# Patient Record
Sex: MALE | Race: WHITE | NOT HISPANIC OR LATINO | Employment: FULL TIME | URBAN - METROPOLITAN AREA
[De-identification: names, ages, dates, MRNs, and addresses within clinical notes are randomized per-mention and may not be internally consistent; named-entity substitution may affect disease eponyms.]

---

## 2017-02-18 ENCOUNTER — LAB CONVERSION - ENCOUNTER (OUTPATIENT)
Dept: OTHER | Facility: OTHER | Age: 62
End: 2017-02-18

## 2017-02-18 LAB — PROSTATE SPECIFIC ANTIGEN TOTAL (HISTORICAL): <0.1 NG/ML

## 2017-03-23 ENCOUNTER — ANESTHESIA EVENT (OUTPATIENT)
Dept: GASTROENTEROLOGY | Facility: HOSPITAL | Age: 62
End: 2017-03-23

## 2017-03-23 ENCOUNTER — ANESTHESIA (OUTPATIENT)
Dept: GASTROENTEROLOGY | Facility: HOSPITAL | Age: 62
End: 2017-03-23

## 2017-04-04 ENCOUNTER — ALLSCRIPTS OFFICE VISIT (OUTPATIENT)
Dept: OTHER | Facility: OTHER | Age: 62
End: 2017-04-04

## 2017-05-08 ENCOUNTER — ANESTHESIA EVENT (OUTPATIENT)
Dept: GASTROENTEROLOGY | Facility: HOSPITAL | Age: 62
End: 2017-05-08
Payer: COMMERCIAL

## 2017-05-09 ENCOUNTER — ANESTHESIA (OUTPATIENT)
Dept: GASTROENTEROLOGY | Facility: HOSPITAL | Age: 62
End: 2017-05-09
Payer: COMMERCIAL

## 2017-05-09 ENCOUNTER — HOSPITAL ENCOUNTER (OUTPATIENT)
Facility: HOSPITAL | Age: 62
Setting detail: OUTPATIENT SURGERY
Discharge: HOME | End: 2017-05-09
Attending: INTERNAL MEDICINE | Admitting: INTERNAL MEDICINE
Payer: COMMERCIAL

## 2017-05-09 ENCOUNTER — GENERIC CONVERSION - ENCOUNTER (OUTPATIENT)
Dept: OTHER | Facility: OTHER | Age: 62
End: 2017-05-09

## 2017-05-09 VITALS
HEART RATE: 49 BPM | DIASTOLIC BLOOD PRESSURE: 82 MMHG | BODY MASS INDEX: 42.66 KG/M2 | WEIGHT: 315 LBS | SYSTOLIC BLOOD PRESSURE: 150 MMHG | RESPIRATION RATE: 18 BRPM | TEMPERATURE: 97.5 F | OXYGEN SATURATION: 100 % | HEIGHT: 72 IN

## 2017-05-09 DIAGNOSIS — K63.5 POLYP OF COLON: ICD-10-CM

## 2017-05-09 PROCEDURE — 88305 TISSUE EXAM BY PATHOLOGIST: CPT | Performed by: INTERNAL MEDICINE

## 2017-05-09 RX ORDER — OMEPRAZOLE 20 MG/1
20 CAPSULE, DELAYED RELEASE ORAL AS NEEDED
COMMUNITY

## 2017-05-09 RX ORDER — SODIUM CHLORIDE, SODIUM LACTATE, POTASSIUM CHLORIDE, CALCIUM CHLORIDE 600; 310; 30; 20 MG/100ML; MG/100ML; MG/100ML; MG/100ML
100 INJECTION, SOLUTION INTRAVENOUS CONTINUOUS
Status: DISCONTINUED | OUTPATIENT
Start: 2017-05-09 | End: 2017-05-09 | Stop reason: HOSPADM

## 2017-05-09 RX ORDER — LIDOCAINE HYDROCHLORIDE 10 MG/ML
INJECTION, SOLUTION INFILTRATION; PERINEURAL AS NEEDED
Status: DISCONTINUED | OUTPATIENT
Start: 2017-05-09 | End: 2017-05-09 | Stop reason: SURG

## 2017-05-09 RX ORDER — TRAMADOL HYDROCHLORIDE 50 MG/1
50 TABLET ORAL EVERY 6 HOURS PRN
Status: ON HOLD | COMMUNITY
End: 2017-05-09 | Stop reason: ALTCHOICE

## 2017-05-09 RX ORDER — PROPOFOL 10 MG/ML
INJECTION, EMULSION INTRAVENOUS AS NEEDED
Status: DISCONTINUED | OUTPATIENT
Start: 2017-05-09 | End: 2017-05-09 | Stop reason: SURG

## 2017-05-09 RX ORDER — LISINOPRIL 40 MG/1
20 TABLET ORAL DAILY
COMMUNITY
End: 2021-04-23 | Stop reason: CLARIF

## 2017-05-09 RX ORDER — ALLOPURINOL 100 MG/1
100 TABLET ORAL DAILY
COMMUNITY
End: 2021-06-28 | Stop reason: ALTCHOICE

## 2017-05-09 RX ORDER — TADALAFIL 5 MG/1
5 TABLET ORAL DAILY PRN
COMMUNITY
End: 2021-06-28 | Stop reason: ALTCHOICE

## 2017-05-09 RX ORDER — ASPIRIN 81 MG/1
81 TABLET, CHEWABLE ORAL DAILY
COMMUNITY
End: 2021-06-28 | Stop reason: ALTCHOICE

## 2017-05-09 RX ORDER — SIMVASTATIN 80 MG
40 TABLET ORAL
COMMUNITY
End: 2021-04-23 | Stop reason: SDUPTHER

## 2017-05-09 RX ORDER — OMEGA-3 FATTY ACIDS CAP DELAYED RELEASE 1000 MG 1000 MG
1000 CAPSULE DELAYED RELEASE ORAL DAILY
COMMUNITY

## 2017-05-09 RX ORDER — FEXOFENADINE HCL 180 MG/1
180 TABLET ORAL DAILY PRN
COMMUNITY

## 2017-05-09 RX ADMIN — PROPOFOL 50 MG: 10 INJECTION, EMULSION INTRAVENOUS at 14:01

## 2017-05-09 RX ADMIN — PROPOFOL 50 MG: 10 INJECTION, EMULSION INTRAVENOUS at 13:59

## 2017-05-09 RX ADMIN — PROPOFOL 20 MG: 10 INJECTION, EMULSION INTRAVENOUS at 13:55

## 2017-05-09 RX ADMIN — SODIUM CHLORIDE, SODIUM LACTATE, POTASSIUM CHLORIDE, AND CALCIUM CHLORIDE 100 ML/HR: .6; .31; .03; .02 INJECTION, SOLUTION INTRAVENOUS at 13:20

## 2017-05-09 RX ADMIN — PROPOFOL 100 MG: 10 INJECTION, EMULSION INTRAVENOUS at 13:53

## 2017-05-09 RX ADMIN — PROPOFOL 30 MG: 10 INJECTION, EMULSION INTRAVENOUS at 13:57

## 2017-05-09 RX ADMIN — PROPOFOL 20 MG: 10 INJECTION, EMULSION INTRAVENOUS at 13:54

## 2017-05-09 RX ADMIN — LIDOCAINE HYDROCHLORIDE 50 MG: 10 INJECTION, SOLUTION INFILTRATION; PERINEURAL at 13:53

## 2017-05-15 ENCOUNTER — GENERIC CONVERSION - ENCOUNTER (OUTPATIENT)
Dept: OTHER | Facility: OTHER | Age: 62
End: 2017-05-15

## 2017-08-23 ENCOUNTER — LAB CONVERSION - ENCOUNTER (OUTPATIENT)
Dept: OTHER | Facility: OTHER | Age: 62
End: 2017-08-23

## 2017-08-23 LAB — PROSTATE SPECIFIC ANTIGEN TOTAL (HISTORICAL): <0.1 NG/ML

## 2017-10-01 DIAGNOSIS — C61 MALIGNANT NEOPLASM OF PROSTATE (HCC): ICD-10-CM

## 2017-10-10 ENCOUNTER — ALLSCRIPTS OFFICE VISIT (OUTPATIENT)
Dept: OTHER | Facility: OTHER | Age: 62
End: 2017-10-10

## 2018-01-09 NOTE — RESULT NOTES
Discussion/Summary    Colon polyp removed came back as benign hyperplastic  Repeat colonoscopy in 5 years    DISCUSSED WITH PATIENT, REMINDER SET  CS         Verified Results  (1) TISSUE EXAM [id number] 02:00PM Dana Jimenez     Test Name Result Flag Reference   LAB AP CASE REPORT (Report)     Surgical Pathology Report             Case: [id number]                   Authorizing Provider: Merna John MD     Collected:      05/09/2017 1400        Ordering Location:   [facility]    Received:      05/09/2017 [facility]                               Pathologist:      Eliza Ybarra DO                               Specimen:  Large Intestine, Left/Descending Colon, Cold Bx Polypectomy Descending colon   LAB AP FINAL DIAGNOSIS (Report)     A  Colon, descending polyp, polypectomy:  - Hyperplastic polyp   - Benign polypoid fragment of colonic mucosa with submucosal lymphoid   aggregate  Interpretation performed at [facility], [place] [address]  Electronically signed by Eliza Ybarra DO on 5/10/2017 at 12:29 PM   LAB AP SURGICAL ADDITIONAL INFORMATION (Report)     These tests were developed and their performance characteristics   determined by [facility] or [email or web address]  They may not be cleared or approved by the [place]  Food and   Drug Administration  The FDA has determined that such clearance or   approval is not necessary  These tests are used for clinical purposes  They should not be regarded as investigational or for research  This   laboratory has been approved by CLIA 88, designated as a high-complexity   laboratory and is qualified to perform these tests  LAB AP GROSS DESCRIPTION (Report)     A  The specimen is received in formalin, labeled with the patient's name   and hospital number, and is designated polypectomy descending colon     The specimen consists of 2 tan soft tissue fragments measuring 0 2 and 0 3   cm  Entirely submitted  One cassette  Note: The estimated total formalin fixation time based upon information   provided by the submitting clinician and the standard processing schedule   is 14 5 hours      MAC

## 2018-01-13 VITALS
SYSTOLIC BLOOD PRESSURE: 128 MMHG | DIASTOLIC BLOOD PRESSURE: 80 MMHG | BODY MASS INDEX: 42.66 KG/M2 | HEIGHT: 72 IN | WEIGHT: 315 LBS | HEART RATE: 74 BPM

## 2018-01-14 VITALS
SYSTOLIC BLOOD PRESSURE: 130 MMHG | HEART RATE: 50 BPM | DIASTOLIC BLOOD PRESSURE: 68 MMHG | BODY MASS INDEX: 39.14 KG/M2 | HEIGHT: 72 IN | WEIGHT: 289 LBS

## 2018-01-16 NOTE — MISCELLANEOUS
Message  GI Reminder Recall Trent Coyle:   Date: 12/19/2016   Dear Mose Duverney BARROWS:     Review of our records shows you are due for the following: Colonoscopy  Please call the following office to schedule your appointment:   [address] [facility], [address], [address] [place] [address]  [phone]  We look forward to hearing from you!      Sincerely,         [facility]  Dr Erna Beckett    Electronically signed by : Wendi Castrejon, ; Dec 19 2016  9:29AM EST                       (Author)

## 2018-01-16 NOTE — PROGRESS NOTES
Assessment    1  Prostate cancer (185) (C61)    Plan  Prostate cancer    · (1) PSA, DIAGNOSTIC (FOLLOW-UP); Status:Active; Requested for:[id number];    Perform:[facility]; EDUARD:[id number]; Ordered; For:Prostate cancer; Ordered By:Edouard Blunt;   · Urine Dip Non-Automated- POC; Status:Complete - Retrospective By Protocol  Authorization;   Done: [id number] 08:44AM   Performed: In Office; Due:56Pyc8549; Last Updated By:Silke Flor; 8/26/2016 8:43:30 AM;Ordered; For:Prostate cancer; Ordered By:Edouard Blunt;   · Follow-up visit in 6 months Evaluation and Treatment  Follow-up  Status: Hold For -  Scheduling  Requested for: 04Wib4694   Ordered; For: Prostate cancer; Ordered By: Fatemeh Herndon Performed:  Due: 53Oxb9447    Discussion/Summary  Discussion Summary:   Radha Garcia is a 64year old gentleman here for follow up s/p DVP 11/2012 for his [place] 6 T2C prostate cancer managed by Dr Jen Lyle  PSA remains undetectable  Reviewed Kegel exercises for his leakage  Discussed dietary and behavioral modifications for his nocturia  Viagra samples given  Follow up in 6 months with a PSA prior to the visit  Medication SE Review and Pt Understands Tx: Possible side effects of new medications were reviewed with the patient/guardian today  Understands and agrees with treatment plan: The treatment plan was reviewed with the patient/guardian  The patient/guardian understands and agrees with the treatment plan      Chief Complaint  Chief Complaint Free Text Note Form: CAP S/P DVP (11/2012); ED  PSA= ,0 1 (8/24/16)      History of Present Illness  HPI: Radha Garcia is a 64year old gentleman here for follow up s/p DVP 11/2012 for his [place] 6 T2C prostate cancer  PSAs have been undetectable  Denies any hematuria or dysuria  Complains of nocturia 1-3 but does drink iced tea at dinner  Feels empty when voids and stream is good  Has some rare stress incontinence but does not wear pads  Occasionally has some post void dribbling   No interval health changes  Review of Systems  Complete-Male Urology:   Constitutional: No fever or chills, feels well, no tiredness, no recent weight gain or weight loss  Respiratory: No complaints of shortness of breath, no wheezing, no cough, no SOB on exertion, no orthopnea or PND  Cardiovascular: No complaints of slow heart rate, no fast heart rate, no chest pain, no palpitations, no leg claudication, no lower extremity  Gastrointestinal: No complaints of abdominal pain, no constipation, no nausea or vomiting, no diarrhea or bloody stools  Genitourinary: Empty sensation, feelings of urinary urgency and stream quality good, but no dysuria, no urinary hesitancy, no hematuria and no incontinence    The patient presents with complaints of nocturia (1-3 TIMES A NIGHT )  Musculoskeletal: No complaints of arthralgia, no myalgias, no joint swelling or stiffness, no limb pain or swelling  Integumentary: No complaints of skin rash or skin lesions, no itching, no skin wound, no dry skin  Hematologic/Lymphatic: No complaints of swollen glands, no swollen glands in the neck, does not bleed easily, no easy bruising  Neurological: No compliants of headache, no confusion, no convulsions, no numbness or tingling, no dizziness or fainting, no limb weakness, no difficulty walking  ROS Reviewed:   ROS reviewed  Active Problems    1  Adenomatous colon polyp (211 3) (D12 6)   2  Arthritis (716 90) (M19 90)   3  Organic impotence (607 84) (N52 9)   4  Prostate cancer (185) (C61)    Past Medical History    1  History of Reported Prostate Antigen Blood Test  Active Problems And Past Medical History Reviewed: The active problems and past medical history were reviewed and updated today  Surgical History    1  History of Needle Biopsy Of Prostate   2  History of Prostatectomy Robotic-Assisted  Surgical History Reviewed: The surgical history was reviewed and updated today  Family History  Mother    1  Family history of Brain Cancer (V16 8)  Father    2  Family history of Heart Disease (V17 49)  Family History Reviewed: The family history was reviewed and updated today  Social History    · Former smoker (F28 97) (I82 385)  Social History Reviewed: The social history was reviewed and updated today  The social history was reviewed and is unchanged  Current Meds   1  Adult Aspirin EC Low Strength 81 MG Oral Tablet Delayed Release Recorded   2  [employer] Vitamin B-12 TABS Recorded   3  [employer] Vitamin C 500 MG TABS Recorded   4  Allopurinol 100 MG Oral Tablet Recorded   5  Cialis 20 MG Oral Tablet; Take as directed; Therapy: 15JWH3400 to (Evaluate:20Jan2017); Last Rx:26Jan2016 Ordered   6  Daily Multiple Vitamins TABS Recorded   7  Fexofenadine HCl - 180 MG Oral Tablet Recorded   8  Fish Oil CAPS Recorded   9  Lisinopril 40 MG Oral Tablet; Therapy: 39QVM5443 to (Evaluate:96Wbw6683) Recorded   10  Omeprazole 20 MG Oral Capsule Delayed Release; Therapy: 38VOT8175 to (Evaluate:62Bbm0780) Recorded   11  Simvastatin 80 MG Oral Tablet Recorded  Medication List Reviewed: The medication list was reviewed and updated today  Allergies    1  Penicillins    Vitals  Vital Signs    Recorded: 10SLS5935 09:77HR   Systolic 960   Diastolic 86   Heart Rate 64   Height 6 ft    Weight 345 lb    BMI Calculated 46 79   BSA Calculated 2 69     Physical Exam    Constitutional   General appearance: Abnormal   obese  Head and Face   Head and face: Normal     Eyes   Conjunctiva and lids: No erythema, swelling or discharge  Ears, Nose, Mouth, and Throat   Hearing: Normal     Pulmonary   Respiratory effort: No increased work of breathing or signs of respiratory distress  Cardiovascular   Examination of extremities for edema and/or varicosities: Normal     Abdomen   Abdomen: Abnormal   obese     Musculoskeletal   Gait and station: Normal     Skin   Skin and subcutaneous tissue: Normal without rashes or lesions  Neurologic   Cranial nerves: Cranial nerves 2-12 intact  Psychiatric   Judgment and insight: Normal     Orientation to person, place and time: Normal     Recent and remote memory: Intact  Mood and affect: Normal        Results/Data  Urine Dip Non-Automated- POC 75Gib9606 08:44AM Yonatan Mauritian     Test Name Result Flag Reference   Color Yellow     Clarity Hazy     Leukocytes neg     Nitrite neg     Blood neg     Protein 30     Ph 6 0     Specific Gravity 1 025     Ketone neg     Glucose neg       (1) PSA, DIAGNOSTIC (FOLLOW-UP) 91Vdr7568 10:19AM Julia Dudley Order Number: [id number]     Test Name Result Flag Reference   PSA <0 1 ng/mL  0 0-4 0     Future Appointments    Date/Time Provider Specialty Site   02/27/2017 08:45 AM [facility] [address]     Signatures   Electronically signed by : Isaiah Dominguez;  Aug 26 2016  9:09AM EST                       (Author)    Electronically signed by : DEREK Brown ; Aug 26 2016  4:05PM EST

## 2018-10-10 DIAGNOSIS — C61 MALIGNANT NEOPLASM OF PROSTATE (HCC): ICD-10-CM

## 2018-11-01 ENCOUNTER — TRANSCRIBE ORDERS (OUTPATIENT)
Dept: LAB | Facility: CLINIC | Age: 63
End: 2018-11-01

## 2018-11-01 ENCOUNTER — APPOINTMENT (OUTPATIENT)
Dept: LAB | Facility: CLINIC | Age: 63
End: 2018-11-01
Payer: COMMERCIAL

## 2018-11-01 DIAGNOSIS — C61 PROSTATE CANCER (HCC): ICD-10-CM

## 2018-11-01 DIAGNOSIS — C61 PROSTATE CANCER (HCC): Primary | ICD-10-CM

## 2018-11-01 LAB — PSA SERPL-MCNC: <0.1 NG/ML (ref 0–4)

## 2018-11-01 PROCEDURE — 84153 ASSAY OF PSA TOTAL: CPT

## 2018-11-05 ENCOUNTER — OFFICE VISIT (OUTPATIENT)
Dept: UROLOGY | Facility: CLINIC | Age: 63
End: 2018-11-05
Payer: COMMERCIAL

## 2018-11-05 VITALS
DIASTOLIC BLOOD PRESSURE: 78 MMHG | BODY MASS INDEX: 39.28 KG/M2 | HEART RATE: 75 BPM | WEIGHT: 290 LBS | HEIGHT: 72 IN | SYSTOLIC BLOOD PRESSURE: 122 MMHG

## 2018-11-05 DIAGNOSIS — C61 PROSTATE CANCER (HCC): Primary | ICD-10-CM

## 2018-11-05 DIAGNOSIS — N52.31 ERECTILE DYSFUNCTION AFTER RADICAL PROSTATECTOMY: ICD-10-CM

## 2018-11-05 PROCEDURE — 99213 OFFICE O/P EST LOW 20 MIN: CPT | Performed by: PHYSICIAN ASSISTANT

## 2018-11-05 RX ORDER — SILDENAFIL CITRATE 20 MG/1
TABLET ORAL
Qty: 30 TABLET | Refills: 6 | Status: SHIPPED | OUTPATIENT
Start: 2018-11-05 | End: 2021-06-28 | Stop reason: ALTCHOICE

## 2018-11-05 NOTE — PROGRESS NOTES
1  Prostate cancer ([facility] [address] )  PSA Total, Diagnostic   2  Erectile dysfunction after radical prostatectomy  Impotence Aid Device (ERECAID CLASSIC) KIT    sildenafil (REVATIO) 20 mg tablet     Assessment and plan:       1  Gl 6 T2C prostate cancer s/p DVP 11/2012 managed by Dr Janeen Montoya  - PSA undetectable  - f/u 1 year PSA prior to visit    2  Erectile dysfunction   - discussed continuing Viagra 100mg, adding Vacuum assisted device, versus intracavernosal injections  Patient wishes to try a vacuum  RX provided and refills of Sildenafil sent to pharmacy  Fantasma Ellis PA-C      Chief Complaint     Chief Complaint   Patient presents with    Prostate Cancer     S/P DVP 2012    Erectile Dysfunction       History of Present Illness     Leona Marcos is a 63 y o  male patient of Dr Janeen Montoya with a history of [place] 6 T2C prostate cancer s/p DVP 11/2012 presenting for follow up  PSA remains undetactable at <0 1  Patient utilizes Viagra 100mg PRN erectile dysfunction  Is worried about trimix injections as he has a buried penis and does not have good visualization for injections  Patient overall comfortable with urination  Has rare stress incontinence  Denies dysuria, gross hematuria, suprapubic pressure, bone pain, or weight loss  Laboratory     Lab Results   Component Value Date    PSA <0 1 11/01/2018    PSA <0 1 08/24/2016    PSA <0 1 01/25/2016       Review of Systems     Review of Systems   Constitutional: Negative for activity change, appetite change, chills, diaphoresis, fatigue, fever and unexpected weight change  Respiratory: Negative for chest tightness and shortness of breath  Cardiovascular: Negative for chest pain, palpitations and leg swelling  Gastrointestinal: Negative for abdominal distention, abdominal pain, constipation, diarrhea, nausea and vomiting     Genitourinary: Negative for decreased urine volume, difficulty urinating, dysuria, enuresis, flank pain, frequency, genital sores, hematuria and urgency  Musculoskeletal: Negative for back pain, gait problem and myalgias  Skin: Negative for color change, pallor, rash and wound  Psychiatric/Behavioral: Negative for behavioral problems  The patient is not nervous/anxious  AUA SYMPTOM SCORE      Most Recent Value   AUA SYMPTOM SCORE   How often have you had a sensation of not emptying your bladder completely after you finished urinating? 0   How often have you had to urinate again less than two hours after you finished urinating? 4   How often have you found you stopped and started again several times when you urinate?  0   How often have you found it difficult to postpone urination? 2   How often have you had a weak urinary stream?  0   How often have you had to push or strain to begin urination? 0   How many times did you most typically get up to urinate from the time you went to bed at night until the time you got up in the morning? 2   Quality of Life: If you were to spend the rest of your life with your urinary condition just the way it is now, how would you feel about that?  1   AUA SYMPTOM SCORE  8          Allergies     Allergies   Allergen Reactions    Penicillins Rash     sensitivity       Physical Exam     Physical Exam   Constitutional: He is oriented to person, place, and time  He appears well-developed and well-nourished  No distress  HENT:   Head: Normocephalic and atraumatic  Eyes: Conjunctivae are normal    Neck: Normal range of motion  No tracheal deviation present  Pulmonary/Chest: Effort normal    Abdominal:   obese   Musculoskeletal: Normal range of motion  He exhibits no edema or deformity  Neurological: He is alert and oriented to person, place, and time  Skin: Skin is warm and dry  No rash noted  He is not diaphoretic  No erythema  Psychiatric: He has a normal mood and affect   His behavior is normal          Vital Signs     Vitals:    11/05/18 0908   BP: 122/78   BP Location: Left arm   Patient Position: Sitting   Cuff Size: Standard   Pulse: 75   Weight: 132 kg (290 lb)   Height: 6' (1 829 m)         Current Medications       Current Outpatient Prescriptions:     allopurinol (ZYLOPRIM) 100 mg tablet, Take 100 mg by mouth daily, Disp: , Rfl:     Ascorbic Acid (VITAMIN C PO), Take 1,000 mg by mouth daily, Disp: , Rfl:     Cyanocobalamin (VITAMIN B-12 PO), Take 1,000 mg by mouth daily, Disp: , Rfl:     fexofenadine (ALLEGRA) 180 MG tablet, Take 180 mg by mouth daily as needed, Disp: , Rfl:     lisinopril (ZESTRIL) 40 mg tablet, Take 20 mg by mouth daily  , Disp: , Rfl:     Multiple Vitamins-Minerals (MULTIVITAL PO), Take 1 tablet by mouth, Disp: , Rfl:     Omega-3 Fatty Acids (FISH OIL) 1000 MG CPDR, Take 1,000 mg by mouth daily, Disp: , Rfl:     omeprazole (PriLOSEC) 20 mg delayed release capsule, Take 20 mg by mouth as needed, Disp: , Rfl:     simvastatin (ZOCOR) 80 mg tablet, Take 40 mg by mouth daily at bedtime  , Disp: , Rfl:     tadalafil (CIALIS) 5 MG tablet, Take 5 mg by mouth daily as needed for erectile dysfunction, Disp: , Rfl:     aspirin 81 mg chewable tablet, Chew 81 mg daily, Disp: , Rfl:     Impotence Aid Device (ERECAID CLASSIC) KIT, by Does not apply route as needed (erectile dysfunction), Disp: 1 each, Rfl: 1    sildenafil (REVATIO) 20 mg tablet, Take 3-5 tablets PO PRN erectile dysfunction, Disp: 30 tablet, Rfl: 6      Active Problems     Patient Active Problem List   Diagnosis    Prostate cancer ([facility] [address] )    Erectile dysfunction after radical prostatectomy         Past Medical History     Past Medical History:   Diagnosis Date    GERD (gastroesophageal reflux disease)     Hyperlipidemia     Hypertension          Surgical History     Past Surgical History:   Procedure Laterality Date    HIP SURGERY Bilateral     CA COLONOSCOPY FLX DX W/COLLJ SPEC WHEN PFRMD N/A 5/9/2017    Procedure: COLONOSCOPY;  Surgeon: Radha Juarez MD;  [facility];   Service: Gastroenterology    PROSTATECTOMY           Family History     History reviewed  No pertinent family history        Social History     Social History       Radiology

## 2019-02-24 ENCOUNTER — HOSPITAL ENCOUNTER (EMERGENCY)
Facility: HOSPITAL | Age: 64
Discharge: HOME | End: 2019-02-24
Attending: EMERGENCY MEDICINE
Payer: COMMERCIAL

## 2019-02-24 ENCOUNTER — APPOINTMENT (EMERGENCY)
Dept: RADIOLOGY | Facility: HOSPITAL | Age: 64
End: 2019-02-24
Payer: COMMERCIAL

## 2019-02-24 VITALS
HEART RATE: 62 BPM | SYSTOLIC BLOOD PRESSURE: 188 MMHG | TEMPERATURE: 98.8 F | RESPIRATION RATE: 18 BRPM | DIASTOLIC BLOOD PRESSURE: 86 MMHG | OXYGEN SATURATION: 98 %

## 2019-02-24 DIAGNOSIS — S62.609A FINGER FRACTURE: Primary | ICD-10-CM

## 2019-02-24 DIAGNOSIS — S61.214A LACERATION OF RIGHT RING FINGER WITHOUT FOREIGN BODY WITHOUT DAMAGE TO NAIL, INITIAL ENCOUNTER: ICD-10-CM

## 2019-02-24 PROCEDURE — 90471 IMMUNIZATION ADMIN: CPT

## 2019-02-24 PROCEDURE — 73130 X-RAY EXAM OF HAND: CPT

## 2019-02-24 PROCEDURE — 90715 TDAP VACCINE 7 YRS/> IM: CPT | Performed by: PHYSICIAN ASSISTANT

## 2019-02-24 PROCEDURE — 99283 EMERGENCY DEPT VISIT LOW MDM: CPT

## 2019-02-24 RX ORDER — GINSENG 100 MG
1 CAPSULE ORAL ONCE
Status: COMPLETED | OUTPATIENT
Start: 2019-02-24 | End: 2019-02-24

## 2019-02-24 RX ORDER — LIDOCAINE HYDROCHLORIDE 10 MG/ML
5 INJECTION, SOLUTION EPIDURAL; INFILTRATION; INTRACAUDAL; PERINEURAL ONCE
Status: COMPLETED | OUTPATIENT
Start: 2019-02-24 | End: 2019-02-24

## 2019-02-24 RX ORDER — ACETAMINOPHEN 325 MG/1
650 TABLET ORAL ONCE
Status: COMPLETED | OUTPATIENT
Start: 2019-02-24 | End: 2019-02-24

## 2019-02-24 RX ADMIN — BACITRACIN ZINC 1 SMALL APPLICATION: 500 OINTMENT TOPICAL at 20:06

## 2019-02-24 RX ADMIN — LIDOCAINE HYDROCHLORIDE 5 ML: 10 INJECTION, SOLUTION EPIDURAL; INFILTRATION; INTRACAUDAL; PERINEURAL at 19:00

## 2019-02-24 RX ADMIN — ACETAMINOPHEN 650 MG: 325 TABLET, FILM COATED ORAL at 20:05

## 2019-02-24 RX ADMIN — TETANUS TOXOID, REDUCED DIPHTHERIA TOXOID AND ACELLULAR PERTUSSIS VACCINE, ADSORBED 0.5 ML: 5; 2.5; 8; 8; 2.5 SUSPENSION INTRAMUSCULAR at 19:01

## 2019-02-25 NOTE — ED PROVIDER NOTES
History  Chief Complaint   Patient presents with    Finger Injury     Patient fell "while showing off" and injured right ring finger  "I think the nail bed might be off  I fell by a cart, and pinched my finger when I got up "      28-year-old male presenting for laceration of the finger  Patient reports he was playing on the back of a shopping cart when the cart fell over crushing his right ring finger  Patient is left hand dominant  He states that he wrapped the area immediately with a napkin is unsure what the laceration looks like at this time  He denies any numbness tingling or weakness  He states he is unsure of tetanus status  Prior to Admission Medications   Prescriptions Last Dose Informant Patient Reported? Taking?    Ascorbic Acid (VITAMIN C PO)  Self Yes No   Sig: Take 1,000 mg by mouth daily   Cyanocobalamin (VITAMIN B-12 PO)  Self Yes No   Sig: Take 1,000 mg by mouth daily   Impotence Aid Device (ERECAID CLASSIC) KIT   No No   Sig: by Does not apply route as needed (erectile dysfunction)   Multiple Vitamins-Minerals (MULTIVITAL PO)  Self Yes No   Sig: Take 1 tablet by mouth   Omega-3 Fatty Acids (FISH OIL) 1000 MG CPDR  Self Yes No   Sig: Take 1,000 mg by mouth daily   allopurinol (ZYLOPRIM) 100 mg tablet  Self Yes No   Sig: Take 100 mg by mouth daily   aspirin 81 mg chewable tablet  Self Yes No   Sig: Chew 81 mg daily   fexofenadine (ALLEGRA) 180 MG tablet  Self Yes No   Sig: Take 180 mg by mouth daily as needed   lisinopril (ZESTRIL) 40 mg tablet  Self Yes No   Sig: Take 20 mg by mouth daily     omeprazole (PriLOSEC) 20 mg delayed release capsule  Self Yes No   Sig: Take 20 mg by mouth as needed   sildenafil (REVATIO) 20 mg tablet   No No   Sig: Take 3-5 tablets PO PRN erectile dysfunction   simvastatin (ZOCOR) 80 mg tablet  Self Yes No   Sig: Take 40 mg by mouth daily at bedtime     tadalafil (CIALIS) 5 MG tablet  Self Yes No   Sig: Take 5 mg by mouth daily as needed for erectile dysfunction      Facility-Administered Medications: None       Past Medical History:   Diagnosis Date    GERD (gastroesophageal reflux disease)     Hyperlipidemia     Hypertension        Past Surgical History:   Procedure Laterality Date    HIP SURGERY Bilateral     MS COLONOSCOPY FLX DX W/COLLJ SPEC WHEN PFRMD N/A 2017    Procedure: COLONOSCOPY;  Surgeon: Esther Bazan MD;  [facility]; Service: Gastroenterology    PROSTATECTOMY         History reviewed  No pertinent family history  I have reviewed and agree with the history as documented  Social History     Tobacco Use    Smoking status: Former Smoker     Last attempt to quit: 1992     Years since quittin 8    Smokeless tobacco: Never Used   Substance Use Topics    Alcohol use: Yes     Comment: rarely    Drug use: No        Review of Systems   All other systems reviewed and are negative  Physical Exam  Physical Exam   Constitutional: He is oriented to person, place, and time  He appears well-developed and well-nourished  No distress  HENT:   Head: Normocephalic and atraumatic  Eyes: Conjunctivae are normal    EOM grossly intact   Neck: Normal range of motion  Neck supple  No JVD present  Cardiovascular: Normal rate  Pulmonary/Chest: Effort normal    Abdominal: Soft  Musculoskeletal:        Hands:  FROM right upper extremity, steady gait, cap refill brisk, strength and sensation intact throughout, radial and ulnar pulses intact right upper extremity   Neurological: He is alert and oriented to person, place, and time  Skin: Skin is warm and dry  Capillary refill takes less than 2 seconds  Psychiatric: He has a normal mood and affect  His behavior is normal    Nursing note and vitals reviewed        Vital Signs  ED Triage Vitals   Temperature Pulse Respirations Blood Pressure SpO2   19 1809 19 1808 19 1808 19 1808 19 180   98 8 °F (37 1 °C) 62 18 (!) 188/86 98 %      Temp Source Heart Rate Source Patient Position - Orthostatic VS BP Location FiO2 (%)   02/24/19 1809 02/24/19 1808 02/24/19 1808 02/24/19 1808 --   Oral Monitor Sitting Left arm       Pain Score       02/24/19 1808       6           Vitals:    02/24/19 1808   BP: (!) 188/86   Pulse: 62   Patient Position - Orthostatic VS: Sitting       Visual Acuity      ED Medications  Medications   tetanus-diphtheria-acellular pertussis (BOOSTRIX) IM injection 0 5 mL (0 5 mL Intramuscular Given 2/24/19 1901)   lidocaine (PF) (XYLOCAINE-MPF) 1 % injection 5 mL (5 mL Infiltration Given by Other 2/24/19 1900)   bacitracin topical ointment 1 small application (1 small application Topical Given 2/24/19 2006)   acetaminophen (TYLENOL) tablet 650 mg (650 mg Oral Given 2/24/19 2005)       Diagnostic Studies  Results Reviewed     None                 XR hand 3+ views RIGHT    (Results Pending)              Procedures  Lac Repair  Date/Time: 2/24/2019 10:13 PM  Performed by: Mandi Lopez PA-C  Authorized by: Mandi Lopez PA-C   Consent: Verbal consent obtained  Risks and benefits: risks, benefits and alternatives were discussed  Consent given by: patient  Required items: required blood products, implants, devices, and special equipment available  Patient identity confirmed: verbally with patient  Time out: Immediately prior to procedure a "time out" was called to verify the correct patient, procedure, equipment, support staff and site/side marked as required    Body area: upper extremity  Location details: right ring finger  Laceration length: 2 cm  Foreign bodies: no foreign bodies  Tendon involvement: none  Nerve involvement: none  Anesthesia: local infiltration and digital block    Anesthesia:  Local Anesthetic: lidocaine 1% without epinephrine  Anesthetic total: 4 mL    Sedation:  Patient sedated: no      Wound Dehiscence:  Superficial Wound Dehiscence: simple closure      Procedure Details:  Irrigation solution: saline  Amount of cleaning: standard  Debridement: none  Degree of undermining: none  Skin closure: 5-0 nylon  Number of sutures: 4  Technique: simple  Approximation: close  Approximation difficulty: simple  Dressing: 4x4 sterile gauze and splint  Patient tolerance: Patient tolerated the procedure well with no immediate complications  Comments: Pt nail partially avulsed, placed back under cuticle, nail bed uninterrupted             Phone Contacts  ED Phone Contact    ED Course                               MDM  Number of Diagnoses or Management Options  Finger fracture:   Laceration of right ring finger without foreign body without damage to nail, initial encounter:   Diagnosis management comments: 72-year-old male presenting for evaluation laceration, x-ray did show a lucency over the distal aspect of the right ring finger likely fracture, patient's laceration was repaired with 4 5-0 simple interrupted sutures, patient was advised to return in 7-10 days for suture removal, tetanus was updated at this time, he is distally neurovascularly intact, follow up with PCP for any further complaints    All imaging discussed with patient, strict return to ED precautions discussed  Pt verbalizes understanding and agrees with plan  Pt is stable for discharge    Portions of the record may have been created with voice recognition software  Occasional wrong word or "sound a like" substitutions may have occurred due to the inherent limitations of voice recognition software  Read the chart carefully and recognize, using context, where substitutions have occurred          Disposition  Final diagnoses:   Finger fracture   Laceration of right ring finger without foreign body without damage to nail, initial encounter     Time reflects when diagnosis was documented in both MDM as applicable and the Disposition within this note     Time User Action Codes Description Comment    2/24/2019  7:56 PM Griffin Madrigal Add [[id number]] Finger fracture 2/24/2019  7:56 PM Rada Libman Add [[id number]] Laceration of right ring finger without foreign body without damage to nail, initial encounter       ED Disposition     ED Disposition Condition Date/Time Comment    Discharge Stable Sun Feb 24, 2019  7:56 PM [address] discharge to home/self care              Follow-up Information     Follow up With Specialties Details Why Contact Info Additional Information    Carmen Muñoz MD Internal Medicine Schedule an appointment as soon as possible for a visit  As needed [address]  Emergency Department Emergency Medicine In 7 days For suture removal [address]  [phone] [facility], [address] [facility], [facility]    Antonette Russell MD Orthopedic Surgery Schedule an appointment as soon as possible for a visit  As needed [address]  [phone]             Discharge Medication List as of 2/24/2019  7:57 PM      CONTINUE these medications which have NOT CHANGED    Details   allopurinol (ZYLOPRIM) 100 mg tablet Take 100 mg by mouth daily, Historical Med      Ascorbic Acid (VITAMIN C PO) Take 1,000 mg by mouth daily, Historical Med      aspirin 81 mg chewable tablet Chew 81 mg daily, Historical Med      Cyanocobalamin (VITAMIN B-12 PO) Take 1,000 mg by mouth daily, Historical Med      fexofenadine (ALLEGRA) 180 MG tablet Take 180 mg by mouth daily as needed, Historical Med      Impotence Aid Device (ERECAID CLASSIC) KIT by Does not apply route as needed (erectile dysfunction), Starting Mon 11/5/2018, Print      lisinopril (ZESTRIL) 40 mg tablet Take 20 mg by mouth daily  , Historical Med      Multiple Vitamins-Minerals (MULTIVITAL PO) Take 1 tablet by mouth, Historical Med      Omega-3 Fatty Acids (FISH OIL) 1000 MG CPDR Take 1,000 mg by mouth daily, Historical Med      omeprazole (PriLOSEC) 20 mg delayed release capsule Take 20 mg by mouth as needed, Historical Med      sildenafil (REVATIO) 20 mg tablet Take 3-5 tablets PO PRN erectile dysfunction, Normal      simvastatin (ZOCOR) 80 mg tablet Take 40 mg by mouth daily at bedtime  , Historical Med      tadalafil (CIALIS) 5 MG tablet Take 5 mg by mouth daily as needed for erectile dysfunction, Historical Med           No discharge procedures on file      ED Provider  Electronically Signed by           Wilfred Lopez PA-C  02/24/19 3025

## 2019-03-01 ENCOUNTER — HOSPITAL ENCOUNTER (EMERGENCY)
Facility: HOSPITAL | Age: 64
Discharge: HOME | End: 2019-03-01
Payer: COMMERCIAL

## 2019-03-01 VITALS
HEART RATE: 70 BPM | TEMPERATURE: 98.1 F | OXYGEN SATURATION: 96 % | SYSTOLIC BLOOD PRESSURE: 150 MMHG | RESPIRATION RATE: 18 BRPM | DIASTOLIC BLOOD PRESSURE: 81 MMHG

## 2019-03-01 DIAGNOSIS — S61.214A LACERATION WITHOUT FOREIGN BODY OF RIGHT RING FINGER WITHOUT DAMAGE TO NAIL, INITIAL ENCOUNTER: Primary | ICD-10-CM

## 2019-03-01 PROCEDURE — 99283 EMERGENCY DEPT VISIT LOW MDM: CPT

## 2019-03-01 NOTE — CONSULTS
Consultation - Plastic Surgery   Ariella John Scherer 66 y o  male MRN: [id number]  Unit/Bed#: ED 09 Encounter: [id number]      Assessment/Plan      Assessment:  Cellulitis right ring finger  Plan:  Po augmentin and wound care    History of Present Illness   Physician Requesting Consult: No att  providers found  Reason for Consult / Principal Problem: swelling and pain right ring finger  Hx and PE limited by:   HPI: Liseth Clay is a 66y o  year old male who presents with swelling and pain right ring finger the past 2 days; patient had a laceration to right ring finger with avulsion of nail 5 days ago and there was a suspicion of distal phalangeal fracture  Wound was sutured in the ER that patient developed swelling and pain subsequently    Consults    Review of Systems    Historical Information   Past Medical History:   Diagnosis Date    GERD (gastroesophageal reflux disease)     Hyperlipidemia     Hypertension      Past Surgical History:   Procedure Laterality Date    HIP SURGERY Bilateral     FL COLONOSCOPY FLX DX W/COLLJ SPEC WHEN PFRMD N/A 2017    Procedure: COLONOSCOPY;  Surgeon: Duane Ely MD;  [facility];   Service: Gastroenterology    PROSTATECTOMY       Social History   Social History     Substance and Sexual Activity   Alcohol Use Yes    Comment: rarely     Social History     Substance and Sexual Activity   Drug Use No     Social History     Tobacco Use   Smoking Status Former Smoker    Last attempt to quit: 1992    Years since quittin 8   Smokeless Tobacco Never Used     Family History: non-contributory    Meds/Allergies   all current active meds have been reviewed    Allergies   Allergen Reactions    Penicillins Rash     sensitivity       Objective   No intake or output data in the 24 hours ending 19 1651    Invasive Devices          None          Physical Exam examination of the right ring finger shows redness and swelling and pain; there is purulent drainage from the incision upon pressure  The nail is mobile and partially out of the socket/cuticle; x-ray was reviewed and there is no fracture    Lab Results:   No results found for: WBC, HGB, HCT, MCV, PLT   No results found for: TISSUECULT, WOUNDCULT  Imaging Studies:  X-ray from few days ago shows no fracture  EKG, Pathology, and Other Studies:   Lab Results   Component Value Date/Time    FINALDX  05/09/2017 02:00 PM     A  Colon, descending polyp, polypectomy:  - Hyperplastic polyp   - Benign polypoid fragment of colonic mucosa with submucosal lymphoid aggregate  Interpretation performed at [facility], [address]       VTE Prophylaxis: Reason for no pharmacologic prophylaxis Patient is outpatient    Code Status: No Order  Advance Directive and Living Will:      Power of :    POLST:      Counseling / Coordination of Care  Total floor / unit time spent today 30 minutes  Greater than 50% of total time was spent with the patient and / or family counseling and / or coordination of care   A description of the counseling / coordination of care:

## 2019-11-04 DIAGNOSIS — C61 PROSTATE CANCER (HCC): Primary | ICD-10-CM

## 2020-11-27 ENCOUNTER — APPOINTMENT (EMERGENCY)
Dept: RADIOLOGY | Facility: HOSPITAL | Age: 65
End: 2020-11-27
Payer: COMMERCIAL

## 2020-11-27 ENCOUNTER — HOSPITAL ENCOUNTER (EMERGENCY)
Facility: HOSPITAL | Age: 65
Discharge: HOME | End: 2020-11-27
Attending: EMERGENCY MEDICINE | Admitting: EMERGENCY MEDICINE
Payer: COMMERCIAL

## 2020-11-27 VITALS
HEART RATE: 74 BPM | RESPIRATION RATE: 18 BRPM | OXYGEN SATURATION: 98 % | SYSTOLIC BLOOD PRESSURE: 156 MMHG | BODY MASS INDEX: 42.66 KG/M2 | DIASTOLIC BLOOD PRESSURE: 82 MMHG | HEIGHT: 72 IN | TEMPERATURE: 98.4 F | WEIGHT: 315 LBS

## 2020-11-27 DIAGNOSIS — U07.1 COVID-19: ICD-10-CM

## 2020-11-27 DIAGNOSIS — J06.9 UPPER RESPIRATORY INFECTION: Primary | ICD-10-CM

## 2020-11-27 LAB
ALBUMIN SERPL BCP-MCNC: 3.3 G/DL (ref 3.5–5)
ALP SERPL-CCNC: 83 U/L (ref 46–116)
ALT SERPL W P-5'-P-CCNC: 41 U/L (ref 12–78)
ANION GAP SERPL CALCULATED.3IONS-SCNC: 7 MMOL/L (ref 4–13)
AST SERPL W P-5'-P-CCNC: 34 U/L (ref 5–45)
ATRIAL RATE: 69 BPM
BASOPHILS # BLD AUTO: 0.02 THOUSANDS/ΜL (ref 0–0.1)
BASOPHILS NFR BLD AUTO: 0 % (ref 0–1)
BILIRUB SERPL-MCNC: 0.25 MG/DL (ref 0.2–1)
BUN SERPL-MCNC: 13 MG/DL (ref 5–25)
CALCIUM ALBUM COR SERPL-MCNC: 9.1 MG/DL (ref 8.3–10.1)
CALCIUM SERPL-MCNC: 8.5 MG/DL (ref 8.3–10.1)
CHLORIDE SERPL-SCNC: 100 MMOL/L (ref 100–108)
CO2 SERPL-SCNC: 31 MMOL/L (ref 21–32)
CREAT SERPL-MCNC: 1.11 MG/DL (ref 0.6–1.3)
EOSINOPHIL # BLD AUTO: 0.06 THOUSAND/ΜL (ref 0–0.61)
EOSINOPHIL NFR BLD AUTO: 1 % (ref 0–6)
ERYTHROCYTE [DISTWIDTH] IN BLOOD BY AUTOMATED COUNT: 13.2 % (ref 11.6–15.1)
FLUAV RNA RESP QL NAA+PROBE: NEGATIVE
FLUBV RNA RESP QL NAA+PROBE: NEGATIVE
GFR SERPL CREATININE-BSD FRML MDRD: 70 ML/MIN/1.73SQ M
GLUCOSE SERPL-MCNC: 104 MG/DL (ref 65–140)
HCT VFR BLD AUTO: 43.4 % (ref 36.5–49.3)
HGB BLD-MCNC: 13.9 G/DL (ref 12–17)
IMM GRANULOCYTES # BLD AUTO: 0.03 THOUSAND/UL (ref 0–0.2)
IMM GRANULOCYTES NFR BLD AUTO: 1 % (ref 0–2)
LYMPHOCYTES # BLD AUTO: 1.19 THOUSANDS/ΜL (ref 0.6–4.47)
LYMPHOCYTES NFR BLD AUTO: 23 % (ref 14–44)
MCH RBC QN AUTO: 29.3 PG (ref 26.8–34.3)
MCHC RBC AUTO-ENTMCNC: 32 G/DL (ref 31.4–37.4)
MCV RBC AUTO: 91 FL (ref 82–98)
MONOCYTES # BLD AUTO: 0.42 THOUSAND/ΜL (ref 0.17–1.22)
MONOCYTES NFR BLD AUTO: 8 % (ref 4–12)
NEUTROPHILS # BLD AUTO: 3.52 THOUSANDS/ΜL (ref 1.85–7.62)
NEUTS SEG NFR BLD AUTO: 67 % (ref 43–75)
NRBC BLD AUTO-RTO: 0 /100 WBCS
P AXIS: 71 DEGREES
PLATELET # BLD AUTO: 219 THOUSANDS/UL (ref 149–390)
PMV BLD AUTO: 9.8 FL (ref 8.9–12.7)
POTASSIUM SERPL-SCNC: 4.1 MMOL/L (ref 3.5–5.3)
PR INTERVAL: 146 MS
PROT SERPL-MCNC: 7.6 G/DL (ref 6.4–8.2)
QRS AXIS: -14 DEGREES
QRSD INTERVAL: 78 MS
QT INTERVAL: 378 MS
QTC INTERVAL: 405 MS
RBC # BLD AUTO: 4.75 MILLION/UL (ref 3.88–5.62)
RSV RNA RESP QL NAA+PROBE: NEGATIVE
SARS-COV-2 RNA RESP QL NAA+PROBE: POSITIVE
SODIUM SERPL-SCNC: 138 MMOL/L (ref 136–145)
T WAVE AXIS: 117 DEGREES
TROPONIN I SERPL-MCNC: <0.02 NG/ML
VENTRICULAR RATE: 69 BPM
WBC # BLD AUTO: 5.24 THOUSAND/UL (ref 4.31–10.16)

## 2020-11-27 PROCEDURE — 0241U HB NFCT DS VIR RESP RNA 4 TRGT: CPT | Performed by: EMERGENCY MEDICINE

## 2020-11-27 PROCEDURE — 71045 X-RAY EXAM CHEST 1 VIEW: CPT

## 2020-11-27 PROCEDURE — 36415 COLL VENOUS BLD VENIPUNCTURE: CPT | Performed by: EMERGENCY MEDICINE

## 2020-11-27 PROCEDURE — 85025 COMPLETE CBC W/AUTO DIFF WBC: CPT | Performed by: EMERGENCY MEDICINE

## 2020-11-27 PROCEDURE — 99285 EMERGENCY DEPT VISIT HI MDM: CPT

## 2020-11-27 PROCEDURE — 84484 ASSAY OF TROPONIN QUANT: CPT | Performed by: EMERGENCY MEDICINE

## 2020-11-27 PROCEDURE — 93010 ELECTROCARDIOGRAM REPORT: CPT | Performed by: INTERNAL MEDICINE

## 2020-11-27 PROCEDURE — 80053 COMPREHEN METABOLIC PANEL: CPT | Performed by: EMERGENCY MEDICINE

## 2020-11-27 PROCEDURE — 99285 EMERGENCY DEPT VISIT HI MDM: CPT | Performed by: EMERGENCY MEDICINE

## 2020-11-27 PROCEDURE — 93005 ELECTROCARDIOGRAM TRACING: CPT

## 2020-11-27 RX ORDER — ACETAMINOPHEN 325 MG/1
975 TABLET ORAL ONCE
Status: COMPLETED | OUTPATIENT
Start: 2020-11-27 | End: 2020-11-27

## 2020-11-27 RX ADMIN — ACETAMINOPHEN 975 MG: 325 TABLET, FILM COATED ORAL at 19:46

## 2021-01-04 LAB — HBA1C MFR BLD HPLC: 6.5 %

## 2021-01-06 ENCOUNTER — TELEPHONE (OUTPATIENT)
Dept: INTERNAL MEDICINE CLINIC | Facility: CLINIC | Age: 66
End: 2021-01-06

## 2021-01-06 NOTE — TELEPHONE ENCOUNTER
Discussed with the patient blood test report  His potassium is 5 4  No new medications  Possible hemolysis  Discussed with personal potassium diet  His FBS is 106 and hemoglobin A1c is 6 5 , partial early onset of diabetes mellitus  Discussed with the patient low sugar diet and lose weight and exercise  His elevated triglyceride  He stop taking the fish oil  Cholesterol is much better  Advise to restart fish oil 1000 mg twice a day    Address for low-cholesterol low saturated fat diet

## 2021-03-03 RX ORDER — CEPHALEXIN 500 MG/1
CAPSULE ORAL
Qty: 30 CAPSULE | OUTPATIENT
Start: 2021-03-03

## 2021-03-29 DIAGNOSIS — I10 ESSENTIAL HYPERTENSION: Primary | ICD-10-CM

## 2021-03-29 RX ORDER — LISINOPRIL 10 MG/1
TABLET ORAL
Qty: 90 TABLET | Refills: 0 | Status: SHIPPED | OUTPATIENT
Start: 2021-03-29 | End: 2021-04-23 | Stop reason: SDUPTHER

## 2021-04-06 DIAGNOSIS — Z23 ENCOUNTER FOR IMMUNIZATION: ICD-10-CM

## 2021-04-14 ENCOUNTER — IMMUNIZATIONS (OUTPATIENT)
Dept: FAMILY MEDICINE CLINIC | Facility: HOSPITAL | Age: 66
End: 2021-04-14

## 2021-04-14 DIAGNOSIS — Z23 ENCOUNTER FOR IMMUNIZATION: Primary | ICD-10-CM

## 2021-04-14 PROCEDURE — 91300 SARS-COV-2 / COVID-19 MRNA VACCINE (PFIZER-BIONTECH) 30 MCG: CPT

## 2021-04-14 PROCEDURE — 0001A SARS-COV-2 / COVID-19 MRNA VACCINE (PFIZER-BIONTECH) 30 MCG: CPT

## 2021-04-23 ENCOUNTER — TELEPHONE (OUTPATIENT)
Dept: INTERNAL MEDICINE CLINIC | Facility: CLINIC | Age: 66
End: 2021-04-23

## 2021-04-23 DIAGNOSIS — E78.2 MIXED HYPERLIPIDEMIA: Primary | ICD-10-CM

## 2021-04-23 DIAGNOSIS — I10 ESSENTIAL HYPERTENSION: ICD-10-CM

## 2021-04-23 DIAGNOSIS — R21 SKIN RASH: ICD-10-CM

## 2021-04-23 RX ORDER — SIMVASTATIN 40 MG
40 TABLET ORAL
Qty: 90 TABLET | Refills: 0 | Status: SHIPPED | OUTPATIENT
Start: 2021-04-23 | End: 2021-07-14 | Stop reason: SDUPTHER

## 2021-04-23 RX ORDER — LISINOPRIL 10 MG/1
10 TABLET ORAL DAILY
Qty: 90 TABLET | Refills: 0 | Status: SHIPPED | OUTPATIENT
Start: 2021-04-23 | End: 2021-07-14

## 2021-04-23 NOTE — TELEPHONE ENCOUNTER
Simvistatin 40 mg QD  #90  Lisinopril 10 mg QD #90    Also would like a refill the cream you gave him for his eczema - he didn't remember the name and I do not see any creams on his med list     Send to [facility] pharm

## 2021-05-05 ENCOUNTER — IMMUNIZATIONS (OUTPATIENT)
Dept: FAMILY MEDICINE CLINIC | Facility: HOSPITAL | Age: 66
End: 2021-05-05

## 2021-05-05 DIAGNOSIS — Z23 ENCOUNTER FOR IMMUNIZATION: Primary | ICD-10-CM

## 2021-05-05 PROCEDURE — 0002A SARS-COV-2 / COVID-19 MRNA VACCINE (PFIZER-BIONTECH) 30 MCG: CPT

## 2021-05-05 PROCEDURE — 91300 SARS-COV-2 / COVID-19 MRNA VACCINE (PFIZER-BIONTECH) 30 MCG: CPT

## 2021-06-23 ENCOUNTER — TELEPHONE (OUTPATIENT)
Dept: INTERNAL MEDICINE CLINIC | Facility: CLINIC | Age: 66
End: 2021-06-23

## 2021-06-23 DIAGNOSIS — R73.9 HYPERGLYCEMIA: Primary | ICD-10-CM

## 2021-06-23 DIAGNOSIS — E78.2 MIXED HYPERLIPIDEMIA: ICD-10-CM

## 2021-06-23 DIAGNOSIS — I10 ESSENTIAL HYPERTENSION: ICD-10-CM

## 2021-06-23 NOTE — TELEPHONE ENCOUNTER
Arnoldo Lynn please call him that he needs blood test before his appointment  I ordered blood tests and urine test   It is in computer  If he goes to any [facility] he will not need prescription

## 2021-06-23 NOTE — TELEPHONE ENCOUNTER
Called patient but was unable to leave a VM due to voice mailbox full  LM on home phone re blood test and urine test ordered before appt on 6/28  Also LM on wife's cell phone re bloodwork order

## 2021-06-25 ENCOUNTER — APPOINTMENT (OUTPATIENT)
Dept: LAB | Facility: CLINIC | Age: 66
End: 2021-06-25
Payer: COMMERCIAL

## 2021-06-25 DIAGNOSIS — R73.9 HYPERGLYCEMIA: ICD-10-CM

## 2021-06-25 DIAGNOSIS — I10 ESSENTIAL HYPERTENSION: ICD-10-CM

## 2021-06-25 DIAGNOSIS — E78.2 MIXED HYPERLIPIDEMIA: ICD-10-CM

## 2021-06-25 LAB
ALBUMIN SERPL BCP-MCNC: 3.4 G/DL (ref 3.5–5)
ALP SERPL-CCNC: 58 U/L (ref 46–116)
ALT SERPL W P-5'-P-CCNC: 28 U/L (ref 12–78)
ANION GAP SERPL CALCULATED.3IONS-SCNC: 8 MMOL/L (ref 4–13)
AST SERPL W P-5'-P-CCNC: 18 U/L (ref 5–45)
BACTERIA UR QL AUTO: ABNORMAL /HPF
BILIRUB SERPL-MCNC: 0.37 MG/DL (ref 0.2–1)
BILIRUB UR QL STRIP: NEGATIVE
BUN SERPL-MCNC: 21 MG/DL (ref 5–25)
CALCIUM ALBUM COR SERPL-MCNC: 9.2 MG/DL (ref 8.3–10.1)
CALCIUM SERPL-MCNC: 8.7 MG/DL (ref 8.3–10.1)
CHLORIDE SERPL-SCNC: 103 MMOL/L (ref 100–108)
CHOLEST SERPL-MCNC: 156 MG/DL (ref 50–200)
CLARITY UR: CLEAR
CO2 SERPL-SCNC: 28 MMOL/L (ref 21–32)
COLOR UR: YELLOW
CREAT SERPL-MCNC: 1.17 MG/DL (ref 0.6–1.3)
EST. AVERAGE GLUCOSE BLD GHB EST-MCNC: 148 MG/DL
GFR SERPL CREATININE-BSD FRML MDRD: 66 ML/MIN/1.73SQ M
GLUCOSE P FAST SERPL-MCNC: 125 MG/DL (ref 65–99)
GLUCOSE UR STRIP-MCNC: NEGATIVE MG/DL
HBA1C MFR BLD: 6.8 %
HDLC SERPL-MCNC: 42 MG/DL
HGB UR QL STRIP.AUTO: NEGATIVE
KETONES UR STRIP-MCNC: NEGATIVE MG/DL
LDLC SERPL CALC-MCNC: 72 MG/DL (ref 0–100)
LEUKOCYTE ESTERASE UR QL STRIP: NEGATIVE
MUCOUS THREADS UR QL AUTO: ABNORMAL
NITRITE UR QL STRIP: NEGATIVE
NON-SQ EPI CELLS URNS QL MICRO: ABNORMAL /HPF
NONHDLC SERPL-MCNC: 114 MG/DL
PH UR STRIP.AUTO: 6 [PH]
POTASSIUM SERPL-SCNC: 4.7 MMOL/L (ref 3.5–5.3)
PROT SERPL-MCNC: 7.3 G/DL (ref 6.4–8.2)
PROT UR STRIP-MCNC: ABNORMAL MG/DL
RBC #/AREA URNS AUTO: ABNORMAL /HPF
SODIUM SERPL-SCNC: 139 MMOL/L (ref 136–145)
SP GR UR STRIP.AUTO: 1.02 (ref 1–1.03)
TRIGL SERPL-MCNC: 208 MG/DL
UROBILINOGEN UR QL STRIP.AUTO: 0.2 E.U./DL
WBC #/AREA URNS AUTO: ABNORMAL /HPF

## 2021-06-25 PROCEDURE — 3044F HG A1C LEVEL LT 7.0%: CPT | Performed by: INTERNAL MEDICINE

## 2021-06-25 PROCEDURE — 36415 COLL VENOUS BLD VENIPUNCTURE: CPT

## 2021-06-25 PROCEDURE — 81001 URINALYSIS AUTO W/SCOPE: CPT | Performed by: INTERNAL MEDICINE

## 2021-06-25 PROCEDURE — 83036 HEMOGLOBIN GLYCOSYLATED A1C: CPT

## 2021-06-25 PROCEDURE — 80061 LIPID PANEL: CPT

## 2021-06-25 PROCEDURE — 80053 COMPREHEN METABOLIC PANEL: CPT

## 2021-06-26 PROBLEM — E78.2 MIXED HYPERLIPIDEMIA: Status: ACTIVE | Noted: 2021-06-26

## 2021-06-26 PROBLEM — E11.9 TYPE 2 DIABETES MELLITUS WITHOUT COMPLICATION, WITHOUT LONG-TERM CURRENT USE OF INSULIN (HCC): Status: ACTIVE | Noted: 2021-06-26

## 2021-06-26 PROBLEM — R21 SKIN RASH: Status: ACTIVE | Noted: 2021-06-26

## 2021-06-26 PROBLEM — I10 ESSENTIAL HYPERTENSION: Status: ACTIVE | Noted: 2021-06-26

## 2021-06-26 NOTE — ASSESSMENT & PLAN NOTE
Lab Results   Component Value Date    HGBA1C 6 8 (H) 06/25/2021   Fasting blood sugar 125 and hemoglobin A1c 6 8  Patient was strongly advised to follow 1800 ADA diet  Presently controlled on diet

## 2021-06-28 ENCOUNTER — OFFICE VISIT (OUTPATIENT)
Dept: INTERNAL MEDICINE CLINIC | Facility: CLINIC | Age: 66
End: 2021-06-28
Payer: COMMERCIAL

## 2021-06-28 VITALS
HEART RATE: 68 BPM | BODY MASS INDEX: 44.1 KG/M2 | WEIGHT: 315 LBS | DIASTOLIC BLOOD PRESSURE: 82 MMHG | SYSTOLIC BLOOD PRESSURE: 140 MMHG | HEIGHT: 71 IN | TEMPERATURE: 98.7 F | OXYGEN SATURATION: 98 %

## 2021-06-28 DIAGNOSIS — R21 SKIN RASH: ICD-10-CM

## 2021-06-28 DIAGNOSIS — E78.2 MIXED HYPERLIPIDEMIA: ICD-10-CM

## 2021-06-28 DIAGNOSIS — Z00.00 ANNUAL PHYSICAL EXAM: Primary | ICD-10-CM

## 2021-06-28 DIAGNOSIS — E11.9 TYPE 2 DIABETES MELLITUS WITHOUT COMPLICATION, WITHOUT LONG-TERM CURRENT USE OF INSULIN (HCC): ICD-10-CM

## 2021-06-28 DIAGNOSIS — D12.6 TUBULAR ADENOMA OF COLON: ICD-10-CM

## 2021-06-28 DIAGNOSIS — C61 PROSTATE CANCER (HCC): ICD-10-CM

## 2021-06-28 DIAGNOSIS — I10 ESSENTIAL HYPERTENSION: ICD-10-CM

## 2021-06-28 DIAGNOSIS — N52.31 ERECTILE DYSFUNCTION AFTER RADICAL PROSTATECTOMY: ICD-10-CM

## 2021-06-28 PROCEDURE — 1036F TOBACCO NON-USER: CPT | Performed by: INTERNAL MEDICINE

## 2021-06-28 PROCEDURE — 3725F SCREEN DEPRESSION PERFORMED: CPT | Performed by: INTERNAL MEDICINE

## 2021-06-28 PROCEDURE — 99213 OFFICE O/P EST LOW 20 MIN: CPT | Performed by: INTERNAL MEDICINE

## 2021-06-28 PROCEDURE — 3008F BODY MASS INDEX DOCD: CPT | Performed by: INTERNAL MEDICINE

## 2021-06-28 PROCEDURE — 99396 PREV VISIT EST AGE 40-64: CPT | Performed by: INTERNAL MEDICINE

## 2021-06-28 RX ORDER — SILDENAFIL 50 MG/1
50 TABLET, FILM COATED ORAL DAILY PRN
Qty: 8 TABLET | Refills: 1 | Status: SHIPPED | OUTPATIENT
Start: 2021-06-28 | End: 2021-09-28

## 2021-06-28 NOTE — ASSESSMENT & PLAN NOTE
Blood pressure slightly elevated  Discussed with the patient will increase lisinopril 10-20 mg daily as recently had eye exam,has  slight hemorrhage in eyes although does not require any treatment will have a follow-up appointment in 3-4 months with ophthalmology

## 2021-06-28 NOTE — ASSESSMENT & PLAN NOTE
Has a right forearm rash has been applying triamcinolone ointment is not effective possible eczema and/ or psoriasis    Will refer to dermatologist

## 2021-06-28 NOTE — ASSESSMENT & PLAN NOTE
Cholesterol 156, triglyceride 208, HDL 42, LDL 72  Advised to continue present medication    Advised to watch diet for cholesterol and saturated fat  still triglyceride elevated advised to decrease carbs sugar intake

## 2021-06-28 NOTE — PATIENT INSTRUCTIONS

## 2021-06-28 NOTE — ASSESSMENT & PLAN NOTE
Last colonoscopy was done November 2013 by Dr Cece Villalta  Has tubular adenoma    Needs follow-up colonoscopy

## 2021-06-28 NOTE — ASSESSMENT & PLAN NOTE
Had tried sildenafil 20 mg which was not effective  Will prescribe 50 mg sildenafil to take 2 hours prior to sexual activity

## 2021-06-28 NOTE — PROGRESS NOTES
ADULT ANNUAL [address] INTERNAL MEDICINE    NAME: Monique Kang  AGE: 63 y o  SEX: male  : 1955     DATE: 2021     Assessment and Plan:     Problem List Items Addressed This Visit     None      Visit Diagnoses     Annual physical exam    -  Primary          Immunizations and preventive care screenings were discussed with patient today  Appropriate education was printed on patient's after visit summary  Counseling:  · Alcohol/drug use: discussed moderation in alcohol intake, the recommendations for healthy alcohol use, and avoidance of illicit drug use  No follow-ups on file  Chief Complaint:     Chief Complaint   Patient presents with    Annual Exam      History of Present Illness:     Adult Annual Physical   Patient here for a comprehensive physical exam  The patient reports no problems  Diet and Physical Activity  · Diet/Nutrition: well balanced diet  · Exercise: no formal exercise  Depression Screening  PHQ-9 Depression Screening    PHQ-9:   Frequency of the following problems over the past two weeks:      Little interest or pleasure in doing things: 0 - not at all  Feeling down, depressed, or hopeless: 0 - not at all  PHQ-2 Score: 0       General Health  · Sleep: sleeps well  · Hearing: normal - bilateral   · Vision: wears glasses and wears contacts  · Dental: regular dental visits   Health  · Symptoms include: erectile dysfunction     Review of Systems:     Review of Systems   Constitutional: Negative for chills and fever  HENT: Negative for congestion, ear pain, hearing loss, nosebleeds, sinus pain, sore throat and trouble swallowing  Eyes: Negative for discharge, redness and visual disturbance  Respiratory: Negative for cough, chest tightness and shortness of breath  Cardiovascular: Negative for chest pain and palpitations     Gastrointestinal: Negative for abdominal pain, blood in stool, constipation, diarrhea, nausea and vomiting  Genitourinary: Negative for dysuria, flank pain, frequency and hematuria  Musculoskeletal: Negative for arthralgias, myalgias and neck pain  Skin: Negative for color change and rash  Neurological: Negative for dizziness, speech difficulty, weakness and headaches  Hematological: Does not bruise/bleed easily  Psychiatric/Behavioral: Negative for agitation and behavioral problems  Past Medical History:     Past Medical History:   Diagnosis Date    Bronchospasm     Essential hypertension 2021    GERD (gastroesophageal reflux disease)     Hyperlipidemia     Hypertension     Insomnia     Leg cramps     Mixed hyperlipidemia 2021    Nocturia     Numbness of fingers of both hands     Skin lesion     Skin rash 2021    Snoring     Type 2 diabetes mellitus without complication, without long-term current use of insulin ([address] ) 2021    Wears glasses     Weight gain       Past Surgical History:     Past Surgical History:   Procedure Laterality Date    COLONOSCOPY  2017    HIP SURGERY Bilateral     Hip replacement    ME COLONOSCOPY FLX DX W/COLLJ SPEC WHEN PFRMD N/A 2017    Procedure: COLONOSCOPY;  Surgeon: Chantel Barboza MD;  [facility];   Service: Gastroenterology    PROSTATECTOMY      REPLACEMENT TOTAL KNEE Bilateral     Inactive      Family History:     Family History   Problem Relation Age of Onset    Brain cancer Mother     Depression Father     Heart disease Father       Social History:     Social History     Socioeconomic History    Marital status: /Civil Union     Spouse name: None    Number of children: None    Years of education: None    Highest education level: None   Occupational History    Occupation: Presently not working   Tobacco Use    Smoking status: Former Smoker     Quit date: 1992     Years since quittin 1    Smokeless tobacco: Never Used   Substance and Sexual Activity    Alcohol use: Yes     Comment: rarely    Drug use: No    Sexual activity: None   Other Topics Concern    None   Social History Narrative    Single-family home    Current work/study status: Full-time    Caffeine use: Coffee, 3 servings/day    Lives with spouse    Former smoker - Inactive 6/22/2018    Annual eye exam: Sees 1hr; wears glasses    Annual dental checkup: Sees q6months    PSA: Used to follow Urology    - As per [employer]     Social Determinants of Health     Financial Resource Strain:     Difficulty of Paying Living Expenses:    Food Insecurity:     Worried About Running Out of Food in the Last Year:     [address] in the Last Year:    Transportation Needs:     Lack of Transportation (Medical):      Lack of Transportation (Non-Medical):    Physical Activity:     Days of Exercise per Week:     Minutes of Exercise per Session:    Stress:     Feeling of Stress :    Social Connections:     Frequency of Communication with Friends and Family:     Frequency of Social Gatherings with Friends and Family:     Attends Baptism Services:     Active Member of Clubs or Organizations:     Attends Club or Organization Meetings:     Marital Status:    Intimate Partner Violence:     Fear of Current or Ex-Partner:     Emotionally Abused:     Physically Abused:     Sexually Abused:       Current Medications:     Current Outpatient Medications   Medication Sig Dispense Refill    Ascorbic Acid (VITAMIN C PO) Take 1,000 mg by mouth daily      Cyanocobalamin (VITAMIN B-12 PO) Take 1,000 mg by mouth daily      fexofenadine (ALLEGRA) 180 MG tablet Take 180 mg by mouth daily as needed      lisinopril (ZESTRIL) 10 mg tablet Take 1 tablet (10 mg total) by mouth daily 90 tablet 0    Multiple Vitamins-Minerals (MULTIVITAL PO) Take 1 tablet by mouth      Omega-3 Fatty Acids (FISH OIL) 1000 MG CPDR Take 1,000 mg by mouth daily      omeprazole (PriLOSEC) 20 mg delayed release capsule Take 20 mg by mouth as needed      simvastatin (ZOCOR) 40 mg tablet Take 1 tablet (40 mg total) by mouth daily at bedtime 90 tablet 0    triamcinolone (KENALOG) 0 1 % ointment Apply topically 2 (two) times a day 80 g 0     No current facility-administered medications for this visit  Allergies:     No Known Allergies   Physical Exam:     /82 (BP Location: Right arm, Patient Position: Sitting, Cuff Size: Adult)   Pulse 68   Temp 98 7 °F (37 1 °C) (Tympanic)   Ht 5' 11" (1 803 m)   Wt (!) 156 kg (344 lb)   SpO2 98%   BMI 47 98 kg/m²     Physical Exam  Vitals and nursing note reviewed  Constitutional:       Appearance: Normal appearance  He is well-developed  He is obese  HENT:      Head: Normocephalic and atraumatic  Right Ear: Tympanic membrane, ear canal and external ear normal       Left Ear: Tympanic membrane, ear canal and external ear normal       Mouth/Throat:      Comments: Pt  Has face mask on  Eyes:      General: No scleral icterus  Right eye: No discharge  Left eye: No discharge  Conjunctiva/sclera: Conjunctivae normal    Cardiovascular:      Rate and Rhythm: Normal rate and regular rhythm  Pulses: Normal pulses  Heart sounds: No murmur heard  Pulmonary:      Effort: Pulmonary effort is normal  No respiratory distress  Breath sounds: Normal breath sounds  Abdominal:      Palpations: Abdomen is soft  Tenderness: There is no abdominal tenderness  Musculoskeletal:         General: Normal range of motion  Cervical back: Normal range of motion and neck supple  Right lower leg: No edema  Left lower leg: No edema  Skin:     General: Skin is warm and dry  Findings: No rash (has rash right forearm ,papular,raised,slight erythematous, dry,below elbow area  )  Neurological:      General: No focal deficit present  Mental Status: He is alert and oriented to person, place, and time     Psychiatric:         Mood and Affect: Mood normal          Behavior: Behavior normal             Ce Aguilar MD  [address] [facility]

## 2021-06-28 NOTE — PROGRESS NOTES
Assessment/Plan:    1  Annual physical exam    2  Essential hypertension  Assessment & Plan:  Blood pressure slightly elevated  Discussed with the patient will increase lisinopril 10-20 mg daily as recently had eye exam,has  slight hemorrhage in eyes although does not require any treatment will have a follow-up appointment in 3-4 months with ophthalmology  3  Type 2 diabetes mellitus without complication, without long-term current use of insulin ([facility])  Assessment & Plan:    Lab Results   Component Value Date    HGBA1C 6 8 (H) 06/25/2021   Fasting blood sugar 125 and hemoglobin A1c 6 8  Patient was strongly advised to follow 1800 ADA diet  Presently controlled on diet  4  Skin rash  Assessment & Plan:  Has a right forearm rash has been applying triamcinolone ointment is not effective possible eczema and/ or psoriasis  Will refer to dermatologist     Orders:  -     Ambulatory referral to Dermatology; Future    5  Prostate cancer [facility])  Assessment & Plan:  Last PSA less than 0 01  Advised to follow-up with urologist       6  Mixed hyperlipidemia  Assessment & Plan:  Cholesterol 156, triglyceride 208, HDL 42, LDL 72  Advised to continue present medication  Advised to watch diet for cholesterol and saturated fat  still triglyceride elevated advised to decrease carbs sugar intake        7  Erectile dysfunction after radical prostatectomy  Assessment & Plan:  Had tried sildenafil 20 mg which was not effective  Will prescribe 50 mg sildenafil to take 2 hours prior to sexual activity  Orders:  -     sildenafil (VIAGRA) 50 MG tablet; Take 1 tablet (50 mg total) by mouth daily as needed for erectile dysfunction    8  Tubular adenoma of colon  Assessment & Plan:  Last colonoscopy was done November 2013 by Dr Alee Ariza  Has tubular adenoma  Needs follow-up colonoscopy    Orders:  -     Ambulatory referral to Gastroenterology; Future    9  BMI 45 0-49 9, adult ([facility])    BMI Counseling:  Body mass index is 47 98 kg/m²  The BMI is above normal  Nutrition recommendations include decreasing portion sizes, decreasing fast food intake, consuming healthier snacks, limiting drinks that contain sugar, moderation in carbohydrate intake, reducing intake of saturated and trans fat and reducing intake of cholesterol  Exercise recommendations include exercising 3-5 times per week and strength training exercises  No pharmacotherapy was ordered  Subjective:  Patient presents for annual wellness exam as well as follow-up of his medical problems  Patient ID: Monique Kang is a 63 y o  male  HPI   31-year-old white male patient presents for annual wellness exam as well as follow-up of his medical problems  He denies any chest pain, shortness of breath, pain abdomen  He denies any cough, fever, chills  Denies any nausea vomiting, diarrhea he got his COVID-19 vaccination his skin is site forearm not improving despite applying triamcinolone ointment  The following portions of the patient's history were reviewed and updated as appropriate:     Past Medical History:  He has a past medical history of BMI 45 0-49 9, adult ([facility] [address] ) (6/28/2021), Bronchospasm, Essential hypertension (6/26/2021), GERD (gastroesophageal reflux disease), Hyperlipidemia, Hypertension, Insomnia, Leg cramps, Mixed hyperlipidemia (6/26/2021), Nocturia, Numbness of fingers of both hands, Skin lesion, Skin rash (6/26/2021), Snoring, Tubular adenoma of colon (6/28/2021), Type 2 diabetes mellitus without complication, without long-term current use of insulin ([facility] [address] ) (6/26/2021), Wears glasses, and Weight gain  ,  _______________________________________________________________________  Past Surgical History:   has a past surgical history that includes Hip surgery (Bilateral); Prostatectomy; pr colonoscopy flx dx w/collj spec when pfrmd (N/A, 5/9/2017);  Replacement total knee (Bilateral); and Colonoscopy (05/09/2017)  ,  _______________________________________________________________________  Family History:  family history includes Brain cancer in his mother; Depression in his father; Heart disease in his father ,  _______________________________________________________________________  Social History:   reports that he quit smoking about 29 years ago  He has never used smokeless tobacco  He reports current alcohol use  He reports that he does not use drugs  ,  _______________________________________________________________________  Allergies:  has No Known Allergies     _______________________________________________________________________  Current Outpatient Medications   Medication Sig Dispense Refill    Ascorbic Acid (VITAMIN C PO) Take 1,000 mg by mouth daily      Cyanocobalamin (VITAMIN B-12 PO) Take 1,000 mg by mouth daily      fexofenadine (ALLEGRA) 180 MG tablet Take 180 mg by mouth daily as needed      lisinopril (ZESTRIL) 10 mg tablet Take 1 tablet (10 mg total) by mouth daily 90 tablet 0    Multiple Vitamins-Minerals (MULTIVITAL PO) Take 1 tablet by mouth      Omega-3 Fatty Acids (FISH OIL) 1000 MG CPDR Take 1,000 mg by mouth daily      omeprazole (PriLOSEC) 20 mg delayed release capsule Take 20 mg by mouth as needed      simvastatin (ZOCOR) 40 mg tablet Take 1 tablet (40 mg total) by mouth daily at bedtime 90 tablet 0    triamcinolone (KENALOG) 0 1 % ointment Apply topically 2 (two) times a day 80 g 0    sildenafil (VIAGRA) 50 MG tablet Take 1 tablet (50 mg total) by mouth daily as needed for erectile dysfunction 8 tablet 1     No current facility-administered medications for this visit      _______________________________________________________________________  Review of Systems   Constitutional: Negative for chills, fatigue and fever  HENT: Negative for congestion, ear pain, hearing loss, nosebleeds, sinus pain, sore throat and trouble swallowing      Eyes: Negative for discharge, redness and visual disturbance  Respiratory: Negative for cough, chest tightness and shortness of breath  Cardiovascular: Negative for chest pain and palpitations  Gastrointestinal: Negative for abdominal pain, blood in stool, constipation, diarrhea, nausea and vomiting  Genitourinary: Negative for dysuria, flank pain, frequency and hematuria  Musculoskeletal: Negative for arthralgias, myalgias and neck pain  Skin: Positive for rash (Right forearm)  Negative for color change  Neurological: Negative for dizziness, speech difficulty, weakness and headaches  Hematological: Does not bruise/bleed easily  Psychiatric/Behavioral: Negative for agitation and behavioral problems  Objective:  Vitals:    06/28/21 0910   BP: 140/82   BP Location: Right arm   Patient Position: Sitting   Cuff Size: Adult   Pulse: 68   Temp: 98 7 °F (37 1 °C)   TempSrc: Tympanic   SpO2: 98%   Weight: (!) 156 kg (344 lb)   Height: 5' 11" (1 803 m)     Body mass index is 47 98 kg/m²  Physical Exam  Vitals and nursing note reviewed  Constitutional:       General: He is not in acute distress  Appearance: Normal appearance  He is obese  HENT:      Head: Normocephalic and atraumatic  Right Ear: Ear canal and external ear normal       Left Ear: Ear canal and external ear normal       Mouth/Throat:      Comments: Patient has a face mask on  Eyes:      General: No scleral icterus  Right eye: No discharge  Left eye: No discharge  Extraocular Movements: Extraocular movements intact  Conjunctiva/sclera: Conjunctivae normal    Cardiovascular:      Rate and Rhythm: Normal rate and regular rhythm  Pulses: Normal pulses  Heart sounds: No murmur heard  Pulmonary:      Effort: Pulmonary effort is normal  No respiratory distress  Breath sounds: Normal breath sounds  Abdominal:      General: Bowel sounds are normal       Palpations: Abdomen is soft  There is no mass        Tenderness: There is no abdominal tenderness  Musculoskeletal:         General: Normal range of motion  Cervical back: Normal range of motion and neck supple  No muscular tenderness  Right lower leg: No edema  Left lower leg: No edema  Skin:     General: Skin is warm  Findings: No rash  Neurological:      General: No focal deficit present  Mental Status: He is alert and oriented to person, place, and time     Psychiatric:         Mood and Affect: Mood normal          Behavior: Behavior normal

## 2021-07-14 DIAGNOSIS — E78.2 MIXED HYPERLIPIDEMIA: ICD-10-CM

## 2021-07-14 DIAGNOSIS — I10 ESSENTIAL HYPERTENSION: Primary | ICD-10-CM

## 2021-07-14 PROCEDURE — 4010F ACE/ARB THERAPY RXD/TAKEN: CPT | Performed by: INTERNAL MEDICINE

## 2021-07-14 RX ORDER — LISINOPRIL 20 MG/1
20 TABLET ORAL DAILY
Qty: 90 TABLET | Refills: 1 | Status: SHIPPED | OUTPATIENT
Start: 2021-07-14 | End: 2021-10-12 | Stop reason: SDUPTHER

## 2021-07-14 RX ORDER — SIMVASTATIN 40 MG
40 TABLET ORAL
Qty: 90 TABLET | Refills: 0 | Status: SHIPPED | OUTPATIENT
Start: 2021-07-14 | End: 2021-10-12

## 2021-08-23 ENCOUNTER — CONSULT (OUTPATIENT)
Dept: DERMATOLOGY | Facility: CLINIC | Age: 66
End: 2021-08-23
Payer: COMMERCIAL

## 2021-08-23 VITALS — WEIGHT: 315 LBS | HEIGHT: 72 IN | TEMPERATURE: 97 F | BODY MASS INDEX: 42.66 KG/M2

## 2021-08-23 DIAGNOSIS — R21 SKIN RASH: ICD-10-CM

## 2021-08-23 DIAGNOSIS — L20.9 ATOPIC DERMATITIS, UNSPECIFIED TYPE: ICD-10-CM

## 2021-08-23 DIAGNOSIS — L28.0 LICHEN SIMPLEX CHRONICUS: ICD-10-CM

## 2021-08-23 DIAGNOSIS — L82.1 SEBORRHEIC KERATOSIS: ICD-10-CM

## 2021-08-23 PROCEDURE — 3008F BODY MASS INDEX DOCD: CPT | Performed by: DERMATOLOGY

## 2021-08-23 PROCEDURE — 1036F TOBACCO NON-USER: CPT | Performed by: DERMATOLOGY

## 2021-08-23 PROCEDURE — 99204 OFFICE O/P NEW MOD 45 MIN: CPT | Performed by: DERMATOLOGY

## 2021-08-23 RX ORDER — BETAMETHASONE VALERATE 0.1 %
LOTION (ML) TOPICAL
Qty: 60 ML | Refills: 0 | Status: SHIPPED | OUTPATIENT
Start: 2021-08-23 | End: 2022-03-03 | Stop reason: SDUPTHER

## 2021-08-23 RX ORDER — BETAMETHASONE DIPROPIONATE 0.05 %
OINTMENT (GRAM) TOPICAL
Qty: 45 G | Refills: 0 | Status: SHIPPED | OUTPATIENT
Start: 2021-08-23

## 2021-08-23 RX ORDER — BETAMETHASONE DIPROPIONATE 0.05 %
OINTMENT (GRAM) TOPICAL DAILY
Status: CANCELLED | OUTPATIENT
Start: 2021-08-23 | End: 2021-09-06

## 2021-08-23 NOTE — PATIENT INSTRUCTIONS
SEBORRHEIC KERATOSIS; NON-INFLAMED    Assessment and Plan:  Based on a thorough discussion of this condition and the management approach to it (including a comprehensive discussion of the known risks, side effects and potential benefits of treatment), the patient (family) agrees to implement the following specific plan:   Reassured benign    Seborrheic Keratosis  A seborrheic keratosis is a harmless warty spot that appears during adult life as a common sign of skin aging  Seborrheic keratoses can arise on any area of skin, covered or uncovered, with the usual exception of the palms and soles  They do not arise from mucous membranes  Seborrheic keratoses can have highly variable appearance  Seborrheic keratoses are extremely common  It has been estimated that over 90% of adults over the age of 61 years have one or more of them  They occur in males and females of all races, typically beginning to erupt in the 35s or 45s  They are uncommon under the age of 21 years  The precise cause of seborrhoeic keratoses is not known  Seborrhoeic keratoses are considered degenerative in nature  As time goes by, seborrheic keratoses tend to become more numerous  Some people inherit a tendency to develop a very large number of them; some people may have hundreds of them  The name "seborrheic keratosis" is misleading, because these lesions are not limited to a seborrhoeic distribution (scalp, mid-face, chest, upper back), nor are they formed from sebaceous glands, nor are they associated with sebum -- which is greasy    Seborrheic keratosis may also be called "SK," "Seb K," "basal cell papilloma," "senile wart," or "barnacle "      Researchers have noted:   Eruptive seborrhoeic keratoses can follow sunburn or dermatitis   Skin friction may be the reason they appear in body folds   Viral cause (e g , human papillomavirus) seems unlikely   Stable and clonal mutations or activation of FRFR3, PIK3CA, CHERYL, AKT1 and EGFR genes are found in seborrhoeic keratoses   Seborrhoeic keratosis can arise from solar lentigo   FRFR3 mutations also arise in solar lentigines  These mutations are associated with increased age and location on the head and neck, suggesting a role of ultraviolet radiation in these lesions   Seborrheic keratoses do not harbour tumour suppressor gene mutations   Epidermal growth factor receptor inhibitors, which are used to treat some cancers, often result in an increase in verrucal (warty) keratoses  There is no easy way to remove multiple lesions on a single occasion  Unless a specific lesion is "inflamed" and is causing pain or stinging/burning or is bleeding, most insurance companies do not authorize treatment  ATOPIC DERMATITIS     Assessment and Plan:  Based on a thorough discussion of this condition and the management approach to it (including a comprehensive discussion of the known risks, side effects and potential benefits of treatment), the patient (family) agrees to implement the following specific plan:   Apply prescribed Betamethasone valerate lotion once to twice daily as needed to scalp     Assessment and Plan:   Atopic Dermatitis is a chronic, itchy skin condition that is very common in children but may occur at any age  It is also known as eczema or atopic eczema   It is the most common form of dermatitis  Atopic dermatitis usually occurs in people who have an atopic tendency    This means they may develop any or all of these closely linked conditions: Atopic dermatitis, asthma, hay fever (allergic rhinitis), eosinophilic esophagitis, and gastroenteritis  Often these conditions run within families with a parent, child or sibling also affected  A family history of asthma, eczema or hay fever is particularly useful in diagnosing atopic dermatitis in infants  Atopic dermatitis arises because of a complex interaction of genetic and environmental factors   These include defects in skin barrier function making the skin more susceptible to irritation by soap and other contact irritants, the weather, temperature and non-specific triggers  There is also an element of immune system dysregulation that is often present  By definition, it is chronic and has a "waxing-waning" nature; flares should be expected but with good education and treatment strategies can be minimized  Your treatment plan   Using only mild cleansers (hypoallergenic and without fragrances) and fragrance free detergent (not unscented products which contain a masking agent)   Apply moisturizer to entire body at least 2 times a day   Use the above listed medication to affected areas twice a day for a full 2 days after the rash has cleared   Take on over the counter antihistamine like diphenhydramine before bed if the itching is keeping you awake      ATOPIC DERMATITIS FAQS    WHAT IS ATOPIC DERMATITIS? Atopic dermatitis (also called eczema) results from a weak skin barrier and an over active immune system  The weak skin barrier allows things to get into the skin and then the immune system has an intense reaction to this substances  The result is itchy red patches anywhere on the body  WHO GETS ATOPIC DERMATITIS? There is no single answer because many factors are involved  It is likely a combination of genetic makeup and environmental triggers and/or exposures  People with atopic dermatitis are prone to other types of "atopy"  They are at increased risk for food allergies, Asthma and hay fever and there is often a family history of these problems as well  WHAT ABOUT FOOD ALLERGIES? This is a very controversial topic, as many believe that food allergies are responsible for skin flares  In some cases, specific foods may cause worsening of atopic dermatitis; however this occurs in a minority of cases and usually happens within a few hours of ingestion   While food allergy is more common in children with eczema, foods are specific triggers for flares in only a small percentage of children  If you notice that the skin flares after certain foods you can see if eliminating one food at time makes a difference, as long as your child can still enjoy a well-balanced diet  There are blood (RAST) and skin (PRICK) tests that can check for allergies, but they are often positive in children who are not truly allergic  Therefore it is important that you work with your allergist and dermatologist to determine which foods are relevant and causing true symptoms  Extreme food elimination diets without the guidance of your doctor, which have become more popular in recent years, may even result in worsening of the skin rash due to malnutrition and avoidance of essential nutrients  WHY SO MUCH MOISTURIZER? This is the 1st step and most important part of treatment  This helps maintaint the skin's barrier function and prevent flares  Creams and ointments are preferable over lotions  Aveeno eczema relief and Eucerin eczema relief  and cerave are all good ones to start with  It is best to put  moisturizer on directly after bathing, while the skin is damp, it is twice as effective then  You may bathe daily  Use warm - not hot - water, for short periods of time (5-10 minutes at a time) Dry off by Lightly patting the skin with a towel and not rubbing  While the skin is still damp, (within 3 minutes) apply any medications to the rashy areas 1st and then moisturize the entire body  It's best to apply the medication 1st but if the medications sting, moisturizer can be applied 1st     WHY USE THE MEDICATION FOR AN EXTRA 2 DAYS AFTER THE RASH IS GONE? Sometimes the rash may appear to be gone, but there are still microscopic changes in the skin  Using the medication and extra 2 days will ensure the inflammation has resolve and make the rash less likely to return quickly          WHAT ARE TRIGGERS?   Occasionally there are specific things that trigger itching and rashes, but in many cases may none can be identified  The most common triggers are:   Heat and sweat for some individuals, cold weather for others   House dust mites, pet fur   Wool; synthetic fabrics like nylon; dyed fabrics   Tobacco smoke    Fragrances in: shampoos, soaps, lotions, laundry detergents, fabric softeners   Saliva or prolonged exposure to water  start with these  Avoid the use of fabric softeners in the washing machine or dryer sheets (unless they are fragrance-free)  Try to use laundry detergents, soaps and shampoos that are fragrance-free  You may find it helpful to double-rinse your clothes  Some children are sensitive to house dust mites and they may benefit from a plastic mattress wrap  While food allergy is more common in children with eczema, foods are specific triggers for flares in only a small percentage of children  If you notice that the skin flares after certain foods you can see if eliminating one food at time makes a difference, as long as your child can still enjoy a well-balanced diet  4) WHAT DOES THE ANTIHISTAMINE DO? Antihistamines help you not to scratch  Scratching only makes the skin more reactive and the barrier function even more disrupted  It can cause both children and their parents to lose sleep! There are different types of anti-itch medications  Some cause more drowsiness than others  Both types are acceptable depending on your child and your preference  Start with Benadryl and if that does not work, ask for a prescription antihistamine      5) ARE THERE ANY SIDE EFFECTS TO WORRY ABOUT? Corticosteroid creams and ointments (generally things with "-one" or "hattie" on the end of their names): The strength of the cream or ointment depends on the name of the active ingredient  The numbers at the end do not indicate the relative strength    Thus triamcinolone 0 1% ointment, considered a mid-strength corticosteroid, is much stronger than hydrocortisone 1% even though the number following the name is much lower  Topical corticosteroids are very effective in treating atopic dermatitis  When used in the manner prescribed (to rashy areas of skin and for no more than a few weeks at a time to any one area) they are very safe  These are corticosteroids and are anti-inflammatory, not the anabolic steroids like those used illegally by some athletes  It is important that you do not overuse steroid creams, and if you notice a thin, shiny appearance to the skin or broken blood vessels, you should stop using the cream and consult your health care provider regarding possible overuse/overthinning of the skin  The face, armpits and groin have particularly thin and sensitive skin and are therefore most at risk for bad results if steroids are over-used in these sites  Topical non-steroid creams and ointments (immunomodulators): These creams and ointments are also called topical calcineurin inhibitors (TCIs)  These include Protopic ointment and Elidel cream  Crisaborole 2% Stef Chavez) is a prescription ointment that targets an enzyme called PDE4 (phosphodiesterase 4)  It is used on the skin topically to treat mild-to-moderate eczema in adults and children 3years of age and older  In total, these nonsteroidal prescriptions are used to help decrease itching and redness in the skin  They are not as strong as most steroid creams; however, it is believed that they do not thin the skin when overused  They are generally used as second-line medications, though they may be used alone or in conjunction with topical steroids  In sensitive areas such as the face, underarms or groin, they are often recommended  They can sting inflamed skin, but are generally well tolerated once the skin is healing  The FDA placed a black-box warning on both Elidel and Protopic in 2006 based on animal studies using the medications    Some animals developed skin cancer and lymphoma  Subsequently, the FDA released a statement that there is no causal relationship between the two medications and cancer  Because of this concern, there are ongoing studies to evaluate this relationship in humans  So far, there are studies that support the safety of these medications  One showed that the rates of cancer in patient using these medications topically were less than the rates of the general population and another showed that in patient's using the medication over a large area of the body, the levels of the medication in the blood was undetectable  As for Eucrisa, this product is only approved for the topical treatment of mild-to-moderate eczema in patients 3years of age and older; use of the medication in kids younger than 2 is considered off label and has not been formally studied  Burning and stinging are the most commonly reported side effects of this medication  Rarely, this product has been known to cause hives and hypersensitivity reactions; discontinue its use if you develop severe itching, swelling, or redness in the area of application  ACROCHORDON ("SKIN TAG")    Assessment and Plan:  Based on a thorough discussion of this condition and the management approach to it (including a comprehensive discussion of the known risks, side effects and potential benefits of treatment), the patient (family) agrees to implement the following specific plan:   Consider removal; not considered cosmetic; $150- for removal up to 10    Skin tags are common, soft, harmless skin lesions that are also called, in the appropriate settings, papillomas, fibroepithelial polyps, and soft fibromas  They are made up of loosely arranged collagen fibers and blood vessels surrounded by a thickened or thinned-out epidermis  Skin tags tend to develop in both men and women as we grow older  They are usually found on the skin folds (neck, armpits, groin)    It is not known what specifically causes skin tags  Certain factors, though, do appear to play a role:   Chaffing and irritation from skin rubbing together   High levels of growth factors (as seen, for example, in pregnancy or in acromegaly/gigantism)   Insulin resistance   Human papillomavirus (wart virus)    We discussed that most skin tags do not need to be treated unless they are specifically causing the patient physical distress or limitation or pose a risk for a larger problem such as an infection that forms secondary to excoriation or chronic irritation  We had a thorough discussion of treatment options and specific risks (including that any procedural treatment may not be covered by insurance and would then be the patient's responsibility) and benefits/alternatives including but not limited to the following:   Cryotherapy (freezing)   Shave removal   Surgical excision (snip excision with scissors)   Electrosurgery   Ligation (we do not do this procedure and counseled against it due to risk of tissue necrosis and infection)    LICHEN SIMPLEX CHRONICUS    Assessment and Plan:  Based on a thorough discussion of this condition and the management approach to it (including a comprehensive discussion of the known risks, side effects and potential benefits of treatment), the patient (family) agrees to implement the following specific plan:   Consider skin biopsy in future if does not go away   Betamethasone diproprionate 0 05% ointment apply small amount to affected area once to twice daily for up to 2 weeks    Lichen simplex chronicus is a localized area of thickened skin from repeated rubbing, itching, and scratching of the skin  There may be a single or multiple well-circumscribed plaques on the skin  It is also called "neurodermatitis "    What causes lichen simplex chronicus?   Although the mechanism is not understood, lichen simplex chronicus follows repetitive scratching and rubbing, which arises because of chronic localized itch  Lichen simplex occurs in adult males and females  It is unusual in children and is more common in people with anxiety and/or obsessive compulsive disorder  The primary itch can be due to:   Atopic eczema   Contact eczema   Venous eczema   Psoriasis   Lichen planus   Fungal infection   Insect bite   Neuropathy (radiculopathy) eg, brachioradial pruritus  The itching may involve alterations in the way the nervous system perceives and processes itchy sensations  Skin that tends toward eczematous conditions (eg, atopic dermatitis) is more prone to lichenification  What are the clinical features of lichen simplex? A solitary plaque of lichen simplex is circumscribed, somewhat linear or oval in shape, and markedly thickened  It is intensely itchy  Other features may include:   Exaggerated skin markings   Dry or scaly surface   Leathery induration   Broken-off hairs   Pigmentation   Scratch marks    Lichen simplex is often solitary and unilateral, usually affecting the patient's dominant side  Multiple areas of skin can also be involved with symmetrical or asymmetrical distribution  It mainly involves easily reached sites, most commonly the legs, arms, neck, upper trunk, and anal region  How do we diagnose lichen simplex chronicus? Diagnosis is usually done by history and skin examination  At times, it may be helpful to do some investigations  These would include:   Skin scrapings for possible tinea (fungal) infection   Skin biopsy  In the absence of known underlying skin problem or infection, you doctor may look for clues that it is due to nerve damage  In severe cases, the following tests may be performed (although they are often unhelpful):  Spinal imaging: X-rays, CT scans, MRI scans   Electrophysiological nerve conduction studies  It is important to understand that the itchy patch of skin is, at least in part, due to scratching and rubbing   Treatment addresses the symptoms and any underlying cause  Treatment of the lichen simplex may include:   Potent topical steroids until the plaque is resolved (4-6 weeks) -- covering the affected area a few hours after application may enhance efficacy   Reduce potency or frequency of topical steroids once lichenification has resolved   Steroid injections every 4-6 weeks   Coal tar preparations   Moisturizers to relieve dryness and reduce desire to scratch   Cooling creams containing menthol   Antihistamine or tricyclic antidepressant medications (eg, amitriptyline or nortriptyline), to help sleep      The primary condition needs treating; for example:   Antifungal agents for dermatophyte infection   Phototherapy and immunomodulatory medications for inflammatory skin conditions (oral corticosteroids, methotrexate, azathioprine or ciclosporin)   Tricyclic antidepressants (amitriptyline, nortriptyline, doxepin), other antidepressants (eg, duloxetine) or antiepileptic medications (valproate, lamotrigine, gabapentin) for nerve causes

## 2021-08-23 NOTE — PROGRESS NOTES
[facility] Note     Patient Name: Brenda Mendoza  Encounter Date: 08/23/21     Have you been cared for by a [facility] Dermatologist in the last 3 years and, if so, which one? No    · Have you traveled outside of the [address] [place] [address] in the past 3 months or outside of the [facility] area in the last 2 weeks? No     May we call your Preferred Phone number to discuss your specific medical information? Yes     May we leave a detailed message that includes your specific medical information? Yes      Today's Chief Concerns:   Concern #1:  Rash on arm and head   Concern #2:  Skin tags    Past Medical History:  Have you personally ever had or currently have any of the following? · Skin cancer (such as Melanoma, Basal Cell Carcinoma, Squamous Cell Carcinoma? (If Yes, please provide more detail)- No  · Eczema: no  · Psoriasis: No  · HIV/AIDS: No  · Hepatitis B or C: No  · Tuberculosis: No  · Systemic Immunosuppression such as Diabetes, Biologic or Immunotherapy, Chemotherapy, Organ Transplantation, Bone Marrow Transplantation (If YES, please provide more detail): No  · Radiation Treatment (If YES, please provide more detail): No  · Any other major medical conditions/concerns? 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 Working around house    Family History:  Have any of your "first degree relatives" (parent, brother, sister, or child) had any of the following       · Skin cancer such as Melanoma or Merkel Cell Carcinoma or Pancreatic Cancer? No  · Eczema, Asthma, Hay Fever or Seasonal Allergies: YES, mother had hx of eczema  · Psoriasis or Psoriatic Arthritis: YES, mother had hx of psorasis  · Do any other medical conditions seem to run in your family? If Yes, what condition and which relatives?   YES, mother had hx of brain cancer, father had hx of diabetes    Current Medications:         Current Outpatient Medications:     Ascorbic Acid (VITAMIN C PO), Take 1,000 mg by mouth daily, Disp: , Rfl:     Cyanocobalamin (VITAMIN B-12 PO), Take 1,000 mg by mouth daily, Disp: , Rfl:     fexofenadine (ALLEGRA) 180 MG tablet, Take 180 mg by mouth daily as needed, Disp: , Rfl:     lisinopril (ZESTRIL) 20 mg tablet, Take 1 tablet (20 mg total) by mouth daily, Disp: 90 tablet, Rfl: 1    Multiple Vitamins-Minerals (MULTIVITAL PO), Take 1 tablet by mouth, Disp: , Rfl:     Omega-3 Fatty Acids (FISH OIL) 1000 MG CPDR, Take 1,000 mg by mouth daily, Disp: , Rfl:     omeprazole (PriLOSEC) 20 mg delayed release capsule, Take 20 mg by mouth as needed, Disp: , Rfl:     sildenafil (VIAGRA) 50 MG tablet, Take 1 tablet (50 mg total) by mouth daily as needed for erectile dysfunction, Disp: 8 tablet, Rfl: 1    triamcinolone (KENALOG) 0 1 % ointment, Apply topically 2 (two) times a day, Disp: 80 g, Rfl: 0    simvastatin (ZOCOR) 40 mg tablet, Take 1 tablet (40 mg total) by mouth daily at bedtime (Patient not taking: Reported on 8/23/2021), Disp: 90 tablet, Rfl: 0      Review of Systems:  Have you recently had or currently have any of the following? If YES, what are you doing for the problem? · Fever, chills or unintended weight loss: No  · Sudden loss or change in your vision: No  · Nausea, vomiting or blood in your stool: No  · Painful or swollen joints: yes  · Wheezing or cough: No  · Changing mole or non-healing wound: No  · Nosebleeds: No  · Excessive sweating: No  · Easy or prolonged bleeding? No  · Over the last 2 weeks, how often have you been bothered by the following problems? · Taking little interest or pleasure in doing things: 1 - Not at All  · Feeling down, depressed, or hopeless: 1 - Not at All  · Rapid heartbeat with epinephrine:  No    · FEMALES ONLY:    · Are you pregnant or planning to become pregnant?  N/A  · Are you currently or planning to be nursing or breast feeding? N/A    · Any known allergies? · No Known Allergies      Physical Exam:     Was a chaperone (Derm Clinical Assistant) present throughout the entire Physical Exam? Yes     Did the Dermatology Team specifically  the patient on the importance of a Full Skin Exam to be sure that nothing is missed clinically? Yes}  o Did the patient ultimately request or accept a Full Skin Exam?  NO  o Did the patient specifically refuse to have the areas "under-the-bra" examined by the Dermatologist? No  o Did the patient specifically refuse to have the areas "under-the-underwear" examined by the Dermatologist? No    CONSTITUTIONAL:   Vitals:    08/23/21 1047   Temp: (!) 97 °F (36 1 °C)   TempSrc: Tympanic   Weight: (!) 155 kg (341 lb)   Height: 6' (1 829 m)       PSYCH: Normal mood and affect  EYES: Normal conjunctiva  ENT: Normal lips and oral mucosa  CARDIOVASCULAR: No edema  RESPIRATORY: Normal respirations  HEME/LYMPH/IMMUNO:  No regional lymphadenopathy except as noted below in "ASSESSMENT AND PLAN BY DIAGNOSIS"    SKIN:  FOCUSED ORGAN SYSTEM EXAM  Hair, Scalp, Ears, Face Normal except as noted below in Assessment   Neck, Cervical Chain Nodes Normal except as noted below in Assessment   Right Arm Normal except as noted below in Assessment   Left Arm Normal except as noted below in Assessment        Assessment and Plan by Diagnosis:    History of Present Condition:     Duration:  How long has this been an issue for you?    o Right forearm -had about 9 months  o Scalp-had about a couple of years   Location Affected:  Where on the body is this affecting you?    o Right forearm and scalp   Quality:  Is there any bleeding, pain, itch, burning/irritation, or redness associated with the skin lesion? o  itches   Severity:  Describe any bleeding, pain, itch, burning/irritation, or redness on a scale of 1 to 10 (with 10 being the worst)    o  3   Timing:  Does this condition seem to be there pretty constantly or do you notice it more at specific times throughout the day? o  constant   Context:  Have you ever noticed that this condition seems to be associated with specific activities you do?    o  denies   Modifying Factors:    o Anything that seems to make the condition worse?    -  denies  o What have you tried to do to make the condition better?    -  triamcinolone ointment   Associated Signs and Symptoms:  Does this skin lesion seem to be associated with any of the following:  o  SL AMB DERM SIGNS AND SYMPTOMS: Itching and Scratching       SEBORRHEIC KERATOSIS; NON-INFLAMED    Physical Exam:   Anatomic Location Affected:  multiple lesions on extensive forearms   Morphological Description:  Flat and raised, waxy, smooth to warty textured, yellow to brownish-grey to dark brown to blackish, discrete, "stuck-on" appearing papules   Pertinent Positives:   Pertinent Negatives: Additional History of Present Condition:  Patient reports new bumps on the skin  Denies itch, burn, pain, bleeding or ulceration  Present constantly; nothing seems to make it worse or better  No prior treatment  Assessment and Plan:  Based on a thorough discussion of this condition and the management approach to it (including a comprehensive discussion of the known risks, side effects and potential benefits of treatment), the patient (family) agrees to implement the following specific plan:   Reassured benign    Seborrheic Keratosis  A seborrheic keratosis is a harmless warty spot that appears during adult life as a common sign of skin aging  Seborrheic keratoses can arise on any area of skin, covered or uncovered, with the usual exception of the palms and soles  They do not arise from mucous membranes  Seborrheic keratoses can have highly variable appearance  Seborrheic keratoses are extremely common  It has been estimated that over 90% of adults over the age of 61 years have one or more of them   They occur in males and females of all races, typically beginning to erupt in the 35s or 45s  They are uncommon under the age of 21 years  The precise cause of seborrhoeic keratoses is not known  Seborrhoeic keratoses are considered degenerative in nature  As time goes by, seborrheic keratoses tend to become more numerous  Some people inherit a tendency to develop a very large number of them; some people may have hundreds of them  The name "seborrheic keratosis" is misleading, because these lesions are not limited to a seborrhoeic distribution (scalp, mid-face, chest, upper back), nor are they formed from sebaceous glands, nor are they associated with sebum -- which is greasy  Seborrheic keratosis may also be called "SK," "Seb K," "basal cell papilloma," "senile wart," or "barnacle "      Researchers have noted:   Eruptive seborrhoeic keratoses can follow sunburn or dermatitis   Skin friction may be the reason they appear in body folds   Viral cause (e g , human papillomavirus) seems unlikely   Stable and clonal mutations or activation of FRFR3, PIK3CA, CHERYL, AKT1 and EGFR genes are found in seborrhoeic keratoses   Seborrhoeic keratosis can arise from solar lentigo   FRFR3 mutations also arise in solar lentigines  These mutations are associated with increased age and location on the head and neck, suggesting a role of ultraviolet radiation in these lesions   Seborrheic keratoses do not harbour tumour suppressor gene mutations   Epidermal growth factor receptor inhibitors, which are used to treat some cancers, often result in an increase in verrucal (warty) keratoses  There is no easy way to remove multiple lesions on a single occasion  Unless a specific lesion is "inflamed" and is causing pain or stinging/burning or is bleeding, most insurance companies do not authorize treatment      ATOPIC DERMATITIS     Physical Exam   Anatomic Location Affected:  Right parietal scalp   Morphological Description:  Slightly scaly patches   Severity: mild   Pertinent Positives:   Pertinent Negatives: Additional History of Present Condition:  Patient has had for a couple of years now; gets itchy    Assessment and Plan:  Based on a thorough discussion of this condition and the management approach to it (including a comprehensive discussion of the known risks, side effects and potential benefits of treatment), the patient (family) agrees to implement the following specific plan:   Apply prescribed Betamethasone valerate lotion once to twice daily as needed to scalp     Assessment and Plan:   Atopic Dermatitis is a chronic, itchy skin condition that is very common in children but may occur at any age  It is also known as eczema or atopic eczema   It is the most common form of dermatitis  Atopic dermatitis usually occurs in people who have an atopic tendency    This means they may develop any or all of these closely linked conditions: Atopic dermatitis, asthma, hay fever (allergic rhinitis), eosinophilic esophagitis, and gastroenteritis  Often these conditions run within families with a parent, child or sibling also affected  A family history of asthma, eczema or hay fever is particularly useful in diagnosing atopic dermatitis in infants  Atopic dermatitis arises because of a complex interaction of genetic and environmental factors  These include defects in skin barrier function making the skin more susceptible to irritation by soap and other contact irritants, the weather, temperature and non-specific triggers  There is also an element of immune system dysregulation that is often present  By definition, it is chronic and has a "waxing-waning" nature; flares should be expected but with good education and treatment strategies can be minimized      Your treatment plan   Using only mild cleansers (hypoallergenic and without fragrances) and fragrance free detergent (not unscented products which contain a masking agent)   Apply moisturizer to entire body at least 2 times a day   Use the above listed medication to affected areas twice a day for a full 2 days after the rash has cleared   Take on over the counter antihistamine like diphenhydramine before bed if the itching is keeping you awake              ATOPIC DERMATITIS FAQS    WHAT IS ATOPIC DERMATITIS? Atopic dermatitis (also called eczema) results from a weak skin barrier and an over active immune system  The weak skin barrier allows things to get into the skin and then the immune system has an intense reaction to this substances  The result is itchy red patches anywhere on the body  WHO GETS ATOPIC DERMATITIS? There is no single answer because many factors are involved  It is likely a combination of genetic makeup and environmental triggers and/or exposures  People with atopic dermatitis are prone to other types of "atopy"  They are at increased risk for food allergies, Asthma and hay fever and there is often a family history of these problems as well  WHAT ABOUT FOOD ALLERGIES? This is a very controversial topic, as many believe that food allergies are responsible for skin flares  In some cases, specific foods may cause worsening of atopic dermatitis; however this occurs in a minority of cases and usually happens within a few hours of ingestion  While food allergy is more common in children with eczema, foods are specific triggers for flares in only a small percentage of children  If you notice that the skin flares after certain foods you can see if eliminating one food at time makes a difference, as long as your child can still enjoy a well-balanced diet  There are blood (RAST) and skin (PRICK) tests that can check for allergies, but they are often positive in children who are not truly allergic  Therefore it is important that you work with your allergist and dermatologist to determine which foods are relevant and causing true symptoms  Extreme food elimination diets without the guidance of your doctor, which have become more popular in recent years, may even result in worsening of the skin rash due to malnutrition and avoidance of essential nutrients  WHY SO MUCH MOISTURIZER? This is the 1st step and most important part of treatment  This helps maintaint the skin's barrier function and prevent flares  Creams and ointments are preferable over lotions  Aveeno eczema relief and Eucerin eczema relief  and cerave are all good ones to start with  It is best to put  moisturizer on directly after bathing, while the skin is damp, it is twice as effective then  You may bathe daily  Use warm - not hot - water, for short periods of time (5-10 minutes at a time) Dry off by Lightly patting the skin with a towel and not rubbing  While the skin is still damp, (within 3 minutes) apply any medications to the rashy areas 1st and then moisturize the entire body  It's best to apply the medication 1st but if the medications sting, moisturizer can be applied 1st     WHY USE THE MEDICATION FOR AN EXTRA 2 DAYS AFTER THE RASH IS GONE? Sometimes the rash may appear to be gone, but there are still microscopic changes in the skin  Using the medication and extra 2 days will ensure the inflammation has resolve and make the rash less likely to return quickly  WHAT ARE TRIGGERS?   Occasionally there are specific things that trigger itching and rashes, but in many cases may none can be identified  The most common triggers are:   Heat and sweat for some individuals, cold weather for others   House dust mites, pet fur   Wool; synthetic fabrics like nylon; dyed fabrics   Tobacco smoke    Fragrances in: shampoos, soaps, lotions, laundry detergents, fabric softeners   Saliva or prolonged exposure to water  start with these  Avoid the use of fabric softeners in the washing machine or dryer sheets (unless they are fragrance-free)    Try to use laundry detergents, soaps and shampoos that are fragrance-free  You may find it helpful to double-rinse your clothes  Some children are sensitive to house dust mites and they may benefit from a plastic mattress wrap  While food allergy is more common in children with eczema, foods are specific triggers for flares in only a small percentage of children  If you notice that the skin flares after certain foods you can see if eliminating one food at time makes a difference, as long as your child can still enjoy a well-balanced diet  4) WHAT DOES THE ANTIHISTAMINE DO? Antihistamines help you not to scratch  Scratching only makes the skin more reactive and the barrier function even more disrupted  It can cause both children and their parents to lose sleep! There are different types of anti-itch medications  Some cause more drowsiness than others  Both types are acceptable depending on your child and your preference  Start with Benadryl and if that does not work, ask for a prescription antihistamine      5) ARE THERE ANY SIDE EFFECTS TO WORRY ABOUT? Corticosteroid creams and ointments (generally things with "-one" or "hattie" on the end of their names): The strength of the cream or ointment depends on the name of the active ingredient  The numbers at the end do not indicate the relative strength  Thus triamcinolone 0 1% ointment, considered a mid-strength corticosteroid, is much stronger than hydrocortisone 1% even though the number following the name is much lower  Topical corticosteroids are very effective in treating atopic dermatitis  When used in the manner prescribed (to rashy areas of skin and for no more than a few weeks at a time to any one area) they are very safe  These are corticosteroids and are anti-inflammatory, not the anabolic steroids like those used illegally by some athletes   It is important that you do not overuse steroid creams, and if you notice a thin, shiny appearance to the skin or broken blood vessels, you should stop using the cream and consult your health care provider regarding possible overuse/overthinning of the skin  The face, armpits and groin have particularly thin and sensitive skin and are therefore most at risk for bad results if steroids are over-used in these sites  Topical non-steroid creams and ointments (immunomodulators): These creams and ointments are also called topical calcineurin inhibitors (TCIs)  These include Protopic ointment and Elidel cream  Crisaborole 2% Joseluis Spaulding) is a prescription ointment that targets an enzyme called PDE4 (phosphodiesterase 4)  It is used on the skin topically to treat mild-to-moderate eczema in adults and children 3years of age and older  In total, these nonsteroidal prescriptions are used to help decrease itching and redness in the skin  They are not as strong as most steroid creams; however, it is believed that they do not thin the skin when overused  They are generally used as second-line medications, though they may be used alone or in conjunction with topical steroids  In sensitive areas such as the face, underarms or groin, they are often recommended  They can sting inflamed skin, but are generally well tolerated once the skin is healing  The FDA placed a black-box warning on both Elidel and Protopic in 2006 based on animal studies using the medications  Some animals developed skin cancer and lymphoma  Subsequently, the FDA released a statement that there is no causal relationship between the two medications and cancer  Because of this concern, there are ongoing studies to evaluate this relationship in humans  So far, there are studies that support the safety of these medications    One showed that the rates of cancer in patient using these medications topically were less than the rates of the general population and another showed that in patient's using the medication over a large area of the body, the levels of the medication in the blood was undetectable  As for Eucrisa, this product is only approved for the topical treatment of mild-to-moderate eczema in patients 3years of age and older; use of the medication in kids younger than 2 is considered off label and has not been formally studied  Burning and stinging are the most commonly reported side effects of this medication  Rarely, this product has been known to cause hives and hypersensitivity reactions; discontinue its use if you develop severe itching, swelling, or redness in the area of application  ACROCHORDON ("SKIN TAG")    Physical Exam:   Anatomic Location Affected:  neck   Morphological Description:  Skin colored tags   Pertinent Positives:   Pertinent Negatives: Additional History of Present Condition:  Patient has had for many years; noticed getting more    Assessment and Plan:  Based on a thorough discussion of this condition and the management approach to it (including a comprehensive discussion of the known risks, side effects and potential benefits of treatment), the patient (family) agrees to implement the following specific plan:   Consider removal; not considered cosmetic; $150- for removal up to 10    Skin tags are common, soft, harmless skin lesions that are also called, in the appropriate settings, papillomas, fibroepithelial polyps, and soft fibromas  They are made up of loosely arranged collagen fibers and blood vessels surrounded by a thickened or thinned-out epidermis  Skin tags tend to develop in both men and women as we grow older  They are usually found on the skin folds (neck, armpits, groin)  It is not known what specifically causes skin tags    Certain factors, though, do appear to play a role:   Chaffing and irritation from skin rubbing together   High levels of growth factors (as seen, for example, in pregnancy or in acromegaly/gigantism)   Insulin resistance   Human papillomavirus (wart virus)    We discussed that most skin tags do not need to be treated unless they are specifically causing the patient physical distress or limitation or pose a risk for a larger problem such as an infection that forms secondary to excoriation or chronic irritation  We had a thorough discussion of treatment options and specific risks (including that any procedural treatment may not be covered by insurance and would then be the patient's responsibility) and benefits/alternatives including but not limited to the following:   Cryotherapy (freezing)   Shave removal   Surgical excision (snip excision with scissors)   Electrosurgery   Ligation (we do not do this procedure and counseled against it due to risk of tissue necrosis and infection)    LICHEN SIMPLEX CHRONICUS    Physical Exam:   Anatomic Location Affected:  Right forearm   Morphological Description:  Lichen papules confluent into black 10 x 4 cm    Pertinent Positives:   Pertinent Negatives: Additional History of Present Condition:  Patient has been itching for about 9  months; was apply the triamcinolone ointment to forearm      Assessment and Plan:  Based on a thorough discussion of this condition and the management approach to it (including a comprehensive discussion of the known risks, side effects and potential benefits of treatment), the patient (family) agrees to implement the following specific plan:   Consider skin biopsy in future if does not go away   Betamethasone diproprionate 0 05% ointment apply small amount to affected area once to twice daily for up to 2 weeks    Lichen simplex chronicus is a localized area of thickened skin from repeated rubbing, itching, and scratching of the skin  There may be a single or multiple well-circumscribed plaques on the skin  It is also called "neurodermatitis "    What causes lichen simplex chronicus?   Although the mechanism is not understood, lichen simplex chronicus follows repetitive scratching and rubbing, which arises because of chronic localized itch  Lichen simplex occurs in adult males and females  It is unusual in children and is more common in people with anxiety and/or obsessive compulsive disorder  The primary itch can be due to:   Atopic eczema   Contact eczema   Venous eczema   Psoriasis   Lichen planus   Fungal infection   Insect bite   Neuropathy (radiculopathy) eg, brachioradial pruritus  The itching may involve alterations in the way the nervous system perceives and processes itchy sensations  Skin that tends toward eczematous conditions (eg, atopic dermatitis) is more prone to lichenification  What are the clinical features of lichen simplex? A solitary plaque of lichen simplex is circumscribed, somewhat linear or oval in shape, and markedly thickened  It is intensely itchy  Other features may include:   Exaggerated skin markings   Dry or scaly surface   Leathery induration   Broken-off hairs   Pigmentation   Scratch marks    Lichen simplex is often solitary and unilateral, usually affecting the patient's dominant side  Multiple areas of skin can also be involved with symmetrical or asymmetrical distribution  It mainly involves easily reached sites, most commonly the legs, arms, neck, upper trunk, and anal region  How do we diagnose lichen simplex chronicus? Diagnosis is usually done by history and skin examination  At times, it may be helpful to do some investigations  These would include:   Skin scrapings for possible tinea (fungal) infection   Skin biopsy  In the absence of known underlying skin problem or infection, you doctor may look for clues that it is due to nerve damage  In severe cases, the following tests may be performed (although they are often unhelpful):  Spinal imaging: X-rays, CT scans, MRI scans   Electrophysiological nerve conduction studies  It is important to understand that the itchy patch of skin is, at least in part, due to scratching and rubbing  Treatment addresses the symptoms and any underlying cause  Treatment of the lichen simplex may include:   Potent topical steroids until the plaque is resolved (4-6 weeks) -- covering the affected area a few hours after application may enhance efficacy   Reduce potency or frequency of topical steroids once lichenification has resolved   Steroid injections every 4-6 weeks   Coal tar preparations   Moisturizers to relieve dryness and reduce desire to scratch   Cooling creams containing menthol   Antihistamine or tricyclic antidepressant medications (eg, amitriptyline or nortriptyline), to help sleep      The primary condition needs treating; for example:   Antifungal agents for dermatophyte infection   Phototherapy and immunomodulatory medications for inflammatory skin conditions (oral corticosteroids, methotrexate, azathioprine or ciclosporin)   Tricyclic antidepressants (amitriptyline, nortriptyline, doxepin), other antidepressants (eg, duloxetine) or antiepileptic medications (valproate, lamotrigine, gabapentin) for nerve causes    Scribe Attestation    I,:  Opal Fall am acting as a scribe while in the presence of the attending physician :       I,:  Garrett Friedman MD personally performed the services described in this documentation    as scribed in my presence :

## 2021-09-15 ENCOUNTER — CONSULT (OUTPATIENT)
Dept: GASTROENTEROLOGY | Facility: AMBULARY SURGERY CENTER | Age: 66
End: 2021-09-15
Payer: COMMERCIAL

## 2021-09-15 VITALS
BODY MASS INDEX: 42.66 KG/M2 | HEIGHT: 72 IN | DIASTOLIC BLOOD PRESSURE: 86 MMHG | SYSTOLIC BLOOD PRESSURE: 144 MMHG | WEIGHT: 315 LBS

## 2021-09-15 DIAGNOSIS — Z86.010 HISTORY OF COLON POLYPS: ICD-10-CM

## 2021-09-15 DIAGNOSIS — K21.9 GASTROESOPHAGEAL REFLUX DISEASE WITHOUT ESOPHAGITIS: ICD-10-CM

## 2021-09-15 DIAGNOSIS — D12.6 TUBULAR ADENOMA OF COLON: ICD-10-CM

## 2021-09-15 DIAGNOSIS — Z86.010 HISTORY OF COLON POLYPS: Primary | ICD-10-CM

## 2021-09-15 PROBLEM — Z86.0100 HISTORY OF COLON POLYPS: Status: ACTIVE | Noted: 2021-09-15

## 2021-09-15 PROCEDURE — 3079F DIAST BP 80-89 MM HG: CPT | Performed by: INTERNAL MEDICINE

## 2021-09-15 PROCEDURE — 99204 OFFICE O/P NEW MOD 45 MIN: CPT | Performed by: INTERNAL MEDICINE

## 2021-09-15 PROCEDURE — 3077F SYST BP >= 140 MM HG: CPT | Performed by: INTERNAL MEDICINE

## 2021-09-15 PROCEDURE — 3008F BODY MASS INDEX DOCD: CPT | Performed by: INTERNAL MEDICINE

## 2021-09-15 RX ORDER — SODIUM PICOSULFATE, MAGNESIUM OXIDE, AND ANHYDROUS CITRIC ACID 10; 3.5; 12 MG/160ML; G/160ML; G/160ML
1 LIQUID ORAL SEE ADMIN INSTRUCTIONS
Qty: 160 ML | Refills: 0 | Status: SHIPPED | OUTPATIENT
Start: 2021-09-15 | End: 2021-09-16

## 2021-09-15 RX ORDER — FAMOTIDINE 20 MG/1
20 TABLET, FILM COATED ORAL 2 TIMES DAILY
Qty: 60 TABLET | Refills: 11 | Status: SHIPPED | OUTPATIENT
Start: 2021-09-15 | End: 2022-01-11 | Stop reason: SDUPTHER

## 2021-09-15 NOTE — PROGRESS NOTES
Consultation - [facility]) [facility]  Evelin Elias [facility] 1955 male         Chief Complaint:  GERD, history of colon polyps    HPI:  61-year-old obese male with history of GERD, hypertension, hyperlipidemia was referred for colonoscopy  Patient has history of colon polyps on the last colonoscopy was in 2017  He has history of GERD for which he takes omeprazole almost every day  Denies any difficulty swallowing  Good appetite, no recent weight loss  Regular bowel movements and denies any blood or mucus in the stool  No abdominal pain, nausea or vomiting  REVIEW OF SYSTEMS: Review of Systems   Constitutional: Negative for activity change, appetite change, chills, diaphoresis, fatigue, fever and unexpected weight change  HENT: Negative for ear discharge, ear pain, facial swelling, hearing loss, nosebleeds, sore throat, tinnitus and voice change  Eyes: Negative for pain, discharge, redness, itching and visual disturbance  Respiratory: Negative for apnea, cough, chest tightness, shortness of breath and wheezing  Cardiovascular: Negative for chest pain and palpitations  Gastrointestinal:        As noted in HPI   Endocrine: Negative for cold intolerance, heat intolerance and polyuria  Genitourinary: Negative for difficulty urinating, dysuria, flank pain, hematuria and urgency  Musculoskeletal: Negative for arthralgias, back pain, gait problem, joint swelling and myalgias  Skin: Negative for rash and wound  Neurological: Negative for dizziness, tremors, seizures, speech difficulty, light-headedness, numbness and headaches  Hematological: Negative for adenopathy  Does not bruise/bleed easily  Psychiatric/Behavioral: Negative for agitation, behavioral problems and confusion  The patient is not nervous/anxious           Past Medical History:   Diagnosis Date    Asthma     BMI 45 0-49 9, adult ([facility] [address] ) 6/28/2021    Bronchospasm     Cancer (HCC)     Prostate     Colon polyp     Essential hypertension 2021    GERD (gastroesophageal reflux disease)     Hyperlipidemia     Hypertension     Insomnia     Leg cramps     Mixed hyperlipidemia 2021    Nocturia     Numbness of fingers of both hands     Skin lesion     Skin rash 2021    Snoring     Tubular adenoma of colon 2021    Type 2 diabetes mellitus without complication, without long-term current use of insulin ([facility] [address] ) 2021    Wears glasses     Weight gain       Past Surgical History:   Procedure Laterality Date    COLONOSCOPY  2017    COLONOSCOPY      HIP SURGERY Bilateral     Hip replacement    SD COLONOSCOPY FLX DX W/COLLJ SPEC WHEN PFRMD N/A 2017    Procedure: COLONOSCOPY;  Surgeon: Gretchen Brown MD;  [facility];   Service: Gastroenterology    PROSTATECTOMY      REPLACEMENT TOTAL KNEE Bilateral     Inactive    UPPER GASTROINTESTINAL ENDOSCOPY       Social History     Socioeconomic History    Marital status: /Civil Union     Spouse name: Not on file    Number of children: Not on file    Years of education: Not on file    Highest education level: Not on file   Occupational History    Occupation: Presently not working   Tobacco Use    Smoking status: Former Smoker     Quit date: 1992     Years since quittin 3    Smokeless tobacco: Never Used   Vaping Use    Vaping Use: Every day   Substance and Sexual Activity    Alcohol use: Yes     Comment: rarely    Drug use: No    Sexual activity: Not on file   Other Topics Concern    Not on file   Social History Narrative    Single-family home    Current work/study status: Full-time    Caffeine use: Coffee, 3 servings/day    Lives with spouse    Former smoker - Inactive 2018    Annual eye exam: Sees 1hr; wears glasses    Annual dental checkup: Sees q6months    PSA: Used to follow Urology    - As per [employer]     Social Determinants of Health     Financial Resource Strain:     Difficulty of Paying Living Expenses:    Food Insecurity:     Worried About Running Out of Food in the Last Year:     [address] in the Last Year:    Transportation Needs:     Lack of Transportation (Medical):  Lack of Transportation (Non-Medical):    Physical Activity:     Days of Exercise per Week:     Minutes of Exercise per Session:    Stress:     Feeling of Stress :    Social Connections:     Frequency of Communication with Friends and Family:     Frequency of Social Gatherings with Friends and Family:     Attends Mosque Services:     Active Member of Clubs or Organizations:     Attends Club or Organization Meetings:     Marital Status:    Intimate Partner Violence:     Fear of Current or Ex-Partner:     Emotionally Abused:     Physically Abused:     Sexually Abused:      Family History   Problem Relation Age of Onset    Brain cancer Mother     Psoriasis Mother     Eczema Mother     Depression Father     Heart disease Father     Diabetes Father     Stroke Paternal Grandfather      Penicillins  Current Outpatient Medications   Medication Sig Dispense Refill    Ascorbic Acid (VITAMIN C PO) Take 1,000 mg by mouth daily      betamethasone dipropionate (DIPROSONE) 0 05 % ointment Apply sparingly  1-2 times/day for up to 2 wks to R forearm rash   45 g 0    betamethasone valerate (VALISONE) 0 1 % lotion Apply once to twice daily as needed to scalp 60 mL 0    Cyanocobalamin (VITAMIN B-12 PO) Take 1,000 mg by mouth daily      fexofenadine (ALLEGRA) 180 MG tablet Take 180 mg by mouth daily as needed      lisinopril (ZESTRIL) 20 mg tablet Take 1 tablet (20 mg total) by mouth daily 90 tablet 1    Multiple Vitamins-Minerals (MULTIVITAL PO) Take 1 tablet by mouth      Omega-3 Fatty Acids (FISH OIL) 1000 MG CPDR Take 1,000 mg by mouth daily      omeprazole (PriLOSEC) 20 mg delayed release capsule Take 20 mg by mouth as needed      sildenafil (VIAGRA) 50 MG tablet Take 1 tablet (50 mg total) by mouth daily as needed for erectile dysfunction 8 tablet 1    simvastatin (ZOCOR) 40 mg tablet Take 1 tablet (40 mg total) by mouth daily at bedtime 90 tablet 0    famotidine (PEPCID) 20 mg tablet Take 1 tablet (20 mg total) by mouth 2 (two) times a day 60 tablet 11    Sod Picosulfate-Mag Ox-Cit Acd (Clenpiq) 10-3 5-12 MG-GM -GM/160ML SOLN Take 1 Package by mouth see administration instructions 160 mL 0    triamcinolone (KENALOG) 0 1 % ointment Apply topically 2 (two) times a day (Patient not taking: Reported on 9/15/2021) 80 g 0     No current facility-administered medications for this visit  Blood pressure 144/86, height 6' (1 829 m), weight (!) 156 kg (343 lb 12 8 oz)  PHYSICAL EXAM: Physical Exam  Constitutional:       Appearance: He is well-developed  HENT:      Head: Normocephalic and atraumatic  Eyes:      General: No scleral icterus  Right eye: No discharge  Left eye: No discharge  Conjunctiva/sclera: Conjunctivae normal       Pupils: Pupils are equal, round, and reactive to light  Neck:      Thyroid: No thyromegaly  Vascular: No JVD  Trachea: No tracheal deviation  Cardiovascular:      Rate and Rhythm: Normal rate and regular rhythm  Heart sounds: Normal heart sounds  No murmur heard  No friction rub  No gallop  Pulmonary:      Effort: Pulmonary effort is normal  No respiratory distress  Breath sounds: Normal breath sounds  No wheezing or rales  Chest:      Chest wall: No tenderness  Abdominal:      General: Bowel sounds are normal  There is no distension  Palpations: Abdomen is soft  There is no mass  Tenderness: There is no abdominal tenderness  There is no guarding or rebound  Hernia: No hernia is present  Musculoskeletal:      Cervical back: Neck supple  Lymphadenopathy:      Cervical: No cervical adenopathy  Skin:     General: Skin is warm and dry  Findings: No erythema or rash     Neurological:      Mental Status: He is alert and oriented to person, place, and time  Psychiatric:         Behavior: Behavior normal          Thought Content: Thought content normal           Lab Results   Component Value Date    WBC 5 24 11/27/2020    HGB 13 9 11/27/2020    HCT 43 4 11/27/2020    MCV 91 11/27/2020     11/27/2020     Lab Results   Component Value Date    CALCIUM 8 7 06/25/2021    K 4 7 06/25/2021    CO2 28 06/25/2021     06/25/2021    BUN 21 06/25/2021    CREATININE 1 17 06/25/2021     Lab Results   Component Value Date    ALT 28 06/25/2021    AST 18 06/25/2021    ALKPHOS 58 06/25/2021     No results found for: INR, PROTIME    XR chest 1 view portable    Result Date: 11/27/2020  Impression: Limited assessment by technique  Suggested right lung base airspace infiltrate  The study was marked in [facility] for immediate notification  Workstation performed: [id number]       ASSESSMENT & PLAN:    Gastroesophageal reflux disease without esophagitis  Gastroesophageal reflux disease - Patient has the symptoms of chronic acid reflux for a long time  Possible hiatal hernia or LES weakness  Should rule out Lewis's esophagus because of chronic symptoms         -Patient was explained about the lifestyle and dietary modifications  Advised to avoid fatty foods, chocolates, caffeine, alcohol and any other triggering foods  Avoid eating for at least 3 hours before going to bed         -discussed about long-term side effects from omeprazole and advised him to change to Pepcid instead    -schedule for EGD    History of colon polyps  Personal history of colon polyps- patient is at increased risk for colon cancer screening  Rule out colorectal lesions including polyps or malignancy     -Schedule for colonoscopy  -High-fiber diet     -Patient was given instructions about the colonoscopy prep     -Patient was explained about  the risks and benefits of the procedure   Risks including but not limited to bleeding, infection, perforation were explained in detail  Also explained about less than 100% sensitivity with the exam and other alternatives

## 2021-09-15 NOTE — ASSESSMENT & PLAN NOTE
Gastroesophageal reflux disease - Patient has the symptoms of chronic acid reflux for a long time  Possible hiatal hernia or LES weakness  Should rule out Lewis's esophagus because of chronic symptoms         -Patient was explained about the lifestyle and dietary modifications  Advised to avoid fatty foods, chocolates, caffeine, alcohol and any other triggering foods    Avoid eating for at least 3 hours before going to bed         -discussed about long-term side effects from omeprazole and advised him to change to Pepcid instead    -schedule for EGD

## 2021-09-16 RX ORDER — SODIUM PICOSULFATE, MAGNESIUM OXIDE, AND ANHYDROUS CITRIC ACID 10; 3.5; 12 MG/160ML; G/160ML; G/160ML
1 LIQUID ORAL SEE ADMIN INSTRUCTIONS
Qty: 320 ML | Refills: 0 | Status: SHIPPED | OUTPATIENT
Start: 2021-09-16 | End: 2021-09-23

## 2021-09-17 ENCOUNTER — RA CDI HCC (OUTPATIENT)
Dept: OTHER | Facility: HOSPITAL | Age: 66
End: 2021-09-17

## 2021-09-17 NOTE — PROGRESS NOTES
[facility]  coding opportunities       Chart reviewed, no opportunity found: CHART REVIEWED, NO OPPORTUNITY FOUND                        Patients insurance company:  [employer] [facility] (Medicare Advantage and Commercial)

## 2021-09-22 DIAGNOSIS — Z86.010 HISTORY OF COLON POLYPS: ICD-10-CM

## 2021-09-23 DIAGNOSIS — E11.9 TYPE 2 DIABETES MELLITUS WITHOUT COMPLICATION, WITHOUT LONG-TERM CURRENT USE OF INSULIN (HCC): Primary | ICD-10-CM

## 2021-09-23 DIAGNOSIS — I10 ESSENTIAL HYPERTENSION: ICD-10-CM

## 2021-09-23 RX ORDER — SODIUM PICOSULFATE, MAGNESIUM OXIDE, AND ANHYDROUS CITRIC ACID 10; 3.5; 12 MG/160ML; G/160ML; G/160ML
1 LIQUID ORAL SEE ADMIN INSTRUCTIONS
Qty: 320 ML | Refills: 0 | Status: SHIPPED | OUTPATIENT
Start: 2021-09-23

## 2021-09-25 ENCOUNTER — APPOINTMENT (OUTPATIENT)
Dept: LAB | Facility: CLINIC | Age: 66
End: 2021-09-25
Payer: COMMERCIAL

## 2021-09-25 DIAGNOSIS — E11.9 TYPE 2 DIABETES MELLITUS WITHOUT COMPLICATION, WITHOUT LONG-TERM CURRENT USE OF INSULIN (HCC): ICD-10-CM

## 2021-09-25 DIAGNOSIS — I10 ESSENTIAL HYPERTENSION: ICD-10-CM

## 2021-09-25 LAB
ANION GAP SERPL CALCULATED.3IONS-SCNC: 7 MMOL/L (ref 4–13)
BUN SERPL-MCNC: 21 MG/DL (ref 5–25)
CALCIUM SERPL-MCNC: 8.4 MG/DL (ref 8.3–10.1)
CHLORIDE SERPL-SCNC: 104 MMOL/L (ref 100–108)
CO2 SERPL-SCNC: 29 MMOL/L (ref 21–32)
CREAT SERPL-MCNC: 1.21 MG/DL (ref 0.6–1.3)
CREAT UR-MCNC: 80.3 MG/DL
EST. AVERAGE GLUCOSE BLD GHB EST-MCNC: 146 MG/DL
GFR SERPL CREATININE-BSD FRML MDRD: 63 ML/MIN/1.73SQ M
GLUCOSE P FAST SERPL-MCNC: 119 MG/DL (ref 65–99)
HBA1C MFR BLD: 6.7 %
MICROALBUMIN UR-MCNC: 444 MG/L (ref 0–20)
MICROALBUMIN/CREAT 24H UR: 553 MG/G CREATININE (ref 0–30)
POTASSIUM SERPL-SCNC: 5.1 MMOL/L (ref 3.5–5.3)
SODIUM SERPL-SCNC: 140 MMOL/L (ref 136–145)

## 2021-09-25 PROCEDURE — 82570 ASSAY OF URINE CREATININE: CPT | Performed by: INTERNAL MEDICINE

## 2021-09-25 PROCEDURE — 83036 HEMOGLOBIN GLYCOSYLATED A1C: CPT

## 2021-09-25 PROCEDURE — 80048 BASIC METABOLIC PNL TOTAL CA: CPT

## 2021-09-25 PROCEDURE — 3044F HG A1C LEVEL LT 7.0%: CPT | Performed by: INTERNAL MEDICINE

## 2021-09-25 PROCEDURE — 3062F POS MACROALBUMINURIA REV: CPT | Performed by: INTERNAL MEDICINE

## 2021-09-25 PROCEDURE — 36415 COLL VENOUS BLD VENIPUNCTURE: CPT

## 2021-09-25 PROCEDURE — 82043 UR ALBUMIN QUANTITATIVE: CPT | Performed by: INTERNAL MEDICINE

## 2021-09-27 ENCOUNTER — OFFICE VISIT (OUTPATIENT)
Dept: INTERNAL MEDICINE CLINIC | Facility: CLINIC | Age: 66
End: 2021-09-27
Payer: COMMERCIAL

## 2021-09-27 VITALS
TEMPERATURE: 98.2 F | BODY MASS INDEX: 46.79 KG/M2 | SYSTOLIC BLOOD PRESSURE: 128 MMHG | WEIGHT: 315 LBS | HEART RATE: 98 BPM | OXYGEN SATURATION: 96 % | DIASTOLIC BLOOD PRESSURE: 82 MMHG

## 2021-09-27 DIAGNOSIS — C61 PROSTATE CANCER (HCC): ICD-10-CM

## 2021-09-27 DIAGNOSIS — L03.012 PARONYCHIA OF LEFT MIDDLE FINGER: ICD-10-CM

## 2021-09-27 DIAGNOSIS — Z23 NEED FOR PROPHYLACTIC VACCINATION AND INOCULATION AGAINST INFLUENZA: ICD-10-CM

## 2021-09-27 DIAGNOSIS — E11.9 TYPE 2 DIABETES MELLITUS WITHOUT COMPLICATION, WITHOUT LONG-TERM CURRENT USE OF INSULIN (HCC): ICD-10-CM

## 2021-09-27 DIAGNOSIS — K21.9 GASTROESOPHAGEAL REFLUX DISEASE WITHOUT ESOPHAGITIS: ICD-10-CM

## 2021-09-27 DIAGNOSIS — D12.6 TUBULAR ADENOMA OF COLON: ICD-10-CM

## 2021-09-27 DIAGNOSIS — E78.2 MIXED HYPERLIPIDEMIA: ICD-10-CM

## 2021-09-27 DIAGNOSIS — R80.9 MICROALBUMINURIA: ICD-10-CM

## 2021-09-27 DIAGNOSIS — I10 ESSENTIAL HYPERTENSION: Primary | ICD-10-CM

## 2021-09-27 PROCEDURE — 90682 RIV4 VACC RECOMBINANT DNA IM: CPT | Performed by: INTERNAL MEDICINE

## 2021-09-27 PROCEDURE — 90471 IMMUNIZATION ADMIN: CPT | Performed by: INTERNAL MEDICINE

## 2021-09-27 PROCEDURE — 99214 OFFICE O/P EST MOD 30 MIN: CPT | Performed by: INTERNAL MEDICINE

## 2021-09-27 PROCEDURE — 3066F NEPHROPATHY DOC TX: CPT | Performed by: INTERNAL MEDICINE

## 2021-09-27 PROCEDURE — 1036F TOBACCO NON-USER: CPT | Performed by: INTERNAL MEDICINE

## 2021-09-27 RX ORDER — DOXYCYCLINE HYCLATE 100 MG/1
100 CAPSULE ORAL EVERY 12 HOURS SCHEDULED
Qty: 20 CAPSULE | Refills: 0 | Status: SHIPPED | OUTPATIENT
Start: 2021-09-27 | End: 2021-10-07

## 2021-09-27 NOTE — ASSESSMENT & PLAN NOTE
He was advised by GI to change omeprazole to famotidine  So advised to take famotidine 20 mg p o  B i d   Watch diet

## 2021-09-27 NOTE — ASSESSMENT & PLAN NOTE
Advised to decrease portion size  Advised to decrease sugar, carbs, cholesterol intake  Advised to exercise 3-5 times per week to lose weight

## 2021-09-27 NOTE — PATIENT INSTRUCTIONS
Patient advised to continue present medications discussed with the patient medications and laboratory data in detail  Follow-up with me in 3 months    If any blood test was ordered please do 1 week prior to next appointment  If you have any questions please call the office [phone]

## 2021-09-27 NOTE — ASSESSMENT & PLAN NOTE
Diet controlled  Lab Results   Component Value Date    HGBA1C 6 7 (H) 09/25/2021    hemoglobin A1c 6 4 diet controlled   and hemoglobin A1c decreased from 6 7 to 6 4  Patient has been watching diet  Very closely

## 2021-09-27 NOTE — ASSESSMENT & PLAN NOTE
His last cholesterol 156, triglyceride 208, HDL 42, LDL 72  Patient advised to exercise lose weight and low-cholesterol low saturated fat diet  Continue present medication  Will follow lipid panel

## 2021-09-27 NOTE — PROGRESS NOTES
Assessment/Plan:    1  Essential hypertension  Assessment & Plan:  Blood pressure well controlled  Advised to continue present medication  Advised for low-salt diet    Orders:  -     CBC and differential; Future  -     Comprehensive metabolic panel; Future    2  Type 2 diabetes mellitus without complication, without long-term current use of insulin ([facility] [address] )  Assessment & Plan:  Diet controlled  Lab Results   Component Value Date    HGBA1C 6 7 (H) 09/25/2021    hemoglobin A1c 6 4 diet controlled   and hemoglobin A1c decreased from 6 7 to 6 4  Patient has been watching diet  Very closely  Orders:  -     CBC and differential; Future  -     Comprehensive metabolic panel; Future    3  Mixed hyperlipidemia  Assessment & Plan:  His last cholesterol 156, triglyceride 208, HDL 42, LDL 72  Patient advised to exercise lose weight and low-cholesterol low saturated fat diet  Continue present medication  Will follow lipid panel  Orders:  -     Lipid panel; Future    4  BMI 45 0-49 9, adult [facility])  Assessment & Plan:  Advised to decrease portion size  Advised to decrease sugar, carbs, cholesterol intake  Advised to exercise 3-5 times per week to lose weight  5  Tubular adenoma of colon  Assessment & Plan:  Patient was seen by GI doctor Cydney Goldberg, going for colonoscopy this week      6  Gastroesophageal reflux disease without esophagitis  Assessment & Plan:  He was advised by GI to change omeprazole to famotidine  So advised to take famotidine 20 mg p o  B i d   Watch diet  7  Prostate cancer [facility])  Assessment & Plan:  Last PSA January 2021 <0 01 follow yearly PSA    Orders:  -     PSA, Total Screen; Future    8  Paronychia of left middle finger  Assessment & Plan: Will prescribe doxycycline and warm compresses  If not better will refer to surgery  Orders:  -     doxycycline hyclate (VIBRAMYCIN) 100 mg capsule; Take 1 capsule (100 mg total) by mouth every 12 (twelve) hours for 10 days    9  Need for prophylactic vaccination and inoculation against influenza  -     influenza vaccine, quadrivalent, recombinant, PF, 0 5 mL, for patients 18 yr+ (FLUBLOK)    10  Microalbuminuria  Assessment & Plan:  He is on lisinopril  Subjective:  Patient presents for follow-up      Patient ID: Danielle Nelson is a 63 y o  male  HPI   26-year-old white male patient presents for follow-up of his medical problems  He denies any chest pain, shortness of breath, pain in abdomen  Denies any cough, fever, chills  Denies any nausea, vomiting, diarrhea  He got his COVID-19 vaccination  The following portions of the patient's history were reviewed and updated as appropriate:     Past Medical History:  He has a past medical history of Asthma, BMI 45 0-49 9, adult ([facility] [address] ) (6/28/2021), Bronchospasm, Cancer ([facility] ), Colon polyp, Essential hypertension (6/26/2021), GERD (gastroesophageal reflux disease), Hyperlipidemia, Hypertension, Insomnia, Leg cramps, Microalbuminuria (9/27/2021), Mixed hyperlipidemia (6/26/2021), Nocturia, Numbness of fingers of both hands, Paronychia of left middle finger (9/27/2021), Skin lesion, Skin rash (6/26/2021), Snoring, Tubular adenoma of colon (6/28/2021), Type 2 diabetes mellitus without complication, without long-term current use of insulin ([facility] [address] ) (6/26/2021), Wears glasses, and Weight gain  ,  _______________________________________________________________________  Past Surgical History:   has a past surgical history that includes Hip surgery (Bilateral); Prostatectomy; pr colonoscopy flx dx w/collj spec when pfrmd (N/A, 5/9/2017); Replacement total knee (Bilateral); Colonoscopy (05/09/2017); Colonoscopy; and Upper gastrointestinal endoscopy  ,  _______________________________________________________________________  Family History:  family history includes Brain cancer in his mother; Depression in his father; Diabetes in his father; Eczema in his mother; Heart disease in his father; Psoriasis in his mother; Stroke in his paternal grandfather ,  _______________________________________________________________________  Social History:   reports that he quit smoking about 29 years ago  He has never used smokeless tobacco  He reports current alcohol use  He reports that he does not use drugs  ,  _______________________________________________________________________  Allergies:  is allergic to penicillins     _______________________________________________________________________  Current Outpatient Medications   Medication Sig Dispense Refill    Ascorbic Acid (VITAMIN C PO) Take 1,000 mg by mouth daily      betamethasone dipropionate (DIPROSONE) 0 05 % ointment Apply sparingly  1-2 times/day for up to 2 wks to R forearm rash   45 g 0    betamethasone valerate (VALISONE) 0 1 % lotion Apply once to twice daily as needed to scalp 60 mL 0    Clenpiq 10-3 5-12 MG-GM -GM/160ML SOLN TAKE 1 PACKAGE BY MOUTH SEE ADMINISTRATION INSTRUCTIONS 320 mL 0    Cyanocobalamin (VITAMIN B-12 PO) Take 1,000 mg by mouth daily      fexofenadine (ALLEGRA) 180 MG tablet Take 180 mg by mouth daily as needed      lisinopril (ZESTRIL) 20 mg tablet Take 1 tablet (20 mg total) by mouth daily 90 tablet 1    Multiple Vitamins-Minerals (MULTIVITAL PO) Take 1 tablet by mouth      Omega-3 Fatty Acids (FISH OIL) 1000 MG CPDR Take 1,000 mg by mouth daily      omeprazole (PriLOSEC) 20 mg delayed release capsule Take 20 mg by mouth as needed      sildenafil (VIAGRA) 50 MG tablet Take 1 tablet (50 mg total) by mouth daily as needed for erectile dysfunction 8 tablet 1    simvastatin (ZOCOR) 40 mg tablet Take 1 tablet (40 mg total) by mouth daily at bedtime 90 tablet 0    triamcinolone (KENALOG) 0 1 % ointment Apply topically 2 (two) times a day 80 g 0    doxycycline hyclate (VIBRAMYCIN) 100 mg capsule Take 1 capsule (100 mg total) by mouth every 12 (twelve) hours for 10 days 20 capsule 0    famotidine (PEPCID) 20 mg tablet Take 1 tablet (20 mg total) by mouth 2 (two) times a day (Patient not taking: Reported on 9/27/2021) 60 tablet 11     No current facility-administered medications for this visit      _______________________________________________________________________  Review of Systems   Constitutional: Negative for chills and fever  HENT: Negative for congestion, ear pain, hearing loss, nosebleeds, sinus pain, sore throat and trouble swallowing  Eyes: Negative for discharge, redness and visual disturbance  Respiratory: Negative for cough, chest tightness and shortness of breath  Cardiovascular: Negative for chest pain and palpitations  Gastrointestinal: Negative for abdominal pain, blood in stool, constipation, diarrhea, nausea and vomiting  Genitourinary: Negative for dysuria, flank pain and hematuria  Musculoskeletal: Negative for arthralgias, myalgias and neck pain  Skin: Positive for color change (Redness at nail bed of left hand middle finger)  Negative for rash  Neurological: Negative for dizziness, speech difficulty, weakness and headaches  Hematological: Does not bruise/bleed easily  Psychiatric/Behavioral: Negative for agitation and behavioral problems  Objective:  Vitals:    09/27/21 1027   BP: 128/82   BP Location: Left arm   Patient Position: Sitting   Cuff Size: Adult   Pulse: 98   Temp: 98 2 °F (36 8 °C)   TempSrc: Tympanic   SpO2: 96%   Weight: (!) 156 kg (345 lb)     Body mass index is 46 79 kg/m²  Physical Exam  Vitals and nursing note reviewed  Constitutional:       General: He is not in acute distress  Appearance: Normal appearance  He is obese  HENT:      Head: Normocephalic and atraumatic  Right Ear: Ear canal and external ear normal       Left Ear: Ear canal and external ear normal       Mouth/Throat:      Comments: Patient has a face mask:  Eyes:      General: No scleral icterus  Right eye: No discharge  Left eye: No discharge  Extraocular Movements: Extraocular movements intact  Conjunctiva/sclera: Conjunctivae normal    Cardiovascular:      Rate and Rhythm: Normal rate and regular rhythm  Pulses: Normal pulses  Heart sounds: No murmur heard  Pulmonary:      Effort: Pulmonary effort is normal       Breath sounds: Normal breath sounds  Abdominal:      General: Bowel sounds are normal       Palpations: Abdomen is soft  Tenderness: There is no abdominal tenderness  Musculoskeletal:         General: Normal range of motion  Cervical back: Normal range of motion and neck supple  No muscular tenderness  Right lower leg: No edema  Left lower leg: No edema  Skin:     General: Skin is warm  Findings: Erythema (Left middle finger nail bed red and slightly tender no discharges  has a some swelling ) present  No rash  Neurological:      General: No focal deficit present  Mental Status: He is alert and oriented to person, place, and time  Psychiatric:         Mood and Affect: Mood normal          Behavior: Behavior normal          I spent 30 minutes with the patient today    More than 50% time spent for reviewing of external notes, reviewing of the results of diagnostics test, management of care, patient education and ordering of test

## 2021-09-28 DIAGNOSIS — N52.31 ERECTILE DYSFUNCTION AFTER RADICAL PROSTATECTOMY: ICD-10-CM

## 2021-09-28 RX ORDER — SILDENAFIL 50 MG/1
50 TABLET, FILM COATED ORAL DAILY PRN
Qty: 8 TABLET | Refills: 1 | Status: SHIPPED | OUTPATIENT
Start: 2021-09-28 | End: 2021-09-30

## 2021-09-29 ENCOUNTER — TELEPHONE (OUTPATIENT)
Dept: SURGERY | Facility: HOSPITAL | Age: 66
End: 2021-09-29

## 2021-09-30 ENCOUNTER — HOSPITAL ENCOUNTER (OUTPATIENT)
Dept: GASTROENTEROLOGY | Facility: HOSPITAL | Age: 66
Setting detail: OUTPATIENT SURGERY
Discharge: HOME | End: 2021-09-30
Attending: INTERNAL MEDICINE | Admitting: INTERNAL MEDICINE
Payer: COMMERCIAL

## 2021-09-30 ENCOUNTER — ANESTHESIA (OUTPATIENT)
Dept: GASTROENTEROLOGY | Facility: HOSPITAL | Age: 66
End: 2021-09-30

## 2021-09-30 ENCOUNTER — ANESTHESIA EVENT (OUTPATIENT)
Dept: GASTROENTEROLOGY | Facility: HOSPITAL | Age: 66
End: 2021-09-30

## 2021-09-30 VITALS
HEART RATE: 67 BPM | BODY MASS INDEX: 42.66 KG/M2 | RESPIRATION RATE: 19 BRPM | TEMPERATURE: 97.9 F | DIASTOLIC BLOOD PRESSURE: 85 MMHG | WEIGHT: 315 LBS | HEIGHT: 72 IN | SYSTOLIC BLOOD PRESSURE: 143 MMHG | OXYGEN SATURATION: 99 %

## 2021-09-30 DIAGNOSIS — Z86.010 HISTORY OF COLON POLYPS: ICD-10-CM

## 2021-09-30 DIAGNOSIS — N52.31 ERECTILE DYSFUNCTION AFTER RADICAL PROSTATECTOMY: ICD-10-CM

## 2021-09-30 DIAGNOSIS — K21.9 GASTROESOPHAGEAL REFLUX DISEASE WITHOUT ESOPHAGITIS: ICD-10-CM

## 2021-09-30 PROBLEM — J45.20 MILD INTERMITTENT ASTHMA: Status: ACTIVE | Noted: 2021-09-30

## 2021-09-30 PROBLEM — G47.30 SLEEP APNEA: Status: ACTIVE | Noted: 2021-09-30

## 2021-09-30 PROCEDURE — 88305 TISSUE EXAM BY PATHOLOGIST: CPT | Performed by: PATHOLOGY

## 2021-09-30 PROCEDURE — 43239 EGD BIOPSY SINGLE/MULTIPLE: CPT | Performed by: INTERNAL MEDICINE

## 2021-09-30 PROCEDURE — G0121 COLON CA SCRN NOT HI RSK IND: HCPCS | Performed by: INTERNAL MEDICINE

## 2021-09-30 RX ORDER — KETAMINE HYDROCHLORIDE 50 MG/ML
INJECTION, SOLUTION, CONCENTRATE INTRAMUSCULAR; INTRAVENOUS AS NEEDED
Status: DISCONTINUED | OUTPATIENT
Start: 2021-09-30 | End: 2021-09-30

## 2021-09-30 RX ORDER — SILDENAFIL 50 MG/1
50 TABLET, FILM COATED ORAL DAILY PRN
Qty: 8 TABLET | Refills: 1 | Status: SHIPPED | OUTPATIENT
Start: 2021-09-30 | End: 2022-07-21 | Stop reason: SDUPTHER

## 2021-09-30 RX ORDER — PROPOFOL 10 MG/ML
INJECTION, EMULSION INTRAVENOUS AS NEEDED
Status: DISCONTINUED | OUTPATIENT
Start: 2021-09-30 | End: 2021-09-30

## 2021-09-30 RX ORDER — PROPOFOL 10 MG/ML
INJECTION, EMULSION INTRAVENOUS CONTINUOUS PRN
Status: DISCONTINUED | OUTPATIENT
Start: 2021-09-30 | End: 2021-09-30

## 2021-09-30 RX ORDER — SIMETHICONE 20 MG/.3ML
EMULSION ORAL CODE/TRAUMA/SEDATION MEDICATION
Status: COMPLETED | OUTPATIENT
Start: 2021-09-30 | End: 2021-09-30

## 2021-09-30 RX ORDER — SODIUM CHLORIDE, SODIUM LACTATE, POTASSIUM CHLORIDE, CALCIUM CHLORIDE 600; 310; 30; 20 MG/100ML; MG/100ML; MG/100ML; MG/100ML
INJECTION, SOLUTION INTRAVENOUS CONTINUOUS PRN
Status: DISCONTINUED | OUTPATIENT
Start: 2021-09-30 | End: 2021-09-30

## 2021-09-30 RX ADMIN — PROPOFOL 50 MG: 10 INJECTION, EMULSION INTRAVENOUS at 10:40

## 2021-09-30 RX ADMIN — SODIUM CHLORIDE, SODIUM LACTATE, POTASSIUM CHLORIDE, AND CALCIUM CHLORIDE: .6; .31; .03; .02 INJECTION, SOLUTION INTRAVENOUS at 10:26

## 2021-09-30 RX ADMIN — PROPOFOL 100 MCG/KG/MIN: 10 INJECTION, EMULSION INTRAVENOUS at 10:42

## 2021-09-30 RX ADMIN — PROPOFOL 50 MG: 10 INJECTION, EMULSION INTRAVENOUS at 10:36

## 2021-09-30 RX ADMIN — KETAMINE HYDROCHLORIDE 20 MG: 50 INJECTION INTRAMUSCULAR; INTRAVENOUS at 10:39

## 2021-09-30 RX ADMIN — KETAMINE HYDROCHLORIDE 30 MG: 50 INJECTION INTRAMUSCULAR; INTRAVENOUS at 10:32

## 2021-09-30 RX ADMIN — PROPOFOL 100 MG: 10 INJECTION, EMULSION INTRAVENOUS at 10:32

## 2021-09-30 RX ADMIN — Medication 40 MG: at 10:35

## 2021-09-30 RX ADMIN — TOPICAL ANESTHETIC 1 SPRAY: 200 SPRAY DENTAL; PERIODONTAL at 10:28

## 2021-09-30 NOTE — ANESTHESIA POSTPROCEDURE EVALUATION
Post-Op Assessment Note    CV Status:  Stable    Pain management: adequate     Mental Status:  Alert and awake   Hydration Status:  Stable   PONV Controlled:  None   Airway Patency:  Patent      Post Op Vitals Reviewed: Yes      Staff: Anesthesiologist, CRNA         No complications documented      /72 (09/30/21 1052)    Temp 97 9 °F (36 6 °C) (09/30/21 1052)    Pulse 65 (09/30/21 1052)   Resp 19 (09/30/21 1052)    SpO2 98 % (09/30/21 1052)

## 2021-10-01 NOTE — PROGRESS NOTES
Assessment  1  Prostate cancer (185) (C61)    Plan  Prostate cancer    · (1) PSA, DIAGNOSTIC (FOLLOW-UP); Status:Active; Requested [facility]:[id number]; Perform:[facility]; OLS:[id number]; Last Updated By:Jez Barron; 10/10/2017 9:57:10 AM;Ordered; For:Prostate cancer; Ordered By:Dominique Xavier;    Discussion/Summary  Discussion Summary:   53-year-old male managed by Dr Don Barroso 6 TTC prostate cancer status post DVT 11/2012  patient is doing well from a lower urinary tract standpoint  His PSA remains undetectable  He will continue with Viagra 100 mg for now and if he wishes to proceed with trimix injections will call the office  At this time a prescription will be sent to [address] [place] [address] and he'll be scheduled for trimix injection teaching  Otherwise, we will follow up in 1 year with a PSA prior as he is now 5 years out from surgery  The patient understands and agrees this treatment plan  All questions and concerns have been addressed and answered  Chief Complaint  Chief Complaint Free Text Note Form: pt here for prostate cancer S/P DVP (11/2012) ED      History of Present Illness  HPI: Olga Lyon is a 53-year-old male here for follow-up evaluation of his [place] 6 T2c prostate cancer status post DVP 11/2012  The patient presents today with lower urinary tract complaints of urgency and nocturia 1-3 times per night  Denies all other LUTs including gross hematuria  His last visit in April he was given Viagra 100mg and discussed trimix injections  He has been minimally successful on Viagra 100 mg  Is worried about trimix injections as he has a buried penis and does not have good visualization for injections  Review of Systems  Complete-Male Urology:   Constitutional: No fever or chills, feels well, no tiredness, no recent weight gain or weight loss  Respiratory: No complaints of shortness of breath, no wheezing, no cough, no SOB on exertion, no orthopnea or PND     Cardiovascular: No LAURIEAT L1,2,5 RFA    NO AUTH REQUIRED    OK to schedule procedure approved as above. Please note sides/levels approved and date range.    (If applicable, sides/levels approved may differ from those ordered)    TO BE SCHEDULED WITH  complaints of slow heart rate, no fast heart rate, no chest pain, no palpitations, no leg claudication, no lower extremity  Gastrointestinal: No complaints of abdominal pain, no constipation, no nausea or vomiting, no diarrhea or bloody stools  Genitourinary: Empty sensation,-feelings of urinary urgency-and-stream quality good, but-no dysuria,-no urinary hesitancy,-no hematuria-and-no incontinence-   The patient presents with complaints of nocturia (1-3 times)  Musculoskeletal: No complaints of arthralgia, no myalgias, no joint swelling or stiffness, no limb pain or swelling  Integumentary: No complaints of skin rash or skin lesions, no itching, no skin wound, no dry skin  Hematologic/Lymphatic: No complaints of swollen glands, no swollen glands in the neck, does not bleed easily, no easy bruising  Neurological: No compliants of headache, no confusion, no convulsions, no numbness or tingling, no dizziness or fainting, no limb weakness, no difficulty walking  ROS Reviewed:   ROS reviewed  Active Problems  1  Adenomatous colon polyp (211 3) (D12 6)   2  Arthritis (716 90) (M19 90)   3  Colon polyps (211 3) (K63 5)   4  Organic impotence (607 84) (N52 9)   5  Prostate cancer (185) (C61)    Past Medical History  1  History of Reported Prostate Antigen Blood Test  Active Problems And Past Medical History Reviewed: The active problems and past medical history were reviewed and updated today  Surgical History  1  History of Needle Biopsy Of Prostate   2  History of Prostatectomy Robotic-Assisted  Surgical History Reviewed: The surgical history was reviewed and updated today  Family History  Mother    1  Family history of Brain Cancer (V16 8)  Father    2  Family history of Heart Disease (V17 49)  Family History Reviewed: The family history was reviewed and updated today  Social History   · Former smoker (W84 53) (O97 966)  Social History Reviewed:  The social history was reviewed and updated today  The social history was reviewed and is unchanged  Current Meds   1  Adult Aspirin EC Low Strength 81 MG Oral Tablet Delayed Release Recorded   2  [employer] Vitamin B-12 TABS Recorded   3  [employer] Vitamin C 500 MG TABS Recorded   4  Allopurinol 100 MG Oral Tablet Recorded   5  Cialis 20 MG Oral Tablet; Take as directed; Therapy: 18CAQ3852 to (Evaluate:20Jan2017); Last Rx:26Jan2016 Ordered   6  Daily Multiple Vitamins TABS Recorded   7  Fexofenadine HCl - 180 MG Oral Tablet Recorded   8  Fish Oil CAPS Recorded   9  Lisinopril 40 MG Oral Tablet; Therapy: 71LQA9421 to (Evaluate:60Mjr6321) Recorded   10  Omeprazole 20 MG Oral Capsule Delayed Release; Therapy: 51OST1614 to (Evaluate:11Ktp1997) Recorded   11  PEG-3350/Electrolytes 236 GM Oral Solution Reconstituted; TAKE AS DIRECTED; Therapy: 01Xxp5985 to (Last Rx:13Apr2017)  Requested for: 59Oep9485 Ordered   12  Simvastatin 80 MG Oral Tablet Recorded   13  Viagra 100 MG Oral Tablet; TAKE 1 TABLET BY MOUTH AS DIRECTED; Therapy: 92HKI8735 to (Evaluate:10Aug2017); Last Rx:50Afe2515 Ordered  Medication List Reviewed: The medication list was reviewed and updated today  Allergies  1  Penicillins    Vitals  Vital Signs    Recorded: 48ISJ5540 09:38AM   Heart Rate 50   Systolic 159   Diastolic 68   Height 6 ft    Weight 289 lb    BMI Calculated 39 2   BSA Calculated 2 49     Physical Exam    Constitutional   General appearance: No acute distress, well appearing and well nourished  Eyes   Conjunctiva and lids: No erythema, swelling or discharge  Ears, Nose, Mouth, and Throat   Hearing: Normal     Pulmonary   Respiratory effort: No increased work of breathing or signs of respiratory distress  Abdomen   Abdomen: Non-tender, no masses      Musculoskeletal   Gait and station: Normal     Psychiatric   Mood and affect: Normal        Future Appointments    Date/Time Provider Specialty Site   11/05/2018 09:00 AM [facility] [address]     Signatures   Electronically signed by : Ashly Rebolledo, ; Oct 10 2017 10:44AM EST                       (Author)    Electronically signed by : DEREK Blake ; Oct 10 2017 11:14AM EST

## 2021-10-12 DIAGNOSIS — E78.2 MIXED HYPERLIPIDEMIA: ICD-10-CM

## 2021-10-12 DIAGNOSIS — I10 ESSENTIAL HYPERTENSION: ICD-10-CM

## 2021-10-12 PROCEDURE — 4010F ACE/ARB THERAPY RXD/TAKEN: CPT | Performed by: INTERNAL MEDICINE

## 2021-10-12 RX ORDER — LISINOPRIL 20 MG/1
20 TABLET ORAL DAILY
Qty: 90 TABLET | Refills: 0 | Status: SHIPPED | OUTPATIENT
Start: 2021-10-12 | End: 2022-01-11 | Stop reason: SDUPTHER

## 2021-10-12 RX ORDER — SIMVASTATIN 40 MG
40 TABLET ORAL
Qty: 90 TABLET | Refills: 0 | Status: SHIPPED | OUTPATIENT
Start: 2021-10-12 | End: 2021-10-12 | Stop reason: SDUPTHER

## 2021-10-12 RX ORDER — SIMVASTATIN 40 MG
40 TABLET ORAL
Qty: 90 TABLET | Refills: 0 | Status: SHIPPED | OUTPATIENT
Start: 2021-10-12 | End: 2022-01-11 | Stop reason: SDUPTHER

## 2022-01-07 ENCOUNTER — APPOINTMENT (OUTPATIENT)
Dept: LAB | Facility: CLINIC | Age: 67
End: 2022-01-07
Payer: COMMERCIAL

## 2022-01-07 DIAGNOSIS — I10 ESSENTIAL HYPERTENSION: ICD-10-CM

## 2022-01-07 DIAGNOSIS — E11.9 TYPE 2 DIABETES MELLITUS WITHOUT COMPLICATION, WITHOUT LONG-TERM CURRENT USE OF INSULIN (HCC): ICD-10-CM

## 2022-01-07 DIAGNOSIS — E78.2 MIXED HYPERLIPIDEMIA: ICD-10-CM

## 2022-01-07 DIAGNOSIS — C61 PROSTATE CANCER (HCC): ICD-10-CM

## 2022-01-07 LAB
ALBUMIN SERPL BCP-MCNC: 3.5 G/DL (ref 3.5–5)
ALP SERPL-CCNC: 58 U/L (ref 46–116)
ALT SERPL W P-5'-P-CCNC: 36 U/L (ref 12–78)
ANION GAP SERPL CALCULATED.3IONS-SCNC: 7 MMOL/L (ref 4–13)
AST SERPL W P-5'-P-CCNC: 27 U/L (ref 5–45)
BASOPHILS # BLD AUTO: 0.06 THOUSANDS/ΜL (ref 0–0.1)
BASOPHILS NFR BLD AUTO: 1 % (ref 0–1)
BILIRUB SERPL-MCNC: 0.37 MG/DL (ref 0.2–1)
BUN SERPL-MCNC: 17 MG/DL (ref 5–25)
CALCIUM SERPL-MCNC: 8.2 MG/DL (ref 8.3–10.1)
CHLORIDE SERPL-SCNC: 104 MMOL/L (ref 100–108)
CHOLEST SERPL-MCNC: 168 MG/DL
CO2 SERPL-SCNC: 28 MMOL/L (ref 21–32)
CREAT SERPL-MCNC: 1.16 MG/DL (ref 0.6–1.3)
EOSINOPHIL # BLD AUTO: 0.23 THOUSAND/ΜL (ref 0–0.61)
EOSINOPHIL NFR BLD AUTO: 3 % (ref 0–6)
ERYTHROCYTE [DISTWIDTH] IN BLOOD BY AUTOMATED COUNT: 13 % (ref 11.6–15.1)
GFR SERPL CREATININE-BSD FRML MDRD: 66 ML/MIN/1.73SQ M
GLUCOSE P FAST SERPL-MCNC: 111 MG/DL (ref 65–99)
HCT VFR BLD AUTO: 38.5 % (ref 36.5–49.3)
HDLC SERPL-MCNC: 47 MG/DL
HGB BLD-MCNC: 13.1 G/DL (ref 12–17)
IMM GRANULOCYTES # BLD AUTO: 0.03 THOUSAND/UL (ref 0–0.2)
IMM GRANULOCYTES NFR BLD AUTO: 0 % (ref 0–2)
LDLC SERPL CALC-MCNC: 87 MG/DL (ref 0–100)
LYMPHOCYTES # BLD AUTO: 2.36 THOUSANDS/ΜL (ref 0.6–4.47)
LYMPHOCYTES NFR BLD AUTO: 28 % (ref 14–44)
MCH RBC QN AUTO: 31.4 PG (ref 26.8–34.3)
MCHC RBC AUTO-ENTMCNC: 34 G/DL (ref 31.4–37.4)
MCV RBC AUTO: 92 FL (ref 82–98)
MONOCYTES # BLD AUTO: 0.67 THOUSAND/ΜL (ref 0.17–1.22)
MONOCYTES NFR BLD AUTO: 8 % (ref 4–12)
NEUTROPHILS # BLD AUTO: 5.09 THOUSANDS/ΜL (ref 1.85–7.62)
NEUTS SEG NFR BLD AUTO: 60 % (ref 43–75)
NONHDLC SERPL-MCNC: 121 MG/DL
NRBC BLD AUTO-RTO: 0 /100 WBCS
PLATELET # BLD AUTO: 240 THOUSANDS/UL (ref 149–390)
PMV BLD AUTO: 10.3 FL (ref 8.9–12.7)
POTASSIUM SERPL-SCNC: 4.6 MMOL/L (ref 3.5–5.3)
PROT SERPL-MCNC: 7.2 G/DL (ref 6.4–8.2)
PSA SERPL-MCNC: <0.1 NG/ML (ref 0–4)
RBC # BLD AUTO: 4.17 MILLION/UL (ref 3.88–5.62)
SODIUM SERPL-SCNC: 139 MMOL/L (ref 136–145)
TRIGL SERPL-MCNC: 171 MG/DL
WBC # BLD AUTO: 8.44 THOUSAND/UL (ref 4.31–10.16)

## 2022-01-07 PROCEDURE — 80061 LIPID PANEL: CPT

## 2022-01-07 PROCEDURE — 36415 COLL VENOUS BLD VENIPUNCTURE: CPT

## 2022-01-07 PROCEDURE — G0103 PSA SCREENING: HCPCS

## 2022-01-07 PROCEDURE — 80053 COMPREHEN METABOLIC PANEL: CPT

## 2022-01-07 PROCEDURE — 85025 COMPLETE CBC W/AUTO DIFF WBC: CPT

## 2022-01-11 ENCOUNTER — OFFICE VISIT (OUTPATIENT)
Dept: INTERNAL MEDICINE CLINIC | Facility: CLINIC | Age: 67
End: 2022-01-11
Payer: COMMERCIAL

## 2022-01-11 VITALS
BODY MASS INDEX: 40.43 KG/M2 | HEART RATE: 60 BPM | HEIGHT: 74 IN | OXYGEN SATURATION: 98 % | DIASTOLIC BLOOD PRESSURE: 86 MMHG | SYSTOLIC BLOOD PRESSURE: 140 MMHG | TEMPERATURE: 98.2 F | WEIGHT: 315 LBS

## 2022-01-11 DIAGNOSIS — I10 ESSENTIAL HYPERTENSION: Primary | ICD-10-CM

## 2022-01-11 DIAGNOSIS — R22.32 LUMP ON FINGER, LEFT: ICD-10-CM

## 2022-01-11 DIAGNOSIS — E83.51 HYPOCALCEMIA: ICD-10-CM

## 2022-01-11 DIAGNOSIS — E78.2 MIXED HYPERLIPIDEMIA: ICD-10-CM

## 2022-01-11 DIAGNOSIS — R80.9 MICROALBUMINURIA: ICD-10-CM

## 2022-01-11 DIAGNOSIS — J30.2 SEASONAL ALLERGIC RHINITIS, UNSPECIFIED TRIGGER: ICD-10-CM

## 2022-01-11 DIAGNOSIS — C61 PROSTATE CANCER (HCC): ICD-10-CM

## 2022-01-11 DIAGNOSIS — K21.9 GASTROESOPHAGEAL REFLUX DISEASE WITHOUT ESOPHAGITIS: ICD-10-CM

## 2022-01-11 DIAGNOSIS — E11.9 TYPE 2 DIABETES MELLITUS WITHOUT COMPLICATION, WITHOUT LONG-TERM CURRENT USE OF INSULIN (HCC): ICD-10-CM

## 2022-01-11 PROCEDURE — 4010F ACE/ARB THERAPY RXD/TAKEN: CPT | Performed by: SURGERY

## 2022-01-11 PROCEDURE — 99214 OFFICE O/P EST MOD 30 MIN: CPT | Performed by: INTERNAL MEDICINE

## 2022-01-11 PROCEDURE — 3066F NEPHROPATHY DOC TX: CPT | Performed by: SURGERY

## 2022-01-11 RX ORDER — FAMOTIDINE 20 MG/1
20 TABLET, FILM COATED ORAL 2 TIMES DAILY
Qty: 180 TABLET | Refills: 1 | Status: SHIPPED | OUTPATIENT
Start: 2022-01-11 | End: 2022-04-04

## 2022-01-11 RX ORDER — LISINOPRIL 20 MG/1
20 TABLET ORAL DAILY
Qty: 90 TABLET | Refills: 1 | Status: SHIPPED | OUTPATIENT
Start: 2022-01-11 | End: 2022-04-04

## 2022-01-11 RX ORDER — SIMVASTATIN 40 MG
40 TABLET ORAL
Qty: 90 TABLET | Refills: 1 | Status: SHIPPED | OUTPATIENT
Start: 2022-01-11 | End: 2022-04-28 | Stop reason: SDUPTHER

## 2022-01-11 NOTE — ASSESSMENT & PLAN NOTE
But  Lab Results   Component Value Date    HGBA1C 6 7 (H) 09/25/2021   Diet controlled   Continue present diet    Will follow hemoglobin A1c

## 2022-01-11 NOTE — ASSESSMENT & PLAN NOTE
Has a persistent lump left index finger nail bed area  No active discharge, tried antibiotic warm compresses not effective  Will refer to surgery for surgical intervention if necessary

## 2022-01-11 NOTE — ASSESSMENT & PLAN NOTE
He takes a famotidine usually 40 mg every evening and occasionally he need to take omeprazole at night depending on foods he eats  Advised to watch diet and reflux precautions

## 2022-01-11 NOTE — ASSESSMENT & PLAN NOTE
Blood pressure elevated discussed with the patient either increase lisinopril 20 30 mg daily or he can try exercise and lose weight and watch diet for salt intake he prefers to continue 20 mg for now and observe

## 2022-01-11 NOTE — ASSESSMENT & PLAN NOTE
Well controlled except slightly elevated triglyceride but better  Cholesterol 168, triglyceride 171, HDL 47, LDL 87  Advised to continue present medication and low-cholesterol low saturated fat diet

## 2022-01-11 NOTE — PATIENT INSTRUCTIONS
Patient was advised to continue present medications  discussed with the patient medications and laboratory data in detail  Follow-up with me in 3 months or as advised  If any blood test was ordered please do 1 week prior to next appointment unless advise to get earlier    If you have any questions please call the office [phone]

## 2022-01-11 NOTE — ASSESSMENT & PLAN NOTE
Calcium 8 2 last time it was 8 7  Will repeat calcium and check vitamin-D level    If persistently low will need further evaluation

## 2022-01-11 NOTE — PROGRESS NOTES
Assessment/Plan:    1  Essential hypertension  Assessment & Plan:  Blood pressure elevated discussed with the patient either increase lisinopril 20 30 mg daily or he can try exercise and lose weight and watch diet for salt intake he prefers to continue 20 mg for now and observe    Orders:  -     Basic metabolic panel; Future  -     lisinopril (ZESTRIL) 20 mg tablet; Take 1 tablet (20 mg total) by mouth daily    2  Mixed hyperlipidemia  Assessment & Plan:  Well controlled except slightly elevated triglyceride but better  Cholesterol 168, triglyceride 171, HDL 47, LDL 87  Advised to continue present medication and low-cholesterol low saturated fat diet  Orders:  -     simvastatin (ZOCOR) 40 mg tablet; Take 1 tablet (40 mg total) by mouth daily at bedtime    3  BMI 40 0-44 9, adult [facility])  Assessment & Plan:  Patient  was advised to decrease portion size  Advised to decrease carb, sugar, cholesterol intake  Advised to exercise 3-5 times per week  Advised to lose weight  4  Microalbuminuria  Assessment & Plan:  He is on lisinopril  5  Gastroesophageal reflux disease without esophagitis  Assessment & Plan:  He takes a famotidine usually 40 mg every evening and occasionally he need to take omeprazole at night depending on foods he eats  Advised to watch diet and reflux precautions  Orders:  -     famotidine (PEPCID) 20 mg tablet; Take 1 tablet (20 mg total) by mouth 2 (two) times a day    6  Seasonal allergic rhinitis, unspecified trigger  Assessment & Plan:  Controlled on fexofenadine p r n  7  Hypocalcemia  Assessment & Plan:  Calcium 8 2 last time it was 8 7  Will repeat calcium and check vitamin-D level  If persistently low will need further evaluation    Orders:  -     Basic metabolic panel; Future  -     Vitamin D 25 hydroxy; Future    8   Type 2 diabetes mellitus without complication, without long-term current use of insulin ([facility])  Assessment & Plan:  But  Lab Results   Component Value Date    HGBA1C 6 7 (H) 09/25/2021   Diet controlled   Continue present diet  Will follow hemoglobin A1c      Orders:  -     Basic metabolic panel; Future  -     Hemoglobin A1C; Future    9  Prostate cancer [facility])  Assessment & Plan:  Patient is not following urologist now  PSA less than 0 1  Stable  10  Lump on finger, left  Assessment & Plan:  Has a persistent lump left index finger nail bed area  No active discharge, tried antibiotic warm compresses not effective  Will refer to surgery for surgical intervention if necessary  Orders:  -     Ambulatory Referral to General Surgery; Future          Subjective:  Patient presents for follow-up  Patient ID: Sandra Hendrix is a 61 y o  male  HPI   68-year-old white male patient presents follow-up his medical problems  He denies any chest pain, shortness of breath, pain in abdomen  Denies any cough, fever, chills denies nausea vomiting diarrhea he got his COVID-19 vaccination including booster dose  He got his influenza vaccination  Has a swelling/ lump left index finger near nail bed      The following portions of the patient's history were reviewed and updated as appropriate:     Past Medical History:  He has a past medical history of Asthma, BMI 40 0-44 9, adult ([facility] ) (1/11/2022), BMI 45 0-49 9, adult ([facility] [facility] ) (6/28/2021), Bronchospasm, [facility] Colon polyp, Essential hypertension (6/26/2021), GERD (gastroesophageal reflux disease), Hyperlipidemia, Hypertension, Hypocalcemia (1/11/2022), Insomnia, Leg cramps, Lump on finger, left (1/11/2022), Microalbuminuria (9/27/2021), Mixed hyperlipidemia (6/26/2021), Nocturia, Numbness of fingers of both hands, Paronychia of left middle finger (9/27/2021), Seasonal allergic rhinitis (1/11/2022), Skin lesion, Skin rash (6/26/2021), Sleep apnea, Snoring, Tubular adenoma of colon (6/28/2021), Type 2 diabetes mellitus without complication, without long-term current use of insulin ([facility] ) (6/26/2021), Wears glasses, and Weight gain  ,  _______________________________________________________________________  Past Surgical History:   has a past surgical history that includes Hip surgery (Bilateral); Prostatectomy; pr colonoscopy flx dx w/collj spec when pfrmd (N/A, 5/9/2017); Replacement total knee (Bilateral); Colonoscopy (05/09/2017); Colonoscopy; and Upper gastrointestinal endoscopy  ,  _______________________________________________________________________  Family History:  family history includes Brain cancer in his mother; Depression in his father; Diabetes in his father; Eczema in his mother; Heart disease in his father; Psoriasis in his mother; Stroke in his paternal grandfather ,  _______________________________________________________________________  Social History:   reports that he quit smoking about 29 years ago  He has never used smokeless tobacco  He reports current alcohol use  He reports that he does not use drugs  ,  _______________________________________________________________________  Allergies:  is allergic to penicillins     _______________________________________________________________________  Current Outpatient Medications   Medication Sig Dispense Refill    Ascorbic Acid (VITAMIN C PO) Take 1,000 mg by mouth daily      betamethasone dipropionate (DIPROSONE) 0 05 % ointment Apply sparingly  1-2 times/day for up to 2 wks to R forearm rash   45 g 0    betamethasone valerate (VALISONE) 0 1 % lotion Apply once to twice daily as needed to scalp 60 mL 0    Clenpiq 10-3 5-12 MG-GM -GM/160ML SOLN TAKE 1 PACKAGE BY MOUTH SEE ADMINISTRATION INSTRUCTIONS 320 mL 0    Cyanocobalamin (VITAMIN B-12 PO) Take 1,000 mcg by mouth daily       famotidine (PEPCID) 20 mg tablet Take 1 tablet (20 mg total) by mouth 2 (two) times a day 180 tablet 1    fexofenadine (ALLEGRA) 180 MG tablet Take 180 mg by mouth daily as needed      lisinopril (ZESTRIL) 20 mg tablet Take 1 tablet (20 mg total) by mouth daily 90 tablet 1    Multiple Vitamins-Minerals (MULTIVITAL PO) Take 1 tablet by mouth      Omega-3 Fatty Acids (FISH OIL) 1000 MG CPDR Take 1,000 mg by mouth daily      omeprazole (PriLOSEC) 20 mg delayed release capsule Take 20 mg by mouth as needed      sildenafil (VIAGRA) 50 MG tablet TAKE 1 TABLET (50 MG TOTAL) BY MOUTH DAILY AS NEEDED FOR ERECTILE DYSFUNCTION 8 tablet 1    simvastatin (ZOCOR) 40 mg tablet Take 1 tablet (40 mg total) by mouth daily at bedtime 90 tablet 1    triamcinolone (KENALOG) 0 1 % ointment Apply topically 2 (two) times a day 80 g 0     No current facility-administered medications for this visit      _______________________________________________________________________  Review of Systems   Constitutional: Negative for chills and fever  HENT: Negative for congestion, ear pain, hearing loss, nosebleeds, sinus pain, sore throat and trouble swallowing  Eyes: Negative for discharge, redness and visual disturbance  Respiratory: Negative for cough, chest tightness and shortness of breath  Cardiovascular: Negative for chest pain and palpitations  Gastrointestinal: Negative for abdominal pain, blood in stool, constipation, diarrhea, nausea and vomiting  Genitourinary: Negative for dysuria, flank pain and hematuria  Musculoskeletal: Negative for arthralgias, myalgias and neck pain  Skin: Negative for color change and rash  Neurological: Negative for dizziness, speech difficulty, weakness and headaches  Hematological: Does not bruise/bleed easily  Psychiatric/Behavioral: Negative for agitation and behavioral problems  Objective:  Vitals:    01/11/22 1255   BP: 140/86   BP Location: Left arm   Patient Position: Sitting   Cuff Size: Adult   Pulse: 60   Temp: 98 2 °F (36 8 °C)   TempSrc: Tympanic   SpO2: 98%   Weight: (!) 155 kg (342 lb)   Height: 6' 2" (1 88 m)     Body mass index is 43 91 kg/m²  Physical Exam  Vitals and nursing note reviewed     Constitutional: General: He is not in acute distress  Appearance: Normal appearance  He is obese  HENT:      Head: Normocephalic and atraumatic  Right Ear: Ear canal and external ear normal       Left Ear: Ear canal and external ear normal       Nose:      Comments: Patient has a face mask on     Mouth/Throat:      Comments: Patient has a face mask on  Eyes:      General: No scleral icterus  Right eye: No discharge  Left eye: No discharge  Extraocular Movements: Extraocular movements intact  Conjunctiva/sclera: Conjunctivae normal    Cardiovascular:      Rate and Rhythm: Normal rate and regular rhythm  Pulses: Normal pulses  Heart sounds: No murmur heard  Pulmonary:      Effort: Pulmonary effort is normal  No respiratory distress  Breath sounds: Normal breath sounds  Abdominal:      General: Bowel sounds are normal       Palpations: Abdomen is soft  Tenderness: There is no abdominal tenderness  Musculoskeletal:         General: Normal range of motion  Cervical back: Normal range of motion and neck supple  Right lower leg: No edema  Left lower leg: No edema  Comments: Has small lump of left index finger nail bed area  No any active discharge  Not her edematous  Skin:     General: Skin is warm  Findings: No rash  Neurological:      General: No focal deficit present  Mental Status: He is alert and oriented to person, place, and time  Motor: No weakness  Coordination: Coordination normal    Psychiatric:         Mood and Affect: Mood normal          Behavior: Behavior normal          I spent 30 minutes with the patient today    More than 50% time spent for reviewing of external notes, reviewing of the results of diagnostics test, management of care, patient education and ordering of test

## 2022-01-19 ENCOUNTER — CONSULT (OUTPATIENT)
Dept: SURGERY | Facility: CLINIC | Age: 67
End: 2022-01-19
Payer: COMMERCIAL

## 2022-01-19 VITALS
DIASTOLIC BLOOD PRESSURE: 90 MMHG | TEMPERATURE: 98.7 F | HEIGHT: 72 IN | WEIGHT: 315 LBS | HEART RATE: 88 BPM | BODY MASS INDEX: 42.66 KG/M2 | RESPIRATION RATE: 22 BRPM | SYSTOLIC BLOOD PRESSURE: 160 MMHG

## 2022-01-19 DIAGNOSIS — S60.459A: Primary | ICD-10-CM

## 2022-01-19 DIAGNOSIS — R22.32 LUMP ON FINGER, LEFT: ICD-10-CM

## 2022-01-19 PROCEDURE — 1036F TOBACCO NON-USER: CPT | Performed by: SURGERY

## 2022-01-19 PROCEDURE — 3077F SYST BP >= 140 MM HG: CPT | Performed by: SURGERY

## 2022-01-19 PROCEDURE — 3080F DIAST BP >= 90 MM HG: CPT | Performed by: SURGERY

## 2022-01-19 PROCEDURE — 3008F BODY MASS INDEX DOCD: CPT | Performed by: SURGERY

## 2022-01-19 PROCEDURE — 99203 OFFICE O/P NEW LOW 30 MIN: CPT | Performed by: SURGERY

## 2022-01-19 NOTE — PROGRESS NOTES
Assessment/Plan:  Since this opening is longstanding and based upon history has obviously had spontaneous drainage of fluid an abscess from that side I think the 1st thing to rule out would be the presence of a foreign body  For that I am sending him for an x-ray  Once the x-rays done I am going to see him again to discuss the report and plan exploration of that area  He verbalized understanding  No problem-specific Assessment & Plan notes found for this encounter  Diagnoses and all orders for this visit:    Foreign body of finger of left hand  -     X-ray hand left 3+ views; Future    Lump on finger, left  -     Ambulatory Referral to General Surgery          Subjective:      Patient ID: Ceci Johnson is a 61 y o  male  30-year-old male patient came to my office with complaints of feeling a lump at the base of his left middle finger nail  He says he has had 2-3 courses of antibiotics in last few years when it becomes bigger and swells up  He does complain of some drainage in the past   He has they will open pinhole size wound at the base of his nail  He does not complains of any pain right now  No swelling currently  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 and he is left handed  The following portions of the patient's history were reviewed and updated as appropriate: allergies, current medications, past family history, past medical history, past social history, past surgical history and problem list     Review of Systems   All other systems reviewed and are negative  Objective:      /90 (BP Location: Right arm, Patient Position: Sitting, Cuff Size: Adult)   Pulse 88   Temp 98 7 °F (37 1 °C)   Resp 22   Ht 6' (1 829 m)   Wt (!) 157 kg (347 lb)   BMI 47 06 kg/m²          Physical Exam  Vitals reviewed  Constitutional:       Appearance: He is obese  Cardiovascular:      Rate and Rhythm: Normal rate and regular rhythm     Pulmonary:      Effort: Pulmonary effort is normal       Breath sounds: Normal breath sounds  Musculoskeletal:      Comments: At the base of the left middle finger nail he has a pinpoint opening which has granulation tissue  There is some deformity of the nail  I could not squeeze out any fluid from the opening  It is nontender  It is not inflamed  Skin:     General: Skin is warm  Neurological:      Mental Status: He is alert

## 2022-03-03 DIAGNOSIS — L20.9 ATOPIC DERMATITIS, UNSPECIFIED TYPE: ICD-10-CM

## 2022-03-03 RX ORDER — BETAMETHASONE VALERATE 0.1 %
LOTION (ML) TOPICAL
Qty: 60 ML | Refills: 0 | Status: SHIPPED | OUTPATIENT
Start: 2022-03-03

## 2022-04-02 DIAGNOSIS — K21.9 GASTROESOPHAGEAL REFLUX DISEASE WITHOUT ESOPHAGITIS: ICD-10-CM

## 2022-04-02 DIAGNOSIS — I10 ESSENTIAL HYPERTENSION: ICD-10-CM

## 2022-04-04 RX ORDER — LISINOPRIL 20 MG/1
20 TABLET ORAL DAILY
Qty: 90 TABLET | Refills: 1 | Status: SHIPPED | OUTPATIENT
Start: 2022-04-04 | End: 2022-04-28 | Stop reason: SDUPTHER

## 2022-04-04 RX ORDER — FAMOTIDINE 20 MG/1
20 TABLET, FILM COATED ORAL 2 TIMES DAILY
Qty: 180 TABLET | Refills: 1 | Status: SHIPPED | OUTPATIENT
Start: 2022-04-04

## 2022-04-13 ENCOUNTER — RA CDI HCC (OUTPATIENT)
Dept: OTHER | Facility: HOSPITAL | Age: 67
End: 2022-04-13

## 2022-04-13 NOTE — PROGRESS NOTES
[facility] [address]  coding opportunities          Chart Reviewed number of suggestions sent to Provider: 2     E66 01: Morbid (severe) obesity due to excess calories ([facility] [address] )    *Per CMS/ICD 10 coding guidelines, to be used when BMI > 35 & <40 with one or more comorbidity     E11 29 Type 2 diabetes mellitus with other diabetic kidney complication  *DM2 with microalbuminuria      If this is correct, please document and assess at your next visit,4/20/2022    Patients Insurance        Commercial Insurance: [employer]

## 2022-05-24 ENCOUNTER — APPOINTMENT (OUTPATIENT)
Dept: LAB | Facility: CLINIC | Age: 67
End: 2022-05-24
Payer: COMMERCIAL

## 2022-05-24 DIAGNOSIS — I10 ESSENTIAL HYPERTENSION: ICD-10-CM

## 2022-05-24 DIAGNOSIS — E11.9 TYPE 2 DIABETES MELLITUS WITHOUT COMPLICATION, WITHOUT LONG-TERM CURRENT USE OF INSULIN (HCC): ICD-10-CM

## 2022-05-24 DIAGNOSIS — E83.51 HYPOCALCEMIA: ICD-10-CM

## 2022-05-24 LAB
25(OH)D3 SERPL-MCNC: 40 NG/ML (ref 30–100)
ANION GAP SERPL CALCULATED.3IONS-SCNC: 8 MMOL/L (ref 4–13)
BUN SERPL-MCNC: 22 MG/DL (ref 5–25)
CALCIUM SERPL-MCNC: 8.5 MG/DL (ref 8.4–10.2)
CHLORIDE SERPL-SCNC: 104 MMOL/L (ref 96–108)
CO2 SERPL-SCNC: 27 MMOL/L (ref 21–32)
CREAT SERPL-MCNC: 1.27 MG/DL (ref 0.6–1.3)
EST. AVERAGE GLUCOSE BLD GHB EST-MCNC: 151 MG/DL
GFR SERPL CREATININE-BSD FRML MDRD: 59 ML/MIN/1.73SQ M
GLUCOSE P FAST SERPL-MCNC: 124 MG/DL (ref 65–99)
HBA1C MFR BLD: 6.9 %
POTASSIUM SERPL-SCNC: 4.4 MMOL/L (ref 3.5–5.3)
SODIUM SERPL-SCNC: 139 MMOL/L (ref 135–147)

## 2022-05-24 PROCEDURE — 82306 VITAMIN D 25 HYDROXY: CPT

## 2022-05-24 PROCEDURE — 36415 COLL VENOUS BLD VENIPUNCTURE: CPT

## 2022-05-24 PROCEDURE — 83036 HEMOGLOBIN GLYCOSYLATED A1C: CPT

## 2022-05-24 PROCEDURE — 80048 BASIC METABOLIC PNL TOTAL CA: CPT

## 2022-05-24 PROCEDURE — 3044F HG A1C LEVEL LT 7.0%: CPT | Performed by: INTERNAL MEDICINE

## 2022-05-24 NOTE — ANESTHESIA PREPROCEDURE EVALUATION
Medicare Annual Wellness Visit    Barbra Lainez is here for Medicare AWV and 1 Year Follow Up    Assessment & Plan   Medicare annual wellness visit, subsequent  -     Basic Metabolic Panel; Future      Recommendations for Preventive Services Due: see orders and patient instructions/AVS.  Recommended screening schedule for the next 5-10 years is provided to the patient in written form: see Patient Instructions/AVS.     Return for Medicare Annual Wellness Visit in 1 year. Subjective   The following acute and/or chronic problems were also addressed today:  allergies    Review of Systems   Constitutional: Negative for activity change, appetite change, chills, fatigue and fever. HENT: Positive for rhinorrhea (seasonal allergies), sneezing (seasonal allergies) and sore throat (seasonal allergies). Eyes: Negative for visual disturbance. Respiratory: Positive for cough (seasonal allergies). Negative for chest tightness, shortness of breath and wheezing. Cardiovascular: Negative for chest pain, palpitations and leg swelling. Gastrointestinal: Negative for abdominal pain, constipation, diarrhea, nausea and vomiting. Genitourinary: Negative for difficulty urinating. Musculoskeletal: Positive for arthralgias (wrist and fingers) and back pain (occasionally). Skin: Negative for rash. Allergic/Immunologic: Positive for environmental allergies. Neurological: Negative for dizziness, syncope, weakness, light-headedness and headaches. Psychiatric/Behavioral: Negative for sleep disturbance. Patient's complete Health Risk Assessment and screening values have been reviewed and are found in Flowsheets. The following problems were reviewed today and where indicated follow up appointments were made and/or referrals ordered.     Positive Risk Factor Screenings with Interventions:    Fall Risk:  Do you feel unsteady or are you worried about falling? : (!) yes  2 or more falls in past year?: no  Fall with Procedure:  COLONOSCOPY  EGD    Relevant Problems   CARDIO   (+) Essential hypertension   (+) Mixed hyperlipidemia      ENDO   (+) Type 2 diabetes mellitus without complication, without long-term current use of insulin (HCC)      GI/HEPATIC   (+) Gastroesophageal reflux disease without esophagitis      /RENAL   (+) Prostate cancer (HCC)      NEURO/PSYCH   (+) History of colon polyps      PULMONARY   (+) Mild intermittent asthma   (+) Sleep apnea     -no official diagnosis for ROSITA however patient states that he snores   -Asthma is very well-controlled, cant remember the last time he had to use his rescue inhaler  Physical Exam    Airway    Mallampati score: II  TM Distance: >3 FB  Neck ROM: full     Dental   No notable dental hx     Cardiovascular  Cardiovascular exam normal    Pulmonary  Pulmonary exam normal     Other Findings        Anesthesia Plan  ASA Score- 3     Anesthesia Type- IV sedation with anesthesia with ASA Monitors  Additional Monitors:   Airway Plan:     Comment: 08:00 Last of water  03:30: last PO prep  Plan Factors-Exercise tolerance (METS): <4 METS  Chart reviewed  Patient summary reviewed  Patient is not a current smoker  Induction- intravenous  Postoperative Plan-     Informed Consent- Anesthetic plan and risks discussed with patient  I personally reviewed this patient with the CRNA  Discussed and agreed on the Anesthesia Plan with the CRNA  Roberto Johnson injury in past year?: no     Fall Risk Interventions:    · Home safety tips provided              General Health and ACP:  General  In general, how would you say your health is?: Excellent  In the past 7 days, have you experienced any of the following: New or Increased Pain, New or Increased Fatigue, Loneliness, Social Isolation, Stress or Anger?: No  Do you get the social and emotional support that you need?: Yes  Do you have a Living Will?: (!) No    Advance Directives     Power of  Living Will ACP-Advance Directive ACP-Power of     Not on File Not on File Not on File Not on File      General Health Risk Interventions:  · No Living Will: Patient declines ACP discussion/assistance      Safety:  Do you have working smoke detectors?: Yes  Do you have any tripping hazards - loose or unsecured carpets or rugs?: No  Do you have any tripping hazards - clutter in doorways, halls, or stairs?: No  Do you have either shower bars, grab bars, non-slip mats or non-slip surfaces in your shower or bathtub?: (!) No  Do all of your stairways have a railing or banister?: Yes  Do you always fasten your seatbelt when you are in a car?: Yes    Safety Interventions:  · Home safety tips provided           Objective   Vitals:    05/24/22 1034 05/24/22 1131   BP: (!) 150/80 130/80   Pulse: 67    Temp: 98.3 °F (36.8 °C)    SpO2: 98%    Weight: 139 lb (63 kg)    Height: 5' 7\" (1.702 m)       Body mass index is 21.77 kg/m².         General Appearance: alert and oriented to person, place and time, well-developed and well-nourished, in no acute distress  Skin: warm and dry, no rash or erythema  Head: normocephalic and atraumatic  Eyes: pupils equal, round, and reactive to light, extraocular eye movements intact, conjunctivae normal  ENT: tympanic membrane, external ear and ear canal normal bilaterally, oropharynx clear and moist with normal mucous membranes  Neck: neck supple and non tender without mass, no thyromegaly or thyroid nodules, no cervical lymphadenopathy   Pulmonary/Chest: clear to auscultation bilaterally- no wheezes, rales or rhonchi, normal air movement, no respiratory distress  Cardiovascular: normal rate, normal S1 and S2, no gallops and intact distal pulses  Extremities: no cyanosis, no clubbing and no edema  Musculoskeletal: normal range of motion, no joint swelling, deformity or tenderness  Neurologic: gait and coordination normal and speech normal       Allergies   Allergen Reactions    Morphine Nausea And Vomiting     Prior to Visit Medications    Medication Sig Taking?  Authorizing Provider   TURMERIC PO Take by mouth Yes Historical Provider, MD   folic acid (FOLVITE) 085 MCG tablet Take 400 mcg by mouth daily Yes Historical Provider, MD   CALCIUM PO Take by mouth Yes Historical Provider, MD   Omega-3 Fatty Acids (FISH OIL PO) Take by mouth Yes Historical Provider, MD   Ascorbic Acid (VITAMIN C) 250 MG tablet Take 250 mg by mouth daily Yes Historical Provider, MD   magnesium (MAGNESIUM-OXIDE) 250 MG TABS tablet Take 250 mg by mouth daily Yes Historical Provider, MD   zinc gluconate 50 MG tablet Take 50 mg by mouth daily Yes Historical Provider, MD   vitamin E 400 UNIT capsule Take 400 Units by mouth daily Yes Historical Provider, MD   amoxicillin (AMOXIL) 500 MG capsule TAKE 4 CAPSULES BY MOUTH ONE HOUR PRIOR TO APPOINTMENT  Patient not taking: Reported on 5/24/2022  Historical Provider, MD Mel[place] (Including outside providers/suppliers regularly involved in providing care):   Patient Care Team:  Miguel Bailey MD as PCP - General (Family Medicine)  Miguel Bailey MD as PCP - [facility] Provider     Reviewed and updated this visit:  Tobacco  Allergies  Meds  Problems  Med Hx  Surg Hx  Soc Hx  Fam Hx

## 2022-05-26 ENCOUNTER — OFFICE VISIT (OUTPATIENT)
Dept: INTERNAL MEDICINE CLINIC | Facility: CLINIC | Age: 67
End: 2022-05-26
Payer: COMMERCIAL

## 2022-05-26 VITALS
DIASTOLIC BLOOD PRESSURE: 80 MMHG | HEIGHT: 74 IN | SYSTOLIC BLOOD PRESSURE: 132 MMHG | WEIGHT: 315 LBS | TEMPERATURE: 98.6 F | OXYGEN SATURATION: 96 % | HEART RATE: 74 BPM | BODY MASS INDEX: 40.43 KG/M2 | RESPIRATION RATE: 14 BRPM

## 2022-05-26 DIAGNOSIS — B35.1 ONYCHOMYCOSIS OF TOENAIL: ICD-10-CM

## 2022-05-26 DIAGNOSIS — I10 ESSENTIAL HYPERTENSION: Primary | ICD-10-CM

## 2022-05-26 DIAGNOSIS — E66.01 MORBID OBESITY (HCC): ICD-10-CM

## 2022-05-26 DIAGNOSIS — M25.561 CHRONIC PAIN OF BOTH KNEES: ICD-10-CM

## 2022-05-26 DIAGNOSIS — E78.2 MIXED HYPERLIPIDEMIA: ICD-10-CM

## 2022-05-26 DIAGNOSIS — J30.2 SEASONAL ALLERGIC RHINITIS, UNSPECIFIED TRIGGER: ICD-10-CM

## 2022-05-26 DIAGNOSIS — C61 PROSTATE CANCER (HCC): ICD-10-CM

## 2022-05-26 DIAGNOSIS — E11.9 TYPE 2 DIABETES MELLITUS WITHOUT COMPLICATION, WITHOUT LONG-TERM CURRENT USE OF INSULIN (HCC): ICD-10-CM

## 2022-05-26 DIAGNOSIS — M25.562 CHRONIC PAIN OF BOTH KNEES: ICD-10-CM

## 2022-05-26 DIAGNOSIS — R80.9 MICROALBUMINURIA: ICD-10-CM

## 2022-05-26 DIAGNOSIS — G89.29 CHRONIC PAIN OF BOTH KNEES: ICD-10-CM

## 2022-05-26 DIAGNOSIS — K21.9 GASTROESOPHAGEAL REFLUX DISEASE WITHOUT ESOPHAGITIS: ICD-10-CM

## 2022-05-26 PROCEDURE — 3066F NEPHROPATHY DOC TX: CPT | Performed by: INTERNAL MEDICINE

## 2022-05-26 PROCEDURE — 3079F DIAST BP 80-89 MM HG: CPT | Performed by: INTERNAL MEDICINE

## 2022-05-26 PROCEDURE — 3008F BODY MASS INDEX DOCD: CPT | Performed by: INTERNAL MEDICINE

## 2022-05-26 PROCEDURE — 3075F SYST BP GE 130 - 139MM HG: CPT | Performed by: INTERNAL MEDICINE

## 2022-05-26 PROCEDURE — 99214 OFFICE O/P EST MOD 30 MIN: CPT | Performed by: INTERNAL MEDICINE

## 2022-05-26 PROCEDURE — 1036F TOBACCO NON-USER: CPT | Performed by: INTERNAL MEDICINE

## 2022-05-26 NOTE — PATIENT INSTRUCTIONS
Patient was advised to continue present medications  discussed with the patient medications and laboratory data in detail  Follow-up with me in 4 months or as advised  If any blood test was ordered please do 1 week prior to next appointment unless advise to get earlier  If you have any questions please call the office [phone] Patient was advised to continue present medications  discussed with the patient medications and laboratory data in detail  Follow-up with me in 4months or as advised  If any blood test was ordered please do 1 week prior to next appointment unless advise to get earlier    If you have any questions please call the office [phone]

## 2022-05-26 NOTE — PROGRESS NOTES
Assessment/Plan:    1  Essential hypertension  Assessment & Plan:  Blood pressure well controlled  Advised to continue present medication  Advised for low-salt diet  Orders:  -     Comprehensive metabolic panel; Future    2  Type 2 diabetes mellitus without complication, without long-term current use of insulin ([facility])  Assessment & Plan:    Lab Results   Component Value Date    HGBA1C 6 9 (H) 05/24/2022   Slowly slowly his hemoglobin A1c is increasing  Now 6 9 on diet  Patient is strongly advised to follow diet very closely   He started  diet and he lost almost 12 lbs weight seen last few weeks  Will follow blood sugar    Orders:  -     Ambulatory Referral to Podiatry; Future  -     Comprehensive metabolic panel; Future    3  Gastroesophageal reflux disease without esophagitis  Assessment & Plan:  He takes famotidine 20 mg b i d  and occasionally he needs to take omeprazole with famotidine depending on the foods he eats  He had EGD September 2021 revealed patchy erythema  in the stomach  Advised to watch diet for spicy foods, caffeinated beverages, alcoholic beverages, soda, citrus fluids and foods  Advised for reflux precautions  4  Mixed hyperlipidemia  Assessment & Plan:  His cholesterol 168, triglyceride 171, HDL 47, LDL 87  Advised to continue simvastatin 40 mg daily and fish oil daily as well  Advised for low-cholesterol, low carb , low saturated fat diet  Orders:  -     Lipid panel; Future    5  Microalbuminuria  Assessment & Plan:  His own lisinopril  His renal function stable  6  Seasonal allergic rhinitis, unspecified trigger  Assessment & Plan:  His symptoms are controlled on fexofenadine which he takes p r n  7  Prostate cancer [facility])  Assessment & Plan:  Patient is not following with an urologist   Last PSA was less than 0 1  It is stable  Will follow yearly PSA        8  Onychomycosis of toenail  Assessment & Plan:  His great toe nails are advised discoloration and dystrophic  Most likely onychomycosis  Will refer to podiatrist as also has a history of diabetes mellitus  Orders:  -     Ambulatory Referral to Podiatry; Future    9  Chronic pain of both knees  Assessment & Plan:  Most likely 2nd to osteoarthritis  Patient status not too bad  Discussed with the patient if get worse to she has orthopedic specialist       10  Morbid obesity ([place] [address] )  Assessment & Plan:  Patient  was advised to decrease portion size  Advised to decrease carb, sugar, cholesterol intake  Advised to exercise 3-5 times per week  Advised to lose weight  BMI Counseling: Body mass index is 43 01 kg/m²  The BMI is above normal  Nutrition recommendations include decreasing portion sizes, decreasing fast food intake, consuming healthier snacks, limiting drinks that contain sugar, moderation in carbohydrate intake, reducing intake of saturated and trans fat and reducing intake of cholesterol  Exercise recommendations include exercising 3-5 times per week  No pharmacotherapy was ordered  Rationale for BMI follow-up plan is due to patient being overweight or obese  Subjective:  Patient presents for follow-up of his medical problems  Patient ID: Leslye Bob is a 61 y o  male  HPI   70-year-old white male patient presents follow-up his medical problems  He denies any chest pain, shortness a of breath, pain in abdomen  Denies any cough, fever, chills  Denies any nausea, vomiting, diarrhea  He got his COVID-19 vaccination including booster dose      The following portions of the patient's history were reviewed and updated as appropriate:     Past Medical History:  He has a past medical history of Asthma, BMI 40 0-44 9, adult ([place] [address] ) (1/11/2022), BMI 45 0-49 9, adult ([place] [address] ) (6/28/2021), Bronchospasm, Cancer ([place] [address] ), Colon polyp, Essential hypertension (6/26/2021), GERD (gastroesophageal reflux disease), Hyperlipidemia, Hypertension, Hypocalcemia (1/11/2022), Insomnia, Leg cramps, Lump on finger, left (1/11/2022), Microalbuminuria (9/27/2021), Mixed hyperlipidemia (6/26/2021), Morbid obesity ([address] ) (5/26/2022), Nocturia, Numbness of fingers of both hands, Onychomycosis of toenail (5/26/2022), Pain in both knees (5/26/2022), Paronychia of left middle finger (9/27/2021), Seasonal allergic rhinitis (1/11/2022), Skin lesion, Skin rash (6/26/2021), Sleep apnea, Snoring, Tubular adenoma of colon (6/28/2021), Type 2 diabetes mellitus without complication, without long-term current use of insulin ([facility] [address] ) (6/26/2021), Wears glasses, and Weight gain  ,  _______________________________________________________________________  Past Surgical History:   has a past surgical history that includes Hip surgery (Bilateral); Prostatectomy; pr colonoscopy flx dx w/collj spec when pfrmd (N/A, 5/9/2017); Replacement total knee (Bilateral); Colonoscopy (05/09/2017); Colonoscopy; and Upper gastrointestinal endoscopy  ,  _______________________________________________________________________  Family History:  family history includes Brain cancer in his mother; Depression in his father; Diabetes in his father; Eczema in his mother; Heart disease in his father; Psoriasis in his mother; Stroke in his paternal grandfather ,  _______________________________________________________________________  Social History:   reports that he quit smoking about 30 years ago  He has never used smokeless tobacco  He reports current alcohol use  He reports that he does not use drugs  ,  _______________________________________________________________________  Allergies:  is allergic to penicillins     _______________________________________________________________________  Current Outpatient Medications   Medication Sig Dispense Refill    Ascorbic Acid (VITAMIN C PO) Take 1,000 mg by mouth daily      betamethasone dipropionate (DIPROSONE) 0 05 % ointment Apply sparingly  1-2 times/day for up to 2 wks to R forearm rash   45 g 0    betamethasone valerate (VALISONE) 0 1 % lotion Apply once to twice daily as needed to scalp 60 mL 0    Clenpiq 10-3 5-12 MG-GM -GM/160ML SOLN TAKE 1 PACKAGE BY MOUTH SEE ADMINISTRATION INSTRUCTIONS 320 mL 0    Cyanocobalamin (VITAMIN B-12 PO) Take 1,000 mcg by mouth daily       famotidine (PEPCID) 20 mg tablet TAKE 1 TABLET (20 MG TOTAL) BY MOUTH 2 (TWO) TIMES A  tablet 1    fexofenadine (ALLEGRA) 180 MG tablet Take 180 mg by mouth daily as needed      lisinopril (ZESTRIL) 20 mg tablet Take 1 tablet (20 mg total) by mouth daily 90 tablet 1    Multiple Vitamins-Minerals (MULTIVITAL PO) Take 1 tablet by mouth      Omega-3 Fatty Acids (FISH OIL) 1000 MG CPDR Take 1,000 mg by mouth daily      omeprazole (PriLOSEC) 20 mg delayed release capsule Take 20 mg by mouth as needed      sildenafil (VIAGRA) 50 MG tablet TAKE 1 TABLET (50 MG TOTAL) BY MOUTH DAILY AS NEEDED FOR ERECTILE DYSFUNCTION 8 tablet 1    simvastatin (ZOCOR) 40 mg tablet Take 1 tablet (40 mg total) by mouth daily at bedtime 90 tablet 1    triamcinolone (KENALOG) 0 1 % ointment Apply topically 2 (two) times a day 80 g 0     No current facility-administered medications for this visit      _______________________________________________________________________  Review of Systems   Constitutional: Negative for chills and fever  HENT: Negative for congestion, ear pain, hearing loss, nosebleeds, sinus pain, sore throat and trouble swallowing  Eyes: Negative for discharge, redness and visual disturbance  Respiratory: Negative for cough, chest tightness and shortness of breath  Cardiovascular: Negative for chest pain and palpitations  Gastrointestinal: Negative for abdominal pain, blood in stool, constipation, diarrhea, nausea and vomiting  Genitourinary: Negative for dysuria, flank pain and hematuria  Musculoskeletal: Negative for arthralgias, myalgias and neck pain  Skin: Negative for color change and rash     Neurological: Negative for dizziness, speech difficulty, weakness and headaches  Hematological: Does not bruise/bleed easily  Psychiatric/Behavioral: Negative for agitation and behavioral problems  Objective:  Vitals:    05/26/22 1545   BP: 132/80   BP Location: Right arm   Patient Position: Sitting   Cuff Size: Adult   Pulse: 74   Resp: 14   Temp: 98 6 °F (37 °C)   TempSrc: Tympanic   SpO2: 96%   Weight: (!) 152 kg (335 lb)   Height: 6' 2" (1 88 m)     Body mass index is 43 01 kg/m²  Physical Exam  Vitals and nursing note reviewed  Constitutional:       General: He is not in acute distress  Appearance: Normal appearance  HENT:      Head: Normocephalic and atraumatic  Right Ear: Tympanic membrane, ear canal and external ear normal       Left Ear: Tympanic membrane, ear canal and external ear normal       Nose: Nose normal       Mouth/Throat:      Mouth: Mucous membranes are moist    Eyes:      General: No scleral icterus  Right eye: No discharge  Left eye: No discharge  Extraocular Movements: Extraocular movements intact  Conjunctiva/sclera: Conjunctivae normal    Cardiovascular:      Rate and Rhythm: Normal rate and regular rhythm  Pulses: Normal pulses  Dorsalis pedis pulses are 2+ on the right side and 2+ on the left side  Posterior tibial pulses are 2+ on the right side and 2+ on the left side  Heart sounds: No murmur heard  Pulmonary:      Effort: Pulmonary effort is normal  No respiratory distress  Breath sounds: Normal breath sounds  Abdominal:      General: Bowel sounds are normal       Palpations: Abdomen is soft  Tenderness: There is no abdominal tenderness  Musculoskeletal:         General: Normal range of motion  Cervical back: Normal range of motion and neck supple  No muscular tenderness  Right lower leg: No edema  Left lower leg: No edema     Feet:      Right foot:      Skin integrity: No ulcer, skin breakdown, erythema, warmth, callus or dry skin  Skin:     General: Skin is warm  Findings: No rash  Neurological:      General: No focal deficit present  Mental Status: He is alert and oriented to person, place, and time  Motor: No weakness  Coordination: Coordination normal    Psychiatric:         Mood and Affect: Mood normal          Behavior: Behavior normal          Patient's shoes and socks removed  Right Foot/Ankle   Right Foot Inspection  Skin Exam: skin normal and skin intact  No dry skin, no warmth, no callus, no erythema, no maceration, no abnormal color, no pre-ulcer, no ulcer and no callus  Toe Exam: ROM and strength within normal limits  Sensory   Vibration: intact  Proprioception: intact  Monofilament testing: intact    Vascular  The right DP pulse is 2+  The right PT pulse is 2+  Left Foot/Ankle  Left Foot Inspection      Toe Exam: ROM and strength within normal limits  Sensory   Vibration: intact  Proprioception: intact  Monofilament testing: intact    Vascular  The left DP pulse is 2+  The left PT pulse is 2+  Patient  was advised to decrease portion size  Advised to decrease carb, sugar, cholesterol intake  Advised to exercise 3-5 times per week  Advised to lose weight

## 2022-05-27 NOTE — ASSESSMENT & PLAN NOTE
Patient is not following with an urologist   Last PSA was less than 0 1  It is stable  Will follow yearly PSA

## 2022-05-27 NOTE — ASSESSMENT & PLAN NOTE
Most likely 2nd to osteoarthritis  Patient status not too bad    Discussed with the patient if get worse to she has orthopedic specialist

## 2022-05-27 NOTE — ASSESSMENT & PLAN NOTE
His cholesterol 168, triglyceride 171, HDL 47, LDL 87  Advised to continue simvastatin 40 mg daily and fish oil daily as well  Advised for low-cholesterol, low carb , low saturated fat diet

## 2022-05-27 NOTE — ASSESSMENT & PLAN NOTE
He takes famotidine 20 mg b i d  and occasionally he needs to take omeprazole with famotidine depending on the foods he eats  He had EGD September 2021 revealed patchy erythema  in the stomach  Advised to watch diet for spicy foods, caffeinated beverages, alcoholic beverages, soda, citrus fluids and foods  Advised for reflux precautions

## 2022-05-27 NOTE — ASSESSMENT & PLAN NOTE
His great toe nails are advised discoloration and dystrophic  Most likely onychomycosis  Will refer to podiatrist as also has a history of diabetes mellitus

## 2022-05-27 NOTE — ASSESSMENT & PLAN NOTE
Lab Results   Component Value Date    HGBA1C 6 9 (H) 05/24/2022   Slowly slowly his hemoglobin A1c is increasing  Now 6 9 on diet  Patient is strongly advised to follow diet very closely   He started  diet and he lost almost 12 lbs weight seen last few weeks    Will follow blood sugar

## 2022-06-02 ENCOUNTER — OFFICE VISIT (OUTPATIENT)
Dept: PODIATRY | Facility: CLINIC | Age: 67
End: 2022-06-02
Payer: COMMERCIAL

## 2022-06-02 VITALS
HEIGHT: 72 IN | WEIGHT: 315 LBS | SYSTOLIC BLOOD PRESSURE: 150 MMHG | DIASTOLIC BLOOD PRESSURE: 92 MMHG | BODY MASS INDEX: 42.66 KG/M2 | HEART RATE: 51 BPM

## 2022-06-02 DIAGNOSIS — E11.9 TYPE 2 DIABETES MELLITUS WITHOUT COMPLICATION, WITHOUT LONG-TERM CURRENT USE OF INSULIN (HCC): Primary | ICD-10-CM

## 2022-06-02 DIAGNOSIS — B35.3 TINEA PEDIS OF BOTH FEET: ICD-10-CM

## 2022-06-02 DIAGNOSIS — B35.1 ONYCHOMYCOSIS OF TOENAIL: ICD-10-CM

## 2022-06-02 PROCEDURE — 3077F SYST BP >= 140 MM HG: CPT | Performed by: PODIATRIST

## 2022-06-02 PROCEDURE — 3080F DIAST BP >= 90 MM HG: CPT | Performed by: PODIATRIST

## 2022-06-02 PROCEDURE — 99203 OFFICE O/P NEW LOW 30 MIN: CPT | Performed by: PODIATRIST

## 2022-06-02 RX ORDER — KETOCONAZOLE 20 MG/G
CREAM TOPICAL DAILY
Qty: 60 G | Refills: 5 | Status: SHIPPED | OUTPATIENT
Start: 2022-06-02 | End: 2022-07-15 | Stop reason: SDUPTHER

## 2022-06-02 NOTE — PROGRESS NOTES
PATIENT:  Nick BENDER [facility]    1955    ASSESSMENT:     1  Type 2 diabetes mellitus without complication, without long-term current use of insulin ([address] )  Ambulatory Referral to Podiatry   2  Onychomycosis of toenail  Ambulatory Referral to Podiatry   3  Tinea pedis of both feet  ketoconazole (NIZORAL) 2 % cream         PLAN:  1  Patient was counseled on the condition and diagnosis  2   Educated disease prevention and risks related to diabetes  3   Educated proper daily foot care and exam   Instructed proper skin care / protection and footwear  Instructed to identify any signs of infection and related foot problem  4   The recent blood work was reviewed and the last HbA1c was 6 9  Discussed proper blood glucose control with diet and exercise  5   He has chronic onychomycosis and it is less likely that oral antifungal would help him  Recommended palliative care as needed  Toenails debrided today  6   He has mild neuropathy in his feet  No need for active treatment  Instructed him to monitor for any worsening  Instructed supportive care  7  Rx: Ketoconazole cream for tinea pedis  8  The patient will return in 6 months for diabetic foot exam       Subjective:          HPI  The patient was referred to my office for diabetic foot evaluation  The patient has diabetes for a couple of years  The blood glucose is under control with diet  The patient denied any history of diabetic foot ulcer, related foot infection, or amputation  He has mild numbness and paresthesia in his feet for about 10 years  No significant pain  No dysfunction  He also complained of thick toenails  He probably had it for 40 years  He tried some topical agents in the past without resolving it  No significant pain           The following portions of the patient's history were reviewed and updated as appropriate: allergies, current medications, past family history, past medical history, past social history, past surgical history and problem list   All pertinent labs and images were reviewed  Past Medical History  Past Medical History:   Diagnosis Date    Asthma     BMI 40 0-44 9, adult ([place] [address] ) 1/11/2022    BMI 45 0-49 9, adult ([place] [address] ) 6/28/2021    Bronchospasm     Cancer (HCC)     Prostate     Colon polyp     Essential hypertension 6/26/2021    GERD (gastroesophageal reflux disease)     Hyperlipidemia     Hypertension     Hypocalcemia 1/11/2022    Insomnia     Leg cramps     Lump on finger, left 1/11/2022    Microalbuminuria 9/27/2021    Mixed hyperlipidemia 6/26/2021    Morbid obesity ([place] [address] ) 5/26/2022    Nocturia     Numbness of fingers of both hands     Onychomycosis of toenail 5/26/2022    Pain in both knees 5/26/2022    Paronychia of left middle finger 9/27/2021    Seasonal allergic rhinitis 1/11/2022    Skin lesion     Skin rash 6/26/2021    Sleep apnea     not tested yet    Snoring     Tubular adenoma of colon 6/28/2021    Type 2 diabetes mellitus without complication, without long-term current use of insulin ([place] [address] ) 6/26/2021    Wears glasses     Weight gain        Past Surgical History  Past Surgical History:   Procedure Laterality Date    COLONOSCOPY  05/09/2017    COLONOSCOPY      HIP SURGERY Bilateral     Hip replacement    TN COLONOSCOPY FLX DX W/COLLJ SPEC WHEN PFRMD N/A 5/9/2017    Procedure: COLONOSCOPY;  Surgeon: Godwin Devine MD;  [facility]; Service: Gastroenterology    PROSTATECTOMY      REPLACEMENT TOTAL KNEE Bilateral     Inactive    UPPER GASTROINTESTINAL ENDOSCOPY          Allergies:  Penicillins    Medications:  Current Outpatient Medications   Medication Sig Dispense Refill    Ascorbic Acid (VITAMIN C PO) Take 1,000 mg by mouth daily      betamethasone dipropionate (DIPROSONE) 0 05 % ointment Apply sparingly  1-2 times/day for up to 2 wks to R forearm rash   45 g 0    betamethasone valerate (VALISONE) 0 1 % lotion Apply once to twice daily as needed to scalp 60 mL 0    Cyanocobalamin (VITAMIN B-12 PO) Take 1,000 mcg by mouth daily       famotidine (PEPCID) 20 mg tablet TAKE 1 TABLET (20 MG TOTAL) BY MOUTH 2 (TWO) TIMES A  tablet 1    fexofenadine (ALLEGRA) 180 MG tablet Take 180 mg by mouth daily as needed      ketoconazole (NIZORAL) 2 % cream Apply topically daily 60 g 5    lisinopril (ZESTRIL) 20 mg tablet Take 1 tablet (20 mg total) by mouth daily 90 tablet 1    Multiple Vitamins-Minerals (MULTIVITAL PO) Take 1 tablet by mouth      Omega-3 Fatty Acids (FISH OIL) 1000 MG CPDR Take 1,000 mg by mouth daily      omeprazole (PriLOSEC) 20 mg delayed release capsule Take 20 mg by mouth as needed      sildenafil (VIAGRA) 50 MG tablet TAKE 1 TABLET (50 MG TOTAL) BY MOUTH DAILY AS NEEDED FOR ERECTILE DYSFUNCTION 8 tablet 1    simvastatin (ZOCOR) 40 mg tablet Take 1 tablet (40 mg total) by mouth daily at bedtime 90 tablet 1    Clenpiq 10-3 5-12 MG-GM -GM/160ML SOLN TAKE 1 PACKAGE BY MOUTH SEE ADMINISTRATION INSTRUCTIONS 320 mL 0    triamcinolone (KENALOG) 0 1 % ointment Apply topically 2 (two) times a day 80 g 0     No current facility-administered medications for this visit         Social History:  Social History     Socioeconomic History    Marital status: /Civil Union     Spouse name: None    Number of children: None    Years of education: None    Highest education level: None   Occupational History    Occupation: Presently not working   Tobacco Use    Smoking status: Former Smoker     Quit date: 1992     Years since quittin 0    Smokeless tobacco: Never Used   Vaping Use    Vaping Use: Every day   Substance and Sexual Activity    Alcohol use: Yes     Comment: rarely    Drug use: No    Sexual activity: Not Currently   Other Topics Concern    None   Social History Narrative    Single-family home    Current work/study status: Full-time    Caffeine use: Coffee, 3 servings/day    Lives with spouse    Former smoker - Inactive 6/22/2018    Annual eye exam: Sees 1hr; wears glasses    Annual dental checkup: Sees q6months    PSA: Used to follow Urology    [facility]     Social Determinants of Health     Financial Resource Strain: Not on file   Food Insecurity: Not on file   Transportation Needs: Not on file   Physical Activity: Not on file   Stress: Not on file   Social Connections: Not on file   Intimate Partner Violence: Not on file   Housing Stability: Not on file        Review of Systems   Constitutional: Negative for appetite change, chills and fever  HENT: Negative for sore throat  Eyes: Negative for visual disturbance  Respiratory: Negative for cough and shortness of breath  Cardiovascular: Negative for chest pain  Gastrointestinal: Negative for diarrhea, nausea and vomiting  Musculoskeletal: Negative for gait problem  Skin: Negative for wound  Neurological: Positive for numbness  Negative for weakness  Hematological: Negative  Psychiatric/Behavioral: Negative for behavioral problems and confusion  Objective:      /92   Pulse (!) 51   Ht 6' (1 829 m) Comment: verbal  Wt (!) 152 kg (335 lb)   BMI 45 43 kg/m²          Physical Exam  Vitals reviewed  Constitutional:       General: He is not in acute distress  Appearance: He is obese  He is not toxic-appearing  HENT:      Head: Normocephalic and atraumatic  Eyes:      Extraocular Movements: Extraocular movements intact  Cardiovascular:      Rate and Rhythm: Normal rate  Pulses: Normal pulses  no weak pulses          Dorsalis pedis pulses are 2+ on the right side and 2+ on the left side  Posterior tibial pulses are 2+ on the right side and 2+ on the left side  Comments: No ischemia  Pulmonary:      Effort: Pulmonary effort is normal  No respiratory distress  Musculoskeletal:         General: No swelling, tenderness or signs of injury  Cervical back: Normal range of motion and neck supple  Right lower leg: No edema  Left lower leg: No edema  Right foot: No Charcot foot or foot drop  Left foot: No Charcot foot or foot drop  Feet:      Right foot:      Protective Sensation: 10 sites tested  10 sites sensed  Skin integrity: No ulcer  Left foot:      Protective Sensation: 10 sites tested  10 sites sensed  Skin integrity: No ulcer  Skin:     General: Skin is warm  Capillary Refill: Capillary refill takes less than 2 seconds  Coloration: Skin is not cyanotic or mottled  Findings: No abscess, ecchymosis or wound  Nails: There is no clubbing  Comments: Thick, mycotic nails on both great toes  Mild skin peeling and redness in plantar feet and around toes  Neurological:      General: No focal deficit present  Mental Status: He is alert and oriented to person, place, and time  Cranial Nerves: No cranial nerve deficit  Sensory: No sensory deficit  Motor: No weakness  Coordination: Coordination normal       Gait: Gait normal    Psychiatric:         Mood and Affect: Mood normal          Behavior: Behavior normal          Thought Content: Thought content normal          Judgment: Judgment normal            Diabetic Foot Exam    Patient's shoes and socks removed  Right Foot/Ankle   Right Foot Inspection  Skin Exam: skin intact  No pre-ulcer and no ulcer  Toe Exam: No swelling, no tenderness, erythema and  no right toe deformity    Sensory   Vibration: diminished  Proprioception: intact  Monofilament testing: intact    Vascular  Capillary refills: < 3 seconds  The right DP pulse is 2+  The right PT pulse is 2+  Left Foot/Ankle  Left Foot Inspection  Skin Exam: skin intact  No pre-ulcer and no ulcer  Toe Exam: No swelling, no tenderness, no erythema and no left toe deformity       Sensory   Vibration: diminished  Proprioception: intact  Monofilament testing: intact    Vascular  Capillary refills: < 3 seconds  The left DP pulse is 2+  The left PT pulse is 2+       Assign Risk Category  No deformity present  No loss of protective sensation  No weak pulses  Risk: 0

## 2022-06-02 NOTE — LETTER
June 2, 2022     Helene Lyons, [address] [facility] [address] [facility]    Patient: Cornel Martinez   YOB: 1955   Date of Visit: 6/2/2022       Dear Dr Mora Confer: Thank you for referring Felipe Brown to me for evaluation  Below are my notes for this consultation  If you have questions, please do not hesitate to call me  I look forward to following your patient along with you  Sincerely,        Emmy Ma DPM        CC: No Recipients  Ariadne Cabral  6/2/2022  6:02 PM  Sign when Signing Visit        PATIENT:  Doyle Kilgore [facility]    1955    ASSESSMENT:     1  Type 2 diabetes mellitus without complication, without long-term current use of insulin ([facility] [address] )  Ambulatory Referral to Podiatry   2  Onychomycosis of toenail  Ambulatory Referral to Podiatry   3  Tinea pedis of both feet  ketoconazole (NIZORAL) 2 % cream         PLAN:  1  Patient was counseled on the condition and diagnosis  2   Educated disease prevention and risks related to diabetes  3   Educated proper daily foot care and exam   Instructed proper skin care / protection and footwear  Instructed to identify any signs of infection and related foot problem  4   The recent blood work was reviewed and the last HbA1c was 6 9  Discussed proper blood glucose control with diet and exercise  5   He has chronic onychomycosis and it is less likely that oral antifungal would help him  Recommended palliative care as needed  Toenails debrided today  6   He has mild neuropathy in his feet  No need for active treatment  Instructed him to monitor for any worsening  Instructed supportive care  7  Rx: Ketoconazole cream for tinea pedis  8  The patient will return in 6 months for diabetic foot exam       Subjective:          HPI  The patient was referred to my office for diabetic foot evaluation  The patient has diabetes for a couple of years  The blood glucose is under control with diet    The patient denied any history of diabetic foot ulcer, related foot infection, or amputation  He has mild numbness and paresthesia in his feet for about 10 years  No significant pain  No dysfunction  He also complained of thick toenails  He probably had it for 40 years  He tried some topical agents in the past without resolving it  No significant pain  The following portions of the patient's history were reviewed and updated as appropriate: allergies, current medications, past family history, past medical history, past social history, past surgical history and problem list   All pertinent labs and images were reviewed      Past Medical History  Past Medical History:   Diagnosis Date    Asthma     BMI 40 0-44 9, adult ([facility] [address] ) 1/11/2022    [facility] 45 0-49 9, [facility] 6/28/2021    Bronchospasm     Cancer (HCC)     Prostate     Colon polyp     Essential hypertension 6/26/2021    GERD (gastroesophageal reflux disease)     Hyperlipidemia     Hypertension     Hypocalcemia 1/11/2022    Insomnia     Leg cramps     Lump on finger, left 1/11/2022    Microalbuminuria 9/27/2021    Mixed hyperlipidemia 6/26/2021    Morbid obesity ([facility] [address] ) 5/26/2022    Nocturia     Numbness of fingers of both hands     Onychomycosis of toenail 5/26/2022    Pain in both knees 5/26/2022    Paronychia of left middle finger 9/27/2021    Seasonal allergic rhinitis 1/11/2022    Skin lesion     Skin rash 6/26/2021    Sleep apnea     not tested yet    Snoring     Tubular adenoma of colon 6/28/2021    Type 2 diabetes mellitus without complication, without long-term current use of insulin ([facility] ) 6/26/2021    Wears glasses     Weight gain        Past Surgical History  Past Surgical History:   Procedure Laterality Date    COLONOSCOPY  05/09/2017    COLONOSCOPY      HIP SURGERY Bilateral     Hip replacement    SD COLONOSCOPY FLX DX W/COLLJ SPEC WHEN PFRMD N/A 5/9/2017    Procedure: COLONOSCOPY;  Surgeon: Radha Avery MD; [facility]; Service: Gastroenterology    PROSTATECTOMY      REPLACEMENT TOTAL KNEE Bilateral     Inactive    UPPER GASTROINTESTINAL ENDOSCOPY          Allergies:  Penicillins    Medications:  Current Outpatient Medications   Medication Sig Dispense Refill    Ascorbic Acid (VITAMIN C PO) Take 1,000 mg by mouth daily      betamethasone dipropionate (DIPROSONE) 0 05 % ointment Apply sparingly  1-2 times/day for up to 2 wks to R forearm rash  45 g 0    betamethasone valerate (VALISONE) 0 1 % lotion Apply once to twice daily as needed to scalp 60 mL 0    Cyanocobalamin (VITAMIN B-12 PO) Take 1,000 mcg by mouth daily       famotidine (PEPCID) 20 mg tablet TAKE 1 TABLET (20 MG TOTAL) BY MOUTH 2 (TWO) TIMES A  tablet 1    fexofenadine (ALLEGRA) 180 MG tablet Take 180 mg by mouth daily as needed      ketoconazole (NIZORAL) 2 % cream Apply topically daily 60 g 5    lisinopril (ZESTRIL) 20 mg tablet Take 1 tablet (20 mg total) by mouth daily 90 tablet 1    Multiple Vitamins-Minerals (MULTIVITAL PO) Take 1 tablet by mouth      Omega-3 Fatty Acids (FISH OIL) 1000 MG CPDR Take 1,000 mg by mouth daily      omeprazole (PriLOSEC) 20 mg delayed release capsule Take 20 mg by mouth as needed      sildenafil (VIAGRA) 50 MG tablet TAKE 1 TABLET (50 MG TOTAL) BY MOUTH DAILY AS NEEDED FOR ERECTILE DYSFUNCTION 8 tablet 1    simvastatin (ZOCOR) 40 mg tablet Take 1 tablet (40 mg total) by mouth daily at bedtime 90 tablet 1    Clenpiq 10-3 5-12 MG-GM -GM/160ML SOLN TAKE 1 PACKAGE BY MOUTH SEE ADMINISTRATION INSTRUCTIONS 320 mL 0    triamcinolone (KENALOG) 0 1 % ointment Apply topically 2 (two) times a day 80 g 0     No current facility-administered medications for this visit         Social History:  Social History     Socioeconomic History    Marital status: /Civil Union     Spouse name: None    Number of children: None    Years of education: None    Highest education level: None   Occupational History    Occupation: Presently not working   Tobacco Use    Smoking status: Former Smoker     Quit date: 1992     Years since quittin 0    Smokeless tobacco: Never Used   Vaping Use    Vaping Use: Every day   Substance and Sexual Activity    Alcohol use: Yes     Comment: rarely    Drug use: No    Sexual activity: Not Currently   Other Topics Concern    None   Social History Narrative    Single-family home    Current work/study status: Full-time    Caffeine use: Coffee, 3 servings/day    Lives with spouse    Former smoker - Inactive 2018    Annual eye exam: Sees 1hr; wears glasses    Annual dental checkup: Sees q6months    PSA: Used to follow Urology    - As per [employer]     Social Determinants of Health     Financial Resource Strain: Not on file   Food Insecurity: Not on file   Transportation Needs: Not on file   Physical Activity: Not on file   Stress: Not on file   Social Connections: Not on file   Intimate Partner Violence: Not on file   Housing Stability: Not on file        Review of Systems   Constitutional: Negative for appetite change, chills and fever  HENT: Negative for sore throat  Eyes: Negative for visual disturbance  Respiratory: Negative for cough and shortness of breath  Cardiovascular: Negative for chest pain  Gastrointestinal: Negative for diarrhea, nausea and vomiting  Musculoskeletal: Negative for gait problem  Skin: Negative for wound  Neurological: Positive for numbness  Negative for weakness  Hematological: Negative  Psychiatric/Behavioral: Negative for behavioral problems and confusion  Objective:      /92   Pulse (!) 51   Ht 6' (1 829 m) Comment: verbal  Wt (!) 152 kg (335 lb)   BMI 45 43 kg/m²          Physical Exam  Vitals reviewed  Constitutional:       General: He is not in acute distress  Appearance: He is obese  He is not toxic-appearing  HENT:      Head: Normocephalic and atraumatic     Eyes:      Extraocular Movements: Extraocular movements intact  Cardiovascular:      Rate and Rhythm: Normal rate  Pulses: Normal pulses  no weak pulses          Dorsalis pedis pulses are 2+ on the right side and 2+ on the left side  Posterior tibial pulses are 2+ on the right side and 2+ on the left side  Comments: No ischemia  Pulmonary:      Effort: Pulmonary effort is normal  No respiratory distress  Musculoskeletal:         General: No swelling, tenderness or signs of injury  Cervical back: Normal range of motion and neck supple  Right lower leg: No edema  Left lower leg: No edema  Right foot: No Charcot foot or foot drop  Left foot: No Charcot foot or foot drop  Feet:      Right foot:      Protective Sensation: 10 sites tested  10 sites sensed  Skin integrity: No ulcer  Left foot:      Protective Sensation: 10 sites tested  10 sites sensed  Skin integrity: No ulcer  Skin:     General: Skin is warm  Capillary Refill: Capillary refill takes less than 2 seconds  Coloration: Skin is not cyanotic or mottled  Findings: No abscess, ecchymosis or wound  Nails: There is no clubbing  Comments: Thick, mycotic nails on both great toes  Mild skin peeling and redness in plantar feet and around toes  Neurological:      General: No focal deficit present  Mental Status: He is alert and oriented to person, place, and time  Cranial Nerves: No cranial nerve deficit  Sensory: No sensory deficit  Motor: No weakness  Coordination: Coordination normal       Gait: Gait normal    Psychiatric:         Mood and Affect: Mood normal          Behavior: Behavior normal          Thought Content: Thought content normal          Judgment: Judgment normal            Diabetic Foot Exam    Patient's shoes and socks removed  Right Foot/Ankle   Right Foot Inspection  Skin Exam: skin intact  No pre-ulcer and no ulcer       Toe Exam: No swelling, no tenderness, erythema and  no right toe deformity    Sensory   Vibration: diminished  Proprioception: intact  Monofilament testing: intact    Vascular  Capillary refills: < 3 seconds  The right DP pulse is 2+  The right PT pulse is 2+  Left Foot/Ankle  Left Foot Inspection  Skin Exam: skin intact  No pre-ulcer and no ulcer  Toe Exam: No swelling, no tenderness, no erythema and no left toe deformity  Sensory   Vibration: diminished  Proprioception: intact  Monofilament testing: intact    Vascular  Capillary refills: < 3 seconds  The left DP pulse is 2+  The left PT pulse is 2+       Assign Risk Category  No deformity present  No loss of protective sensation  No weak pulses  Risk: 0

## 2022-06-02 NOTE — PATIENT INSTRUCTIONS
Foot Care for People with Diabetes   AMBULATORY CARE:   What you need to know about foot care: Foot care helps protect your feet and prevent foot ulcers or sores  Long-term high blood sugar levels can damage the blood vessels and nerves in your legs and feet  This damage makes it hard to feel pressure, pain, temperature, and touch  You may not be able to feel a cut or sore, or shoes that are too tight  Foot care is needed to prevent serious problems, such as an infection or amputation  Diabetes may cause your toes to become crooked or curved under  These changes may affect the way you walk and can lead to increased pressure on your foot  The pressure can decrease blood flow to your feet  Lack of blood flow increases your risk for a foot ulcer  Do not ignore small problems, such as dry skin or small wounds  These can become life-threatening over time without proper care  Call your care team provider if:   Your feet become numb, weak, or hard to move  You have pus draining from a sore on your foot  You have a wound on your foot that gets bigger, deeper, or does not heal      You see blisters, cuts, scratches, calluses, or sores on your foot  You have a fever, and your feet become red, warm, and swollen  Your toenails become thick, curled, or yellow  You find it hard to check your feet because your vision is poor  You have questions or concerns about your condition or care  How to care for your feet:   Check your feet each day  Look at your whole foot, including the bottom, and between and under your toes  Check for wounds, corns, and calluses  Use a mirror to see the bottom of your feet  The skin on your feet may be shiny, tight, or darker than normal  Your feet may also be cold and pale  Feel your feet by running your hands along the tops, bottoms, sides, and between your toes  Redness, swelling, and warmth are signs of blood flow problems that can lead to a foot ulcer   Do not try to remove corns or calluses yourself  Wash your feet each day with soap and warm water  Do not use hot water, because this can injure your foot  Dry your feet gently with a towel after you wash them  Dry between and under your toes  Apply lotion or a moisturizer on your dry feet  Ask your care team provider what lotions are best to use  Do not put lotion or moisturizer between your toes  Moisture between your toes could lead to skin breakdown  Cut your toenails correctly  File or cut your toenails straight across  Use a soft brush to clean around your toenails  If your toenails are very thick, you may need to have a care team provider or specialist cut them  Protect your feet  Do not walk barefoot or wear your shoes without socks  Check your shoes for rocks or other objects that can hurt your feet  Wear cotton socks to help keep your feet dry  Wear socks without toe seams, or wear them with the seams inside out  Change your socks each day  Do not wear socks that are dirty or damp  Wear shoes that fit well  Wear shoes that do not rub against any area of your feet  Your shoes should be ½ to ¾ inch (1 to 2 centimeters) longer than your feet  Your shoes should also have extra space around the widest part of your feet  Walking or athletic shoes with laces or straps that adjust are best  Ask your care team provider for help to choose shoes that fit you best  Ask him or her if you need to wear an insert, orthotic, or bandage on your feet  Go to your follow-up visits  Your care team provider will do a foot exam at least once a year  You may need a foot exam more often if you have nerve damage, foot deformities, or ulcers  He will check for nerve damage and how well you can feel your feet  He will check your shoes to see if they fit well  Do not smoke  Smoking can damage your blood vessels and put you at increased risk for foot ulcers   Ask your care team provider for information if you currently smoke and need help to quit  E-cigarettes or smokeless tobacco still contain nicotine  Talk to your care team provider before you use these products  Follow up with your diabetes care team provider or foot specialist as directed: You will need to have your feet checked at least once a year  You may need a foot exam more often if you have nerve damage, foot deformities, or ulcers  Write down your questions so you remember to ask them during your visits  © Copyright [employer] 2022 Information is for End User's use only and may not be sold, redistributed or otherwise used for commercial purposes  All illustrations and images included in CareNotes® are the copyrighted property of [employer]  or [facility] Mattie Sneed   The above information is an  only  It is not intended as medical advice for individual conditions or treatments  Talk to your doctor, nurse or pharmacist before following any medical regimen to see if it is safe and effective for you

## 2022-06-06 PROBLEM — E11.29 TYPE 2 DIABETES MELLITUS WITH OTHER DIABETIC KIDNEY COMPLICATION (HCC): Status: ACTIVE | Noted: 2022-06-06

## 2022-06-13 ENCOUNTER — OFFICE VISIT (OUTPATIENT)
Dept: DERMATOLOGY | Facility: CLINIC | Age: 67
End: 2022-06-13
Payer: COMMERCIAL

## 2022-06-13 VITALS — WEIGHT: 315 LBS | BODY MASS INDEX: 42.66 KG/M2 | TEMPERATURE: 97.2 F | HEIGHT: 72 IN

## 2022-06-13 DIAGNOSIS — L82.1 SEBORRHEIC KERATOSES: Primary | ICD-10-CM

## 2022-06-13 DIAGNOSIS — B35.4 TINEA CORPORIS: ICD-10-CM

## 2022-06-13 DIAGNOSIS — L40.8 SEBOPSORIASIS: ICD-10-CM

## 2022-06-13 PROCEDURE — 99213 OFFICE O/P EST LOW 20 MIN: CPT | Performed by: DERMATOLOGY

## 2022-06-13 PROCEDURE — 1036F TOBACCO NON-USER: CPT | Performed by: DERMATOLOGY

## 2022-06-13 PROCEDURE — 3008F BODY MASS INDEX DOCD: CPT | Performed by: DERMATOLOGY

## 2022-06-13 RX ORDER — TERBINAFINE HYDROCHLORIDE 250 MG/1
250 TABLET ORAL DAILY
Qty: 14 TABLET | Refills: 0 | Status: SHIPPED | OUTPATIENT
Start: 2022-06-13 | End: 2022-06-27

## 2022-06-13 RX ORDER — CLOBETASOL PROPIONATE 0.46 MG/ML
SOLUTION TOPICAL 2 TIMES DAILY
Qty: 50 ML | Refills: 3 | Status: SHIPPED | OUTPATIENT
Start: 2022-06-13

## 2022-06-13 NOTE — PATIENT INSTRUCTIONS
SEBOPSORIASIS    Assessment and Plan:  Based on a thorough discussion of this condition and the management approach to it (including a comprehensive discussion of the known risks, side effects and potential benefits of treatment), the patient (family) agrees to implement the following specific plan:  Clobetasol 0 05% solution apply topically unto the scalp daily for 2 weeks straight then as needed for flares  Okay to use the ketoconazole 2% cream topically on the right eyebrow as needed for flares  SEBOPSORIASIS  is an overlap between seborrhea and psoriasis  Seborrhea is probably a reaction to a yeast that lives on the skin and psoriasis in an autoimmune condition where the immune system is sending signals to the skin to grow very rapidly and not to form properly  Sebopsoriasis affects primarily the scalp and the face  It causes a lot of redness and scaling and can be itchy  TREATMENT is aimed at reducing the yeast and treating the inflammation and scale with:   1  Antifungal agents - like antifungal shampoos, creams or washes   2  Topical steroids - solutions, creams, foams or shampoos   3  Keratolytics - creams or shampoos that help the skin to shed better   They contain salicylic acid, lactic acid or urea  Sometimes more aggressive treatment is needed and these will be discussed if there is a lack of response to traditional treatment  SEBORRHEIC KERATOSIS; NON-INFLAMED    Assessment and Plan:  Based on a thorough discussion of this condition and the management approach to it (including a comprehensive discussion of the known risks, side effects and potential benefits of treatment), the patient (family) agrees to implement the following specific plan:  Reassured benign  Seborrheic Keratosis  A seborrheic keratosis is a harmless warty spot that appears during adult life as a common sign of skin aging    Seborrheic keratoses can arise on any area of skin, covered or uncovered, with the usual exception of the palms and soles  They do not arise from mucous membranes  Seborrheic keratoses can have highly variable appearance  Seborrheic keratoses are extremely common  It has been estimated that over 90% of adults over the age of 61 years have one or more of them  They occur in males and females of all races, typically beginning to erupt in the 35s or 45s  They are uncommon under the age of 21 years  The precise cause of seborrhoeic keratoses is not known  Seborrhoeic keratoses are considered degenerative in nature  As time goes by, seborrheic keratoses tend to become more numerous  Some people inherit a tendency to develop a very large number of them; some people may have hundreds of them  The name "seborrheic keratosis" is misleading, because these lesions are not limited to a seborrhoeic distribution (scalp, mid-face, chest, upper back), nor are they formed from sebaceous glands, nor are they associated with sebum -- which is greasy  Seborrheic keratosis may also be called "SK," "Seb K," "basal cell papilloma," "senile wart," or "barnacle "      Researchers have noted:  Eruptive seborrhoeic keratoses can follow sunburn or dermatitis  Skin friction may be the reason they appear in body folds  Viral cause (e g , human papillomavirus) seems unlikely  Stable and clonal mutations or activation of FRFR3, PIK3CA, CHERYL, AKT1 and EGFR genes are found in seborrhoeic keratoses  Seborrhoeic keratosis can arise from solar lentigo  FRFR3 mutations also arise in solar lentigines  These mutations are associated with increased age and location on the head and neck, suggesting a role of ultraviolet radiation in these lesions  Seborrheic keratoses do not harbour tumour suppressor gene mutations  Epidermal growth factor receptor inhibitors, which are used to treat some cancers, often result in an increase in verrucal (warty) keratoses  There is no easy way to remove multiple lesions on a single occasion    Unless a specific lesion is "inflamed" and is causing pain or stinging/burning or is bleeding, most insurance companies do not authorize treatment  TINEA CORPORIS ("RING WORM")    Assessment and Plan:  Based on a thorough discussion of this condition and the management approach to it (including a comprehensive discussion of the known risks, side effects and potential benefits of treatment), the patient (family) agrees to implement the following specific plan:  Terbinafine 250 mg take 1 tablet by mouth daily for 2 week (14) days total   Okay to use on the left inner thigh topically ketoconazole cream daily  Tinea Corporis  Tinea corporis (ringworm) is the name used for infection of the trunk, legs or arms with a dermatophyte fungus  In different parts of the world, different species cause tinea corporis  Infection often comes from the feet (tinea pedis) or nails (tinea unguium) originally  Microsporum canis (M  canis) from cats and dogs, and T  verrucosum, from farm cattle, are also common  Tinea corporis may be acute (sudden onset and rapid spread) or chronic (slow extension of a mild, barely inflamed, rash)  It usually affects exposed areas but may also spread from other infected sites  Acute tinea corporis presents as itchy inflamed red patches and may be pustular  The cause is often infection by an animal (zoophilic) fungus such as M canis  Chronic tinea corporis tends to be most prominent in body folds (spreading from tinea cruris)  T  rubrum is the most common cause  If widespread, the condition tends to be stubborn to treat and prone to recurrence  This is possibly due to a decreased natural skin resistance to fungi or because of reinfection from the environment  The term "ringworm" refers to round or oval red scaly patches, often less red and scaly in the middle or healed in the middle  Sometimes one ring arises inside another older ring  Sal[place] is an inflamed fungal abscess   It presents as a boggy mass studied with pustules, often with satellite spots  It is often confused with a large boil or carbuncle or a tumour such as a skin cancer  Majocchi granuloma describes tinea corporis involving the hair follicles resulting in pustules and nodules  Gogo Metcalf is due to [place] and occurs in the [place] and other tropical areas  It results in brown scaly concentric rings  Non-fungal conditions resembling tinea corporis include:  Impetigo  Seborrhoeic dermatitis  Psoriasis  Discoid eczema  Lichen simplex  Contact allergic dermatitis  Pityriasis rosea    The diagnosis of tinea corporis may be confirmed by microscopy and culture of skin scrapings  Occasionally, the diagnosis is made on skin biopsy because of characteristic histopathological features of tinea corporis and organisms may be found in the outside layers of the skin  Tinea corporis is usually treated with topical antifungal agents, but if the treatment is unsuccessful,  oral antifungal medicines may be considered, including terbinafine and itraconazole  oral antifungal medicines may be considered, including terbinafine and itraconazole

## 2022-06-13 NOTE — PROGRESS NOTES
[facility] Follow Up Note    Patient Name: Jaun Schultz  Encounter Date: 6/13/22    Today's Chief Concerns:  [facility] Concern #1:  Atopic dermatitis    Concern #2:  Rash on left inner thigh      Current Medications:    Current Outpatient Medications:     Ascorbic Acid (VITAMIN C PO), Take 1,000 mg by mouth daily, Disp: , Rfl:     betamethasone dipropionate (DIPROSONE) 0 05 % ointment, Apply sparingly  1-2 times/day for up to 2 wks to R forearm rash , Disp: 45 g, Rfl: 0    betamethasone valerate (VALISONE) 0 1 % lotion, Apply once to twice daily as needed to scalp, Disp: 60 mL, Rfl: 0    Cyanocobalamin (VITAMIN B-12 PO), Take 1,000 mcg by mouth daily , Disp: , Rfl:     famotidine (PEPCID) 20 mg tablet, TAKE 1 TABLET (20 MG TOTAL) BY MOUTH 2 (TWO) TIMES A DAY, Disp: 180 tablet, Rfl: 1    fexofenadine (ALLEGRA) 180 MG tablet, Take 180 mg by mouth daily as needed, Disp: , Rfl:     ketoconazole (NIZORAL) 2 % cream, Apply topically daily, Disp: 60 g, Rfl: 5    lisinopril (ZESTRIL) 20 mg tablet, Take 1 tablet (20 mg total) by mouth daily, Disp: 90 tablet, Rfl: 1    Multiple Vitamins-Minerals (MULTIVITAL PO), Take 1 tablet by mouth, Disp: , Rfl:     Omega-3 Fatty Acids (FISH OIL) 1000 MG CPDR, Take 1,000 mg by mouth daily, Disp: , Rfl:     omeprazole (PriLOSEC) 20 mg delayed release capsule, Take 20 mg by mouth as needed, Disp: , Rfl:     sildenafil (VIAGRA) 50 MG tablet, TAKE 1 TABLET (50 MG TOTAL) BY MOUTH DAILY AS NEEDED FOR ERECTILE DYSFUNCTION, Disp: 8 tablet, Rfl: 1    simvastatin (ZOCOR) 40 mg tablet, Take 1 tablet (40 mg total) by mouth daily at bedtime, Disp: 90 tablet, Rfl: 1    Clenpiq 10-3 5-12 MG-GM -GM/160ML SOLN, TAKE 1 PACKAGE BY MOUTH SEE ADMINISTRATION INSTRUCTIONS, Disp: 320 mL, Rfl: 0    triamcinolone (KENALOG) 0 1 % ointment, Apply topically 2 (two) times a day, Disp: 80 g, Rfl: 0    CONSTITUTIONAL:   Vitals:    06/13/22 1610   Temp: (!) 97 2 °F (36 2 °C)   TempSrc: Temporal   Weight: (!) 155 kg (341 lb 9 6 oz)   Height: 6' (1 829 m)           Specific Alerts:    Have you been seen by a [facility]atologist in the last 3 years? YES        Allergies   Allergen Reactions    Penicillins Rash     sensitivity       May we call your Preferred Phone number to discuss your specific medical information? YES    May we leave a detailed message that includes your specific medical information? YES    Have you traveled outside of the [facility] in the past 3 months? No    Do you currently have a pacemaker or defibrillator? No    Do you have any artificial heart valves, joints, plates, screws, rods, stents, pins, etc? YES   - If Yes, were any placed within the last 2 years? joints    Do you require any medications prior to a surgical procedure? No   - If Yes, for which procedure? - If Yes, what medications to you require? Are you taking any medications that cause you to bleed more easily ("blood thinners") YES    Have you ever experienced a rapid heartbeat with epinephrine? No    Have you ever been treated with "gold" (gold sodium thiomalate) therapy? No    Review of Systems:  Have you recently had or currently have any of the following?     · Fever or chills: No  · Night Sweats: No  · Headaches: No  · Weight Gain: No  · Weight Loss: No  · Blurry Vision: No  · Nausea: No  · Vomiting: No  · Diarrhea: No  · Blood in Stool: No  · Abdominal Pain: No  · Itchy Skin: YES  · Painful Joints: No  · Swollen Joints: No  · Muscle Pain: No  · Irregular Mole: No  · Sun Burn: No  · Dry Skin: No  · Skin Color Changes: No  · Scar or Keloid: No  · Cold Sores/Fever Blisters: No  · Bacterial Infections/MRSA: No  · Anxiety: No  · Depression: No  · Suicidal or Homicidal Thoughts: No      PSYCH: Normal mood and affect  EYES: Normal conjunctiva  ENT: Normal lips and oral mucosa  CARDIOVASCULAR: No edema  RESPIRATORY: Normal respirations  HEME/LYMPH/IMMUNO:  No regional lymphadenopathy except as noted below in [address] SKIN EXAM (SKIN)   Hair, Scalp, Ears, Face Normal except as noted below in Assessment   Neck, Cervical Chain Nodes Normal except as noted below in Assessment   Left Leg Normal except as noted below in Assessment       SEBOPSORIASIS    Physical Exam:   Anatomic Location Affected:  Scalp, right eyebrow   Morphological Description:  Pink plaques with scales      Additional History of Present Condition:  Used betamethasone lotion  No improvements    Assessment and Plan:  Based on a thorough discussion of this condition and the management approach to it (including a comprehensive discussion of the known risks, side effects and potential benefits of treatment), the patient (family) agrees to implement the following specific plan:   Clobetasol 0 05% solution apply topically unto the scalp daily for 2 weeks straight then as needed for flares   Okay to use the ketoconazole 2% cream topically on the right eyebrow as needed for flares  SEBOPSORIASIS  is an overlap between seborrhea and psoriasis  Seborrhea is probably a reaction to a yeast that lives on the skin and psoriasis in an autoimmune condition where the immune system is sending signals to the skin to grow very rapidly and not to form properly  Sebopsoriasis affects primarily the scalp and the face  It causes a lot of redness and scaling and can be itchy  TREATMENT is aimed at reducing the yeast and treating the inflammation and scale with:   1  Antifungal agents - like antifungal shampoos, creams or washes   2  Topical steroids - solutions, creams, foams or shampoos   3  Keratolytics - creams or shampoos that help the skin to shed better   They contain salicylic acid, lactic acid or urea  Sometimes more aggressive treatment is needed and these will be discussed if there is a lack of response to traditional treatment          SEBORRHEIC KERATOSIS; NON-INFLAMED    Physical Exam:   Anatomic Location Affected:  Right forearm   Morphological Description:  Flat and raised, waxy, smooth to warty textured, yellow to brownish-grey to dark brown to blackish, discrete, "stuck-on" appearing papules   Pertinent Positives:   Pertinent Negatives: Additional History of Present Condition:  Patient reports new bumps on the skin  Denies itch, burn, pain, bleeding or ulceration  Present constantly; nothing seems to make it worse or better  No prior treatment  Assessment and Plan:  Based on a thorough discussion of this condition and the management approach to it (including a comprehensive discussion of the known risks, side effects and potential benefits of treatment), the patient (family) agrees to implement the following specific plan:   Reassured benign  Seborrheic Keratosis  A seborrheic keratosis is a harmless warty spot that appears during adult life as a common sign of skin aging  Seborrheic keratoses can arise on any area of skin, covered or uncovered, with the usual exception of the palms and soles  They do not arise from mucous membranes  Seborrheic keratoses can have highly variable appearance  Seborrheic keratoses are extremely common  It has been estimated that over 90% of adults over the age of 61 years have one or more of them  They occur in males and females of all races, typically beginning to erupt in the 35s or 45s  They are uncommon under the age of 21 years  The precise cause of seborrhoeic keratoses is not known  Seborrhoeic keratoses are considered degenerative in nature  As time goes by, seborrheic keratoses tend to become more numerous  Some people inherit a tendency to develop a very large number of them; some people may have hundreds of them      The name "seborrheic keratosis" is misleading, because these lesions are not limited to a seborrhoeic distribution (scalp, mid-face, chest, upper back), nor are they formed from sebaceous glands, nor are they associated with sebum -- which is greasy  Seborrheic keratosis may also be called "SK," "Seb K," "basal cell papilloma," "senile wart," or "barnacle "      Researchers have noted:   Eruptive seborrhoeic keratoses can follow sunburn or dermatitis   Skin friction may be the reason they appear in body folds   Viral cause (e g , human papillomavirus) seems unlikely   Stable and clonal mutations or activation of FRFR3, PIK3CA, CHERYL, AKT1 and EGFR genes are found in seborrhoeic keratoses   Seborrhoeic keratosis can arise from solar lentigo   FRFR3 mutations also arise in solar lentigines  These mutations are associated with increased age and location on the head and neck, suggesting a role of ultraviolet radiation in these lesions   Seborrheic keratoses do not harbour tumour suppressor gene mutations   Epidermal growth factor receptor inhibitors, which are used to treat some cancers, often result in an increase in verrucal (warty) keratoses  There is no easy way to remove multiple lesions on a single occasion  Unless a specific lesion is "inflamed" and is causing pain or stinging/burning or is bleeding, most insurance companies do not authorize treatment  TINEA CORPORIS ("RING WORM")    Physical Exam:   Anatomic Location Affected:  Left inner thigh   Morphological Description:  Annular red plaque with scales and papules   Pertinent Positives:   Pertinent Negatives: Additional History of Present Condition:  Appeared 6 months ago has used betamethasone cream and lotion  No improvements, keeps recurring      Assessment and Plan:  Based on a thorough discussion of this condition and the management approach to it (including a comprehensive discussion of the known risks, side effects and potential benefits of treatment), the patient (family) agrees to implement the following specific plan:   Terbinafine 250 mg take 1 tablet by mouth daily for 2 week (14) days total    Okay to use on the left inner thigh topically ketoconazole cream daily  Tinea Corporis  Tinea corporis (ringworm) is the name used for infection of the trunk, legs or arms with a dermatophyte fungus  In different parts of the world, different species cause tinea corporis  Infection often comes from the feet (tinea pedis) or nails (tinea unguium) originally  Microsporum canis (M  canis) from cats and dogs, and T  verrucosum, from farm cattle, are also common  Tinea corporis may be acute (sudden onset and rapid spread) or chronic (slow extension of a mild, barely inflamed, rash)  It usually affects exposed areas but may also spread from other infected sites  Acute tinea corporis presents as itchy inflamed red patches and may be pustular  The cause is often infection by an animal (zoophilic) fungus such as M canis  Chronic tinea corporis tends to be most prominent in body folds (spreading from tinea cruris)  T  rubrum is the most common cause  If widespread, the condition tends to be stubborn to treat and prone to recurrence  This is possibly due to a decreased natural skin resistance to fungi or because of reinfection from the environment  The term "ringworm" refers to round or oval red scaly patches, often less red and scaly in the middle or healed in the middle  Sometimes one ring arises inside another older ring  [place] is an inflamed fungal abscess  It presents as a boggy mass studied with pustules, often with satellite spots  It is often confused with a large boil or carbuncle or a tumour such as a skin cancer  Majocchi granuloma describes tinea corporis involving the hair follicles resulting in pustules and nodules  [place] is due to [place] and occurs in the [place] and other tropical areas  It results in brown scaly concentric rings      Non-fungal conditions resembling tinea corporis include:   Impetigo   Seborrhoeic dermatitis   Psoriasis   Discoid eczema   Lichen simplex   Contact allergic dermatitis   Pityriasis rosea    The diagnosis of tinea corporis may be confirmed by microscopy and culture of skin scrapings  Occasionally, the diagnosis is made on skin biopsy because of characteristic histopathological features of tinea corporis and organisms may be found in the outside layers of the skin  Tinea corporis is usually treated with topical antifungal agents, but if the treatment is unsuccessful,  oral antifungal medicines may be considered, including terbinafine and itraconazole  oral antifungal medicines may be considered, including terbinafine and itraconazole    Scribe Attestation    I,:  Hill Spicer am acting as a scribe while in the presence of the attending physician :       I,:  Steve Thrasher MD personally performed the services described in this documentation    as scribed in my presence :

## 2022-07-15 DIAGNOSIS — B35.3 TINEA PEDIS OF BOTH FEET: ICD-10-CM

## 2022-07-18 RX ORDER — KETOCONAZOLE 20 MG/G
CREAM TOPICAL DAILY
Qty: 60 G | Refills: 0 | Status: SHIPPED | OUTPATIENT
Start: 2022-07-18 | End: 2022-10-04 | Stop reason: SDUPTHER

## 2022-07-21 DIAGNOSIS — I10 ESSENTIAL HYPERTENSION: ICD-10-CM

## 2022-07-21 DIAGNOSIS — E78.2 MIXED HYPERLIPIDEMIA: ICD-10-CM

## 2022-07-21 DIAGNOSIS — N52.31 ERECTILE DYSFUNCTION AFTER RADICAL PROSTATECTOMY: ICD-10-CM

## 2022-07-21 RX ORDER — SIMVASTATIN 40 MG
40 TABLET ORAL
Qty: 90 TABLET | Refills: 0 | Status: SHIPPED | OUTPATIENT
Start: 2022-07-21

## 2022-07-21 RX ORDER — LISINOPRIL 20 MG/1
20 TABLET ORAL DAILY
Qty: 90 TABLET | Refills: 0 | Status: SHIPPED | OUTPATIENT
Start: 2022-07-21

## 2022-07-21 RX ORDER — SILDENAFIL 50 MG/1
50 TABLET, FILM COATED ORAL DAILY PRN
Qty: 8 TABLET | Refills: 0 | Status: SHIPPED | OUTPATIENT
Start: 2022-07-21

## 2022-08-12 ENCOUNTER — VBI (OUTPATIENT)
Dept: ADMINISTRATIVE | Facility: OTHER | Age: 67
End: 2022-08-12

## 2022-09-10 DIAGNOSIS — K21.9 GASTROESOPHAGEAL REFLUX DISEASE WITHOUT ESOPHAGITIS: ICD-10-CM

## 2022-09-12 RX ORDER — FAMOTIDINE 20 MG/1
20 TABLET, FILM COATED ORAL 2 TIMES DAILY
Qty: 180 TABLET | Refills: 1 | Status: SHIPPED | OUTPATIENT
Start: 2022-09-12

## 2022-09-20 ENCOUNTER — RA CDI HCC (OUTPATIENT)
Dept: OTHER | Facility: HOSPITAL | Age: 67
End: 2022-09-20

## 2022-10-04 DIAGNOSIS — B35.3 TINEA PEDIS OF BOTH FEET: ICD-10-CM

## 2022-10-04 RX ORDER — KETOCONAZOLE 20 MG/G
CREAM TOPICAL DAILY
Qty: 60 G | Refills: 0 | Status: SHIPPED | OUTPATIENT
Start: 2022-10-04

## 2022-10-11 ENCOUNTER — APPOINTMENT (OUTPATIENT)
Dept: LAB | Facility: CLINIC | Age: 67
End: 2022-10-11
Payer: COMMERCIAL

## 2022-10-11 ENCOUNTER — OFFICE VISIT (OUTPATIENT)
Dept: INTERNAL MEDICINE CLINIC | Facility: CLINIC | Age: 67
End: 2022-10-11
Payer: COMMERCIAL

## 2022-10-11 VITALS
OXYGEN SATURATION: 95 % | BODY MASS INDEX: 42.66 KG/M2 | HEIGHT: 72 IN | SYSTOLIC BLOOD PRESSURE: 140 MMHG | HEART RATE: 64 BPM | WEIGHT: 315 LBS | DIASTOLIC BLOOD PRESSURE: 80 MMHG | TEMPERATURE: 98.6 F

## 2022-10-11 DIAGNOSIS — K21.9 GASTROESOPHAGEAL REFLUX DISEASE WITHOUT ESOPHAGITIS: ICD-10-CM

## 2022-10-11 DIAGNOSIS — J30.89 SEASONAL ALLERGIC RHINITIS DUE TO OTHER ALLERGIC TRIGGER: ICD-10-CM

## 2022-10-11 DIAGNOSIS — I10 ESSENTIAL HYPERTENSION: ICD-10-CM

## 2022-10-11 DIAGNOSIS — E78.2 MIXED HYPERLIPIDEMIA: ICD-10-CM

## 2022-10-11 DIAGNOSIS — C61 PROSTATE CANCER (HCC): ICD-10-CM

## 2022-10-11 DIAGNOSIS — Z12.5 SCREENING PSA (PROSTATE SPECIFIC ANTIGEN): ICD-10-CM

## 2022-10-11 DIAGNOSIS — H91.8X9 OTHER SPECIFIED FORMS OF HEARING LOSS, UNSPECIFIED LATERALITY: ICD-10-CM

## 2022-10-11 DIAGNOSIS — E11.9 TYPE 2 DIABETES MELLITUS WITHOUT COMPLICATION, WITHOUT LONG-TERM CURRENT USE OF INSULIN (HCC): ICD-10-CM

## 2022-10-11 DIAGNOSIS — E11.9 TYPE 2 DIABETES MELLITUS WITHOUT COMPLICATION, WITHOUT LONG-TERM CURRENT USE OF INSULIN (HCC): Primary | ICD-10-CM

## 2022-10-11 DIAGNOSIS — B35.1 ONYCHOMYCOSIS OF TOENAIL: ICD-10-CM

## 2022-10-11 DIAGNOSIS — R80.9 MICROALBUMINURIA: ICD-10-CM

## 2022-10-11 PROBLEM — IMO0001 HEARING IMPAIRMENT: Status: ACTIVE | Noted: 2022-10-11

## 2022-10-11 PROBLEM — H91.90 HEARING IMPAIRMENT: Status: ACTIVE | Noted: 2022-10-11

## 2022-10-11 LAB
ALBUMIN SERPL BCP-MCNC: 4 G/DL (ref 3.5–5)
ALP SERPL-CCNC: 51 U/L (ref 34–104)
ALT SERPL W P-5'-P-CCNC: 28 U/L (ref 7–52)
ANION GAP SERPL CALCULATED.3IONS-SCNC: 4 MMOL/L (ref 4–13)
AST SERPL W P-5'-P-CCNC: 25 U/L (ref 13–39)
BILIRUB SERPL-MCNC: 0.32 MG/DL (ref 0.2–1)
BUN SERPL-MCNC: 22 MG/DL (ref 5–25)
CALCIUM SERPL-MCNC: 8.8 MG/DL (ref 8.4–10.2)
CHLORIDE SERPL-SCNC: 102 MMOL/L (ref 96–108)
CHOLEST SERPL-MCNC: 166 MG/DL
CO2 SERPL-SCNC: 32 MMOL/L (ref 21–32)
CREAT SERPL-MCNC: 1.22 MG/DL (ref 0.6–1.3)
GFR SERPL CREATININE-BSD FRML MDRD: 62 ML/MIN/1.73SQ M
GLUCOSE P FAST SERPL-MCNC: 132 MG/DL (ref 65–99)
HDLC SERPL-MCNC: 44 MG/DL
LDLC SERPL CALC-MCNC: 73 MG/DL (ref 0–100)
NONHDLC SERPL-MCNC: 122 MG/DL
POTASSIUM SERPL-SCNC: 5 MMOL/L (ref 3.5–5.3)
PROT SERPL-MCNC: 7 G/DL (ref 6.4–8.4)
SODIUM SERPL-SCNC: 138 MMOL/L (ref 135–147)
TRIGL SERPL-MCNC: 243 MG/DL

## 2022-10-11 PROCEDURE — 80061 LIPID PANEL: CPT

## 2022-10-11 PROCEDURE — 36415 COLL VENOUS BLD VENIPUNCTURE: CPT

## 2022-10-11 PROCEDURE — 80053 COMPREHEN METABOLIC PANEL: CPT

## 2022-10-11 PROCEDURE — 99214 OFFICE O/P EST MOD 30 MIN: CPT | Performed by: INTERNAL MEDICINE

## 2022-10-11 NOTE — PATIENT INSTRUCTIONS
Patient was advised to continue present medications  discussed with the patient medications and laboratory data in detail  Follow-up with me in 4 months or as advised  If any blood test was ordered please do 1 week prior to next appointment unless advise to get earlier    If you have any questions please call the office [phone]

## 2022-10-11 NOTE — PROGRESS NOTES
Assessment/Plan:    1  Type 2 diabetes mellitus without complication, without long-term current use of insulin ([facility])  Assessment & Plan:    Lab Results   Component Value Date    HGBA1C 6 9 (H) 05/24/2022   Diabetes mellitus is well controlled on diet  Advised to continue 1800 calorie ADA diet  Orders:  -     Hemoglobin A1C; Future  -     Basic metabolic panel; Future  -     Microalbumin / creatinine urine ratio  -     UA (URINE) with reflex to Scope    2  Gastroesophageal reflux disease without esophagitis  Assessment & Plan:  Usually takes famotidine 20 mg b i d  but occasionally takes omeprazole depending on the foods he eats  Has been watching diet and taking reflux precautions  Orders:  -     CBC and differential; Future    3  Prostate cancer [facility])  Assessment & Plan:  He was seen by urologist last time 2018  Last PSA was done January 2022 will follow yearly PSA patient has appointment to see urologist next year    Orders:  -     PSA, Total Screen; Future    4  Microalbuminuria  Assessment & Plan:  He is on lisinopril  His renal function stable  Will follow yearly microalbumin  5  Mixed hyperlipidemia  Assessment & Plan:  Cholesterol 166 triglyceride increased from 171-243, HDL 44, LDL 73 advised to continue simvastatin 40 mg daily  Advised to continue low-cholesterol, low saturated, low carbs diet  He has been taking also fish well as well  6  Seasonal allergic rhinitis due to other allergic trigger  Assessment & Plan:  His symptoms are controlled on fexofenadine , he takes p r n  Orders:  -     CBC and differential; Future    7  Other specified forms of hearing loss, unspecified laterality  Assessment & Plan:  Patient states sometime he is having some hearing problem   On exam ears are clear no wax  Will refer to ENT specialist for further evaluation and recommendation  Orders:  -     Ambulatory Referral to Otolaryngology; Future    8   BMI 45 0-49 9, adult [facility])  Assessment & Plan:  Patient  was advised to decrease portion size  Advised to decrease carb, sugar, cholesterol intake  Advised to exercise 3-5 times per week  Advised to lose weight  9  Essential hypertension  Assessment & Plan:  Systolic blood pressure slightly elevated  Patient state he just came from work  Will observe for now advised to continue present medication and advised to exercise lose weight and low-salt diet  Orders:  -     Basic metabolic panel; Future  -     UA (URINE) with reflex to Scope  -     CBC and differential; Future    10  Screening PSA (prostate specific antigen)  -     PSA, Total Screen; Future    11  Onychomycosis of toenail  Assessment & Plan:  He was seen by podiatrist was advised to observe  Advised to follow with podiatrist as advised  Discussed with the patient about getting pneumonia vaccination he will consider next time  Got COVID-19 vaccination including 1 booster dose  Advised influenza vaccination        Subjective:  Patient presents for follow-up    Patient ID: Delmer Prasad is a 61 y o  male  HPI   21-year-old white male patient presents for follow-up of his medical problems  He denies any chest pain, shortness a of breath, pain abdomen  Denies any cough, fever, chills  Denies any nausea vomiting, diarrhea  He was seen by podiatrist was prescribed cream for his feet for tinea pedis infection  Which is better  He has onychomycosis of the great toenail was advised to observe by podiatrist   He was also seen by dermatologist for sebo psoriasis of scalp and he was prescribed lotion for that, and a left thigh tinea corporis infection      The following portions of the patient's history were reviewed and updated as appropriate:     Past Medical History:  He has a past medical history of Asthma, BMI 40 0-44 9, adult ([facility] ) (1/11/2022), BMI 45 0-49 9, adult ([facility] ) (6/28/2021), Bronchospasm, Cancer ([facility] ), Colon polyp, Essential hypertension (6/26/2021), GERD (gastroesophageal reflux disease), Hearing impairment (10/11/2022), Hyperlipidemia, Hypertension, Hypocalcemia (1/11/2022), Insomnia, Leg cramps, Lump on finger, left (1/11/2022), Microalbuminuria (9/27/2021), Mixed hyperlipidemia (6/26/2021), Morbid obesity ([address] ) (5/26/2022), Nocturia, Numbness of fingers of both hands, Onychomycosis of toenail (5/26/2022), Pain in both knees (5/26/2022), Paronychia of left middle finger (9/27/2021), Seasonal allergic rhinitis (1/11/2022), Skin lesion, Skin rash (6/26/2021), Sleep apnea, Snoring, Tubular adenoma of colon (6/28/2021), Type 2 diabetes mellitus without complication, without long-term current use of insulin ([facility] ) (6/26/2021), Wears glasses, and Weight gain  ,  _______________________________________________________________________  Past Surgical History:   has a past surgical history that includes Hip surgery (Bilateral); Prostatectomy; pr colonoscopy flx dx w/collj spec when pfrmd (N/A, 5/9/2017); Replacement total knee (Bilateral); Colonoscopy (05/09/2017); Colonoscopy; and Upper gastrointestinal endoscopy  ,  _______________________________________________________________________  Family History:  family history includes Brain cancer in his mother; Depression in his father; Diabetes in his father; Eczema in his mother; Heart disease in his father; Psoriasis in his mother; Stroke in his paternal grandfather ,  _______________________________________________________________________  Social History:   reports that he quit smoking about 30 years ago  He has never used smokeless tobacco  He reports current alcohol use  He reports that he does not use drugs  ,  _______________________________________________________________________  Allergies:  is allergic to penicillins     _______________________________________________________________________  Current Outpatient Medications   Medication Sig Dispense Refill   • Ascorbic Acid (VITAMIN C PO) Take 1,000 mg by mouth daily     • betamethasone dipropionate (DIPROSONE) 0 05 % ointment Apply sparingly  1-2 times/day for up to 2 wks to R forearm rash  45 g 0   • betamethasone valerate (VALISONE) 0 1 % lotion Apply once to twice daily as needed to scalp 60 mL 0   • Clenpiq 10-3 5-12 MG-GM -GM/160ML SOLN TAKE 1 PACKAGE BY MOUTH SEE ADMINISTRATION INSTRUCTIONS 320 mL 0   • clobetasol (TEMOVATE) 0 05 % external solution Apply topically 2 (two) times a day 50 mL 3   • Cyanocobalamin (VITAMIN B-12 PO) Take 1,000 mcg by mouth daily      • famotidine (PEPCID) 20 mg tablet TAKE 1 TABLET (20 MG TOTAL) BY MOUTH 2 (TWO) TIMES A  tablet 1   • fexofenadine (ALLEGRA) 180 MG tablet Take 180 mg by mouth daily as needed     • ketoconazole (NIZORAL) 2 % cream Apply topically daily 60 g 0   • lisinopril (ZESTRIL) 20 mg tablet Take 1 tablet (20 mg total) by mouth daily 90 tablet 0   • Multiple Vitamins-Minerals (MULTIVITAL PO) Take 1 tablet by mouth     • Omega-3 Fatty Acids (FISH OIL) 1000 MG CPDR Take 1,000 mg by mouth daily     • omeprazole (PriLOSEC) 20 mg delayed release capsule Take 20 mg by mouth as needed     • sildenafil (VIAGRA) 50 MG tablet Take 1 tablet (50 mg total) by mouth daily as needed for erectile dysfunction 8 tablet 0   • simvastatin (ZOCOR) 40 mg tablet Take 1 tablet (40 mg total) by mouth daily at bedtime 90 tablet 0     No current facility-administered medications for this visit      _______________________________________________________________________  Review of Systems   Constitutional: Negative for chills and fever  HENT: Negative for congestion, ear pain, hearing loss, nosebleeds, sinus pain, sore throat and trouble swallowing  Eyes: Negative for discharge, redness and visual disturbance  Respiratory: Negative for cough, chest tightness and shortness of breath  Cardiovascular: Negative for chest pain and palpitations     Gastrointestinal: Negative for abdominal pain, blood in stool, constipation, diarrhea, nausea and vomiting  Genitourinary: Negative for dysuria, flank pain and hematuria  Musculoskeletal: Negative for arthralgias, myalgias and neck pain  Skin: Negative for color change and rash  Neurological: Negative for dizziness, speech difficulty, weakness and headaches  Hematological: Does not bruise/bleed easily  Psychiatric/Behavioral: Negative for agitation and behavioral problems  Objective:  Vitals:    10/11/22 1653   BP: 140/80   Pulse: 64   Temp: 98 6 °F (37 °C)   SpO2: 95%   Weight: (!) 154 kg (340 lb)   Height: 6' (1 829 m)     Body mass index is 46 11 kg/m²  Physical Exam  Vitals and nursing note reviewed  Constitutional:       General: He is not in acute distress  Appearance: He is obese  HENT:      Head: Normocephalic and atraumatic  Right Ear: Ear canal and external ear normal       Left Ear: Ear canal and external ear normal       Nose: Nose normal       Mouth/Throat:      Mouth: Mucous membranes are moist    Eyes:      General: No scleral icterus  Right eye: No discharge  Left eye: No discharge  Extraocular Movements: Extraocular movements intact  Conjunctiva/sclera: Conjunctivae normal    Cardiovascular:      Rate and Rhythm: Normal rate and regular rhythm  Pulses: Normal pulses  Heart sounds: No murmur heard  Pulmonary:      Effort: Pulmonary effort is normal  No respiratory distress  Breath sounds: Normal breath sounds  Abdominal:      General: Bowel sounds are normal       Palpations: Abdomen is soft  Tenderness: There is no abdominal tenderness  Musculoskeletal:         General: Normal range of motion  Cervical back: Normal range of motion and neck supple  No muscular tenderness  Right lower leg: No edema  Left lower leg: No edema  Skin:     General: Skin is warm  Findings: No rash  Neurological:      General: No focal deficit present        Mental Status: He is alert and oriented to person, place, and time  Motor: No weakness  Coordination: Coordination normal    Psychiatric:         Mood and Affect: Mood normal          Behavior: Behavior normal          I spent 30 minutes with the patient today    More than 50% time spent for reviewing of external notes, reviewing of the results of diagnostics test, management of care, patient education and ordering of test

## 2022-10-12 NOTE — ASSESSMENT & PLAN NOTE
Lab Results   Component Value Date    HGBA1C 6 9 (H) 05/24/2022   Diabetes mellitus is well controlled on diet  Advised to continue 1800 calorie ADA diet

## 2022-10-12 NOTE — ASSESSMENT & PLAN NOTE
Systolic blood pressure slightly elevated  Patient state he just came from work  Will observe for now advised to continue present medication and advised to exercise lose weight and low-salt diet

## 2022-10-12 NOTE — ASSESSMENT & PLAN NOTE
He was seen by urologist last time 2018    Last PSA was done January 2022 will follow yearly PSA patient has appointment to see urologist next year

## 2022-10-12 NOTE — ASSESSMENT & PLAN NOTE
Usually takes famotidine 20 mg b i d  but occasionally takes omeprazole depending on the foods he eats  Has been watching diet and taking reflux precautions

## 2022-10-12 NOTE — ASSESSMENT & PLAN NOTE
Patient states sometime he is having some hearing problem   On exam ears are clear no wax  Will refer to ENT specialist for further evaluation and recommendation

## 2022-10-13 ENCOUNTER — VBI (OUTPATIENT)
Dept: ADMINISTRATIVE | Facility: OTHER | Age: 67
End: 2022-10-13

## 2022-12-06 ENCOUNTER — OFFICE VISIT (OUTPATIENT)
Dept: PODIATRY | Facility: CLINIC | Age: 67
End: 2022-12-06

## 2022-12-06 VITALS
SYSTOLIC BLOOD PRESSURE: 179 MMHG | BODY MASS INDEX: 42.66 KG/M2 | HEART RATE: 57 BPM | WEIGHT: 315 LBS | HEIGHT: 72 IN | DIASTOLIC BLOOD PRESSURE: 90 MMHG

## 2022-12-06 DIAGNOSIS — E11.9 TYPE 2 DIABETES MELLITUS WITHOUT COMPLICATION, WITHOUT LONG-TERM CURRENT USE OF INSULIN (HCC): Primary | ICD-10-CM

## 2022-12-06 NOTE — LETTER
December 6, 2022     Devin Salazar, [address]    Patient: Ceci Johnson   YOB: 1955   Date of Visit: 12/6/2022       Dear Dr Heather Petersen: Thank you for referring Verito Carr to me for evaluation  Below are my notes for this consultation  If you have questions, please do not hesitate to call me  I look forward to following your patient along with you  Sincerely,        Lisa Sesay DPM        CC: No Recipients  Lisa Sesay DPM  12/6/2022  4:00 PM  Sign when Signing Visit        PATIENT:  Sherry Fuentes [facility]    1955    ASSESSMENT:    1  Type 2 diabetes mellitus without complication, without long-term current use of insulin ([facility] [address] )              PLAN:  1  Patient was counseled on the condition and diagnosis  2   Educated disease prevention and risks related to diabetes  3   Educated proper daily foot care and exam   Instructed proper skin care / protection and footwear  Instructed to identify any signs of infection and related foot problem  4   The recent blood work was reviewed and the last HbA1c was 6 9  Discussed proper blood glucose control with diet and exercise  5   He has mild neuropathy in his feet  Instructed him to monitor for any worsening  6  Nail and HPK debrided for courtesy  Instructed proper footwear  7  The patient will return in 6 months for diabetic foot exam       Subjective:     HPI  The patient presents for diabetic foot evaluation  The blood glucose is under control with diet  The patient denied diabetic foot ulcer or related foot infection  He has mild numbness and paresthesia in his feet  No significant pain  No dysfunction  He notices callus on right great toe  He wears steel tip shoe at work  His toenails seem to be better  Tinea pedis resolved        The following portions of the patient's history were reviewed and updated as appropriate: allergies, current medications, past family history, past medical history, past social history, past surgical history and problem list   All pertinent labs and images were reviewed  Past Medical History  Past Medical History:   Diagnosis Date   • Asthma    • BMI 40 0-44 9, adult ([facility] ) 1/11/2022   • BMI 45 0-49 9, adult ([place] [address] ) 6/28/2021   • Bronchospasm    • Cancer [facility])     Prostate    • Colon polyp    • Essential hypertension 6/26/2021   • GERD (gastroesophageal reflux disease)    • Hearing impairment 10/11/2022   • Hyperlipidemia    • Hypertension    • Hypocalcemia 1/11/2022   • Insomnia    • Leg cramps    • Lump on finger, left 1/11/2022   • Microalbuminuria 9/27/2021   • Mixed hyperlipidemia 6/26/2021   • Morbid obesity ([place] [address] ) 5/26/2022   • Nocturia    • Numbness of fingers of both hands    • Onychomycosis of toenail 5/26/2022   • Pain in both knees 5/26/2022   • Paronychia of left middle finger 9/27/2021   • Seasonal allergic rhinitis 1/11/2022   • Skin lesion    • Skin rash 6/26/2021   • Sleep apnea     not tested yet   • Snoring    • Tubular adenoma of colon 6/28/2021   • Type 2 diabetes mellitus without complication, without long-term current use of insulin ([place] [address] ) 6/26/2021   • Wears glasses    • Weight gain        Past Surgical History  Past Surgical History:   Procedure Laterality Date   • COLONOSCOPY  05/09/2017   • COLONOSCOPY     • HIP SURGERY Bilateral     Hip replacement   • [place] COLONOSCOPY FLX DX W/COLLJ SPEC WHEN PFRMD N/A 5/9/2017    Procedure: COLONOSCOPY;  Surgeon: Thierry Mao MD;  [facility]; Service: Gastroenterology   • PROSTATECTOMY     • REPLACEMENT TOTAL KNEE Bilateral     Inactive   • UPPER GASTROINTESTINAL ENDOSCOPY          Allergies:  Penicillins    Medications:  Current Outpatient Medications   Medication Sig Dispense Refill   • Ascorbic Acid (VITAMIN C PO) Take 1,000 mg by mouth daily     • betamethasone dipropionate (DIPROSONE) 0 05 % ointment Apply sparingly  1-2 times/day for up to 2 wks to R forearm rash   45 g 0 • betamethasone valerate (VALISONE) 0 1 % lotion Apply once to twice daily as needed to scalp 60 mL 0   • clobetasol (TEMOVATE) 0 05 % external solution Apply topically 2 (two) times a day 50 mL 3   • Cyanocobalamin (VITAMIN B-12 PO) Take 1,000 mcg by mouth daily      • famotidine (PEPCID) 20 mg tablet TAKE 1 TABLET (20 MG TOTAL) BY MOUTH 2 (TWO) TIMES A  tablet 1   • fexofenadine (ALLEGRA) 180 MG tablet Take 180 mg by mouth daily as needed     • ketoconazole (NIZORAL) 2 % cream Apply topically daily 60 g 0   • lisinopril (ZESTRIL) 20 mg tablet TAKE 1 TABLET (20 MG TOTAL) BY MOUTH DAILY 90 tablet 1   • Multiple Vitamins-Minerals (MULTIVITAL PO) Take 1 tablet by mouth     • Omega-3 Fatty Acids (FISH OIL) 1000 MG CPDR Take 1,000 mg by mouth daily     • omeprazole (PriLOSEC) 20 mg delayed release capsule Take 20 mg by mouth as needed     • sildenafil (VIAGRA) 50 MG tablet Take 1 tablet (50 mg total) by mouth daily as needed for erectile dysfunction 8 tablet 0   • simvastatin (ZOCOR) 40 mg tablet TAKE 1 TABLET (40 MG TOTAL) BY MOUTH DAILY AT BEDTIME 90 tablet 1     No current facility-administered medications for this visit         Social History:  Social History     Socioeconomic History   • Marital status: /Civil Union     Spouse name: None   • Number of children: None   • Years of education: None   • Highest education level: None   Occupational History   • Occupation: Presently not working   Tobacco Use   • Smoking status: Former     Types: Cigarettes     Quit date: 1992     Years since quittin 5   • Smokeless tobacco: Never   Vaping Use   • Vaping Use: Every day   Substance and Sexual Activity   • Alcohol use: Yes     Comment: rarely   • Drug use: No   • Sexual activity: Not Currently   Other Topics Concern   • None   Social History Narrative    Single-family home    Current work/study status: Full-time    Caffeine use: Coffee, 3 servings/day    Lives with spouse    Former smoker - Inactive 6/22/2018    Annual eye exam: Sees 1hr; wears glasses    Annual dental checkup: Sees q6months    PSA: [facility] Urology    [facility]     Social Determinants of Health     Financial Resource Strain: Not on file   Food Insecurity: Not on file   Transportation Needs: Not on file   Physical Activity: Not on file   Stress: Not on file   Social Connections: Not on file   Intimate Partner Violence: Not on file   Housing Stability: Not on file        Review of Systems   Constitutional: Negative for appetite change, chills and fever  Respiratory: Negative for cough and shortness of breath  Cardiovascular: Negative for chest pain  Gastrointestinal: Negative for diarrhea, nausea and vomiting  Musculoskeletal: Negative for gait problem  Skin: Negative for wound  Neurological: Positive for numbness  Negative for weakness  Objective:      BP (!) 179/90   Pulse 57   Ht 6' (1 829 m)   Wt (!) 158 kg (348 lb 9 6 oz)   BMI 47 28 kg/m²         Physical Exam  Vitals reviewed  Constitutional:       General: He is not in acute distress  Appearance: He is obese  He is not toxic-appearing  Cardiovascular:      Rate and Rhythm: Normal rate and regular rhythm  Pulses: Normal pulses  no weak pulses          Dorsalis pedis pulses are 2+ on the right side and 2+ on the left side  Posterior tibial pulses are 2+ on the right side and 2+ on the left side  Comments: No ischemia  Pulmonary:      Effort: Pulmonary effort is normal  No respiratory distress  Musculoskeletal:         General: No swelling, tenderness or signs of injury  Right lower leg: No edema  Left lower leg: No edema  Right foot: No Charcot foot or foot drop  Left foot: No Charcot foot or foot drop  Feet:      Right foot:      Protective Sensation: 10 sites tested  10 sites sensed  Skin integrity: No ulcer  Left foot:      Protective Sensation: 10 sites tested  10 sites sensed  Skin integrity: No ulcer  Skin:     General: Skin is warm  Capillary Refill: Capillary refill takes less than 2 seconds  Coloration: Skin is not cyanotic or mottled  Findings: No abscess, ecchymosis, erythema, rash or wound  Nails: There is no clubbing  Comments: Mild thickening and discoloration of right great toenail  Mild HPK on right hallux IPJ medially  Neurological:      General: No focal deficit present  Mental Status: He is alert and oriented to person, place, and time  Cranial Nerves: No cranial nerve deficit  Sensory: No sensory deficit  Motor: No weakness  Coordination: Coordination normal       Gait: Gait normal    Psychiatric:         Mood and Affect: Mood normal          Behavior: Behavior normal          Thought Content:  Thought content normal          Judgment: Judgment normal

## 2022-12-06 NOTE — PROGRESS NOTES
PATIENT:  Sunshine BENDER [facility]    1955    ASSESSMENT:     1  Type 2 diabetes mellitus without complication, without long-term current use of insulin ([facility] [address] )              PLAN:  1  Patient was counseled on the condition and diagnosis  2   Educated disease prevention and risks related to diabetes  3   Educated proper daily foot care and exam   Instructed proper skin care / protection and footwear  Instructed to identify any signs of infection and related foot problem  4   The recent blood work was reviewed and the last HbA1c was 6 9  Discussed proper blood glucose control with diet and exercise  5   He has mild neuropathy in his feet  Instructed him to monitor for any worsening  6  Nail and HPK debrided for courtesy  Instructed proper footwear  7  The patient will return in 6 months for diabetic foot exam       Subjective:      HPI  The patient presents for diabetic foot evaluation  The blood glucose is under control with diet  The patient denied diabetic foot ulcer or related foot infection  He has mild numbness and paresthesia in his feet  No significant pain  No dysfunction  He notices callus on right great toe  He wears steel tip shoe at work  His toenails seem to be better  Tinea pedis resolved  The following portions of the patient's history were reviewed and updated as appropriate: allergies, current medications, past family history, past medical history, past social history, past surgical history and problem list   All pertinent labs and images were reviewed      Past Medical History  Past Medical History:   Diagnosis Date   • Asthma    • BMI 40 0-44 9, adult ([facility] ) 1/11/2022   • BMI 45 0-49 9, adult ([place] [address] ) 6/28/2021   • Bronchospasm    • Cancer [facility])     Prostate    • Colon polyp    • Essential hypertension 6/26/2021   • GERD (gastroesophageal reflux disease)    • Hearing impairment 10/11/2022   • Hyperlipidemia    • Hypertension    • Hypocalcemia 1/11/2022   • Insomnia    • Leg cramps    • Lump on finger, left 1/11/2022   • Microalbuminuria 9/27/2021   • Mixed hyperlipidemia 6/26/2021   • Morbid obesity ([address] ) 5/26/2022   • Nocturia    • Numbness of fingers of both hands    • Onychomycosis of toenail 5/26/2022   • Pain in both knees 5/26/2022   • Paronychia of left middle finger 9/27/2021   • Seasonal allergic rhinitis 1/11/2022   • Skin lesion    • Skin rash 6/26/2021   • Sleep apnea     not tested yet   • Snoring    • Tubular adenoma of colon 6/28/2021   • Type 2 diabetes mellitus without complication, without long-term current use of insulin ([facility] [address] ) 6/26/2021   • Wears glasses    • Weight gain        Past Surgical History  Past Surgical History:   Procedure Laterality Date   • COLONOSCOPY  05/09/2017   • COLONOSCOPY     • HIP SURGERY Bilateral     Hip replacement   • [place] COLONOSCOPY FLX DX W/COLLJ SPEC WHEN PFRMD N/A 5/9/2017    Procedure: COLONOSCOPY;  Surgeon: Duane Ely MD;  [facility]; Service: Gastroenterology   • PROSTATECTOMY     • REPLACEMENT TOTAL KNEE Bilateral     Inactive   • UPPER GASTROINTESTINAL ENDOSCOPY          Allergies:  Penicillins    Medications:  Current Outpatient Medications   Medication Sig Dispense Refill   • Ascorbic Acid (VITAMIN C PO) Take 1,000 mg by mouth daily     • betamethasone dipropionate (DIPROSONE) 0 05 % ointment Apply sparingly  1-2 times/day for up to 2 wks to R forearm rash   45 g 0   • betamethasone valerate (VALISONE) 0 1 % lotion Apply once to twice daily as needed to scalp 60 mL 0   • clobetasol (TEMOVATE) 0 05 % external solution Apply topically 2 (two) times a day 50 mL 3   • Cyanocobalamin (VITAMIN B-12 PO) Take 1,000 mcg by mouth daily      • famotidine (PEPCID) 20 mg tablet TAKE 1 TABLET (20 MG TOTAL) BY MOUTH 2 (TWO) TIMES A  tablet 1   • fexofenadine (ALLEGRA) 180 MG tablet Take 180 mg by mouth daily as needed     • ketoconazole (NIZORAL) 2 % cream Apply topically daily 60 g 0   • lisinopril (ZESTRIL) 20 mg tablet TAKE 1 TABLET (20 MG TOTAL) BY MOUTH DAILY 90 tablet 1   • Multiple Vitamins-Minerals (MULTIVITAL PO) Take 1 tablet by mouth     • Omega-3 Fatty Acids (FISH OIL) 1000 MG CPDR Take 1,000 mg by mouth daily     • omeprazole (PriLOSEC) 20 mg delayed release capsule Take 20 mg by mouth as needed     • sildenafil (VIAGRA) 50 MG tablet Take 1 tablet (50 mg total) by mouth daily as needed for erectile dysfunction 8 tablet 0   • simvastatin (ZOCOR) 40 mg tablet TAKE 1 TABLET (40 MG TOTAL) BY MOUTH DAILY AT BEDTIME 90 tablet 1     No current facility-administered medications for this visit         Social History:  Social History     Socioeconomic History   • Marital status: /Civil Union     Spouse name: None   • Number of children: None   • Years of education: None   • Highest education level: None   Occupational History   • Occupation: Presently not working   Tobacco Use   • Smoking status: Former     Types: Cigarettes     Quit date: 1992     Years since quittin 5   • Smokeless tobacco: Never   Vaping Use   • Vaping Use: Every day   Substance and Sexual Activity   • Alcohol use: Yes     Comment: rarely   • Drug use: No   • Sexual activity: Not Currently   Other Topics Concern   • None   Social History Narrative    Single-family home    Current work/study status: Full-time    Caffeine use: Coffee, 3 servings/day    Lives with spouse    Former smoker - Inactive 2018    Annual eye exam: Sees 1hr; wears glasses    Annual dental checkup: Sees q6months    PSA: Used to follow Urology    - As per [employer]     Social Determinants of Health     Financial Resource Strain: Not on file   Food Insecurity: Not on file   Transportation Needs: Not on file   Physical Activity: Not on file   Stress: Not on file   Social Connections: Not on file   Intimate Partner Violence: Not on file   Housing Stability: Not on file        Review of Systems   Constitutional: Negative for appetite change, chills and fever  Respiratory: Negative for cough and shortness of breath  Cardiovascular: Negative for chest pain  Gastrointestinal: Negative for diarrhea, nausea and vomiting  Musculoskeletal: Negative for gait problem  Skin: Negative for wound  Neurological: Positive for numbness  Negative for weakness  Objective:      BP (!) 179/90   Pulse 57   Ht 6' (1 829 m)   Wt (!) 158 kg (348 lb 9 6 oz)   BMI 47 28 kg/m²          Physical Exam  Vitals reviewed  Constitutional:       General: He is not in acute distress  Appearance: He is obese  He is not toxic-appearing  Cardiovascular:      Rate and Rhythm: Normal rate and regular rhythm  Pulses: Normal pulses  no weak pulses          Dorsalis pedis pulses are 2+ on the right side and 2+ on the left side  Posterior tibial pulses are 2+ on the right side and 2+ on the left side  Comments: No ischemia  Pulmonary:      Effort: Pulmonary effort is normal  No respiratory distress  Musculoskeletal:         General: No swelling, tenderness or signs of injury  Right lower leg: No edema  Left lower leg: No edema  Right foot: No Charcot foot or foot drop  Left foot: No Charcot foot or foot drop  Feet:      Right foot:      Protective Sensation: 10 sites tested  10 sites sensed  Skin integrity: No ulcer  Left foot:      Protective Sensation: 10 sites tested  10 sites sensed  Skin integrity: No ulcer  Skin:     General: Skin is warm  Capillary Refill: Capillary refill takes less than 2 seconds  Coloration: Skin is not cyanotic or mottled  Findings: No abscess, ecchymosis, erythema, rash or wound  Nails: There is no clubbing  Comments: Mild thickening and discoloration of right great toenail  Mild HPK on right hallux IPJ medially  Neurological:      General: No focal deficit present  Mental Status: He is alert and oriented to person, place, and time        Cranial Nerves: No cranial nerve deficit  Sensory: No sensory deficit  Motor: No weakness  Coordination: Coordination normal       Gait: Gait normal    Psychiatric:         Mood and Affect: Mood normal          Behavior: Behavior normal          Thought Content:  Thought content normal          Judgment: Judgment normal

## 2022-12-06 NOTE — PATIENT INSTRUCTIONS
Foot Care for People with Diabetes   AMBULATORY CARE:   What you need to know about foot care: Foot care helps protect your feet and prevent foot ulcers or sores  Long-term high blood sugar levels can damage the blood vessels and nerves in your legs and feet  This damage makes it hard to feel pressure, pain, temperature, and touch  You may not be able to feel a cut or sore, or shoes that are too tight  Foot care is needed to prevent serious problems, such as an infection or amputation  Diabetes may cause your toes to become crooked or curved under  These changes may affect the way you walk and can lead to increased pressure on your foot  The pressure can decrease blood flow to your feet  Lack of blood flow increases your risk for a foot ulcer  Do not ignore small problems, such as dry skin or small wounds  These can become life-threatening over time without proper care  Call your care team provider if:   Your feet become numb, weak, or hard to move  You have pus draining from a sore on your foot  You have a wound on your foot that gets bigger, deeper, or does not heal      You see blisters, cuts, scratches, calluses, or sores on your foot  You have a fever, and your feet become red, warm, and swollen  Your toenails become thick, curled, or yellow  You find it hard to check your feet because your vision is poor  You have questions or concerns about your condition or care  How to care for your feet:   Check your feet each day  Look at your whole foot, including the bottom, and between and under your toes  Check for wounds, corns, and calluses  Use a mirror to see the bottom of your feet  The skin on your feet may be shiny, tight, or darker than normal  Your feet may also be cold and pale  Feel your feet by running your hands along the tops, bottoms, sides, and between your toes  Redness, swelling, and warmth are signs of blood flow problems that can lead to a foot ulcer   Do not try to remove corns or calluses yourself  Wash your feet each day with soap and warm water  Do not use hot water, because this can injure your foot  Dry your feet gently with a towel after you wash them  Dry between and under your toes  Apply lotion or a moisturizer on your dry feet  Ask your care team provider what lotions are best to use  Do not put lotion or moisturizer between your toes  Moisture between your toes could lead to skin breakdown  Cut your toenails correctly  File or cut your toenails straight across  Use a soft brush to clean around your toenails  If your toenails are very thick, you may need to have a care team provider or specialist cut them  Protect your feet  Do not walk barefoot or wear your shoes without socks  Check your shoes for rocks or other objects that can hurt your feet  Wear cotton socks to help keep your feet dry  Wear socks without toe seams, or wear them with the seams inside out  Change your socks each day  Do not wear socks that are dirty or damp  Wear shoes that fit well  Wear shoes that do not rub against any area of your feet  Your shoes should be ½ to ¾ inch (1 to 2 centimeters) longer than your feet  Your shoes should also have extra space around the widest part of your feet  Walking or athletic shoes with laces or straps that adjust are best  Ask your care team provider for help to choose shoes that fit you best  Ask him or her if you need to wear an insert, orthotic, or bandage on your feet  Go to your follow-up visits  Your care team provider will do a foot exam at least once a year  You may need a foot exam more often if you have nerve damage, foot deformities, or ulcers  He will check for nerve damage and how well you can feel your feet  He will check your shoes to see if they fit well  Do not smoke  Smoking can damage your blood vessels and put you at increased risk for foot ulcers   Ask your care team provider for information if you currently smoke and need help to quit  E-cigarettes or smokeless tobacco still contain nicotine  Talk to your care team provider before you use these products  Follow up with your diabetes care team provider or foot specialist as directed: You will need to have your feet checked at least once a year  You may need a foot exam more often if you have nerve damage, foot deformities, or ulcers  Write down your questions so you remember to ask them during your visits  © Copyright [employer] 2022 Information is for End User's use only and may not be sold, redistributed or otherwise used for commercial purposes  All illustrations and images included in CareNotes® are the copyrighted property of A D A M , Inc  or [facility] Mattie Sneed   The above information is an  only  It is not intended as medical advice for individual conditions or treatments  Talk to your doctor, nurse or pharmacist before following any medical regimen to see if it is safe and effective for you

## 2022-12-20 DIAGNOSIS — B35.3 TINEA PEDIS OF BOTH FEET: ICD-10-CM

## 2022-12-20 RX ORDER — KETOCONAZOLE 20 MG/G
CREAM TOPICAL DAILY
Qty: 60 G | Refills: 0 | Status: SHIPPED | OUTPATIENT
Start: 2022-12-20

## 2023-01-11 ENCOUNTER — VBI (OUTPATIENT)
Dept: ADMINISTRATIVE | Facility: OTHER | Age: 68
End: 2023-01-11

## 2023-02-02 ENCOUNTER — RA CDI HCC (OUTPATIENT)
Dept: OTHER | Facility: HOSPITAL | Age: 68
End: 2023-02-02

## 2023-02-02 NOTE — PROGRESS NOTES
[facility]  coding opportunities          Chart Reviewed number of suggestions sent to Provider: 1   [phone]    Patients Insurance        Commercial Insurance: [employer]

## 2023-02-15 ENCOUNTER — VBI (OUTPATIENT)
Dept: ADMINISTRATIVE | Facility: OTHER | Age: 68
End: 2023-02-15

## 2023-02-16 ENCOUNTER — APPOINTMENT (OUTPATIENT)
Dept: LAB | Facility: CLINIC | Age: 68
End: 2023-02-16

## 2023-02-16 DIAGNOSIS — J30.89 SEASONAL ALLERGIC RHINITIS DUE TO OTHER ALLERGIC TRIGGER: ICD-10-CM

## 2023-02-16 DIAGNOSIS — E11.9 TYPE 2 DIABETES MELLITUS WITHOUT COMPLICATION, WITHOUT LONG-TERM CURRENT USE OF INSULIN (HCC): ICD-10-CM

## 2023-02-16 DIAGNOSIS — K21.9 GASTROESOPHAGEAL REFLUX DISEASE WITHOUT ESOPHAGITIS: ICD-10-CM

## 2023-02-16 DIAGNOSIS — C61 PROSTATE CANCER (HCC): ICD-10-CM

## 2023-02-16 DIAGNOSIS — Z12.5 SCREENING PSA (PROSTATE SPECIFIC ANTIGEN): ICD-10-CM

## 2023-02-16 DIAGNOSIS — I10 ESSENTIAL HYPERTENSION: ICD-10-CM

## 2023-02-16 LAB
ANION GAP SERPL CALCULATED.3IONS-SCNC: 6 MMOL/L (ref 4–13)
BACTERIA UR QL AUTO: NORMAL /HPF
BASOPHILS # BLD AUTO: 0.04 THOUSANDS/ÂΜL (ref 0–0.1)
BASOPHILS NFR BLD AUTO: 1 % (ref 0–1)
BILIRUB UR QL STRIP: NEGATIVE
BUN SERPL-MCNC: 22 MG/DL (ref 5–25)
CALCIUM SERPL-MCNC: 8.9 MG/DL (ref 8.4–10.2)
CHLORIDE SERPL-SCNC: 103 MMOL/L (ref 96–108)
CLARITY UR: CLEAR
CO2 SERPL-SCNC: 30 MMOL/L (ref 21–32)
COLOR UR: ABNORMAL
CREAT SERPL-MCNC: 1.2 MG/DL (ref 0.6–1.3)
CREAT UR-MCNC: 104 MG/DL
EOSINOPHIL # BLD AUTO: 0.34 THOUSAND/ÂΜL (ref 0–0.61)
EOSINOPHIL NFR BLD AUTO: 4 % (ref 0–6)
ERYTHROCYTE [DISTWIDTH] IN BLOOD BY AUTOMATED COUNT: 12.8 % (ref 11.6–15.1)
EST. AVERAGE GLUCOSE BLD GHB EST-MCNC: 163 MG/DL
GFR SERPL CREATININE-BSD FRML MDRD: 63 ML/MIN/1.73SQ M
GLUCOSE P FAST SERPL-MCNC: 143 MG/DL (ref 65–99)
GLUCOSE UR STRIP-MCNC: NEGATIVE MG/DL
HBA1C MFR BLD: 7.3 %
HCT VFR BLD AUTO: 41.2 % (ref 36.5–49.3)
HGB BLD-MCNC: 13.5 G/DL (ref 12–17)
HGB UR QL STRIP.AUTO: NEGATIVE
IMM GRANULOCYTES # BLD AUTO: 0.05 THOUSAND/UL (ref 0–0.2)
IMM GRANULOCYTES NFR BLD AUTO: 1 % (ref 0–2)
KETONES UR STRIP-MCNC: NEGATIVE MG/DL
LEUKOCYTE ESTERASE UR QL STRIP: NEGATIVE
LYMPHOCYTES # BLD AUTO: 1.93 THOUSANDS/ÂΜL (ref 0.6–4.47)
LYMPHOCYTES NFR BLD AUTO: 22 % (ref 14–44)
MCH RBC QN AUTO: 30.8 PG (ref 26.8–34.3)
MCHC RBC AUTO-ENTMCNC: 32.8 G/DL (ref 31.4–37.4)
MCV RBC AUTO: 94 FL (ref 82–98)
MICROALBUMIN UR-MCNC: 648 MG/L (ref 0–20)
MICROALBUMIN/CREAT 24H UR: 623 MG/G CREATININE (ref 0–30)
MONOCYTES # BLD AUTO: 0.65 THOUSAND/ÂΜL (ref 0.17–1.22)
MONOCYTES NFR BLD AUTO: 8 % (ref 4–12)
NEUTROPHILS # BLD AUTO: 5.67 THOUSANDS/ÂΜL (ref 1.85–7.62)
NEUTS SEG NFR BLD AUTO: 64 % (ref 43–75)
NITRITE UR QL STRIP: NEGATIVE
NON-SQ EPI CELLS URNS QL MICRO: NORMAL /HPF
NRBC BLD AUTO-RTO: 0 /100 WBCS
PH UR STRIP.AUTO: 5 [PH]
PLATELET # BLD AUTO: 260 THOUSANDS/UL (ref 149–390)
PMV BLD AUTO: 10.6 FL (ref 8.9–12.7)
POTASSIUM SERPL-SCNC: 4.6 MMOL/L (ref 3.5–5.3)
PROT UR STRIP-MCNC: ABNORMAL MG/DL
PSA SERPL-MCNC: <0.1 NG/ML (ref 0–4)
RBC # BLD AUTO: 4.38 MILLION/UL (ref 3.88–5.62)
RBC #/AREA URNS AUTO: NORMAL /HPF
SODIUM SERPL-SCNC: 139 MMOL/L (ref 135–147)
SP GR UR STRIP.AUTO: 1.01 (ref 1–1.03)
UROBILINOGEN UR STRIP-ACNC: <2 MG/DL
WBC # BLD AUTO: 8.68 THOUSAND/UL (ref 4.31–10.16)
WBC #/AREA URNS AUTO: NORMAL /HPF

## 2023-02-17 ENCOUNTER — OFFICE VISIT (OUTPATIENT)
Dept: INTERNAL MEDICINE CLINIC | Facility: CLINIC | Age: 68
End: 2023-02-17

## 2023-02-17 ENCOUNTER — HOSPITAL ENCOUNTER (EMERGENCY)
Facility: HOSPITAL | Age: 68
Discharge: HOME | End: 2023-02-17
Attending: EMERGENCY MEDICINE

## 2023-02-17 ENCOUNTER — APPOINTMENT (EMERGENCY)
Dept: RADIOLOGY | Facility: HOSPITAL | Age: 68
End: 2023-02-17

## 2023-02-17 VITALS
BODY MASS INDEX: 42.66 KG/M2 | WEIGHT: 315 LBS | HEART RATE: 64 BPM | OXYGEN SATURATION: 95 % | HEIGHT: 72 IN | TEMPERATURE: 98.8 F | RESPIRATION RATE: 16 BRPM | SYSTOLIC BLOOD PRESSURE: 124 MMHG | DIASTOLIC BLOOD PRESSURE: 80 MMHG

## 2023-02-17 VITALS
DIASTOLIC BLOOD PRESSURE: 86 MMHG | OXYGEN SATURATION: 97 % | SYSTOLIC BLOOD PRESSURE: 166 MMHG | RESPIRATION RATE: 16 BRPM | TEMPERATURE: 98.4 F | HEART RATE: 59 BPM

## 2023-02-17 DIAGNOSIS — R07.9 CHEST PAIN, UNSPECIFIED TYPE: Primary | ICD-10-CM

## 2023-02-17 DIAGNOSIS — I10 ESSENTIAL HYPERTENSION: ICD-10-CM

## 2023-02-17 DIAGNOSIS — E11.9 TYPE 2 DIABETES MELLITUS WITHOUT COMPLICATION, WITHOUT LONG-TERM CURRENT USE OF INSULIN (HCC): ICD-10-CM

## 2023-02-17 DIAGNOSIS — J30.89 SEASONAL ALLERGIC RHINITIS DUE TO OTHER ALLERGIC TRIGGER: ICD-10-CM

## 2023-02-17 DIAGNOSIS — R06.02 SOB (SHORTNESS OF BREATH) ON EXERTION: ICD-10-CM

## 2023-02-17 DIAGNOSIS — R07.89 CHEST TIGHTNESS: Primary | ICD-10-CM

## 2023-02-17 DIAGNOSIS — E78.2 MIXED HYPERLIPIDEMIA: ICD-10-CM

## 2023-02-17 DIAGNOSIS — R09.89 CHOKING EPISODE: ICD-10-CM

## 2023-02-17 DIAGNOSIS — K21.9 GASTROESOPHAGEAL REFLUX DISEASE WITHOUT ESOPHAGITIS: ICD-10-CM

## 2023-02-17 DIAGNOSIS — C61 PROSTATE CANCER (HCC): ICD-10-CM

## 2023-02-17 DIAGNOSIS — R80.9 MICROALBUMINURIA: ICD-10-CM

## 2023-02-17 LAB
2HR DELTA HS TROPONIN: -1 NG/L
ALBUMIN SERPL BCP-MCNC: 4 G/DL (ref 3.5–5)
ALP SERPL-CCNC: 57 U/L (ref 34–104)
ALT SERPL W P-5'-P-CCNC: 30 U/L (ref 7–52)
ANION GAP SERPL CALCULATED.3IONS-SCNC: 9 MMOL/L (ref 4–13)
AST SERPL W P-5'-P-CCNC: 29 U/L (ref 13–39)
BASOPHILS # BLD AUTO: 0.06 THOUSANDS/ÂΜL (ref 0–0.1)
BASOPHILS NFR BLD AUTO: 1 % (ref 0–1)
BILIRUB SERPL-MCNC: 0.45 MG/DL (ref 0.2–1)
BUN SERPL-MCNC: 19 MG/DL (ref 5–25)
CALCIUM SERPL-MCNC: 8.7 MG/DL (ref 8.4–10.2)
CARDIAC TROPONIN I PNL SERPL HS: 5 NG/L
CARDIAC TROPONIN I PNL SERPL HS: 6 NG/L
CHLORIDE SERPL-SCNC: 105 MMOL/L (ref 96–108)
CO2 SERPL-SCNC: 23 MMOL/L (ref 21–32)
CREAT SERPL-MCNC: 1.09 MG/DL (ref 0.6–1.3)
EOSINOPHIL # BLD AUTO: 0.29 THOUSAND/ÂΜL (ref 0–0.61)
EOSINOPHIL NFR BLD AUTO: 4 % (ref 0–6)
ERYTHROCYTE [DISTWIDTH] IN BLOOD BY AUTOMATED COUNT: 12.8 % (ref 11.6–15.1)
GFR SERPL CREATININE-BSD FRML MDRD: 70 ML/MIN/1.73SQ M
GLUCOSE SERPL-MCNC: 167 MG/DL (ref 65–140)
HCT VFR BLD AUTO: 41.9 % (ref 36.5–49.3)
HGB BLD-MCNC: 13.6 G/DL (ref 12–17)
IMM GRANULOCYTES # BLD AUTO: 0.06 THOUSAND/UL (ref 0–0.2)
IMM GRANULOCYTES NFR BLD AUTO: 1 % (ref 0–2)
LYMPHOCYTES # BLD AUTO: 2.17 THOUSANDS/ÂΜL (ref 0.6–4.47)
LYMPHOCYTES NFR BLD AUTO: 27 % (ref 14–44)
MCH RBC QN AUTO: 30.9 PG (ref 26.8–34.3)
MCHC RBC AUTO-ENTMCNC: 32.5 G/DL (ref 31.4–37.4)
MCV RBC AUTO: 95 FL (ref 82–98)
MONOCYTES # BLD AUTO: 0.49 THOUSAND/ÂΜL (ref 0.17–1.22)
MONOCYTES NFR BLD AUTO: 6 % (ref 4–12)
NEUTROPHILS # BLD AUTO: 5.04 THOUSANDS/ÂΜL (ref 1.85–7.62)
NEUTS SEG NFR BLD AUTO: 61 % (ref 43–75)
NRBC BLD AUTO-RTO: 0 /100 WBCS
PLATELET # BLD AUTO: 246 THOUSANDS/UL (ref 149–390)
PMV BLD AUTO: 10.5 FL (ref 8.9–12.7)
POTASSIUM SERPL-SCNC: 4.3 MMOL/L (ref 3.5–5.3)
PROT SERPL-MCNC: 7.2 G/DL (ref 6.4–8.4)
RBC # BLD AUTO: 4.4 MILLION/UL (ref 3.88–5.62)
SODIUM SERPL-SCNC: 137 MMOL/L (ref 135–147)
WBC # BLD AUTO: 8.11 THOUSAND/UL (ref 4.31–10.16)

## 2023-02-17 NOTE — ASSESSMENT & PLAN NOTE
Lab Results   Component Value Date    HGBA1C 7 3 (H) 02/16/2023   Now his hemoglobin A1c is 7 3  Discussed with the patient since his A1c increased to 7 3 will need to start medication but he will consider seeing nutrition therapy and try to watch diet closely and lose weight if he can control without medication  will observe for now and repeat hemoglobin A1c after 3 months if persistently elevated then will need to start medication

## 2023-02-17 NOTE — ASSESSMENT & PLAN NOTE
His urine microalbumin has been increasing  He is already on the lisinopril  As mentioned above  needs better control his diabetes mellitus  Delmi Arguello

## 2023-02-17 NOTE — PATIENT INSTRUCTIONS
Patient was advised to continue present medications  discussed with the patient medications and laboratory data in detail  Follow-up with me in 3 months or as advised  If any blood test was ordered please do 1 week prior to next appointment unless advise to get earlier    If you have any questions please call the office [phone]

## 2023-02-17 NOTE — ASSESSMENT & PLAN NOTE
Patient states sometimes when he drinks fluids he gets episodes of choking feeling  Although it does not happen with solid foods  He had a EGD September 2021    Will refer to GI specialist for further evaluation and recommendation

## 2023-02-17 NOTE — ASSESSMENT & PLAN NOTE
His cholesterol is 166, triglyceride 243, HDL 44, LDL 73  Advised to increase fish oil from 1 tablet once a day to twice a day as triglyceride is still elevated     Advised to continue other medication  Continue low-cholesterol low carbs diet  Will refer to nutrition therapy for dietary recommendation

## 2023-02-17 NOTE — DISCHARGE INSTRUCTIONS
Please call and schedule appointment with cardiology as soon as possible for follow up and management  Thank you for allowing us to take part in your care

## 2023-02-17 NOTE — ASSESSMENT & PLAN NOTE
He gets some sinus congestion postnasal drip and sometimes cough could be from allergy versus other etiology  He is not taking his fexofenadine daily  Advised to try for next few days to take  fexofenadine daily to see how it helps for his symptoms

## 2023-02-17 NOTE — ED PROVIDER NOTES
History  Chief Complaint   Patient presents with   • Chest Pain     Went to PCP for chest tightness and had an abnormal ekg and was told to come to ER for eval since he couldn't see a cardiologist today     Sarthak Rain is a 72-year-old male presenting from his PCPs office due to abnormal EKG and history of chest pain  Patient states he has been having regular chest tightness and shortness of breath typically associated with physical exertion that has been progressively getting worse  Patient states this happens a couple times a week and he has noticed that he cannot seem to take a deep breath like he used to  Patient also endorses having sporadic headaches once in a while  Patient denies any current chest pain and states his appointment earlier today was a regular checkup  Patient takes his medications on and off and has ran out recently for which she was off for a week or 2 and just restarted it last week  Patient denies any chest palpitations, abdominal pain, recent illnesses, increased swelling in his lower extremities  Prior to Admission Medications   Prescriptions Last Dose Informant Patient Reported? Taking? Ascorbic Acid (VITAMIN C PO)   Yes No   Sig: Take 1,000 mg by mouth daily   Cyanocobalamin (VITAMIN B-12 PO)   Yes No   Sig: Take 1,000 mcg by mouth daily    Multiple Vitamins-Minerals (MULTIVITAL PO)   Yes No   Sig: Take 1 tablet by mouth   Omega-3 Fatty Acids (FISH OIL) 1000 MG CPDR   Yes No   Sig: Take 1,000 mg by mouth daily   betamethasone dipropionate (DIPROSONE) 0 05 % ointment   No No   Sig: Apply sparingly  1-2 times/day for up to 2 wks to R forearm rash     betamethasone valerate (VALISONE) 0 1 % lotion   No No   Sig: Apply once to twice daily as needed to scalp   clobetasol (TEMOVATE) 0 05 % external solution   No No   Sig: Apply topically 2 (two) times a day   famotidine (PEPCID) 20 mg tablet   No No   Sig: TAKE 1 TABLET (20 MG TOTAL) BY MOUTH 2 (TWO) TIMES A DAY   fexofenadine (ALLEGRA) 180 MG tablet   Yes No   Sig: Take 180 mg by mouth daily as needed   ketoconazole (NIZORAL) 2 % cream   No No   Sig: APPLY TOPICALLY DAILY   lisinopril (ZESTRIL) 20 mg tablet   No No   Sig: Take 1 tablet (20 mg total) by mouth daily   omeprazole (PriLOSEC) 20 mg delayed release capsule   Yes No   Sig: Take 20 mg by mouth as needed   sildenafil (VIAGRA) 50 MG tablet   No No   Sig: Take 1 tablet (50 mg total) by mouth daily as needed for erectile dysfunction   simvastatin (ZOCOR) 40 mg tablet   No No   Sig: Take 1 tablet (40 mg total) by mouth daily at bedtime      Facility-Administered Medications: None       Past Medical History:   Diagnosis Date   • Asthma    • BMI 40 0-44 9, adult ([facility]) 1/11/2022   • BMI 45 0-49 9, adult ([facility] ) 6/28/2021   • Bronchospasm    • Cancer ([facility])     Prostate    • Chest tightness 2/17/2023   • Choking episode 2/17/2023   • Colon polyp    • Essential hypertension 6/26/2021   • GERD (gastroesophageal reflux disease)    • Hearing impairment 10/11/2022   • Hyperlipidemia    • Hypertension    • Hypocalcemia 1/11/2022   • Insomnia    • Leg cramps    • Lump on finger, left 1/11/2022   • Microalbuminuria 9/27/2021   • Mixed hyperlipidemia 6/26/2021   • Morbid obesity ([facility] ) 5/26/2022   • Nocturia    • Numbness of fingers of both hands    • Onychomycosis of toenail 5/26/2022   • Pain in both knees 5/26/2022   • Paronychia of left middle finger 9/27/2021   • Seasonal allergic rhinitis 1/11/2022   • Skin lesion    • Skin rash 6/26/2021   • Sleep apnea     not tested yet   • Snoring    • Tubular adenoma of colon 6/28/2021   • Type 2 diabetes mellitus without complication, without long-term current use of insulin ([facility] ) 6/26/2021   • Wears glasses    • Weight gain        Past Surgical History:   Procedure Laterality Date   • COLONOSCOPY  05/09/2017   • COLONOSCOPY     • HIP SURGERY Bilateral     Hip replacement   • MI COLONOSCOPY FLX DX W/COLLJ SPEC WHEN PFRMD N/A 5/9/2017    Procedure: COLONOSCOPY;  Surgeon: Sergio Rascon MD;  Location: [facility]; Service: Gastroenterology   • PROSTATECTOMY     • REPLACEMENT TOTAL KNEE Bilateral     Inactive   • UPPER GASTROINTESTINAL ENDOSCOPY         Family History   Problem Relation Age of Onset   • Brain cancer Mother    • Psoriasis Mother    • Eczema Mother    • Depression Father    • Heart disease Father    • Diabetes Father    • Stroke Paternal Grandfather      I have reviewed and agree with the history as documented  E-Cigarette/Vaping   • E-Cigarette Use Current Every Day User      E-Cigarette/Vaping Substances   • Nicotine No    • THC No    • CBD No    • Flavoring No    • Unknown No      Social History     Tobacco Use   • Smoking status: Former     Types: Cigarettes     Quit date: 1992     Years since quittin 7   • Smokeless tobacco: Never   Vaping Use   • Vaping Use: Every day   Substance Use Topics   • Alcohol use: Yes     Comment: rarely   • Drug use: No        Review of Systems   Constitutional: Negative for chills and fever  HENT: Negative for congestion, ear pain, rhinorrhea and sore throat  Eyes: Negative for pain and visual disturbance  Respiratory: Positive for shortness of breath (with exertion)  Negative for cough  Cardiovascular: Positive for chest pain (with exertion)  Negative for palpitations  Gastrointestinal: Negative for abdominal pain, constipation, diarrhea, nausea and vomiting  Genitourinary: Negative for dysuria and hematuria  Musculoskeletal: Negative for arthralgias, back pain, neck pain and neck stiffness  Skin: Negative for color change and rash  Neurological: Negative for dizziness, seizures, syncope, weakness, light-headedness and headaches  All other systems reviewed and are negative        Physical Exam  ED Triage Vitals [23 1132]   Temperature Pulse Respirations Blood Pressure SpO2   98 4 °F (36 9 °C) 74 20 165/79 95 %      Temp Source Heart Rate Source Patient Position - Orthostatic VS BP Location FiO2 (%)   Oral Monitor Sitting Right arm --      Pain Score       --             Orthostatic Vital Signs  Vitals:    02/17/23 1132 02/17/23 1300 02/17/23 1500   BP: 165/79 118/69 166/86   Pulse: 74 63 59   Patient Position - Orthostatic VS: Sitting Sitting Lying       Physical Exam  Vitals and nursing note reviewed  Constitutional:       General: He is not in acute distress  Appearance: He is well-developed  He is obese  He is not diaphoretic  Comments: Speaking in full sentences   HENT:      Head: Normocephalic and atraumatic  Nose: Nose normal  No congestion  Mouth/Throat:      Mouth: Mucous membranes are moist       Pharynx: Oropharynx is clear  Eyes:      Extraocular Movements: Extraocular movements intact  Conjunctiva/sclera: Conjunctivae normal       Pupils: Pupils are equal, round, and reactive to light  Cardiovascular:      Rate and Rhythm: Normal rate and regular rhythm  Pulses: Normal pulses  Heart sounds: Normal heart sounds  No murmur heard  Pulmonary:      Effort: Pulmonary effort is normal  No respiratory distress  Breath sounds: Normal breath sounds  Chest:      Chest wall: No tenderness  Abdominal:      General: Abdomen is flat  Bowel sounds are normal  There is no distension  Palpations: Abdomen is soft  Tenderness: There is no abdominal tenderness  There is no right CVA tenderness or left CVA tenderness  Musculoskeletal:         General: No deformity or signs of injury  Normal range of motion  Cervical back: Normal range of motion and neck supple  No rigidity or tenderness  Right lower leg: Edema present  Left lower leg: Edema present  Skin:     General: Skin is warm and dry  Findings: No bruising, lesion or rash  Neurological:      General: No focal deficit present  Mental Status: He is alert and oriented to person, place, and time  Cranial Nerves: No cranial nerve deficit        Sensory: No sensory deficit           ED Medications  Medications - No data to display    Diagnostic Studies  Results Reviewed     Procedure Component Value Units Date/Time    HS Troponin I 2hr [[id number]]  (Normal) Collected: 02/17/23 1350    Lab Status: Final result Specimen: Blood from Arm, Right Updated: 02/17/23 1442     hs TnI 2hr 5 ng/L      Delta 2hr hsTnI -1 ng/L     Comprehensive metabolic panel [[id number]]  (Abnormal) Collected: 02/17/23 1153    Lab Status: Final result Specimen: Blood from Arm, Right Updated: 02/17/23 1302     Sodium 137 mmol/L      Potassium 4 3 mmol/L      Chloride 105 mmol/L      CO2 23 mmol/L      ANION GAP 9 mmol/L      BUN 19 mg/dL      Creatinine 1 09 mg/dL      Glucose 167 mg/dL      Calcium 8 7 mg/dL      AST 29 U/L      ALT 30 U/L      Alkaline Phosphatase 57 U/L      Total Protein 7 2 g/dL      Albumin 4 0 g/dL      Total Bilirubin 0 45 mg/dL      eGFR 70 ml/min/1 73sq m     Narrative:      [facility] guidelines for Chronic Kidney Disease (CKD):   •  Stage 1 with normal or high GFR (GFR > 90 mL/min/1 73 square meters)  •  Stage 2 Mild CKD (GFR = 60-89 mL/min/1 73 square meters)  •  Stage 3A Moderate CKD (GFR = 45-59 mL/min/1 73 square meters)  •  Stage 3B Moderate CKD (GFR = 30-44 mL/min/1 73 square meters)  •  Stage 4 Severe CKD (GFR = 15-29 mL/min/1 73 square meters)  •  Stage 5 End Stage CKD (GFR <15 mL/min/1 73 square meters)  Note: GFR calculation is accurate only with a steady state creatinine    HS Troponin 0hr (reflex protocol) [[id number]]  (Normal) Collected: 02/17/23 1153    Lab Status: Final result Specimen: Blood from Arm, Right Updated: 02/17/23 1244     hs TnI 0hr 6 ng/L     CBC and differential [[id number]] Collected: 02/17/23 1153    Lab Status: Final result Specimen: Blood from Arm, Right Updated: 02/17/23 1210     WBC 8 11 Thousand/uL      RBC 4 40 Million/uL      Hemoglobin 13 6 g/dL      Hematocrit 41 9 %      MCV 95 fL      MCH 30 9 pg MCHC 32 5 g/dL      RDW 12 8 %      MPV 10 5 fL      Platelets 571 Thousands/uL      nRBC 0 /100 WBCs      Neutrophils Relative 61 %      Immat GRANS % 1 %      Lymphocytes Relative 27 %      Monocytes Relative 6 %      Eosinophils Relative 4 %      Basophils Relative 1 %      Neutrophils Absolute 5 04 Thousands/µL      Immature Grans Absolute 0 06 Thousand/uL      Lymphocytes Absolute 2 17 Thousands/µL      Monocytes Absolute 0 49 Thousand/µL      Eosinophils Absolute 0 29 Thousand/µL      Basophils Absolute 0 06 Thousands/µL                  XR chest 1 view portable   Final Result by Poonam Harley MD (02/17 1359)      No acute cardiopulmonary disease  Right basal infiltrate has cleared            Workstation performed: [id number]               Procedures  ECG 12 Lead Documentation Only    Date/Time: 2/17/2023 9:12 PM  Performed by: Irl Fothergill, MD  Authorized by: Irl Fothergill, MD     ECG reviewed by me, the ED Provider: yes    Patient location:  ED  Previous ECG:     Previous ECG:  Compared to current    Similarity:  No change  Interpretation:     Interpretation: abnormal    Rate:     ECG rate:  70    ECG rate assessment: normal    Rhythm:     Rhythm: sinus rhythm    Ectopy:     Ectopy: none    QRS:     QRS axis:  Normal    QRS intervals:  Normal  ST segments:     ST segments:  Abnormal    Depression:  V5 and V6  T waves:     T waves: inverted      Inverted:  V4, V5 and V6          ED Course  ED Course as of 02/17/23 2116 Fri Feb 17, 2023   1257 hs TnI 0hr: 6  Will repeat 2 hr   1257 CBC and differential  Unremarkable   1257 ECG 12 lead  Normal Sinus, with slight T wave inversion in V4-6, similar to previous   1320 Comprehensive metabolic panel(!)  Glu- 811 otherwise unremarkable   1422 XR chest 1 view portable  No acute cardiopulmonary disease      Right basal infiltrate has cleared   1442 Delta 2hr hsTnI: -1   2113 Discussed with patient results of lab work, EKG, chest x-ray    Work-up largely unremarkable and recommend follow-up with cardiology for reassessment and further management  Strict return precautions discussed including but not limited to fever, chills, chest pain radiating to jaw or left arm, worsening shortness of breath, severe abdominal pain  Patient is agreeable and appropriate for discharge  HEART Risk Score    Flowsheet Row Most Recent Value   Heart Score Risk Calculator    History 1 Filed at: 02/17/2023 1425   ECG 1 Filed at: 02/17/2023 1425   Age 2 Filed at: 02/17/2023 1425   Risk Factors 2 Filed at: 02/17/2023 1425   Troponin 0 Filed at: 02/17/2023 1425   HEART Score 6 Filed at: 02/17/2023 1425                                Medical Decision Making  Chest pain, unspecified type: acute illness or injury  Amount and/or Complexity of Data Reviewed  Labs:  Decision-making details documented in ED Course  Radiology:  Decision-making details documented in ED Course  ECG/medicine tests:  Decision-making details documented in ED Course  Disposition  Final diagnoses:   Chest pain, unspecified type     Time reflects when diagnosis was documented in both MDM as applicable and the Disposition within this note     Time User Action Codes Description Comment    2/17/2023  2:24 PM Abhi Mooney Add [R07 9] Chest pain, unspecified type       ED Disposition     ED Disposition   Discharge    Condition   Stable    Date/Time   Fri Feb 17, 2023  2:27 PM    Comment   [facility] discharge to home/self care                 Follow-up Information     Follow up With Specialties Details Why Contact Info Additional Information    Brissa Garcia MD Internal Medicine   [address] [facility] [address]  [address] [facility] Schedule an appointment as soon as possible for a visit   Romario Gill [address] [address]  [address] [facility] [address] [place], [place], [address]          Discharge Medication List as of 2/17/2023  3:18 PM      CONTINUE these medications which have NOT CHANGED    Details   Ascorbic Acid (VITAMIN C PO) Take 1,000 mg by mouth daily, Historical Med      betamethasone dipropionate (DIPROSONE) 0 05 % ointment Apply sparingly  1-2 times/day for up to 2 wks to R forearm rash , Normal      betamethasone valerate (VALISONE) 0 1 % lotion Apply once to twice daily as needed to scalp, Normal      clobetasol (TEMOVATE) 0 05 % external solution Apply topically 2 (two) times a day, Starting Mon 6/13/2022, Normal      Cyanocobalamin (VITAMIN B-12 PO) Take 1,000 mcg by mouth daily , Historical Med      famotidine (PEPCID) 20 mg tablet TAKE 1 TABLET (20 MG TOTAL) BY MOUTH 2 (TWO) TIMES A DAY, Starting Mon 9/12/2022, Normal      fexofenadine (ALLEGRA) 180 MG tablet Take 180 mg by mouth daily as needed, Historical Med      ketoconazole (NIZORAL) 2 % cream APPLY TOPICALLY DAILY, Starting Tue 12/20/2022, Normal      lisinopril (ZESTRIL) 20 mg tablet Take 1 tablet (20 mg total) by mouth daily, Starting Mon 1/30/2023, Normal      Multiple Vitamins-Minerals (MULTIVITAL PO) Take 1 tablet by mouth, Historical Med      Omega-3 Fatty Acids (FISH OIL) 1000 MG CPDR Take 1,000 mg by mouth daily, Historical Med      omeprazole (PriLOSEC) 20 mg delayed release capsule Take 20 mg by mouth as needed, Historical Med      sildenafil (VIAGRA) 50 MG tablet Take 1 tablet (50 mg total) by mouth daily as needed for erectile dysfunction, Starting Mon 1/30/2023, Normal      simvastatin (ZOCOR) 40 mg tablet Take 1 tablet (40 mg total) by mouth daily at bedtime, Starting Mon 1/30/2023, Normal           No discharge procedures on file  PDMP Review     None           ED Provider  Attending physically available and evaluated Yvon Wetzel I managed the patient along with the ED Attending      Electronically Signed by         Kell Stephens MD  02/17/23 9666

## 2023-02-17 NOTE — ED ATTENDING ATTESTATION
2/17/2023  IRhett DO, saw and evaluated the patient  I have discussed the patient with the resident/non-physician practitioner and agree with the resident's/non-physician practitioner's findings, Plan of Care, and MDM as documented in the resident's/non-physician practitioner's note, except where noted  All available labs and Radiology studies were reviewed  I was present for key portions of any procedure(s) performed by the resident/non-physician practitioner and I was immediately available to provide assistance  At this point I agree with the current assessment done in the Emergency Department  I have conducted an independent evaluation of this patient a history and physical is as follows:            53-year-old presents with chest pain  ,  This is been occurring for months if not longer, went to his family doctor today, discussed that he is shortness of breath and chest pain with ambulation, has not changed over the past few months but his PCP sent him here for evaluation  Patient has been steadily gaining weight he is currently 344 pounds  ,  Says he has been getting heavier    Review of Systems   Constitutional: Negative for activity change, chills, diaphoresis and fever  HENT: Negative for congestion, sinus pressure and sore throat  Eyes: Negative for pain and visual disturbance  Respiratory: Negative for cough, positive for chest tightness, shortness of breath, negative for wheezing and stridor  Cardiovascular: Positive for chest pain negative for palpitations  Gastrointestinal: Negative for abdominal distention, abdominal pain, constipation, diarrhea, nausea and vomiting  Genitourinary: Negative for dysuria and frequency  Musculoskeletal: Negative for neck pain and neck stiffness  Skin: Negative for rash  Neurological: Negative for dizziness, speech difficulty, light-headedness, numbness and headaches  Physical Exam  Vitals reviewed     Constitutional: General: He is not in acute distress  Appearance: He is well-developed  He is not diaphoretic  HENT:      Head: Normocephalic and atraumatic  Right Ear: External ear normal       Left Ear: External ear normal       Nose: Nose normal    Eyes:      General:         Right eye: No discharge  Left eye: No discharge  Pupils: Pupils are equal, round, and reactive to light  Neck:      Trachea: No tracheal deviation  Cardiovascular:      Rate and Rhythm: Normal rate and regular rhythm  Heart sounds: Normal heart sounds  No murmur heard  Pulmonary:      Effort: Pulmonary effort is normal  No respiratory distress  Breath sounds: Normal breath sounds  No stridor  Abdominal:      General: There is no distension  Palpations: Abdomen is soft  Tenderness: There is no abdominal tenderness  There is no guarding or rebound  Musculoskeletal:         General: Normal range of motion  Cervical back: Normal range of motion and neck supple  Skin:     General: Skin is warm and dry  Coloration: Skin is not pale  Findings: No erythema  Neurological:      General: No focal deficit present  Mental Status: He is alert and oriented to person, place, and time                ED Course         HEART score:    History 1=Moderate suspicious   ECG 1=Nonspecific repolarization disturbance   Age 1= > 45 - <65 years   Risk Factors 2= > 3 risk factors, or history of atherosclerotic disease   Troponin 0= Less than or equal to 12 ng/L   Total 5          Critical Care Time  Procedures    Labs Reviewed   COMPREHENSIVE METABOLIC PANEL - Abnormal       Result Value Ref Range Status    Sodium 137  135 - 147 mmol/L Final    Potassium 4 3  3 5 - 5 3 mmol/L Final    Chloride 105  96 - 108 mmol/L Final    CO2 23  21 - 32 mmol/L Final    Comment: Verified by repeat    ANION GAP 9  4 - 13 mmol/L Final    BUN 19  5 - 25 mg/dL Final    Creatinine 1 09  0 60 - 1 30 mg/dL Final    Comment: Standardized to IDMS reference method    Glucose 167 (*) 65 - 140 mg/dL Final    Comment: If the patient is fasting, the ADA then defines impaired fasting glucose as > 100 mg/dL and diabetes as > or equal to 123 mg/dL  Specimen collection should occur prior to Sulfasalazine administration due to the potential for falsely depressed results  Specimen collection should occur prior to Sulfapyridine administration due to the potential for falsely elevated results  Calcium 8 7  8 4 - 10 2 mg/dL Final    AST 29  13 - 39 U/L Final    Comment: Specimen collection should occur prior to Sulfasalazine administration due to the potential for falsely depressed results  ALT 30  7 - 52 U/L Final    Comment: Specimen collection should occur prior to Sulfasalazine administration due to the potential for falsely depressed results  Alkaline Phosphatase 57  34 - 104 U/L Final    Total Protein 7 2  6 4 - 8 4 g/dL Final    Albumin 4 0  3 5 - 5 0 g/dL Final    Total Bilirubin 0 45  0 20 - 1 00 mg/dL Final    eGFR 70  ml/min/1 73sq m Final    Narrative:     [place] guidelines for Chronic Kidney Disease (CKD):   •  Stage 1 with normal or high GFR (GFR > 90 mL/min/1 73 square meters)  •  Stage 2 Mild CKD (GFR = 60-89 mL/min/1 73 square meters)  •  Stage 3A Moderate CKD (GFR = 45-59 mL/min/1 73 square meters)  •  Stage 3B Moderate CKD (GFR = 30-44 mL/min/1 73 square meters)  •  Stage 4 Severe CKD (GFR = 15-29 mL/min/1 73 square meters)  •  Stage 5 End Stage CKD (GFR <15 mL/min/1 73 square meters)  Note: GFR calculation is accurate only with a steady state creatinine   HS TROPONIN I 0HR - Normal    hs TnI 0hr 6  "Refer to ACS Flowchart"- see link ng/L Final    Comment:                                              Initial (time 0) result  If >=50 ng/L, Myocardial injury suggested ;  Type of myocardial injury and treatment strategy  to be determined    If 5-49 ng/L, a delta result at 2 hours and or 4 hours will be needed to further evaluate  If <4 ng/L, and chest pain has been >3 hours since onset, patient may qualify for discharge based on the HEART score in the ED  If <5 ng/L and <3hours since onset of chest pain, a delta result at 2 hours will be needed to further evaluate  HS Troponin 99th Percentile URL of a Health Population=12 ng/L with a 95% Confidence Interval of 8-18 ng/L  Second Troponin (time 2 hours)  If calculated delta >= 20 ng/L,  Myocardial injury suggested ; Type of myocardial injury and treatment strategy to be determined  If 5-49 ng/L and the calculated delta is 5-19 ng/L, consult medical service for evaluation  Continue evaluation for ischemia on ecg and other possible etiology and repeat hs troponin at 4 hours  If delta is <5 ng/L at 2 hours, consider discharge based on risk stratification via the HEART score (if in ED), or MELI risk score in IP/Observation  HS Troponin 99th Percentile URL of a Health Population=12 ng/L with a 95% Confidence Interval of 8-18 ng/L    HS TROPONIN I 2HR - Normal    hs TnI 2hr 5  "Refer to ACS Flowchart"- see link ng/L Final    Comment:                                              Initial (time 0) result  If >=50 ng/L, Myocardial injury suggested ;  Type of myocardial injury and treatment strategy  to be determined  If 5-49 ng/L, a delta result at 2 hours and or 4 hours will be needed to further evaluate  If <4 ng/L, and chest pain has been >3 hours since onset, patient may qualify for discharge based on the HEART score in the ED  If <5 ng/L and <3hours since onset of chest pain, a delta result at 2 hours will be needed to further evaluate  HS Troponin 99th Percentile URL of a Health Population=12 ng/L with a 95% Confidence Interval of 8-18 ng/L  Second Troponin (time 2 hours)  If calculated delta >= 20 ng/L,  Myocardial injury suggested ; Type of myocardial injury and treatment strategy to be determined    If 5-49 ng/L and the calculated delta is 5-19 ng/L, consult medical service for evaluation  Continue evaluation for ischemia on ecg and other possible etiology and repeat hs troponin at 4 hours  If delta is <5 ng/L at 2 hours, consider discharge based on risk stratification via the HEART score (if in ED), or MELI risk score in IP/Observation  HS Troponin 99th Percentile URL of a Health Population=12 ng/L with a 95% Confidence Interval of 8-18 ng/L  Delta 2hr hsTnI -1  <20 ng/L Final   CBC AND DIFFERENTIAL    WBC 8 11  4 31 - 10 16 Thousand/uL Final    RBC 4 40  3 88 - 5 62 Million/uL Final    Hemoglobin 13 6  12 0 - 17 0 g/dL Final    Hematocrit 41 9  36 5 - 49 3 % Final    MCV 95  82 - 98 fL Final    MCH 30 9  26 8 - 34 3 pg Final    MCHC 32 5  31 4 - 37 4 g/dL Final    RDW 12 8  11 6 - 15 1 % Final    MPV 10 5  8 9 - 12 7 fL Final    Platelets 601  347 - 390 Thousands/uL Final    nRBC 0  /100 WBCs Final    Neutrophils Relative 61  43 - 75 % Final    Immat GRANS % 1  0 - 2 % Final    Lymphocytes Relative 27  14 - 44 % Final    Monocytes Relative 6  4 - 12 % Final    Eosinophils Relative 4  0 - 6 % Final    Basophils Relative 1  0 - 1 % Final    Neutrophils Absolute 5 04  1 85 - 7 62 Thousands/µL Final    Immature Grans Absolute 0 06  0 00 - 0 20 Thousand/uL Final    Lymphocytes Absolute 2 17  0 60 - 4 47 Thousands/µL Final    Monocytes Absolute 0 49  0 17 - 1 22 Thousand/µL Final    Eosinophils Absolute 0 29  0 00 - 0 61 Thousand/µL Final    Basophils Absolute 0 06  0 00 - 0 10 Thousands/µL Final   LIGHT BLUE TOP         XR chest 1 view portable   Final Result      No acute cardiopulmonary disease        Right basal infiltrate has cleared            Workstation performed: [id number]                   MDM  Number of Diagnoses or Management Options  Chest pain, unspecified type: new, needed workup  Diagnosis management comments:       Initial ED assessment: 72-year-old male, chest pain, has been occurring for months, EKG showing flattening V4 through V6, basically unchanged from his previous EKG    Initial DDx includes but is not limited to:   Cardiac etiology such as arrhythmia or ACS, obesity/eight-based shortness of breath/chest pain, pulmonary etiology such as pneumonia or pneumothorax    Initial ED plan:   Blood work, chest x-ray, delta troponins        Final ED summary/disposition:   After evaluation and workup in the emergency department, no evidence of ACS, patient discharged, will follow with PCP for outpatient stress test due to elevated heart score          Time reflects when diagnosis was documented in both MDM as applicable and the Disposition within this note     Time User Action Codes Description Comment    2/17/2023  2:24 PM Heather Nieves Add [R07 9] Chest pain, unspecified type       ED Disposition     ED Disposition   Discharge    Condition   Stable    Date/Time   Fri Feb 17, 2023  2:27 PM    [address] [facility] discharge to home/self care                 Follow-up Information     Follow up With Specialties Details Why Contact Info Additional Information    Jaymie Lan MD Internal Medicine   [address] [facility] [address]  [address] [facility] Schedule an appointment as soon as possible for a visit   Osiel Osborne [address] [place] [address] [facility] [address] [facility], [address] [address]

## 2023-02-17 NOTE — ASSESSMENT & PLAN NOTE
His last PSA is less than 0 1  He had seen urologist in the past but now is not seeing urologist   His PSA is stable

## 2023-02-17 NOTE — PROGRESS NOTES
Assessment/Plan:    1  Chest tightness  Assessment & Plan:  Complain of getting chest tightness mainly evening hours on and off for last few weeks  Also he is having a shortness of breath on exertion when he goes up hills or up stairs which is somewhat new onset  EKG was done in the office today revealed normal sinus rhythm has  T wave inversion in lead I, aVL, V 4 , 5 and 6  No arrhythmia  Due to multiple risk factors, abnormal EKG and present symptoms I attempted to have  cardiologist evaluation today but unable to schedule so referred to emergency room for prompt evaluation  Orders:  -     POCT ECG  -     XR chest pa & lateral; Future; Expected date: 02/17/2023  -     Comprehensive metabolic panel; Future  -     Ambulatory Referral to Cardiology; Future    2  SOB (shortness of breath) on exertion  Assessment & Plan:  Complaint of shortness of breath on exertion and sometime  has a dry cough  Will order chest x-ray  Discussed with the patient to Take his allergy medication daily to see whether that makes any difference in his cough as he has some sinus symptoms  As mentioned above also needs cardiac evaluation to rule out any underlying cardiac etiology causing his symptoms  Also discussed the patient about losing weight as that might be also contributing  some of his symptoms as well  Orders:  -     POCT ECG  -     XR chest pa & lateral; Future; Expected date: 02/17/2023  -     Comprehensive metabolic panel; Future  -     Ambulatory Referral to Cardiology; Future    3  Gastroesophageal reflux disease without esophagitis  Assessment & Plan:  He takes famotidine and watching diet  4  Essential hypertension  Assessment & Plan:  Blood pressure well controlled  Advised to continue present medication  Advised for low-salt diet  Orders:  -     POCT ECG  -     XR chest pa & lateral; Future; Expected date: 02/17/2023  -     Comprehensive metabolic panel; Future    5   Seasonal allergic rhinitis due to other allergic trigger  Assessment & Plan:  He gets some sinus congestion postnasal drip and sometimes cough could be from allergy versus other etiology  He is not taking his fexofenadine daily  Advised to try for next few days to take  fexofenadine daily to see how it helps for his symptoms  6  Mixed hyperlipidemia  Assessment & Plan:  His cholesterol is 166, triglyceride 243, HDL 44, LDL 73  Advised to increase fish oil from 1 tablet once a day to twice a day as triglyceride is still elevated     Advised to continue other medication  Continue low-cholesterol low carbs diet  Will refer to nutrition therapy for dietary recommendation  Orders:  -     Lipid panel; Future  -     Comprehensive metabolic panel; Future  -     Ambulatory Referral to Nutrition Services; Future    7  BMI 45 0-49 9, adult [facility])  Assessment & Plan:  Patient  was advised to decrease portion size  Advised to decrease carb, sugar, cholesterol intake  Advised to exercise 3-5 times per week  Advised to lose weight  Orders:  -     Ambulatory Referral to Nutrition Services; Future    8  Microalbuminuria  Assessment & Plan:  His urine microalbumin has been increasing  He is already on the lisinopril  As mentioned above  needs better control his diabetes mellitus         9  Type 2 diabetes mellitus without complication, without long-term current use of insulin ([facility])  Assessment & Plan:    Lab Results   Component Value Date    HGBA1C 7 3 (H) 02/16/2023   Now his hemoglobin A1c is 7 3  Discussed with the patient since his A1c increased to 7 3 will need to start medication but he will consider seeing nutrition therapy and try to watch diet closely and lose weight if he can control without medication  will observe for now and repeat hemoglobin A1c after 3 months if persistently elevated then will need to start medication  Orders:  -     Comprehensive metabolic panel;  Future  -     Hemoglobin A1C; Future  -     Ambulatory Referral to Nutrition Services; Future    10  Prostate cancer [facility])  Assessment & Plan:  His last PSA is less than 0 1  He had seen urologist in the past but now is not seeing urologist   His PSA is stable  11  Choking episode  Assessment & Plan:  Patient states sometimes when he drinks fluids he gets episodes of choking feeling  Although it does not happen with solid foods  He had a EGD September 2021  Will refer to GI specialist for further evaluation and recommendation    Orders:  -     Ambulatory Referral to Gastroenterology; Future          Subjective: Patient presents for follow-up his medical problems and complain of having a chest tightness and shortness of breath on exertion  Patient ID: Jose Conn is a 74 y o  male  HPI   82-year-old white male patient presents to follow-up his medical problems  Patient complains of having chest tightness on and off for last few weeks  mainly at evening hours for last few weeks  Also he has been noticing shortness of breath on exertion  when he goes up hills or up stairs Which is somewhat new onset  sometime  he gets dry cough  Has some sinus congestion and feels postnasal drip  Denies any fever or chills  Denies nausea vomiting diarrhea or pain in the abdomen      The following portions of the patient's history were reviewed and updated as appropriate:     Past Medical History:  He has a past medical history of Asthma, BMI 40 0-44 9, adult ([address] ) (1/11/2022), BMI 45 0-49 9, adult ([address] ) (6/28/2021), Bronchospasm, Cancer ([address] ), Chest tightness (2/17/2023), Choking episode (2/17/2023), Colon polyp, Essential hypertension (6/26/2021), GERD (gastroesophageal reflux disease), Hearing impairment (10/11/2022), Hyperlipidemia, Hypertension, Hypocalcemia (1/11/2022), Insomnia, Leg cramps, Lump on finger, left (1/11/2022), Microalbuminuria (9/27/2021), Mixed hyperlipidemia (6/26/2021), Morbid obesity ([address] ) (5/26/2022), Nocturia, Numbness of fingers of both hands, Onychomycosis of toenail (5/26/2022), Pain in both knees (5/26/2022), Paronychia of left middle finger (9/27/2021), Seasonal allergic rhinitis (1/11/2022), Skin lesion, Skin rash (6/26/2021), Sleep apnea, Snoring, Tubular adenoma of colon (6/28/2021), Type 2 diabetes mellitus without complication, without long-term current use of insulin ([address] ) (6/26/2021), Wears glasses, and Weight gain  ,  _______________________________________________________________________  Past Surgical History:   has a past surgical history that includes Hip surgery (Bilateral); Prostatectomy; pr colonoscopy flx dx w/collj spec when pfrmd (N/A, 5/9/2017); Replacement total knee (Bilateral); Colonoscopy (05/09/2017); Colonoscopy; and Upper gastrointestinal endoscopy  ,  _______________________________________________________________________  Family History:  family history includes Brain cancer in his mother; Depression in his father; Diabetes in his father; Eczema in his mother; Heart disease in his father; Psoriasis in his mother; Stroke in his paternal grandfather ,  _______________________________________________________________________  Social History:   reports that he quit smoking about 30 years ago  He has never used smokeless tobacco  He reports current alcohol use  He reports that he does not use drugs  ,  _______________________________________________________________________  Allergies:  is allergic to penicillins     _______________________________________________________________________  Current Outpatient Medications   Medication Sig Dispense Refill   • Ascorbic Acid (VITAMIN C PO) Take 1,000 mg by mouth daily     • betamethasone dipropionate (DIPROSONE) 0 05 % ointment Apply sparingly  1-2 times/day for up to 2 wks to R forearm rash   45 g 0   • betamethasone valerate (VALISONE) 0 1 % lotion Apply once to twice daily as needed to scalp 60 mL 0   • clobetasol (TEMOVATE) 0 05 % external solution Apply topically 2 (two) times a day 50 mL 3   • Cyanocobalamin (VITAMIN B-12 PO) Take 1,000 mcg by mouth daily      • famotidine (PEPCID) 20 mg tablet TAKE 1 TABLET (20 MG TOTAL) BY MOUTH 2 (TWO) TIMES A  tablet 1   • fexofenadine (ALLEGRA) 180 MG tablet Take 180 mg by mouth daily as needed     • ketoconazole (NIZORAL) 2 % cream APPLY TOPICALLY DAILY 60 g 0   • lisinopril (ZESTRIL) 20 mg tablet Take 1 tablet (20 mg total) by mouth daily 90 tablet 1   • Multiple Vitamins-Minerals (MULTIVITAL PO) Take 1 tablet by mouth     • Omega-3 Fatty Acids (FISH OIL) 1000 MG CPDR Take 1,000 mg by mouth daily     • omeprazole (PriLOSEC) 20 mg delayed release capsule Take 20 mg by mouth as needed     • sildenafil (VIAGRA) 50 MG tablet Take 1 tablet (50 mg total) by mouth daily as needed for erectile dysfunction 8 tablet 0   • simvastatin (ZOCOR) 40 mg tablet Take 1 tablet (40 mg total) by mouth daily at bedtime 90 tablet 1     No current facility-administered medications for this visit      _______________________________________________________________________  Review of Systems   Constitutional: Negative for chills and fever  HENT: Positive for postnasal drip  Negative for congestion, ear pain, hearing loss, nosebleeds, sinus pain, sore throat and trouble swallowing  Eyes: Negative for discharge, redness and visual disturbance  Respiratory: Positive for cough, chest tightness and shortness of breath  Negative for wheezing  Cardiovascular: Positive for chest pain ( Like tightness in the middle of chest)  Negative for palpitations  Gastrointestinal: Negative for abdominal pain, blood in stool, constipation, diarrhea, nausea and vomiting  Genitourinary: Negative for dysuria, flank pain, frequency and hematuria  Musculoskeletal: Negative for arthralgias, myalgias and neck pain  Skin: Negative for color change and rash  Neurological: Negative for dizziness, speech difficulty, weakness and headaches  Hematological: Does not bruise/bleed easily  Psychiatric/Behavioral: Negative for agitation and behavioral problems  Objective:  Vitals:    02/17/23 0831   BP: 124/80   BP Location: Left arm   Patient Position: Sitting   Cuff Size: Adult   Pulse: 64   Resp: 16   Temp: 98 8 °F (37 1 °C)   TempSrc: Tympanic   SpO2: 95%   Weight: (!) 156 kg (344 lb)   Height: 6' (1 829 m)     Body mass index is 46 65 kg/m²  Physical Exam  Vitals and nursing note reviewed  Constitutional:       General: He is not in acute distress  Appearance: He is obese  HENT:      Head: Normocephalic and atraumatic  Nose: Nose normal       Mouth/Throat:      Mouth: Mucous membranes are moist    Eyes:      General: No scleral icterus  Right eye: No discharge  Left eye: No discharge  Extraocular Movements: Extraocular movements intact  Conjunctiva/sclera: Conjunctivae normal    Cardiovascular:      Rate and Rhythm: Normal rate and regular rhythm  Pulses: Normal pulses  Heart sounds: Normal heart sounds  No murmur heard  Pulmonary:      Effort: Pulmonary effort is normal  No respiratory distress  Breath sounds: Normal breath sounds  No wheezing, rhonchi or rales  Abdominal:      General: Bowel sounds are normal       Palpations: Abdomen is soft  Tenderness: There is no abdominal tenderness  Musculoskeletal:         General: Normal range of motion  Cervical back: Normal range of motion and neck supple  No muscular tenderness  Right lower leg: Edema ( Trace pedal edema) present  Left lower leg: Edema ( Trace pedal edema) present  Skin:     General: Skin is warm  Findings: No rash  Neurological:      General: No focal deficit present  Mental Status: He is alert and oriented to person, place, and time  Motor: No weakness        Coordination: Coordination normal       Gait: Gait normal    Psychiatric:         Mood and Affect: Mood normal          Behavior: Behavior normal          I spent 30 minutes with the patient today    More than 50% time spent for reviewing of external notes, reviewing of the results of diagnostics test, management of care, patient education and ordering of test

## 2023-02-17 NOTE — ASSESSMENT & PLAN NOTE
Complaint of shortness of breath on exertion and sometime  has a dry cough  Will order chest x-ray  Discussed with the patient to Take his allergy medication daily to see whether that makes any difference in his cough as he has some sinus symptoms  As mentioned above also needs cardiac evaluation to rule out any underlying cardiac etiology causing his symptoms  Also discussed the patient about losing weight as that might be also contributing  some of his symptoms as well

## 2023-02-17 NOTE — ASSESSMENT & PLAN NOTE
Complain of getting chest tightness mainly evening hours on and off for last few weeks  Also he is having a shortness of breath on exertion when he goes up hills or up stairs which is somewhat new onset  EKG was done in the office today revealed normal sinus rhythm has  T wave inversion in lead I, aVL, V 4 , 5 and 6  No arrhythmia  Due to multiple risk factors, abnormal EKG and present symptoms I attempted to have  cardiologist evaluation today but unable to schedule so referred to emergency room for prompt evaluation

## 2023-02-20 ENCOUNTER — TELEPHONE (OUTPATIENT)
Dept: ADMINISTRATIVE | Facility: HOSPITAL | Age: 68
End: 2023-02-20

## 2023-02-20 NOTE — TELEPHONE ENCOUNTER
I reached out to pt to offer him an earlier appointment with cardiologist  Pt declined 03/09 appointment with cardiologist  Francisco Manuel with the cardiologist office reached out to pt to discuss different dates as well with pt, however, pt just wanted to keep 04/11 appointment and Francisco Manuel added him to the cancellation list

## 2023-02-21 LAB
ATRIAL RATE: 70 BPM
P AXIS: 73 DEGREES
PR INTERVAL: 160 MS
QRS AXIS: 39 DEGREES
QRSD INTERVAL: 80 MS
QT INTERVAL: 378 MS
QTC INTERVAL: 408 MS
T WAVE AXIS: 104 DEGREES
VENTRICULAR RATE: 70 BPM

## 2023-03-07 DIAGNOSIS — B35.3 TINEA PEDIS OF BOTH FEET: ICD-10-CM

## 2023-03-07 RX ORDER — KETOCONAZOLE 20 MG/G
CREAM TOPICAL DAILY
Qty: 60 G | Refills: 0 | Status: SHIPPED | OUTPATIENT
Start: 2023-03-07

## 2023-03-30 DIAGNOSIS — L20.9 ATOPIC DERMATITIS, UNSPECIFIED TYPE: ICD-10-CM

## 2023-04-04 DIAGNOSIS — L20.9 ATOPIC DERMATITIS, UNSPECIFIED TYPE: Primary | ICD-10-CM

## 2023-04-04 DIAGNOSIS — B35.3 TINEA PEDIS OF BOTH FEET: ICD-10-CM

## 2023-04-04 RX ORDER — KETOCONAZOLE 20 MG/G
CREAM TOPICAL DAILY
Qty: 60 G | Refills: 0 | Status: SHIPPED | OUTPATIENT
Start: 2023-04-04

## 2023-04-04 RX ORDER — TRIAMCINOLONE ACETONIDE 1 MG/ML
LOTION TOPICAL 2 TIMES DAILY
Qty: 60 ML | Refills: 2 | Status: SHIPPED | OUTPATIENT
Start: 2023-04-04

## 2023-04-04 RX ORDER — BETAMETHASONE VALERATE 0.1 %
LOTION (ML) TOPICAL
Qty: 60 ML | Refills: 0 | Status: SHIPPED | OUTPATIENT
Start: 2023-04-04 | End: 2023-04-04 | Stop reason: ALTCHOICE

## 2023-04-04 NOTE — TELEPHONE ENCOUNTER
Received a call from [facility] stating that the betamethasone valerate is on back order  Pharmacist asking if there is an alternative medication that you want to prescribe or if patient wants to check the availability at another pharmacy  Please call to advise

## 2023-04-07 ENCOUNTER — CLINICAL SUPPORT (OUTPATIENT)
Dept: NUTRITION | Facility: HOSPITAL | Age: 68
End: 2023-04-07
Attending: INTERNAL MEDICINE

## 2023-04-07 VITALS — WEIGHT: 315 LBS | BODY MASS INDEX: 47.77 KG/M2

## 2023-04-07 DIAGNOSIS — E78.2 MIXED HYPERLIPIDEMIA: ICD-10-CM

## 2023-04-07 DIAGNOSIS — E11.9 TYPE 2 DIABETES MELLITUS WITHOUT COMPLICATION, WITHOUT LONG-TERM CURRENT USE OF INSULIN (HCC): Primary | ICD-10-CM

## 2023-04-07 NOTE — PROGRESS NOTES
" Nutrition Assessment Form    Patient Name: Cristina Sheppard    YOB: 1955    Sex: Male     Assessment Date: 4/7/2023  Start Time: 1250 Stop Time: 150 Total Minutes: 60     Data:  Present at session: self   Parent/Patient Concerns/reason for visit: \"I want to lose weight\"   Medical Dx/Reason for Referral: DM   Past Medical History:   Diagnosis Date   • Asthma    • BMI 40 0-44 9, adult ([facility] ) 1/11/2022   • BMI 45 0-49 9, adult ([facility] ) 6/28/2021   • Bronchospasm    • Cancer ([place] [id number] )     Prostate    • Chest tightness 2/17/2023   • Choking episode 2/17/2023   • Colon polyp    • Essential hypertension 6/26/2021   • GERD (gastroesophageal reflux disease)    • Hearing impairment 10/11/2022   • Hyperlipidemia    • Hypertension    • Hypocalcemia 1/11/2022   • Insomnia    • Leg cramps    • Lump on finger, left 1/11/2022   • Microalbuminuria 9/27/2021   • Mixed hyperlipidemia 6/26/2021   • Morbid obesity ([facility] ) 5/26/2022   • Nocturia    • Numbness of fingers of both hands    • Onychomycosis of toenail 5/26/2022   • Pain in both knees 5/26/2022   • Paronychia of left middle finger 9/27/2021   • Seasonal allergic rhinitis 1/11/2022   • Skin lesion    • Skin rash 6/26/2021   • Sleep apnea     not tested yet   • Snoring    • Tubular adenoma of colon 6/28/2021   • Type 2 diabetes mellitus without complication, without long-term current use of insulin ([facility] ) 6/26/2021   • Wears glasses    • Weight gain        Current Outpatient Medications   Medication Sig Dispense Refill   • Ascorbic Acid (VITAMIN C PO) Take 1,000 mg by mouth daily     • betamethasone dipropionate (DIPROSONE) 0 05 % ointment Apply sparingly  1-2 times/day for up to 2 wks to R forearm rash   45 g 0   • clobetasol (TEMOVATE) 0 05 % external solution Apply topically 2 (two) times a day 50 mL 3   • Cyanocobalamin (VITAMIN B-12 PO) Take 1,000 mcg by mouth daily      • famotidine (PEPCID) 20 mg tablet TAKE 1 TABLET (20 MG TOTAL) BY MOUTH 2 (TWO) TIMES A  " tablet 1   • fexofenadine (ALLEGRA) 180 MG tablet Take 180 mg by mouth daily as needed     • ketoconazole (NIZORAL) 2 % cream APPLY TOPICALLY DAILY 60 g 0   • lisinopril (ZESTRIL) 20 mg tablet Take 1 tablet (20 mg total) by mouth daily 90 tablet 1   • Multiple Vitamins-Minerals (MULTIVITAL PO) Take 1 tablet by mouth     • Omega-3 Fatty Acids (FISH OIL) 1000 MG CPDR Take 1,000 mg by mouth daily     • omeprazole (PriLOSEC) 20 mg delayed release capsule Take 20 mg by mouth as needed     • sildenafil (VIAGRA) 50 MG tablet Take 1 tablet (50 mg total) by mouth daily as needed for erectile dysfunction 8 tablet 0   • simvastatin (ZOCOR) 40 mg tablet Take 1 tablet (40 mg total) by mouth daily at bedtime 90 tablet 1   • triamcinolone (KENALOG) 0 1 % lotion Apply topically 2 (two) times a day 60 mL 2     No current facility-administered medications for this visit  Additional Meds/Supplements: n/a   Special Learning Needs/barriers to learning/any new barriers n/a   Height: HC Readings from Last 5 Encounters:   No data found for [facility]       Weight: Wt Readings from Last 10 Encounters:   02/17/23 (!) 156 kg (344 lb)   12/06/22 (!) 158 kg (348 lb 9 6 oz)   10/11/22 (!) 154 kg (340 lb)   06/13/22 (!) 155 kg (341 lb 9 6 oz)   06/02/22 (!) 152 kg (335 lb)   05/26/22 (!) 152 kg (335 lb)   01/19/22 (!) 157 kg (347 lb)   01/11/22 (!) 155 kg (342 lb)   09/30/21 (!) 158 kg (349 lb)   09/27/21 (!) 156 kg (345 lb)     Estimated body mass index is 46 65 kg/m² as calculated from the following:    Height as of 2/17/23: 6' (1 829 m)  Weight as of 2/17/23: 156 kg (344 lb)     Recent Weight Change: []Yes     [x]No  Amount:       Energy Needs: 2400 cals 25cals/kg-1500 for 3#/wk wt loss)   Allergies   Allergen Reactions   • Penicillins Rash     sensitivity    or intolerances NKFA   Social History     Substance and Sexual Activity   Alcohol Use Yes    Comment: rarely    Maybe monthly to quarterly rare   Social History     Tobacco Use   Smoking "Status Former   • Types: Cigarettes   • Quit date: 1992   • Years since quittin 9   Smokeless Tobacco Never    n/a   Who shops? patient--   Who cooks/cooking methods/Eating out/take out habits   patient  Cooking methods: bake/vigil/air vigil/grill/boil/other________    Take out: ___ x/wk or month   Dining out _2-3___ x/wk or month   Exercise: Nothing formal    Other: ie: Sleep habits/ stress level/ work habits household-lives with ?/ food security His current sleep habits include going to bed at 10-11 will fall asleep right away, and he will wake up 545  He does feel still tired when he wakes up, but does not nap  He does doze off in the evening while watching tv 3-4x/wk  [id number]  His energy during the day is variable  He does get up 3-4x/wk but is able to fall right back to sleep  He does not use a CPAP  He has food security  He works in a [employer] and does work approx 50hrs/wk  Prior Nutritional Counseling? []Yes     [x]No  When:      Why:         Diet Hx:2 meals on w/e bagel and then early dinner/ if eating out will 6oz steak  Breakfast: Diet:  7am (never skips) Protein shake -220cals w/e different- bagel-- hot herbal tea- green w/lemon 20o (1-2pkt of stevia)   Lunch: 1- rarely skips will eat something small-bag of chips maybe but that is not normal- s/w -roast beef or hoagie 6 inch-italian  Chips- water or iced tea- sweet x 20oz or water couple x /wk     Dinner: 730-8-  Meat starch x1c vegetables x1c -some- not nightly (he only eats it not wife) or salad-sweet green tea [id number] couple x /wk- large portions     Snacks:  Large amount and poor choices-  Does not preportion snacks AM -   PM -   HS -    Other Notes/ Initial Assessment:  Mega Olmos is here to lose weight, he and his wife both have an issue  Mega Olmos has always been on the \"larger side\"  He does modified isogenic now lost 70 # in 2017 (down to 280#), but then gained it all back when he started eating normal again    He has ROSITA but does " not treat it, and c/o being tired all the time  HIS wife is also known to this office  Updated assessment (Follow up note only):       Nutrition Diagnosis:   Altered Nutrition-Related Laboratory values  related to Kidney, liver, cardiac, endocrine, neurologic, and/or pulmonary dysfunction as evidenced by  Abnormal plasma glucose and/or HgbA1c levels       Any change or new dx since previous visit:     Nutrition Diagnosis:   n/a      Medical Nutrition Therapy Intervention:  [x]Individualized Meal Plan-Discussed the plate method of portioning foods, including half a plate fruits and vegetables or a half plate all vegetables, 1/4 of the plate a lean protein source or meat, and a 1/4 of the plate being a whole grain carb- usually 1/2-1c  This should be followed for at least 2 meals of the day, but could also be followed for all 3  Discussed importance of adequate fruits and vegetables, and appropriate serving sizes, including cooking methods, variety of colors daily, and how they help balance diet and food intake  Portions of other foods may increase if adequate fruits and vegetables are not included on a daily basis  []Understanding Lab Values   []Basic Pathophysiology of Disease []Food/Medication Interactions   []Food Diary [x]Exercise-150 mins of moderate activity weekly, discussed importance of variety of activity, including wt bearing activities 2-3x/wk x 10-15mins  Discussed importance of activity throughout the lifecycle and its impact on overall health/stress management, etc - as able and cleared by medical   [x]Lifestyle/Behavior Modification Techniques-Discussed the importance of adequate sleep, and ideal sleeping conditions, including a cool temperature 64-68 degrees F, and a dark and quiet room  Also discussed the importance of a regular and consistent sleep routine, including minimizing blue light exposure an hour before sleeping    Weekend habits should include staying fairly consistent with weekday sleep habits to minimize disruption during the week  A connection was made between getting adequate and good quality sleep and the ability to handle stress the next day, make healthy food choices, and be active  []Medication, Mechanism of Action   []Label Reading: CHO/ Na/ Fat/ other_________ []Self Blood Glucose Monitoring   [x]Weight/BMI Goals: gain/lose/maintain-Short term goal of 2-4# x 1 month or next visit []Other -           Comprehension: []Excellent  []Very Good  [x]Good  []Fair   []Poor    Receptivity: []Excellent  []Very Good  []Good  [x]Fair   []Poor    Expected Compliance: []Excellent  []Very Good  []Good  [x]Fair   []Poor        Goals (initial)/ Progress made on previous goals/new goals:  1   3meals daily no skipping   2  Portions snacks and meals out   3 16 oz fluid with meals and additional 32oz water during day       No follow-ups on file    Labs:  CMP  Lab Results   Component Value Date    K 4 3 02/17/2023     02/17/2023    CO2 23 02/17/2023    BUN 19 02/17/2023    CREATININE 1 09 02/17/2023    GLUF 143 (H) 02/16/2023    CALCIUM 8 7 02/17/2023    CORRECTEDCA 9 2 06/25/2021    AST 29 02/17/2023    ALT 30 02/17/2023    ALKPHOS 57 02/17/2023    EGFR 70 02/17/2023       BMP  Lab Results   Component Value Date    CALCIUM 8 7 02/17/2023    K 4 3 02/17/2023    CO2 23 02/17/2023     02/17/2023    BUN 19 02/17/2023    CREATININE 1 09 02/17/2023       Lipids  No results found for: CHOL  Lab Results   Component Value Date    HDL 44 10/11/2022    HDL 47 01/07/2022    HDL 42 06/25/2021     Lab Results   Component Value Date    LDLCALC 73 10/11/2022    LDLCALC 87 01/07/2022    LDLCALC 72 06/25/2021     Lab Results   Component Value Date    TRIG 243 (H) 10/11/2022    TRIG 171 (H) 01/07/2022    TRIG 208 (H) 06/25/2021     No results found for: CHOLHDL    Hemoglobin A1C  Lab Results   Component Value Date    HGBA1C 7 3 (H) 02/16/2023       Fasting Glucose  Lab Results   Component Value Date GLUF 143 (H) 02/16/2023       Insulin     Thyroid  No results found for: TSH, U8RSLRY, M5IEPFP, THYROIDAB    Hepatic Function Panel  Lab Results   Component Value Date    ALT 30 02/17/2023    AST 29 02/17/2023    ALKPHOS 57 02/17/2023       Celiac Disease Antibody Panel  No results found for: ENDOMYSIAL IGA, GLIADIN IGA, GLIADIN IGG, IGA, TISSUE TRANSGLUT AB, TTG IGA   Iron  No results found for: IRON, TIBC, FERRITIN         Yobany Pina, [address] [place] [id number]

## 2023-04-26 ENCOUNTER — OFFICE VISIT (OUTPATIENT)
Dept: GASTROENTEROLOGY | Facility: AMBULARY SURGERY CENTER | Age: 68
End: 2023-04-26

## 2023-04-26 ENCOUNTER — HOSPITAL ENCOUNTER (OUTPATIENT)
Dept: NON INVASIVE DIAGNOSTICS | Facility: CLINIC | Age: 68
Discharge: HOME | End: 2023-04-26

## 2023-04-26 VITALS
HEART RATE: 58 BPM | WEIGHT: 315 LBS | HEIGHT: 72 IN | DIASTOLIC BLOOD PRESSURE: 82 MMHG | BODY MASS INDEX: 42.66 KG/M2 | SYSTOLIC BLOOD PRESSURE: 134 MMHG

## 2023-04-26 VITALS
HEART RATE: 58 BPM | OXYGEN SATURATION: 98 % | DIASTOLIC BLOOD PRESSURE: 82 MMHG | HEIGHT: 72 IN | SYSTOLIC BLOOD PRESSURE: 134 MMHG | BODY MASS INDEX: 42.66 KG/M2 | WEIGHT: 315 LBS

## 2023-04-26 DIAGNOSIS — R09.89 CHOKING EPISODE: Primary | ICD-10-CM

## 2023-04-26 DIAGNOSIS — R06.02 SOB (SHORTNESS OF BREATH) ON EXERTION: ICD-10-CM

## 2023-04-26 DIAGNOSIS — R07.89 CHEST TIGHTNESS: ICD-10-CM

## 2023-04-26 LAB
AORTIC ROOT: 3.6 CM
APICAL FOUR CHAMBER EJECTION FRACTION: 66 %
ASCENDING AORTA: 3.6 CM
E WAVE DECELERATION TIME: 241 MS
FRACTIONAL SHORTENING: 33 % (ref 28–44)
INTERVENTRICULAR SEPTUM IN DIASTOLE (PARASTERNAL SHORT AXIS VIEW): 1.4 CM
INTERVENTRICULAR SEPTUM: 1.4 CM (ref 0.6–1.1)
LAAS-AP2: 23.7 CM2
LAAS-AP4: 27.6 CM2
LEFT ATRIUM SIZE: 4.9 CM
LEFT INTERNAL DIMENSION IN SYSTOLE: 3.6 CM (ref 2.1–4)
LEFT VENTRICULAR INTERNAL DIMENSION IN DIASTOLE: 5.4 CM (ref 3.5–6)
LEFT VENTRICULAR POSTERIOR WALL IN END DIASTOLE: 1.4 CM
LEFT VENTRICULAR STROKE VOLUME: 90 ML
LVSV (TEICH): 90 ML
MV E'TISSUE VEL-SEP: 6 CM/S
MV PEAK A VEL: 0.81 M/S
MV PEAK E VEL: 106 CM/S
MV STENOSIS PRESSURE HALF TIME: 70 MS
MV VALVE AREA P 1/2 METHOD: 3.14 CM2
RA PRESSURE ESTIMATED: 5 MMHG
RIGHT ATRIUM AREA SYSTOLE A4C: 26.1 CM2
RIGHT VENTRICLE ID DIMENSION: 4.5 CM
RV PSP: 33 MMHG
SL CV LEFT ATRIUM LENGTH A2C: 6.3 CM
SL CV LV EF: 55
SL CV PED ECHO LEFT VENTRICLE DIASTOLIC VOLUME (MOD BIPLANE) 2D: 143 ML
SL CV PED ECHO LEFT VENTRICLE SYSTOLIC VOLUME (MOD BIPLANE) 2D: 53 ML
TR MAX PG: 28 MMHG
TR PEAK VELOCITY: 2.6 M/S
TRICUSPID ANNULAR PLANE SYSTOLIC EXCURSION: 2.1 CM
TRICUSPID VALVE PEAK REGURGITATION VELOCITY: 2.63 M/S

## 2023-04-26 RX ADMIN — PERFLUTREN 0.6 ML/MIN: 6.52 INJECTION, SUSPENSION INTRAVENOUS at 12:09

## 2023-04-26 NOTE — ASSESSMENT & PLAN NOTE
Peers to be secondary to oropharyngeal muscle weakness  Doubt for Zenker's  Doubt for any esophageal lesions      -Explained to patient in detail about different possible etiologies and given reassurance     -Scheduled for barium swallow and also video barium swallow by speech therapist-    -Advised to chew well before swallowing    -Consider ENT evaluation

## 2023-04-26 NOTE — PROGRESS NOTES
Follow-up Note -  [facility]  Brian Alex NAPOLEON [facility] 1955 male         Reason: Choking    HPI:  Mr  Frannie Flores because of issues with choking  Complaining about choking episodes during meals at times but also from his own saliva and secretions  Denies any difficulty swallowing  He has history of GERD for which she takes omeprazole 20 mg daily  He tried to change to Pepcid but reports having recurrent symptoms  Good appetite, no recent weight loss  No abdominal pain, nausea or vomiting  Regular bowel movements and denies any blood or mucus in the stool  He had EGD and colonoscopies in September 2021  Chaperon: Ms Eagle Meade: Review of Systems   Constitutional: Negative for activity change, appetite change, chills, diaphoresis, fatigue, fever and unexpected weight change  HENT: Negative for ear discharge, ear pain, facial swelling, hearing loss, nosebleeds, sore throat, tinnitus and voice change  Eyes: Negative for pain, discharge, redness, itching and visual disturbance  Respiratory: Negative for apnea, cough, chest tightness, shortness of breath and wheezing  Cardiovascular: Positive for chest pain  Negative for palpitations  Gastrointestinal:        As noted in HPI   Endocrine: Negative for cold intolerance, heat intolerance and polyuria  Genitourinary: Negative for difficulty urinating, dysuria, flank pain, hematuria and urgency  Musculoskeletal: Negative for arthralgias, back pain, gait problem, joint swelling and myalgias  Skin: Negative for rash and wound  Neurological: Negative for dizziness, tremors, seizures, speech difficulty, light-headedness, numbness and headaches  Hematological: Negative for adenopathy  Does not bruise/bleed easily  Psychiatric/Behavioral: Negative for agitation, behavioral problems and confusion  The patient is not nervous/anxious           Past Medical History:   Diagnosis Date   • Asthma    • BMI 40 0-44 9, adult ([facility] ) 2022   • BMI 45 0-49 9, adult ([facility] ) 2021   • Bronchospasm    • Cancer [facility])     Prostate    • Chest tightness 2023   • Choking episode 2023   • Colon polyp    • Essential hypertension 2021   • GERD (gastroesophageal reflux disease)    • Hearing impairment 10/11/2022   • Hyperlipidemia    • Hypertension    • Hypocalcemia 2022   • Insomnia    • Leg cramps    • Lump on finger, left 2022   • Microalbuminuria 2021   • Mixed hyperlipidemia 2021   • Morbid obesity ([place] [address] ) 2022   • Nocturia    • Numbness of fingers of both hands    • Onychomycosis of toenail 2022   • Pain in both knees 2022   • Paronychia of left middle finger 2021   • Seasonal allergic rhinitis 2022   • Skin lesion    • Skin rash 2021   • Sleep apnea     not tested yet   • Snoring    • Tubular adenoma of colon 2021   • Type 2 diabetes mellitus without complication, without long-term current use of insulin ([place] [address] ) 2021   • Wears glasses    • Weight gain       Past Surgical History:   Procedure Laterality Date   • COLONOSCOPY  2017   • COLONOSCOPY     • HIP SURGERY Bilateral     Hip replacement   • AK COLONOSCOPY FLX DX W/COLLJ SPEC WHEN PFRMD N/A 2017    Procedure: COLONOSCOPY;  Surgeon: Ana Lilia James MD;  [facility];   Service: Gastroenterology   • PROSTATECTOMY     • REPLACEMENT TOTAL KNEE Bilateral     Inactive   • UPPER GASTROINTESTINAL ENDOSCOPY       Social History     Socioeconomic History   • Marital status: /Civil Union     Spouse name: Not on file   • Number of children: Not on file   • Years of education: Not on file   • Highest education level: Not on file   Occupational History   • Occupation: Presently not working   Tobacco Use   • Smoking status: Former     Packs/day: 1 50     Years: 15 00     Pack years: 22 50     Types: Cigarettes     Start date: 1975     Quit date: 1992     Years since quittin 9   • Smokeless tobacco: Never   Vaping Use   • Vaping Use: Every day   Substance and Sexual Activity   • Alcohol use: Yes     Alcohol/week: 1 0 standard drink     Types: 1 Glasses of wine per week     Comment: rarely   • Drug use: No   • Sexual activity: Not Currently     Partners: Female     Birth control/protection: Abstinence, Post-menopausal, Rhythm, Male Sterilization, Female Sterilization   Other Topics Concern   • Not on file   Social History Narrative    Single-family home    Current work/study status: Full-time    Caffeine use: Coffee, 3 servings/day    Lives with spouse    Former smoker - Inactive 6/22/2018    Annual eye exam: Sees 1hr; wears glasses    Annual dental checkup: Sees q6months    PSA: Used to follow Urology    - As per [employer]     Social Determinants of Health     Financial Resource Strain: Not on file   Food Insecurity: Not on file   Transportation Needs: Not on file   Physical Activity: Not on file   Stress: Not on file   Social Connections: Not on file   Intimate Partner Violence: Not on file   Housing Stability: Not on file     Family History   Problem Relation Age of Onset   • Brain cancer Mother    • Psoriasis Mother    • Eczema Mother    • Depression Father    • Heart disease Father         Heart Artery blockages   • Diabetes Father    • Stroke Paternal Grandfather      Penicillins  Current Outpatient Medications   Medication Sig Dispense Refill   • Ascorbic Acid (VITAMIN C PO) Take 1,000 mg by mouth daily     • betamethasone dipropionate (DIPROSONE) 0 05 % ointment Apply sparingly  1-2 times/day for up to 2 wks to R forearm rash   45 g 0   • clobetasol (TEMOVATE) 0 05 % external solution Apply topically 2 (two) times a day 50 mL 3   • Cyanocobalamin (VITAMIN B-12 PO) Take 1,000 mcg by mouth daily      • famotidine (PEPCID) 20 mg tablet TAKE 1 TABLET (20 MG TOTAL) BY MOUTH 2 (TWO) TIMES A  tablet 1   • fexofenadine (ALLEGRA) 180 MG tablet Take 180 mg by mouth daily as needed     • ketoconazole (NIZORAL) 2 % cream APPLY TOPICALLY DAILY 60 g 0   • lisinopril (ZESTRIL) 20 mg tablet Take 1 tablet (20 mg total) by mouth daily 90 tablet 1   • Multiple Vitamins-Minerals (MULTIVITAL PO) Take 1 tablet by mouth     • Omega-3 Fatty Acids (FISH OIL) 1000 MG CPDR Take 1,000 mg by mouth daily     • omeprazole (PriLOSEC) 20 mg delayed release capsule Take 20 mg by mouth as needed     • sildenafil (VIAGRA) 50 MG tablet Take 1 tablet (50 mg total) by mouth daily as needed for erectile dysfunction 8 tablet 0   • simvastatin (ZOCOR) 40 mg tablet Take 1 tablet (40 mg total) by mouth daily at bedtime 90 tablet 1   • triamcinolone (KENALOG) 0 1 % lotion Apply topically 2 (two) times a day 60 mL 2     No current facility-administered medications for this visit  Blood pressure 134/82, pulse 58, height 6' (1 829 m), weight (!) 158 kg (349 lb), SpO2 98 %  PHYSICAL EXAM: Physical Exam  Constitutional:       Appearance: He is well-developed  HENT:      Head: Normocephalic and atraumatic  Eyes:      General: No scleral icterus  Right eye: No discharge  Left eye: No discharge  Conjunctiva/sclera: Conjunctivae normal       Pupils: Pupils are equal, round, and reactive to light  Neck:      Thyroid: No thyromegaly  Vascular: No JVD  Trachea: No tracheal deviation  Cardiovascular:      Rate and Rhythm: Normal rate and regular rhythm  Heart sounds: Normal heart sounds  No murmur heard  No friction rub  No gallop  Pulmonary:      Effort: Pulmonary effort is normal  No respiratory distress  Breath sounds: Normal breath sounds  No wheezing or rales  Chest:      Chest wall: No tenderness  Abdominal:      General: Bowel sounds are normal  There is no distension  Palpations: Abdomen is soft  There is no mass  Tenderness: There is no abdominal tenderness  There is no guarding or rebound  Hernia: No hernia is present     Musculoskeletal:      Cervical back: Neck supple  Lymphadenopathy:      Cervical: No cervical adenopathy  Skin:     General: Skin is warm and dry  Findings: No erythema or rash  Neurological:      Mental Status: He is alert and oriented to person, place, and time  Psychiatric:         Behavior: Behavior normal          Thought Content: Thought content normal           Lab Results   Component Value Date    WBC 8 11 02/17/2023    HGB 13 6 02/17/2023    HCT 41 9 02/17/2023    MCV 95 02/17/2023     02/17/2023     Lab Results   Component Value Date    CALCIUM 8 7 02/17/2023    K 4 3 02/17/2023    CO2 23 02/17/2023     02/17/2023    BUN 19 02/17/2023    CREATININE 1 09 02/17/2023     Lab Results   Component Value Date    ALT 30 02/17/2023    AST 29 02/17/2023    ALKPHOS 57 02/17/2023     No results found for: INR, PROTIME    XR chest 1 view portable    Result Date: 2/17/2023  Impression: No acute cardiopulmonary disease  Right basal infiltrate has cleared Workstation performed: [id number]       ASSESSMENT & PLAN:    Choking episode  Peers to be secondary to oropharyngeal muscle weakness  Doubt for Zenker's  Doubt for any esophageal lesions      -Explained to patient in detail about different possible etiologies and given reassurance     -Scheduled for barium swallow and also video barium swallow by speech therapist-    -Advised to chew well before swallowing    -Consider ENT evaluation

## 2023-04-27 DIAGNOSIS — B35.3 TINEA PEDIS OF BOTH FEET: ICD-10-CM

## 2023-04-28 RX ORDER — KETOCONAZOLE 20 MG/G
CREAM TOPICAL DAILY
Qty: 60 G | Refills: 0 | Status: SHIPPED | OUTPATIENT
Start: 2023-04-28

## 2023-05-03 ENCOUNTER — HOSPITAL ENCOUNTER (OUTPATIENT)
Dept: NON INVASIVE DIAGNOSTICS | Facility: CLINIC | Age: 68
Discharge: HOME | End: 2023-05-03

## 2023-05-03 LAB
CHEST PAIN STATEMENT: NORMAL
MAX DIASTOLIC BP: 84 MMHG
MAX HEART RATE: 89 BPM
MAX PREDICTED HEART RATE: 155 BPM
MAX. SYSTOLIC BP: 148 MMHG
NUC STRESS EJECTION FRACTION: 70 %
PROTOCOL NAME: NORMAL
RATE PRESSURE PRODUCT: NORMAL
REASON FOR TERMINATION: NORMAL
SL CV REST NUCLEAR ISOTOPE DOSE: 16.1 MCI
SL CV STRESS NUCLEAR ISOTOPE DOSE: 49.2 MCI
SL CV STRESS RECOVERY BP: NORMAL MMHG
SL CV STRESS RECOVERY HR: 71 BPM
SL CV STRESS RECOVERY O2 SAT: 98 %
STRESS ANGINA INDEX: 0
STRESS BASELINE BP: NORMAL MMHG
STRESS BASELINE HR: 55 BPM
STRESS O2 SAT REST: 98 %
STRESS PEAK HR: 89 BPM
STRESS POST O2 SAT PEAK: 98 %
STRESS POST PEAK BP: 134 MMHG
STRESS/REST PERFUSION RATIO: 1.04
TARGET HR FORMULA: NORMAL
TEST INDICATION: NORMAL
TIME IN EXERCISE PHASE: NORMAL

## 2023-05-03 RX ORDER — REGADENOSON 0.08 MG/ML
0.4 INJECTION, SOLUTION INTRAVENOUS ONCE
Status: COMPLETED | OUTPATIENT
Start: 2023-05-03 | End: 2023-05-03

## 2023-05-03 RX ADMIN — REGADENOSON 0.4 MG: 0.08 INJECTION, SOLUTION INTRAVENOUS at 13:45

## 2023-05-05 ENCOUNTER — HOSPITAL ENCOUNTER (OUTPATIENT)
Dept: RADIOLOGY | Facility: HOSPITAL | Age: 68
Discharge: HOME | End: 2023-05-05
Attending: INTERNAL MEDICINE

## 2023-05-05 DIAGNOSIS — R09.89 CHOKING EPISODE: ICD-10-CM

## 2023-05-05 NOTE — PROCEDURES
Speech Pathology - Modified Barium Swallow Study    Patient Name: Jenniffer PATINODL'I Date: 5/5/2023     Problem List  Active Problems:    * No active hospital problems  *      Past Medical History  Past Medical History:   Diagnosis Date   • Asthma    • BMI 40 0-44 9, adult ([facility] [address] ) 1/11/2022   • BMI 45 0-49 9, adult ([facility] [address] ) 6/28/2021   • Bronchospasm    • Cancer [facility])     Prostate    • Chest tightness 2/17/2023   • Choking episode 2/17/2023   • Colon polyp    • Essential hypertension 6/26/2021   • GERD (gastroesophageal reflux disease)    • Hearing impairment 10/11/2022   • Hyperlipidemia    • Hypertension    • Hypocalcemia 1/11/2022   • Insomnia    • Leg cramps    • Lump on finger, left 1/11/2022   • Microalbuminuria 9/27/2021   • Mixed hyperlipidemia 6/26/2021   • Morbid obesity ([facility] ) 5/26/2022   • Nocturia    • Numbness of fingers of both hands    • Onychomycosis of toenail 5/26/2022   • Pain in both knees 5/26/2022   • Paronychia of left middle finger 9/27/2021   • Seasonal allergic rhinitis 1/11/2022   • Skin lesion    • Skin rash 6/26/2021   • Sleep apnea     not tested yet   • Snoring    • Tubular adenoma of colon 6/28/2021   • Type 2 diabetes mellitus without complication, without long-term current use of insulin ([facility] ) 6/26/2021   • Wears glasses    • Weight gain        Past Surgical History  Past Surgical History:   Procedure Laterality Date   • COLONOSCOPY  05/09/2017   • COLONOSCOPY     • HIP SURGERY Bilateral     Hip replacement   • [place] COLONOSCOPY FLX DX W/COLLJ SPEC WHEN PFRMD N/A 5/9/2017    Procedure: COLONOSCOPY;  Surgeon: Carl Edwards MD;  [facility]: [facility]; Service: Gastroenterology   • PROSTATECTOMY     • REPLACEMENT TOTAL KNEE Bilateral     Inactive   • UPPER GASTROINTESTINAL ENDOSCOPY         Assessment Summary:    Pt presents with adequate oral and pharyngeal stages of swallowing  Oral control is adequate, with timely swallow initiation   Airway protection is good with no episodes "of penetration or aspiration observed  Brief scan of the esophagus appears unremarkable  Note: Images are available for review in PACS as desired  Recommendations:   Recommended Diet: regular diet and thin liquids   Recommended Form of Medications: whole with liquid   Aspiration precautions and compensatory swallowing strategies: upright posture, small bites/sips and alternating bites and sips  Consider referral to:  None at this time   SLP Dysphagia therapy recommended: no    Results Reviewed with: patient     General Information;  From GI visit 4/26/2023:   Reason: Choking   HPI:  Mr Corey Singer because of issues with choking  Complaining about choking episodes during meals at times but also from his own saliva and secretions  Denies any difficulty swallowing  He has history of GERD for which she takes omeprazole 20 mg daily  He tried to change to Pepcid but reports having recurrent symptoms  Good appetite, no recent weight loss  No abdominal pain, nausea or vomiting  Regular bowel movements and denies any blood or mucus in the stool    He had EGD and colonoscopies in September 2021  Current concerns for dysphagia include feeling like he has \"spasms\" when swallowing  MBS was recommended to assess oropharyngeal stage swallowing skills at this time  Prior MBS: none     Oral Mechanism Exam  Facial: symmetrical  Labial: WFL  Dentition: adequate  Vocal quality: clear/adequate   Volitional Cough: strong/productive     Pt was viewed sitting upright in the lateral and AP positions  Trials administered were consistent with MBSImP Validated Protocol: Pt was given 5-mL thin liquid x2, 20-mL cup sip thin, 40-mL sequential swallow thin, 5-mL nectar thick, 20-mL cup sip nectar thick, 40-mL sequential swallow nectar thick, 5-mL honey thick, 5-mL pudding, ½ cookie coated with 3-mL pudding, 5-mL honey thick in the AP position and 5-mL pudding in the AP position   Pt was also given thin liquids by straw, as well as a " barium tablet with thin liquid  Initial view observations/comments: Clear view of the upper airway      8-Point Penetration-Aspiration Scale   Thin liquid 1 - Material does not enter the airway   Nectar thick liquid 1 - Material does not enter the airway   Honey thick liquid 1 - Material does not enter the airway   Puree (pudding) 1 - Material does not enter the airway   Solid 1 - Material does not enter the airway     Strategies and Efficacy: n/a    Aspiration Response and Efficacy: no penetration or aspiration seen on study     MBS IMP Rating    ORAL Impairment  Compinent 1--Lip Closure  Judged at any point during the swallow  0 - No labial escape    Component 2--Tongue Control During Bolus Hold  Judged on held liquid boluses only and prior to productive tongue movement  0 - Cohesive bolus between tongue to palatal seal    Component 3--Bolus Preparation/Mastication  Judged only during presentation of 1/2 shortbread cookie coated in pudding  0 - Timely and efficient chewing and mashing    Component 4--Bolus Transport/Lingual Motion  Judged after first productive tongue movement for oral bolus transport  0 - Brisk tongue motion    Component 5--Oral Residue  Judged after first swallow or after the last swallow of the sequential swallow task  1 - Trace residue lining oral structures   Location   C - Tongue    Component 6--Initiation of Pharyngeal Swallow  Judged at first movement of the brisk superior-anterior hyoid trajectory  0 - Bolus head at posterior angle of ramus (first hyoid excursion)      PHARYNGEAL Impairment  Component 7--Soft Palate Elevation  Judged during maximum displacement of soft palate  0 - No bolus between the soft palate (SP)/pharyngeal wall (PW)    Component 8--Laryngeal Elevation  Judged when epiglottis is in its most horizontal position    0 - Complete superior movement of thyroid cartilage with complete approximation of arytenoids to epiglottic petiole    Component 9--Anterior Hyoid Excursion  Judged at height of swallow/maximal anterior hyoid displacement  0 - Complete anterior movement    Component 10--Epiglottic Movement  Judged at height of swallow/maximal anterior hyoid displacement  0 - Complete inversion    Component 11--Laryngeal Vestibular Closure  Judged at height of swallow/maximal anterior hyoid displacement  0 - Complete; no air/contrast in laryngeal vestibule    Component 12--Pharyngeal Stripping Wave  Judged during the full duration of the pharyngeal swallow  0 - Present - complete    Component 13--Pharyngeal Contraction  Judged in AP view at rest and throughout maximum movement of structures  0 - Complete    Component 14--Pharyngoesophageal Segment Opening  Judged during maximum distension of PES and throughout opening and closure  0 - Complete distension and complete duration; no obstruction of flow    Component 15--Tongue Base (TB) Retraction  Judged during maximum retraction of the tongue base  0 - No contrast between TB and posterior pharyngeal wall (PW)    Component 16--Pharyngeal Residue  Judged after first swallow or after the last swallow of the sequential swallow task    1 - Trace residue within or on pharyngeal structures   Location   A - Tongue Base      ESOPHAGEAL Impairment  Component 17--Esophageal Clearance Upright Position  Judged in AP view during bolus transit through the oral cavity to the LES  0 - Complete clearance; esophageal coating

## 2023-05-08 DIAGNOSIS — E78.2 MIXED HYPERLIPIDEMIA: ICD-10-CM

## 2023-05-08 DIAGNOSIS — I10 ESSENTIAL HYPERTENSION: ICD-10-CM

## 2023-05-08 DIAGNOSIS — N52.31 ERECTILE DYSFUNCTION AFTER RADICAL PROSTATECTOMY: ICD-10-CM

## 2023-05-09 DIAGNOSIS — K21.9 GASTROESOPHAGEAL REFLUX DISEASE WITHOUT ESOPHAGITIS: ICD-10-CM

## 2023-05-09 RX ORDER — FAMOTIDINE 20 MG/1
20 TABLET, FILM COATED ORAL 2 TIMES DAILY
Qty: 180 TABLET | Refills: 1 | Status: SHIPPED | OUTPATIENT
Start: 2023-05-09

## 2023-05-09 RX ORDER — SILDENAFIL 50 MG/1
50 TABLET, FILM COATED ORAL DAILY PRN
Qty: 8 TABLET | Refills: 0 | Status: SHIPPED | OUTPATIENT
Start: 2023-05-09

## 2023-05-09 RX ORDER — LISINOPRIL 20 MG/1
20 TABLET ORAL DAILY
Qty: 90 TABLET | Refills: 0 | Status: SHIPPED | OUTPATIENT
Start: 2023-05-09

## 2023-05-09 RX ORDER — SIMVASTATIN 40 MG
40 TABLET ORAL
Qty: 90 TABLET | Refills: 0 | Status: SHIPPED | OUTPATIENT
Start: 2023-05-09

## 2023-05-18 ENCOUNTER — OFFICE VISIT (OUTPATIENT)
Dept: CARDIOLOGY CLINIC | Facility: CLINIC | Age: 68
End: 2023-05-18

## 2023-05-18 VITALS
HEIGHT: 72 IN | OXYGEN SATURATION: 97 % | SYSTOLIC BLOOD PRESSURE: 150 MMHG | DIASTOLIC BLOOD PRESSURE: 100 MMHG | BODY MASS INDEX: 42.66 KG/M2 | HEART RATE: 72 BPM | WEIGHT: 315 LBS

## 2023-05-18 DIAGNOSIS — E78.2 MIXED HYPERLIPIDEMIA: ICD-10-CM

## 2023-05-18 DIAGNOSIS — I11.9 HYPERTENSIVE HEART DISEASE WITHOUT HEART FAILURE: Primary | ICD-10-CM

## 2023-05-18 DIAGNOSIS — R29.818 SUSPECTED SLEEP APNEA: ICD-10-CM

## 2023-05-18 DIAGNOSIS — R06.09 DOE (DYSPNEA ON EXERTION): ICD-10-CM

## 2023-05-18 DIAGNOSIS — I10 ESSENTIAL HYPERTENSION: ICD-10-CM

## 2023-05-18 DIAGNOSIS — E66.01 MORBID OBESITY (HCC): ICD-10-CM

## 2023-05-18 NOTE — PROGRESS NOTES
[facility]    CHIEF COMPLAINT:   Chief Complaint   Patient presents with   • Follow-up     4-6 wk f/u HTN      HPI:  Eran Hernandez is a 74 y o  male with a past medical history of asthma, morbid obesity, hypertension, hyperlipidemia, type 2 diabetes mellitus who presents for follow up  Initially seen in April 2023 for complaints oc chest pain  Briefly, he was seen in the emergency department on 2/17/2023 with complaints of chest pain  He was seen in his primary care provider's office earlier that day was referred to the emergency department for an abnormal ECG  Reports difficulty getting up steps because he feels its more physically demanding  He reports chest tightness and shortness of breath which occurs with exertion  He describes tightness as inability to take in a deep breath rather than a pressure or heaviness sensation  He feels the symptoms are the same and that his shortness of breath is similar to when he had asthma as a child  Although, he does describe L chest discomfort  He is unable to elaborate further or quantify how long this has been occurring  Previously he got his weight down to 280 pounds  He has gained approximately 70 pounds over a couple years time  He gained 10 pounds throughout this past winter  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  No designated activity regimen  Social history: Former smoker  Family history: Father had coronary stents mid to late 46s  Later had coronary bypass  Interval history: He was referred for nuclear stress test and echocardiogram during her last appointment and we reviewed the results today  Does still complain of shortness of breath and fatigue  He does snore at night and does not feel refreshed from sleep  He does take naps when he is able to  He denies lightheadedness, syncope, chest pain, palpitations, orthopnea, PND    He has been out of blood pressure medication for approximately 1 week and needs to  his prescription  The following portions of the patient's history were reviewed and updated as appropriate: allergies, current medications, past family history, past medical history, past social history, past surgical history, and problem list     SINCE LAST OV I REVIEWED WITH THE PATIENT THE INTERIM LABS, TEST RESULTS, CONSULTANT(S) NOTES AND PERFORMED AN INTERIM REVIEW OF HISTORY    Past Medical History:   Diagnosis Date   • Asthma    • BMI 40 0-44 9, adult ([facility] [address] ) 1/11/2022   • BMI 45 0-49 9, adult ([facility] [address] ) 6/28/2021   • Bronchospasm    • Cancer ([facility] )     Prostate    • Chest tightness 2/17/2023   • Choking episode 2/17/2023   • Colon polyp    • Essential hypertension 6/26/2021   • GERD (gastroesophageal reflux disease)    • Hearing impairment 10/11/2022   • Hyperlipidemia    • Hypertension    • Hypocalcemia 1/11/2022   • Insomnia    • Leg cramps    • Lump on finger, left 1/11/2022   • Microalbuminuria 9/27/2021   • Mixed hyperlipidemia 6/26/2021   • Morbid obesity ([facility] [address] ) 5/26/2022   • Nocturia    • Numbness of fingers of both hands    • Onychomycosis of toenail 5/26/2022   • Pain in both knees 5/26/2022   • Paronychia of left middle finger 9/27/2021   • Seasonal allergic rhinitis 1/11/2022   • Skin lesion    • Skin rash 6/26/2021   • Sleep apnea     not tested yet   • Snoring    • Tubular adenoma of colon 6/28/2021   • Type 2 diabetes mellitus without complication, without long-term current use of insulin ([facility] ) 6/26/2021   • Wears glasses    • Weight gain        Past Surgical History:   Procedure Laterality Date   • COLONOSCOPY  05/09/2017   • COLONOSCOPY     • HIP SURGERY Bilateral     Hip replacement   • [place] COLONOSCOPY FLX DX W/COLLJ SPEC WHEN PFRMD N/A 5/9/2017    Procedure: COLONOSCOPY;  Surgeon: Miesha Christensen MD;  [facility]: [facility];   Service: Gastroenterology   • PROSTATECTOMY     • REPLACEMENT TOTAL KNEE Bilateral     Inactive   • UPPER GASTROINTESTINAL ENDOSCOPY         Social History Socioeconomic History   • Marital status: /Civil Union     Spouse name: Not on file   • Number of children: Not on file   • Years of education: Not on file   • Highest education level: Not on file   Occupational History   • Occupation: Presently not working   Tobacco Use   • Smoking status: Former     Packs/day: 1 50     Years: 15 00     Pack years: 22 50     Types: Cigarettes     Start date: 1975     Quit date: 1992     Years since quittin 0   • Smokeless tobacco: Never   Vaping Use   • Vaping Use: Every day   Substance and Sexual Activity   • Alcohol use:  Yes     Alcohol/week: 1 0 standard drink     Types: 1 Glasses of wine per week     Comment: rarely   • Drug use: No   • Sexual activity: Not Currently     Partners: Female     Birth control/protection: Abstinence, Post-menopausal, Rhythm, Male Sterilization, Female Sterilization   Other Topics Concern   • Not on file   Social History Narrative    Single-family home    Current work/study status: Full-time    Caffeine use: Coffee, 3 servings/day    Lives with spouse    Former smoker - Inactive 2018    Annual eye exam: Sees 1hr; wears glasses    Annual dental checkup: Sees q6months    PSA: Used to follow Urology    - As per [employer]     Social Determinants of Health     Financial Resource Strain: Not on file   Food Insecurity: Not on file   Transportation Needs: Not on file   Physical Activity: Not on file   Stress: Not on file   Social Connections: Not on file   Intimate Partner Violence: Not on file   Housing Stability: Not on file       Family History   Problem Relation Age of Onset   • Brain cancer Mother    • Psoriasis Mother    • Eczema Mother    • Depression Father    • Heart disease Father         Heart Artery blockages   • Diabetes Father    • Stroke Paternal Grandfather        Allergies   Allergen Reactions   • Penicillins Rash     sensitivity       Current Outpatient Medications   Medication Sig Dispense Refill   • Ascorbic Acid (VITAMIN C PO) Take 1,000 mg by mouth daily     • betamethasone dipropionate (DIPROSONE) 0 05 % ointment Apply sparingly  1-2 times/day for up to 2 wks to R forearm rash  45 g 0   • clobetasol (TEMOVATE) 0 05 % external solution Apply topically 2 (two) times a day 50 mL 3   • Cyanocobalamin (VITAMIN B-12 PO) Take 1,000 mcg by mouth daily      • famotidine (PEPCID) 20 mg tablet TAKE 1 TABLET (20 MG TOTAL) BY MOUTH 2 (TWO) TIMES A DAY (Patient not taking: Reported on 5/18/2023) 180 tablet 1   • fexofenadine (ALLEGRA) 180 MG tablet Take 180 mg by mouth daily as needed     • ketoconazole (NIZORAL) 2 % cream APPLY TOPICALLY DAILY 60 g 0   • lisinopril (ZESTRIL) 20 mg tablet Take 1 tablet (20 mg total) by mouth daily 90 tablet 0   • Multiple Vitamins-Minerals (MULTIVITAL PO) Take 1 tablet by mouth     • Omega-3 Fatty Acids (FISH OIL) 1000 MG CPDR Take 1,000 mg by mouth daily     • omeprazole (PriLOSEC) 20 mg delayed release capsule Take 20 mg by mouth as needed     • sildenafil (VIAGRA) 50 MG tablet Take 1 tablet (50 mg total) by mouth daily as needed for erectile dysfunction 8 tablet 0   • simvastatin (ZOCOR) 40 mg tablet Take 1 tablet (40 mg total) by mouth daily at bedtime 90 tablet 0   • triamcinolone (KENALOG) 0 1 % lotion Apply topically 2 (two) times a day 60 mL 2     No current facility-administered medications for this visit  /100   Pulse 72   Ht 6' (1 829 m)   Wt (!) 159 kg (350 lb 12 8 oz)   SpO2 97%   BMI 47 58 kg/m²     Review of Systems   All other systems reviewed and are negative  Physical Exam  Vitals reviewed  Constitutional:       General: He is not in acute distress  Appearance: He is well-developed  He is obese  He is not toxic-appearing  HENT:      Head: Normocephalic and atraumatic  Eyes:      General: No scleral icterus  Extraocular Movements: Extraocular movements intact        Conjunctiva/sclera: Conjunctivae normal    Neck:      Vascular: No carotid bruit  Cardiovascular:      Rate and Rhythm: Normal rate and regular rhythm  Pulses: Normal pulses  Heart sounds: Normal heart sounds  No murmur heard  No gallop  Pulmonary:      Effort: Pulmonary effort is normal  No respiratory distress  Breath sounds: Normal breath sounds  No wheezing or rales  Abdominal:      General: Abdomen is flat  Bowel sounds are normal       Palpations: Abdomen is soft  Tenderness: There is no abdominal tenderness  There is no guarding  Musculoskeletal:      Right lower leg: Edema (trace) present  Left lower leg: Edema (trace) present  Skin:     General: Skin is warm and dry  Capillary Refill: Capillary refill takes less than 2 seconds  Neurological:      General: No focal deficit present  Mental Status: He is alert and oriented to person, place, and time  Psychiatric:         Mood and Affect: Mood normal          Behavior: Behavior normal           Lab Results   Component Value Date    K 4 3 02/17/2023     02/17/2023    CO2 23 02/17/2023    BUN 19 02/17/2023    CREATININE 1 09 02/17/2023    CALCIUM 8 7 02/17/2023    ALT 30 02/17/2023    AST 29 02/17/2023       Lab Results   Component Value Date    HDL 44 10/11/2022    LDLCALC 73 10/11/2022    TRIG 243 (H) 10/11/2022       Lab Results   Component Value Date    WBC 8 11 02/17/2023    HGB 13 6 02/17/2023    HCT 41 9 02/17/2023     02/17/2023       Lab Results   Component Value Date     02/16/2023    HGBA1C 7 3 (H) 02/16/2023     Cardiac studies:  Pharm NM stress - 5/3/23: 1 day rest/stress protocol  Pharmacological stress with regadenoson  Nondiagnostic stress ECG due to vasodilator stress  Stress/perfusion ratio 1 04  Normal perfusion imaging without evidence of ischemia or infarct  TTE - 4/26/23: Technically difficult study  Normal left ventricular size with moderately increased wall thickness  Normal systolic function   Wall motion could not be accurately assessed  LVEF 55-60%  Grade 2 DD  Normal right ventricular cavity size and systolic function  No significant valvular abnormalities  ASSESSMENT AND PLAN:  Diagnoses and all orders for this visit:  Carrillo Salazar was seen today for follow-up  #  Hypertension  #  Hypertensive heart disease without heart failure  #  Dyspnea on exertion:   60-year-old gentleman with a history significant for former smoker, morbid obesity, hypertension, hyperlipidemia, diabetes mellitus who presents today in follow-up  He was initially seen for chest tightness and shortness of breath  He was referred for pharmacologic NM stress testing which showed normal perfusion without evidence of ischemia or infarct and no transient ischemic dilation  His echocardiogram normal left ventricular size with moderately increased wall thickness and estimated LVEF 55 to 60%  Grade 2 DD  We reviewed these findings and I suspect this is related to his hypertension and morbid obesity  We spoke about continued aggressive risk factor modification including weight loss and hypertension management  -  Blood pressure was elevated 031 systolic and 819 mmHg on repeat check  He has been out of blood pressure medications for about 1 week  Resume lisinopril 20 mg daily - now has another prescription  Goal BP <130/80 mmHg  If BP not controlled we discussed either increasing lisinopril to twice daily or the addition of another agent  Can consider HCTZ if his lower extremity swelling becomes bothersome-trace on exam today  #  Mixed hyperlipidemia: Lipid panel from 10/2022 with , TGs 243, HDL 44, LDL 73  Continue simvastatin as prescribed  #  Suspected sleep apnea  -     Home Study; Future  #  Morbid obesity: He has multiple symptoms that are suggestive of sleep apnea  This is likely also contributing to his elevated blood pressure  We will check a home sleep study    I also offered a referral to the weight management center and he is strongly considering but wants to think a bit more about this      Tammi Yee MD

## 2023-05-18 NOTE — PATIENT INSTRUCTIONS
-We reviewed the results of your stress test and echocardiogram  -Your blood pressure is elevated today but you have been off medication  -Please restart your Lisinopril 20 mg daily   Your goal blood pressure is <130/80 mmHg  -If your blood pressure remains elevated after restarting your medication, please let myself or Dr Evin Solis office know  -I suspect your have obstructive sleep apnea and have referred your for a sleep study

## 2023-05-19 ENCOUNTER — RA CDI HCC (OUTPATIENT)
Dept: OTHER | Facility: HOSPITAL | Age: 68
End: 2023-05-19

## 2023-05-25 ENCOUNTER — TELEPHONE (OUTPATIENT)
Dept: SLEEP CENTER | Facility: CLINIC | Age: 68
End: 2023-05-25

## 2023-05-25 ENCOUNTER — APPOINTMENT (OUTPATIENT)
Dept: LAB | Facility: CLINIC | Age: 68
End: 2023-05-25

## 2023-05-25 DIAGNOSIS — R06.02 SOB (SHORTNESS OF BREATH) ON EXERTION: ICD-10-CM

## 2023-05-25 DIAGNOSIS — E11.9 TYPE 2 DIABETES MELLITUS WITHOUT COMPLICATION, WITHOUT LONG-TERM CURRENT USE OF INSULIN (HCC): ICD-10-CM

## 2023-05-25 DIAGNOSIS — R07.89 CHEST TIGHTNESS: ICD-10-CM

## 2023-05-25 DIAGNOSIS — E78.2 MIXED HYPERLIPIDEMIA: ICD-10-CM

## 2023-05-25 DIAGNOSIS — I10 ESSENTIAL HYPERTENSION: ICD-10-CM

## 2023-05-25 LAB
ALBUMIN SERPL BCP-MCNC: 4 G/DL (ref 3.5–5)
ALP SERPL-CCNC: 53 U/L (ref 34–104)
ALT SERPL W P-5'-P-CCNC: 39 U/L (ref 7–52)
ANION GAP SERPL CALCULATED.3IONS-SCNC: 7 MMOL/L (ref 4–13)
AST SERPL W P-5'-P-CCNC: 35 U/L (ref 13–39)
BILIRUB SERPL-MCNC: 0.64 MG/DL (ref 0.2–1)
BUN SERPL-MCNC: 21 MG/DL (ref 5–25)
CALCIUM SERPL-MCNC: 8.7 MG/DL (ref 8.4–10.2)
CHLORIDE SERPL-SCNC: 100 MMOL/L (ref 96–108)
CHOLEST SERPL-MCNC: 172 MG/DL
CO2 SERPL-SCNC: 29 MMOL/L (ref 21–32)
CREAT SERPL-MCNC: 1.12 MG/DL (ref 0.6–1.3)
EST. AVERAGE GLUCOSE BLD GHB EST-MCNC: 169 MG/DL
GFR SERPL CREATININE-BSD FRML MDRD: 68 ML/MIN/1.73SQ M
GLUCOSE P FAST SERPL-MCNC: 145 MG/DL (ref 65–99)
HBA1C MFR BLD: 7.5 %
HDLC SERPL-MCNC: 47 MG/DL
LDLC SERPL CALC-MCNC: 93 MG/DL (ref 0–100)
NONHDLC SERPL-MCNC: 125 MG/DL
POTASSIUM SERPL-SCNC: 4.5 MMOL/L (ref 3.5–5.3)
PROT SERPL-MCNC: 7.2 G/DL (ref 6.4–8.4)
SODIUM SERPL-SCNC: 136 MMOL/L (ref 135–147)
TRIGL SERPL-MCNC: 162 MG/DL

## 2023-05-25 NOTE — TELEPHONE ENCOUNTER
----- Message from Isamar Torres MD sent at 5/24/2023  7:59 PM EDT -----  approved  ----- Message -----  From: Sandro Heredia  Sent: 5/24/2023   8:07 AM EDT  To: Sleep Medicine [facility] Provider    This Home sleep study needs approval      If approved please sign and return to clerical pool  If denied please include reasons why  Also provide alternative testing if warranted  Please sign and return to clerical pool

## 2023-05-26 ENCOUNTER — OFFICE VISIT (OUTPATIENT)
Dept: INTERNAL MEDICINE CLINIC | Facility: CLINIC | Age: 68
End: 2023-05-26

## 2023-05-26 VITALS
BODY MASS INDEX: 42.66 KG/M2 | HEIGHT: 72 IN | DIASTOLIC BLOOD PRESSURE: 80 MMHG | RESPIRATION RATE: 18 BRPM | TEMPERATURE: 98 F | OXYGEN SATURATION: 96 % | SYSTOLIC BLOOD PRESSURE: 132 MMHG | HEART RATE: 64 BPM | WEIGHT: 315 LBS

## 2023-05-26 DIAGNOSIS — M79.671 RIGHT FOOT PAIN: ICD-10-CM

## 2023-05-26 DIAGNOSIS — K21.9 GASTROESOPHAGEAL REFLUX DISEASE WITHOUT ESOPHAGITIS: ICD-10-CM

## 2023-05-26 DIAGNOSIS — E11.9 TYPE 2 DIABETES MELLITUS WITHOUT COMPLICATION, WITHOUT LONG-TERM CURRENT USE OF INSULIN (HCC): Primary | ICD-10-CM

## 2023-05-26 DIAGNOSIS — I10 ESSENTIAL HYPERTENSION: ICD-10-CM

## 2023-05-26 DIAGNOSIS — R80.9 MICROALBUMINURIA: ICD-10-CM

## 2023-05-26 DIAGNOSIS — C61 PROSTATE CANCER (HCC): ICD-10-CM

## 2023-05-26 DIAGNOSIS — E78.2 MIXED HYPERLIPIDEMIA: ICD-10-CM

## 2023-05-26 DIAGNOSIS — R06.02 SOB (SHORTNESS OF BREATH) ON EXERTION: ICD-10-CM

## 2023-05-26 DIAGNOSIS — J30.89 SEASONAL ALLERGIC RHINITIS DUE TO OTHER ALLERGIC TRIGGER: ICD-10-CM

## 2023-05-26 RX ORDER — METFORMIN HYDROCHLORIDE 750 MG/1
750 TABLET, EXTENDED RELEASE ORAL
COMMUNITY
End: 2023-05-26 | Stop reason: CLARIF

## 2023-05-26 RX ORDER — METFORMIN HYDROCHLORIDE 500 MG/1
1 TABLET, EXTENDED RELEASE ORAL
COMMUNITY
End: 2023-05-26 | Stop reason: SDUPTHER

## 2023-05-26 RX ORDER — METFORMIN HYDROCHLORIDE 500 MG/1
500 TABLET, EXTENDED RELEASE ORAL
Qty: 30 TABLET | Refills: 3 | Status: SHIPPED | OUTPATIENT
Start: 2023-05-26

## 2023-05-26 NOTE — ASSESSMENT & PLAN NOTE
He is having pain in the right heel most likely fasciitis and/or spur  Patient already made an appointment to see podiatrist   Meanwhile advised to try heel pad

## 2023-05-26 NOTE — PATIENT INSTRUCTIONS
Patient was advised to continue present medications  discussed with the patient medications and laboratory data in detail  Follow-up with me in 3 months or as advised  If any blood test was ordered please do 1 week prior to next appointment unless advise to get earlier    If you have any questions please call the office [phone]

## 2023-05-26 NOTE — ASSESSMENT & PLAN NOTE
He was seen by cardiology had a cardiac work-up cardiogram and also nuclear stress test  Possibly due to overweight  He will try to lose weight and exercise

## 2023-05-26 NOTE — ASSESSMENT & PLAN NOTE
He tried the famotidine was not effective so  he went back on omeprazole which is effective  Has been watching diet and taking reflux precautions

## 2023-05-26 NOTE — ASSESSMENT & PLAN NOTE
Blood pressure slightly elevated  He is now on lisinopril 20 mg daily  Discussed with the patient about adding 12 5 mg HCTZ with lisinopril patient will continue once he finishes present supply of lisinopril and then will change to lisinopril 20 mg/HCTZ 12 5 mg once a day  Continue low-salt diet

## 2023-05-26 NOTE — ASSESSMENT & PLAN NOTE
Cholesterol 172, triglyceride decreased from 2 43-1 62, HDL 47, LDL 93 advised to continue simvastatin 40 mg daily and also he takes fish oil 1 tablet twice a day as well  Continue low-cholesterol, low carbs diet

## 2023-05-26 NOTE — ASSESSMENT & PLAN NOTE
Lab Results   Component Value Date    HGBA1C 7 5 (H) 05/25/2023   Diabetes mellitus uncontrolled on diet  Discussed with the patient about starting medication  Discussed with the patient different options of diabetes medicine  He prefers generic medication   So prescribed metformin  mg every afternoon before supper  Discussed with the patient about checking blood sugar at home but at present he does not prefer to check blood sugar at home  Discussed with the patient her symptoms of hypoglycemia  Patient states he was seen by the nutrition therapy and knows about the diet  Discussed with the patient about seeing weight loss clinic or surgical intervention but at present he would like to hold on

## 2023-05-26 NOTE — PROGRESS NOTES
Assessment/Plan:    1  Type 2 diabetes mellitus without complication, without long-term current use of insulin ([facility])  Assessment & Plan:    Lab Results   Component Value Date    HGBA1C 7 5 (H) 05/25/2023   Diabetes mellitus uncontrolled on diet  Discussed with the patient about starting medication  Discussed with the patient different options of diabetes medicine  He prefers generic medication   So prescribed metformin  mg every afternoon before supper  Discussed with the patient about checking blood sugar at home but at present he does not prefer to check blood sugar at home  Discussed with the patient her symptoms of hypoglycemia  Patient states he was seen by the nutrition therapy and knows about the diet  Discussed with the patient about seeing weight loss clinic or surgical intervention but at present he would like to hold on  Orders:  -     metFORMIN (GLUCOPHAGE-XR) 500 mg 24 hr tablet; Take 1 tablet (500 mg total) by mouth daily at bedtime  -     Comprehensive metabolic panel; Future  -     Hemoglobin A1C; Future    2  Gastroesophageal reflux disease without esophagitis  Assessment & Plan:  He tried the famotidine was not effective so  he went back on omeprazole which is effective  Has been watching diet and taking reflux precautions  3  Essential hypertension  Assessment & Plan:  Blood pressure slightly elevated  He is now on lisinopril 20 mg daily  Discussed with the patient about adding 12 5 mg HCTZ with lisinopril patient will continue once he finishes present supply of lisinopril and then will change to lisinopril 20 mg/HCTZ 12 5 mg once a day  Continue low-salt diet  Orders:  -     Comprehensive metabolic panel; Future    4  Prostate cancer [facility])  Assessment & Plan:  Last PSA less than 0 1  We will follow yearly PSA  5  Microalbuminuria  Assessment & Plan:  He is on lisinopril  We will follow yearly microalbumin      Orders:  -     Comprehensive metabolic panel; Future    6  Mixed hyperlipidemia  Assessment & Plan:  Cholesterol 172, triglyceride decreased from 2 43-1 62, HDL 47, LDL 93 advised to continue simvastatin 40 mg daily and also he takes fish oil 1 tablet twice a day as well  Continue low-cholesterol, low carbs diet  7  BMI 45 0-49 9, adult ([address] )    8  Seasonal allergic rhinitis due to other allergic trigger  Assessment & Plan:  He takes fexofenadine HI which is effective for his rhinitis      9  Right foot pain  Assessment & Plan:  He is having pain in the right heel most likely fasciitis and/or spur  Patient already made an appointment to see podiatrist   Meanwhile advised to try heel pad  10  SOB (shortness of breath) on exertion  Assessment & Plan:  He was seen by cardiology had a cardiac work-up cardiogram and also nuclear stress test  Possibly due to overweight  He will try to lose weight and exercise  BMI Counseling: Body mass index is 47 06 kg/m²  The BMI is above normal  Nutrition recommendations include decreasing portion sizes, decreasing fast food intake, consuming healthier snacks, limiting drinks that contain sugar, moderation in carbohydrate intake, reducing intake of saturated and trans fat and reducing intake of cholesterol  Exercise recommendations include exercising 3-5 times per week  No pharmacotherapy was ordered  Rationale for BMI follow-up plan is due to patient being overweight or obese  Depression Screening and Follow-up Plan: Patient was screened for depression during today's encounter  They screened negative with a PHQ-2 score of 0  Subjective:  patient presents for follow-up of his medical problems  Patient ID: Greta Martin is a 74 y o  male  HPI   29-year-old white male patient presents to follow-up his medical problems  He denies any chest pain  Has some exertional shortness of breath  Denies any cough, fever, chills  Denies nausea vomiting diarrhea or pain in abdomen    He was seen by cardiologist had a nuclear medicine stress test as well as echocardiogram   Will be going for sleep apnea study evaluation  The following portions of the patient's history were reviewed and updated as appropriate:     Past Medical History:  He has a past medical history of Asthma, BMI 40 0-44 9, adult ([facility] ) (1/11/2022), BMI 45 0-49 9, adult ([place] [address] ) (6/28/2021), Bronchospasm, Cancer ([place] [address] ), Chest tightness (2/17/2023), Choking episode (2/17/2023), Colon polyp, Essential hypertension (6/26/2021), GERD (gastroesophageal reflux disease), Hearing impairment (10/11/2022), Hyperlipidemia, Hypertension, Hypocalcemia (1/11/2022), Insomnia, Leg cramps, Lump on finger, left (1/11/2022), Microalbuminuria (9/27/2021), Mixed hyperlipidemia (6/26/2021), Morbid obesity ([place] [address] ) (5/26/2022), Nocturia, Numbness of fingers of both hands, Onychomycosis of toenail (5/26/2022), Pain in both knees (5/26/2022), Paronychia of left middle finger (9/27/2021), Right foot pain (5/26/2023), Seasonal allergic rhinitis (1/11/2022), Skin lesion, Skin rash (6/26/2021), Sleep apnea, Snoring, Tubular adenoma of colon (6/28/2021), Type 2 diabetes mellitus without complication, without long-term current use of insulin ([place] [address] ) (6/26/2021), Wears glasses, and Weight gain  ,  _______________________________________________________________________  Past Surgical History:   has a past surgical history that includes Hip surgery (Bilateral); Prostatectomy; pr colonoscopy flx dx w/collj spec when pfrmd (N/A, 5/9/2017); Replacement total knee (Bilateral); Colonoscopy (05/09/2017); Colonoscopy; and Upper gastrointestinal endoscopy  ,  _______________________________________________________________________  Family History:  family history includes Brain cancer in his mother; Depression in his father; Diabetes in his father; Eczema in his mother; Heart disease in his father; Psoriasis in his mother; Stroke in his paternal grandfather ,  _______________________________________________________________________  Social History:   reports that he quit smoking about 31 years ago  His smoking use included cigarettes  He started smoking about 48 years ago  He has a 22 50 pack-year smoking history  He has never used smokeless tobacco  He reports current alcohol use of about 1 0 standard drink of alcohol per week  He reports that he does not use drugs  ,  _______________________________________________________________________  Allergies:  is allergic to penicillins     _______________________________________________________________________  Current Outpatient Medications   Medication Sig Dispense Refill   • Ascorbic Acid (VITAMIN C PO) Take 1,000 mg by mouth daily     • betamethasone dipropionate (DIPROSONE) 0 05 % ointment Apply sparingly  1-2 times/day for up to 2 wks to R forearm rash   45 g 0   • clobetasol (TEMOVATE) 0 05 % external solution Apply topically 2 (two) times a day 50 mL 3   • Cyanocobalamin (VITAMIN B-12 PO) Take 1,000 mcg by mouth daily      • fexofenadine (ALLEGRA) 180 MG tablet Take 180 mg by mouth daily as needed     • ketoconazole (NIZORAL) 2 % cream APPLY TOPICALLY DAILY 60 g 0   • lisinopril (ZESTRIL) 20 mg tablet Take 1 tablet (20 mg total) by mouth daily 90 tablet 0   • metFORMIN (GLUCOPHAGE-XR) 500 mg 24 hr tablet Take 1 tablet (500 mg total) by mouth daily at bedtime 30 tablet 3   • Multiple Vitamins-Minerals (MULTIVITAL PO) Take 1 tablet by mouth     • Omega-3 Fatty Acids (FISH OIL) 1000 MG CPDR Take 1,000 mg by mouth daily     • omeprazole (PriLOSEC) 20 mg delayed release capsule Take 20 mg by mouth as needed     • sildenafil (VIAGRA) 50 MG tablet Take 1 tablet (50 mg total) by mouth daily as needed for erectile dysfunction 8 tablet 0   • simvastatin (ZOCOR) 40 mg tablet Take 1 tablet (40 mg total) by mouth daily at bedtime 90 tablet 0   • triamcinolone (KENALOG) 0 1 % lotion Apply topically 2 (two) times a day 60 mL 2     No current facility-administered medications for this visit      _______________________________________________________________________  Review of Systems   Constitutional: Negative for chills and fever  HENT: Negative for congestion, ear pain, nosebleeds, sinus pain, sore throat and trouble swallowing  Eyes: Negative for discharge, redness and visual disturbance  Respiratory: Positive for shortness of breath ( On exertion  )  Negative for cough and chest tightness  Cardiovascular: Negative for chest pain and palpitations  Gastrointestinal: Negative for abdominal pain, blood in stool, constipation, diarrhea, nausea and vomiting  Genitourinary: Negative for dysuria, flank pain, frequency and hematuria  Musculoskeletal: Negative for arthralgias, myalgias and neck pain  Skin: Negative for color change and rash  Neurological: Negative for speech difficulty, weakness and headaches  Hematological: Does not bruise/bleed easily  Psychiatric/Behavioral: Negative for agitation and behavioral problems  Objective:  Vitals:    05/26/23 0855   BP: 132/80   BP Location: Left arm   Patient Position: Sitting   Cuff Size: Adult   Pulse: 64   Resp: 18   Temp: 98 °F (36 7 °C)   TempSrc: Temporal   SpO2: 96%   Weight: (!) 157 kg (347 lb)   Height: 6' (1 829 m)     Body mass index is 47 06 kg/m²  Physical Exam  Vitals and nursing note reviewed  Constitutional:       General: He is not in acute distress  Appearance: Normal appearance  HENT:      Head: Normocephalic and atraumatic  Right Ear: Ear canal and external ear normal       Left Ear: Ear canal and external ear normal       Nose: Nose normal       Mouth/Throat:      Mouth: Mucous membranes are moist    Eyes:      General: No scleral icterus  Right eye: No discharge  Left eye: No discharge  Extraocular Movements: Extraocular movements intact        Conjunctiva/sclera: Conjunctivae normal    Cardiovascular: Rate and Rhythm: Normal rate and regular rhythm  Pulses: Normal pulses  Heart sounds: Normal heart sounds  No murmur heard  Pulmonary:      Effort: Pulmonary effort is normal  No respiratory distress  Breath sounds: Normal breath sounds  No wheezing, rhonchi or rales  Abdominal:      General: Bowel sounds are normal       Palpations: Abdomen is soft  Tenderness: There is no abdominal tenderness  Musculoskeletal:         General: Normal range of motion  Cervical back: Normal range of motion and neck supple  No muscular tenderness  Right lower leg: No edema  Left lower leg: No edema  Skin:     General: Skin is warm  Findings: No rash  Neurological:      General: No focal deficit present  Mental Status: He is alert and oriented to person, place, and time  Motor: No weakness  Coordination: Coordination normal    Psychiatric:         Mood and Affect: Mood normal          Behavior: Behavior normal          I spent 30 minutes with the patient today    More than 50% time spent for reviewing of external notes, reviewing of the results of diagnostics test, management of care, patient education and ordering of test

## 2023-07-09 DIAGNOSIS — E11.9 TYPE 2 DIABETES MELLITUS WITHOUT COMPLICATION, WITHOUT LONG-TERM CURRENT USE OF INSULIN (HCC): ICD-10-CM

## 2023-07-10 RX ORDER — METFORMIN HYDROCHLORIDE 500 MG/1
500 TABLET, EXTENDED RELEASE ORAL
Qty: 30 TABLET | Refills: 0 | Status: SHIPPED | OUTPATIENT
Start: 2023-07-10

## 2023-07-11 ENCOUNTER — OFFICE VISIT (OUTPATIENT)
Dept: PODIATRY | Facility: CLINIC | Age: 68
End: 2023-07-11
Payer: COMMERCIAL

## 2023-07-11 VITALS
BODY MASS INDEX: 42.66 KG/M2 | DIASTOLIC BLOOD PRESSURE: 75 MMHG | HEIGHT: 72 IN | SYSTOLIC BLOOD PRESSURE: 118 MMHG | HEART RATE: 111 BPM | WEIGHT: 315 LBS

## 2023-07-11 DIAGNOSIS — E11.9 TYPE 2 DIABETES MELLITUS WITHOUT COMPLICATION, WITHOUT LONG-TERM CURRENT USE OF INSULIN (HCC): Primary | ICD-10-CM

## 2023-07-11 PROCEDURE — 99213 OFFICE O/P EST LOW 20 MIN: CPT | Performed by: PODIATRIST

## 2023-07-11 NOTE — PROGRESS NOTES
PATIENT:  Martha BENDER [facility]    1955    ASSESSMENT:     1. Type 2 diabetes mellitus without complication, without long-term current use of insulin ([address])              PLAN:  1. Patient was counseled on the condition and diagnosis. 2.  Educated disease prevention and risks related to diabetes. 3.  Educated proper daily foot care and exam.  Instructed proper skin care / protection and footwear. Instructed to identify any signs of infection and related foot problem. 4.  The recent blood work was reviewed and the last HbA1c was 7.5. Discussed proper blood glucose control with diet and exercise. 5.  He has mild neuropathy in his feet. Continue to monitor for any worsening. 6. Nail and HPK debrided for courtesy. Instructed proper footwear. 7. The patient will return in 6 months for diabetic foot exam.      Subjective:      HPI  The patient presents for diabetic foot evaluation. The blood glucose is under control. He has mild numbness and paresthesia in his feet. No significant pain. No dysfunction. He has callus on right great toe. The following portions of the patient's history were reviewed and updated as appropriate: allergies, current medications, past family history, past medical history, past social history, past surgical history and problem list.  All pertinent labs and images were reviewed.     Past Medical History  Past Medical History:   Diagnosis Date   • Asthma    • BMI 40.0-44.9, adult ([address]) 1/11/2022   • BMI 45.0-49.9, adult ([address] ) 6/28/2021   • Bronchospasm    • Cancer [facility])     Prostate    • Chest tightness 2/17/2023   • Choking episode 2/17/2023   • Colon polyp    • Essential hypertension 6/26/2021   • GERD (gastroesophageal reflux disease)    • Hearing impairment 10/11/2022   • Hyperlipidemia    • Hypertension    • Hypocalcemia 1/11/2022   • Insomnia    • Leg cramps    • Lump on finger, left 1/11/2022   • Microalbuminuria 9/27/2021   • Mixed hyperlipidemia 6/26/2021   • Morbid obesity ([address]) 5/26/2022   • Nocturia    • Numbness of fingers of both hands    • Onychomycosis of toenail 5/26/2022   • Pain in both knees 5/26/2022   • Paronychia of left middle finger 9/27/2021   • Right foot pain 5/26/2023   • Seasonal allergic rhinitis 1/11/2022   • Skin lesion    • Skin rash 6/26/2021   • Sleep apnea     not tested yet   • Snoring    • Tubular adenoma of colon 6/28/2021   • Type 2 diabetes mellitus without complication, without long-term current use of insulin ([address]) 6/26/2021   • Wears glasses    • Weight gain        Past Surgical History  Past Surgical History:   Procedure Laterality Date   • COLONOSCOPY  05/09/2017   • COLONOSCOPY     • HIP SURGERY Bilateral     Hip replacement   • AK COLONOSCOPY FLX DX W/COLLJ SPEC WHEN PFRMD N/A 5/9/2017    Procedure: COLONOSCOPY;  Surgeon: Chasity Benitez MD;  Location: [facility]; Service: Gastroenterology   • PROSTATECTOMY     • REPLACEMENT TOTAL KNEE Bilateral     Inactive   • UPPER GASTROINTESTINAL ENDOSCOPY          Allergies:  Penicillins    Medications:  Current Outpatient Medications   Medication Sig Dispense Refill   • Ascorbic Acid (VITAMIN C PO) Take 1,000 mg by mouth daily     • betamethasone dipropionate (DIPROSONE) 0.05 % ointment Apply sparingly  1-2 times/day for up to 2 wks to R forearm rash.  45 g 0   • clobetasol (TEMOVATE) 0.05 % external solution Apply topically 2 (two) times a day 50 mL 3   • Cyanocobalamin (VITAMIN B-12 PO) Take 1,000 mcg by mouth daily      • fexofenadine (ALLEGRA) 180 MG tablet Take 180 mg by mouth daily as needed     • ketoconazole (NIZORAL) 2 % cream APPLY TOPICALLY DAILY 60 g 0   • lisinopril (ZESTRIL) 20 mg tablet Take 1 tablet (20 mg total) by mouth daily 90 tablet 0   • metFORMIN (GLUCOPHAGE-XR) 500 mg 24 hr tablet Take 1 tablet (500 mg total) by mouth daily at bedtime 30 tablet 0   • Multiple Vitamins-Minerals (MULTIVITAL PO) Take 1 tablet by mouth     • Omega-3 Fatty Acids (FISH OIL) 1000 MG CPDR Take 1,000 mg by mouth daily     • omeprazole (PriLOSEC) 20 mg delayed release capsule Take 20 mg by mouth as needed     • sildenafil (VIAGRA) 50 MG tablet Take 1 tablet (50 mg total) by mouth daily as needed for erectile dysfunction 8 tablet 0   • simvastatin (ZOCOR) 40 mg tablet Take 1 tablet (40 mg total) by mouth daily at bedtime 90 tablet 0   • triamcinolone (KENALOG) 0.1 % lotion Apply topically 2 (two) times a day 60 mL 2     No current facility-administered medications for this visit. Social History:  Social History     Socioeconomic History   • Marital status: /Civil Union     Spouse name: None   • Number of children: None   • Years of education: None   • Highest education level: None   Occupational History   • Occupation: Presently not working   Tobacco Use   • Smoking status: Former     Packs/day: 1.50     Years: 15.00     Total pack years: 22.50     Types: Cigarettes     Start date: 1975     Quit date: 1992     Years since quittin.1   • Smokeless tobacco: Never   Vaping Use   • Vaping Use: Every day   Substance and Sexual Activity   • Alcohol use:  Yes     Alcohol/week: 1.0 standard drink of alcohol     Types: 1 Glasses of wine per week     Comment: rarely   • Drug use: No   • Sexual activity: Not Currently     Partners: Female     Birth control/protection: Abstinence, Post-menopausal, Rhythm, Male Sterilization, Female Sterilization   Other Topics Concern   • None   Social History Narrative    Single-family home    Current work/study status: Full-time    Caffeine use: Coffee, 3 servings/day    Lives with spouse    Former smoker - Inactive 2018    Annual eye exam: Sees 1hr; wears glasses    Annual dental checkup: Sees q6months    PSA: Used to follow Urology    - As per [employer]     Social Determinants of Health     Financial Resource Strain: Not on file   Food Insecurity: Not on file   Transportation Needs: Not on file   Physical Activity: Not on file   Stress: Not on file   Social Connections: Not on file   Intimate Partner Violence: Not on file   Housing Stability: Not on file        Review of Systems   Constitutional: Negative for appetite change, chills and fever. Respiratory: Negative for cough and shortness of breath. Cardiovascular: Negative for chest pain. Gastrointestinal: Negative for diarrhea, nausea and vomiting. Musculoskeletal: Negative for gait problem. Skin: Negative for wound. Neurological: Positive for numbness. Negative for weakness. Objective:      /75   Pulse (!) 111   Ht 6' (1.829 m)   Wt (!) 154 kg (340 lb)   BMI 46.11 kg/m²          Physical Exam  Vitals reviewed. Constitutional:       General: He is not in acute distress. Appearance: He is obese. He is not toxic-appearing. Cardiovascular:      Rate and Rhythm: Normal rate and regular rhythm. Pulses: Normal pulses. no weak pulses          Dorsalis pedis pulses are 2+ on the right side and 2+ on the left side. Posterior tibial pulses are 2+ on the right side and 2+ on the left side. Comments: No ischemia. Pulmonary:      Effort: Pulmonary effort is normal. No respiratory distress. Musculoskeletal:         General: No swelling, tenderness or signs of injury. Right lower leg: No edema. Left lower leg: No edema. Right foot: No Charcot foot or foot drop. Left foot: No Charcot foot or foot drop. Feet:      Right foot:      Protective Sensation: 10 sites tested. 10 sites sensed. Skin integrity: Callus present. No ulcer, skin breakdown or erythema. Left foot:      Protective Sensation: 10 sites tested. 10 sites sensed. Skin integrity: No ulcer, skin breakdown, erythema or callus. Skin:     General: Skin is warm. Capillary Refill: Capillary refill takes less than 2 seconds. Coloration: Skin is not cyanotic or mottled. Findings: No abscess, ecchymosis, erythema, rash or wound. Nails:  There is no clubbing. Comments: Mild thickening and discoloration of great toenails. Mild HPK on right hallux IPJ medially. Neurological:      General: No focal deficit present. Mental Status: He is alert and oriented to person, place, and time. Cranial Nerves: No cranial nerve deficit. Sensory: No sensory deficit. Motor: No weakness. Coordination: Coordination normal.      Gait: Gait normal.   Psychiatric:         Mood and Affect: Mood normal.         Behavior: Behavior normal.         Thought Content: Thought content normal.         Judgment: Judgment normal.         Diabetic Foot Exam    Patient's shoes and socks removed. Right Foot/Ankle   Right Foot Inspection  Skin Exam: skin intact, callus and callus. No erythema, no maceration and no ulcer. Toe Exam: No swelling, no tenderness, erythema and  no right toe deformity    Sensory   Vibration: diminished  Proprioception: intact  Monofilament testing: intact    Vascular  Capillary refills: < 3 seconds  The right DP pulse is 2+. The right PT pulse is 2+. Left Foot/Ankle  Left Foot Inspection  Skin Exam: skin intact. No erythema, no maceration, no ulcer and no callus. Toe Exam: No swelling, no tenderness, no erythema and no left toe deformity. Sensory   Vibration: diminished  Proprioception: intact  Monofilament testing: intact    Vascular  Capillary refills: < 3 seconds  The left DP pulse is 2+. The left PT pulse is 2+.      Assign Risk Category  No deformity present  No loss of protective sensation  No weak pulses  Risk: 0

## 2023-07-14 ENCOUNTER — HOSPITAL ENCOUNTER (OUTPATIENT)
Dept: SLEEP CENTER | Facility: CLINIC | Age: 68
Discharge: HOME | End: 2023-07-14
Payer: COMMERCIAL

## 2023-07-14 DIAGNOSIS — R29.818 SUSPECTED SLEEP APNEA: ICD-10-CM

## 2023-07-14 PROCEDURE — G0399 HOME SLEEP TEST/TYPE 3 PORTA: HCPCS

## 2023-07-18 NOTE — PROGRESS NOTES
Home Sleep Study Documentation    HOME STUDY DEVICE: Noxturnal no                                           Rosaline G3 yes      Pre-Sleep Home Study:    Set-up and instructions performed by: Hector Modi performed demonstration for Patient: yes    Return demonstration performed by Patient: yes    Written instructions provided to Patient: yes    Patient signed consent form: yes        Post-Sleep Home Study:    Additional comments by Patient:     Home Sleep Study Failed:no:    Failure reason: N/A    Reported or Detected: N/A    Scored by: Priya Holden

## 2023-07-27 ENCOUNTER — TELEPHONE (OUTPATIENT)
Dept: SLEEP CENTER | Facility: CLINIC | Age: 68
End: 2023-07-27

## 2023-07-27 NOTE — TELEPHONE ENCOUNTER
Called patient and advised sleep study resulted and shows severe ROSITA (MAYA 28.3) and hypoxia. Per study order, Dr. Alie Freed, cardiology, requests follow up with sleep specialist.    Scheduled consult in [facility] 12/15/23.   Added to wait list.

## 2023-07-31 LAB
LEFT EYE DIABETIC RETINOPATHY: NORMAL
RIGHT EYE DIABETIC RETINOPATHY: NORMAL

## 2023-08-07 ENCOUNTER — TELEPHONE (OUTPATIENT)
Dept: INTERNAL MEDICINE CLINIC | Facility: CLINIC | Age: 68
End: 2023-08-07

## 2023-08-07 DIAGNOSIS — E78.2 MIXED HYPERLIPIDEMIA: ICD-10-CM

## 2023-08-07 DIAGNOSIS — I10 ESSENTIAL HYPERTENSION: Primary | ICD-10-CM

## 2023-08-07 RX ORDER — LISINOPRIL AND HYDROCHLOROTHIAZIDE 20; 12.5 MG/1; MG/1
1 TABLET ORAL DAILY
Qty: 90 TABLET | Refills: 1 | Status: SHIPPED | OUTPATIENT
Start: 2023-08-07

## 2023-08-07 RX ORDER — SIMVASTATIN 40 MG
40 TABLET ORAL
Qty: 90 TABLET | Refills: 0 | Status: SHIPPED | OUTPATIENT
Start: 2023-08-07

## 2023-08-07 NOTE — TELEPHONE ENCOUNTER
Good afternoon. This is Serjio Worthy. I'm calling to renew one of my prescriptions. Doctor Mirna Akbar wanted me to call when I needed more lisinopril. He was going to change the medication to include a water pill as well. So if he could take care of that and place that order for me, I would appreciate it. But you can reach me at [phone].  Thanks, bybuddy.

## 2023-08-07 NOTE — TELEPHONE ENCOUNTER
Spoke with patient and made aware of new medication order and directions. Patient states that he normally takes his medication at night. Advised patient that the HCTZ could cause in increase in urination. Patient states that is already normal for him and he will further discuss with provider at upcoming appointment.

## 2023-08-17 DIAGNOSIS — E11.9 TYPE 2 DIABETES MELLITUS WITHOUT COMPLICATION, WITHOUT LONG-TERM CURRENT USE OF INSULIN (HCC): ICD-10-CM

## 2023-08-17 RX ORDER — METFORMIN HYDROCHLORIDE 500 MG/1
500 TABLET, EXTENDED RELEASE ORAL
Qty: 30 TABLET | Refills: 0 | Status: SHIPPED | OUTPATIENT
Start: 2023-08-17

## 2023-08-21 ENCOUNTER — RA CDI HCC (OUTPATIENT)
Dept: OTHER | Facility: HOSPITAL | Age: 68
End: 2023-08-21

## 2023-08-21 NOTE — PROGRESS NOTES
[address] [facility] coding opportunities          Chart Reviewed number of suggestions sent to Provider: 2   E11.65  E66.01    Patients Insurance        Commercial Insurance: [employer]

## 2023-08-28 ENCOUNTER — OFFICE VISIT (OUTPATIENT)
Dept: INTERNAL MEDICINE CLINIC | Facility: CLINIC | Age: 68
End: 2023-08-28
Payer: COMMERCIAL

## 2023-08-28 ENCOUNTER — APPOINTMENT (OUTPATIENT)
Dept: LAB | Facility: CLINIC | Age: 68
End: 2023-08-28
Payer: COMMERCIAL

## 2023-08-28 VITALS
SYSTOLIC BLOOD PRESSURE: 140 MMHG | BODY MASS INDEX: 42.66 KG/M2 | HEART RATE: 72 BPM | TEMPERATURE: 97.5 F | HEIGHT: 72 IN | DIASTOLIC BLOOD PRESSURE: 86 MMHG | RESPIRATION RATE: 18 BRPM | WEIGHT: 315 LBS | OXYGEN SATURATION: 97 %

## 2023-08-28 DIAGNOSIS — N52.31 ERECTILE DYSFUNCTION AFTER RADICAL PROSTATECTOMY: ICD-10-CM

## 2023-08-28 DIAGNOSIS — C61 PROSTATE CANCER (HCC): ICD-10-CM

## 2023-08-28 DIAGNOSIS — R80.9 MICROALBUMINURIA: ICD-10-CM

## 2023-08-28 DIAGNOSIS — I10 ESSENTIAL HYPERTENSION: ICD-10-CM

## 2023-08-28 DIAGNOSIS — E11.9 TYPE 2 DIABETES MELLITUS WITHOUT COMPLICATION, WITHOUT LONG-TERM CURRENT USE OF INSULIN (HCC): ICD-10-CM

## 2023-08-28 DIAGNOSIS — K21.9 GASTROESOPHAGEAL REFLUX DISEASE WITHOUT ESOPHAGITIS: ICD-10-CM

## 2023-08-28 DIAGNOSIS — E11.9 TYPE 2 DIABETES MELLITUS WITHOUT COMPLICATION, WITHOUT LONG-TERM CURRENT USE OF INSULIN (HCC): Primary | ICD-10-CM

## 2023-08-28 DIAGNOSIS — E78.2 MIXED HYPERLIPIDEMIA: ICD-10-CM

## 2023-08-28 DIAGNOSIS — G47.33 OBSTRUCTIVE SLEEP APNEA SYNDROME: ICD-10-CM

## 2023-08-28 DIAGNOSIS — G47.33 OBSTRUCTIVE SLEEP APNEA SYNDROME: Primary | ICD-10-CM

## 2023-08-28 DIAGNOSIS — R09.89 CHOKING EPISODE: ICD-10-CM

## 2023-08-28 LAB
ALBUMIN SERPL BCP-MCNC: 3.9 G/DL (ref 3.5–5)
ALP SERPL-CCNC: 54 U/L (ref 34–104)
ALT SERPL W P-5'-P-CCNC: 39 U/L (ref 7–52)
ANION GAP SERPL CALCULATED.3IONS-SCNC: 6 MMOL/L
AST SERPL W P-5'-P-CCNC: 35 U/L (ref 13–39)
BILIRUB SERPL-MCNC: 0.46 MG/DL (ref 0.2–1)
BUN SERPL-MCNC: 20 MG/DL (ref 5–25)
CALCIUM SERPL-MCNC: 8.7 MG/DL (ref 8.4–10.2)
CHLORIDE SERPL-SCNC: 100 MMOL/L (ref 96–108)
CO2 SERPL-SCNC: 31 MMOL/L (ref 21–32)
CREAT SERPL-MCNC: 1.17 MG/DL (ref 0.6–1.3)
EST. AVERAGE GLUCOSE BLD GHB EST-MCNC: 177 MG/DL
GFR SERPL CREATININE-BSD FRML MDRD: 65 ML/MIN/1.73SQ M
GLUCOSE P FAST SERPL-MCNC: 125 MG/DL (ref 65–99)
HBA1C MFR BLD: 7.8 %
POTASSIUM SERPL-SCNC: 4.7 MMOL/L (ref 3.5–5.3)
PROT SERPL-MCNC: 6.7 G/DL (ref 6.4–8.4)
PSA SERPL-MCNC: <0.01 NG/ML (ref 0–4)
SODIUM SERPL-SCNC: 137 MMOL/L (ref 135–147)

## 2023-08-28 PROCEDURE — 36415 COLL VENOUS BLD VENIPUNCTURE: CPT

## 2023-08-28 PROCEDURE — 99214 OFFICE O/P EST MOD 30 MIN: CPT | Performed by: INTERNAL MEDICINE

## 2023-08-28 PROCEDURE — 83036 HEMOGLOBIN GLYCOSYLATED A1C: CPT

## 2023-08-28 PROCEDURE — 80053 COMPREHEN METABOLIC PANEL: CPT

## 2023-08-28 PROCEDURE — 84153 ASSAY OF PSA TOTAL: CPT

## 2023-08-28 NOTE — ASSESSMENT & PLAN NOTE
Lab Results   Component Value Date    HGBA1C 7.8 (H) 08/28/2023   Hemoglobin A1c increased from 7.5-7.8. He is on metformin  mg once a day. Discussed with the patient to increase the dose of metformin but at present he does not want to increase the dose of metformin. Advised to see nutrition therapy. Discussed with the patient about seeing endocrinologist but he would like to wait for now. Continue 1800-calorie ADA diet.   Follow blood sugar hemoglobin A1c

## 2023-08-28 NOTE — ASSESSMENT & PLAN NOTE
Cholesterol 172, triglycerides 162, HDL 47, LDL 93 advised to continue present medication simvastatin 40 mg daily and low-cholesterol low-carb diet and fish oil 1000 mg daily as well.

## 2023-08-28 NOTE — ASSESSMENT & PLAN NOTE
He had a sleep study revealed obstructive sleep apnea. Needs to see specialist for further evaluation and recommendation for treatment of obstructive sleep apnea.   Has appointment to see  specialist December 2023 we will try to schedule earlier if possible to see specialist.

## 2023-08-28 NOTE — ASSESSMENT & PLAN NOTE
Blood pressure elevated. Discussed with the patient about increasing dose of his lisinopril/HCTZ but he prefers at present to stay on present medication and he will try to watch diet for salt intake and exercise and lose weight. Also advised to check blood pressure at home and keep the blood pressure log. If persistently elevated blood pressure will adjust medication.

## 2023-08-28 NOTE — PROGRESS NOTES
Assessment/Plan:    1. Type 2 diabetes mellitus without complication, without long-term current use of insulin ([facility])  Assessment & Plan:    Lab Results   Component Value Date    HGBA1C 7.8 (H) 08/28/2023   Hemoglobin A1c increased from 7.5-7.8. He is on metformin  mg once a day. Discussed with the patient to increase the dose of metformin but at present he does not want to increase the dose of metformin. Advised to see nutrition therapy. Discussed with the patient about seeing endocrinologist but he would like to wait for now. Continue 1800-calorie ADA diet. Follow blood sugar hemoglobin A1c    Orders:  -     Basic metabolic panel; Future  -     Hemoglobin A1C; Future    2. Essential hypertension  Assessment & Plan:  Blood pressure elevated. Discussed with the patient about increasing dose of his lisinopril/HCTZ but he prefers at present to stay on present medication and he will try to watch diet for salt intake and exercise and lose weight. Also advised to check blood pressure at home and keep the blood pressure log. If persistently elevated blood pressure will adjust medication. Orders:  -     Basic metabolic panel; Future    3. Prostate cancer [facility])  Assessment & Plan:  His PSA is less than 0.01 stable. 4. BMI 45.0-49.9, adult [facility])  Assessment & Plan:  Patient  was advised to decrease portion size. Advised to decrease carb, sugar, cholesterol intake. Advised to exercise 3-5 times per week. Advised to lose weight. 5. Mixed hyperlipidemia  Assessment & Plan:  Cholesterol 172, triglycerides 162, HDL 47, LDL 93 advised to continue present medication simvastatin 40 mg daily and low-cholesterol low-carb diet and fish oil 1000 mg daily as well. Orders:  -     Lipid panel; Future    6. Microalbuminuria  Assessment & Plan:  He is on lisinopril. Orders:  -     Basic metabolic panel; Future    7.  Gastroesophageal reflux disease without esophagitis  Assessment & Plan:  His symptoms are controlled on omeprazole has been watching diet and taking reflux precautions       8. Choking episode  Assessment & Plan:  Patient complaint of choking episode. He was seen by GI specialist.  He had barium swallow video with speech therapy was advised regular diet and thin liquid. Questionable postnasal drip and patient noticed some voice change so advised to see ENT specialist.  He takes fexofenadine almost daily. Orders:  -     Ambulatory Referral to Otolaryngology; Future    9. Obstructive sleep apnea syndrome  Assessment & Plan:  He had a sleep study revealed obstructive sleep apnea. Needs to see specialist for further evaluation and recommendation for treatment of obstructive sleep apnea. Has appointment to see  specialist December 2023 we will try to schedule earlier if possible to see specialist.      10. Erectile dysfunction after radical prostatectomy  Assessment & Plan:  Very occasionally takes sildenafil. Left middle finger nailbed area still has slight swelling seen by Dr. Loren Martinez and was advised for x-ray of the left middle finger to rule out any foreign body but patient did not go for x-ray yet. Advised to follow with Dr. Kumar Hint: Patient presents for follow-up. Patient ID: Braulio Kothari is a 74 y.o. male. HPI   19-year-old white male patient presents for follow-up of his medical problems. He denies any chest pain. Breathing is okay. He does gets choking episodes. He has seen a GI specialist  And had  the barium swallow video with the speech therapy evaluation. Denies any fever or chills. Denies nausea vomiting diarrhea pain abdomen. He had a sleep study revealed sleep apnea.     The following portions of the patient's history were reviewed and updated as appropriate:     Past Medical History:  He has a past medical history of Asthma, BMI 40.0-44.9, adult ([address]) (1/11/2022), BMI 45.0-49.9, adult ([address]) (6/28/2021), Bronchospasm, Cancer ([address]), Chest tightness (2/17/2023), Choking episode (2/17/2023), Colon polyp, Essential hypertension (6/26/2021), GERD (gastroesophageal reflux disease), Hearing impairment (10/11/2022), Hyperlipidemia, Hypertension, Hypocalcemia (1/11/2022), Insomnia, Leg cramps, Lump on finger, left (1/11/2022), Microalbuminuria (9/27/2021), Mixed hyperlipidemia (6/26/2021), Morbid obesity ([address]) (5/26/2022), Nocturia, Numbness of fingers of both hands, Obstructive sleep apnea syndrome (8/28/2023), Onychomycosis of toenail (5/26/2022), Pain in both knees (5/26/2022), Paronychia of left middle finger (9/27/2021), Right foot pain (5/26/2023), Seasonal allergic rhinitis (1/11/2022), Skin lesion, Skin rash (6/26/2021), Sleep apnea, Snoring, Tubular adenoma of colon (6/28/2021), Type 2 diabetes mellitus without complication, without long-term current use of insulin ([address]) (6/26/2021), Wears glasses, and Weight gain. ,  _______________________________________________________________________  Past Surgical History:   has a past surgical history that includes Hip surgery (Bilateral); Prostatectomy; pr colonoscopy flx dx w/collj spec when pfrmd (N/A, 5/9/2017); Replacement total knee (Bilateral); Colonoscopy (05/09/2017); Colonoscopy; and Upper gastrointestinal endoscopy. ,  _______________________________________________________________________  Family History:  family history includes Brain cancer in his mother; Depression in his father; Diabetes in his father; Eczema in his mother; Heart disease in his father; Psoriasis in his mother; Stroke in his paternal grandfather.,  _______________________________________________________________________  Social History:   reports that he quit smoking about 31 years ago. His smoking use included cigarettes. He started smoking about 48 years ago. He has a 22.50 pack-year smoking history. He has never used smokeless tobacco. He reports current alcohol use of about 1.0 standard drink of alcohol per week.  He reports that he does not use drugs. ,  _______________________________________________________________________  Allergies:  is allergic to penicillins. .  _______________________________________________________________________  Current Outpatient Medications   Medication Sig Dispense Refill   • Ascorbic Acid (VITAMIN C PO) Take 1,000 mg by mouth daily     • betamethasone dipropionate (DIPROSONE) 0.05 % ointment Apply sparingly  1-2 times/day for up to 2 wks to R forearm rash. 45 g 0   • clobetasol (TEMOVATE) 0.05 % external solution Apply topically 2 (two) times a day 50 mL 3   • Cyanocobalamin (VITAMIN B-12 PO) Take 1,000 mcg by mouth daily      • fexofenadine (ALLEGRA) 180 MG tablet Take 180 mg by mouth daily as needed     • ketoconazole (NIZORAL) 2 % cream APPLY TOPICALLY DAILY 60 g 0   • lisinopril-hydrochlorothiazide (PRINZIDE,ZESTORETIC) 20-12.5 MG per tablet Take 1 tablet by mouth daily 90 tablet 1   • metFORMIN (GLUCOPHAGE-XR) 500 mg 24 hr tablet Take 1 tablet (500 mg total) by mouth daily at bedtime 30 tablet 0   • Multiple Vitamins-Minerals (MULTIVITAL PO) Take 1 tablet by mouth     • Omega-3 Fatty Acids (FISH OIL) 1000 MG CPDR Take 1,000 mg by mouth daily     • omeprazole (PriLOSEC) 20 mg delayed release capsule Take 20 mg by mouth as needed     • sildenafil (VIAGRA) 50 MG tablet Take 1 tablet (50 mg total) by mouth daily as needed for erectile dysfunction 8 tablet 0   • simvastatin (ZOCOR) 40 mg tablet Take 1 tablet (40 mg total) by mouth daily at bedtime 90 tablet 0   • triamcinolone (KENALOG) 0.1 % lotion Apply topically 2 (two) times a day 60 mL 2     No current facility-administered medications for this visit.     _______________________________________________________________________  Review of Systems   Constitutional: Negative for chills and fever. HENT: Negative for congestion, ear pain, nosebleeds, sinus pain, sore throat and trouble swallowing.     Eyes: Negative for discharge, redness and visual disturbance. Respiratory: Positive for choking and shortness of breath ( Exertional). Negative for cough. Cardiovascular: Negative for chest pain and palpitations. Gastrointestinal: Negative for abdominal pain, blood in stool, constipation, diarrhea, nausea and vomiting. Genitourinary: Negative for dysuria, flank pain and hematuria. Musculoskeletal: Negative for arthralgias, myalgias and neck pain. Skin: Negative for color change and rash. Neurological: Negative for dizziness, speech difficulty, weakness and headaches. Hematological: Does not bruise/bleed easily. Psychiatric/Behavioral: Negative for agitation and behavioral problems. Objective:  Vitals:    08/28/23 1617   BP: 140/86   BP Location: Left arm   Patient Position: Sitting   Cuff Size: Large   Pulse: 72   Resp: 18   Temp: 97.5 °F (36.4 °C)   TempSrc: Temporal   SpO2: 97%   Weight: (!) 155 kg (341 lb)   Height: 6' (1.829 m)     Body mass index is 46.25 kg/m². Physical Exam  Vitals and nursing note reviewed. Constitutional:       General: He is not in acute distress. Appearance: He is obese. HENT:      Head: Normocephalic and atraumatic. Nose: Nose normal.      Mouth/Throat:      Mouth: Mucous membranes are moist.   Eyes:      General: No scleral icterus. Right eye: No discharge. Left eye: No discharge. Extraocular Movements: Extraocular movements intact. Conjunctiva/sclera: Conjunctivae normal.   Cardiovascular:      Rate and Rhythm: Normal rate and regular rhythm. Pulses: Normal pulses. Heart sounds: No murmur heard. Pulmonary:      Effort: Pulmonary effort is normal. No respiratory distress. Breath sounds: Normal breath sounds. No wheezing, rhonchi or rales. Abdominal:      General: Bowel sounds are normal.      Palpations: Abdomen is soft. Tenderness: There is no abdominal tenderness. Musculoskeletal:         General: Normal range of motion.       Cervical back: Normal range of motion and neck supple. No muscular tenderness. Right lower leg: Edema ( Trace pedal edema) present. Left lower leg: Edema ( Trace pedal edema) present. Skin:     General: Skin is warm. Findings: No rash. Neurological:      General: No focal deficit present. Mental Status: He is alert and oriented to person, place, and time. Motor: No weakness. Psychiatric:         Mood and Affect: Mood normal.         Behavior: Behavior normal.       I spent 30 minutes with the patient today.   More than 50% time spent for reviewing of external notes, reviewing of the results of diagnostics test, management of care, patient education and ordering of test.

## 2023-08-28 NOTE — PATIENT INSTRUCTIONS
Patient was advised to continue present medications. discussed with the patient medications and laboratory data in detail. Follow-up with me in 4 months or as advised. If any blood test was ordered please do 1 week prior to next appointment unless advise to get earlier.   If you have any questions please call the office [phone]

## 2023-08-28 NOTE — ASSESSMENT & PLAN NOTE
Patient complaint of choking episode. He was seen by GI specialist.  He had barium swallow video with speech therapy was advised regular diet and thin liquid. Questionable postnasal drip and patient noticed some voice change so advised to see ENT specialist.  He takes fexofenadine almost daily.

## 2023-08-30 ENCOUNTER — VBI (OUTPATIENT)
Dept: ADMINISTRATIVE | Facility: OTHER | Age: 68
End: 2023-08-30

## 2023-09-06 ENCOUNTER — TELEPHONE (OUTPATIENT)
Dept: INTERNAL MEDICINE CLINIC | Facility: CLINIC | Age: 68
End: 2023-09-06

## 2023-09-06 NOTE — TELEPHONE ENCOUNTER
Patient called stating stuffy nose, cough, sore throat. Concerned about covid surge. Offered patient appointment with Dr. Cristian Salazar or to go to urgent care since Dr. Denita Colunga is out of the office.  Patient declined stating his insurance has a telemedicine option he will try first.

## 2023-09-29 ENCOUNTER — TELEPHONE (OUTPATIENT)
Age: 68
End: 2023-09-29

## 2023-09-29 ENCOUNTER — OFFICE VISIT (OUTPATIENT)
Dept: INTERNAL MEDICINE CLINIC | Facility: CLINIC | Age: 68
End: 2023-09-29
Payer: COMMERCIAL

## 2023-09-29 VITALS
DIASTOLIC BLOOD PRESSURE: 88 MMHG | RESPIRATION RATE: 20 BRPM | BODY MASS INDEX: 42.66 KG/M2 | TEMPERATURE: 98 F | SYSTOLIC BLOOD PRESSURE: 150 MMHG | WEIGHT: 315 LBS | OXYGEN SATURATION: 98 % | HEIGHT: 72 IN | HEART RATE: 60 BPM

## 2023-09-29 DIAGNOSIS — J40 BRONCHITIS: Primary | ICD-10-CM

## 2023-09-29 DIAGNOSIS — I10 ESSENTIAL HYPERTENSION: ICD-10-CM

## 2023-09-29 DIAGNOSIS — E11.9 TYPE 2 DIABETES MELLITUS WITHOUT COMPLICATION, WITHOUT LONG-TERM CURRENT USE OF INSULIN (HCC): ICD-10-CM

## 2023-09-29 DIAGNOSIS — J30.89 SEASONAL ALLERGIC RHINITIS DUE TO OTHER ALLERGIC TRIGGER: ICD-10-CM

## 2023-09-29 LAB
SARS-COV-2 AG UPPER RESP QL IA: NEGATIVE
VALID CONTROL: NORMAL

## 2023-09-29 PROCEDURE — 87811 SARS-COV-2 COVID19 W/OPTIC: CPT | Performed by: INTERNAL MEDICINE

## 2023-09-29 PROCEDURE — 99213 OFFICE O/P EST LOW 20 MIN: CPT | Performed by: INTERNAL MEDICINE

## 2023-09-29 RX ORDER — BENZONATATE 200 MG/1
200 CAPSULE ORAL EVERY 8 HOURS PRN
Qty: 20 CAPSULE | Refills: 0 | Status: SHIPPED | OUTPATIENT
Start: 2023-09-29

## 2023-09-29 RX ORDER — METFORMIN HYDROCHLORIDE 500 MG/1
500 TABLET, EXTENDED RELEASE ORAL
Qty: 90 TABLET | Refills: 1 | Status: SHIPPED | OUTPATIENT
Start: 2023-09-29

## 2023-09-29 RX ORDER — AZITHROMYCIN 250 MG/1
TABLET, FILM COATED ORAL
Qty: 6 TABLET | Refills: 0 | Status: SHIPPED | OUTPATIENT
Start: 2023-09-29 | End: 2023-10-04

## 2023-09-29 NOTE — TELEPHONE ENCOUNTER
Patient would like a call back to schedule a same day appointment for today if possible for congestion, post nasal drip, mucus and cough.   No slots available and patient wants a call back

## 2023-09-29 NOTE — PATIENT INSTRUCTIONS
Patient was advised to continue present medications. discussed with the patient medications and laboratory data in detail. Follow-up with me in  as advised. If any blood test was ordered please do 1 week prior to next appointment unless advise to get earlier.   If you have any questions please call the office [phone]

## 2023-09-29 NOTE — PROGRESS NOTES
Assessment/Plan:    1. Bronchitis  -     POCT Rapid Covid Ag  -     azithromycin (Zithromax) 250 mg tablet; Take 2 tablets (500 mg total) by mouth daily for 1 day, THEN 1 tablet (250 mg total) daily for 4 days. -     benzonatate (TESSALON) 200 MG capsule; Take 1 capsule (200 mg total) by mouth every 8 (eight) hours as needed for cough    2. Essential hypertension    3. Type 2 diabetes mellitus without complication, without long-term current use of insulin ([facility])  -     metFORMIN (GLUCOPHAGE-XR) 500 mg 24 hr tablet; Take 1 tablet (500 mg total) by mouth daily at bedtime    4. Seasonal allergic rhinitis due to other allergic trigger     COVID-19 test was negative in the office. Possible viral versus bacterial bronchitis. Will start antibiotic and cough medication. Advised to increase fluids. Advised to start fexofenadine and a also nasal spray. States he has a Nasonex nasal spray at home which he will start as well. Blood pressure elevated today but he is not feeling well and he has been taking over-the-counter cough and cold medication so advised to discontinue that medication. We will continue present medication and observe on present medication and low-salt diet. Diabetes mellitus he has been taking his metformin. Advised to continue 1800-calorie ADA diet. Subjective: Presents with cough sinus congestion and drip for last 3 to 4 days. Patient ID: Jam Triana is a 74 y.o. male. HPI   72-year-old white male patient presents with complaint of cough, sinus congestion and sinus drip for last 3 to 4 days. He has been taking over-the-counter cough medicine for cough and cold. He denies any chest pain. Occasionally gets shortness of breath. Denies any fever or chills. Denies nausea vomiting diarrhea.   The following portions of the patient's history were reviewed and updated as appropriate:     Past Medical History:  He has a past medical history of Asthma, BMI 40.0-44.9, adult ([address] (1/11/2022), BMI 45.0-49.9, adult ([address]) (6/28/2021), Bronchitis (9/29/2023), Bronchospasm, Cancer ([address]), Chest tightness (2/17/2023), Choking episode (2/17/2023), Colon polyp, Essential hypertension (6/26/2021), GERD (gastroesophageal reflux disease), Hearing impairment (10/11/2022), Hyperlipidemia, Hypertension, Hypocalcemia (1/11/2022), Insomnia, Leg cramps, Lump on finger, left (1/11/2022), Microalbuminuria (9/27/2021), Mixed hyperlipidemia (6/26/2021), Morbid obesity ([address]) (5/26/2022), Nocturia, Numbness of fingers of both hands, Obstructive sleep apnea syndrome (8/28/2023), Onychomycosis of toenail (5/26/2022), Pain in both knees (5/26/2022), Paronychia of left middle finger (9/27/2021), Right foot pain (5/26/2023), Seasonal allergic rhinitis (1/11/2022), Skin lesion, Skin rash (6/26/2021), Sleep apnea, Snoring, Tubular adenoma of colon (6/28/2021), Type 2 diabetes mellitus without complication, without long-term current use of insulin ([address]) (6/26/2021), Wears glasses, and Weight gain. ,  _______________________________________________________________________  Past Surgical History:   has a past surgical history that includes Hip surgery (Bilateral); Prostatectomy; pr colonoscopy flx dx w/collj spec when pfrmd (N/A, 5/9/2017); Replacement total knee (Bilateral); Colonoscopy (05/09/2017); Colonoscopy; and Upper gastrointestinal endoscopy. ,  _______________________________________________________________________  Family History:  family history includes Brain cancer in his mother; Depression in his father; Diabetes in his father; Eczema in his mother; Heart disease in his father; Psoriasis in his mother; Stroke in his paternal grandfather.,  _______________________________________________________________________  Social History:   reports that he quit smoking about 31 years ago. His smoking use included cigarettes. He started smoking about 48 years ago. He has a 22.50 pack-year smoking history.  He has never used smokeless tobacco. He reports current alcohol use of about 1.0 standard drink of alcohol per week. He reports that he does not use drugs. ,  _______________________________________________________________________  Allergies:  is allergic to penicillins. .  _______________________________________________________________________  Current Outpatient Medications   Medication Sig Dispense Refill   • Ascorbic Acid (VITAMIN C PO) Take 1,000 mg by mouth daily     • azithromycin (Zithromax) 250 mg tablet Take 2 tablets (500 mg total) by mouth daily for 1 day, THEN 1 tablet (250 mg total) daily for 4 days. 6 tablet 0   • benzonatate (TESSALON) 200 MG capsule Take 1 capsule (200 mg total) by mouth every 8 (eight) hours as needed for cough 20 capsule 0   • betamethasone dipropionate (DIPROSONE) 0.05 % ointment Apply sparingly  1-2 times/day for up to 2 wks to R forearm rash.  45 g 0   • clobetasol (TEMOVATE) 0.05 % external solution Apply topically 2 (two) times a day 50 mL 3   • Cyanocobalamin (VITAMIN B-12 PO) Take 1,000 mcg by mouth daily      • fexofenadine (ALLEGRA) 180 MG tablet Take 180 mg by mouth daily as needed     • ketoconazole (NIZORAL) 2 % cream APPLY TOPICALLY DAILY 60 g 0   • lisinopril-hydrochlorothiazide (PRINZIDE,ZESTORETIC) 20-12.5 MG per tablet Take 1 tablet by mouth daily 90 tablet 1   • metFORMIN (GLUCOPHAGE-XR) 500 mg 24 hr tablet Take 1 tablet (500 mg total) by mouth daily at bedtime 90 tablet 1   • Multiple Vitamins-Minerals (MULTIVITAL PO) Take 1 tablet by mouth     • Omega-3 Fatty Acids (FISH OIL) 1000 MG CPDR Take 1,000 mg by mouth daily     • omeprazole (PriLOSEC) 20 mg delayed release capsule Take 20 mg by mouth as needed     • sildenafil (VIAGRA) 50 MG tablet Take 1 tablet (50 mg total) by mouth daily as needed for erectile dysfunction 8 tablet 0   • simvastatin (ZOCOR) 40 mg tablet Take 1 tablet (40 mg total) by mouth daily at bedtime 90 tablet 0   • triamcinolone (KENALOG) 0.1 % lotion Apply topically 2 (two) times a day 60 mL 2     No current facility-administered medications for this visit.     _______________________________________________________________________  Review of Systems   Constitutional: Negative for chills and fever. HENT: Positive for congestion and postnasal drip. Negative for ear pain, hearing loss, nosebleeds, sinus pain, sore throat and trouble swallowing. Eyes: Negative for discharge, redness and visual disturbance. Respiratory: Positive for cough and shortness of breath ( Occasional shortness of breath). Negative for chest tightness. Cardiovascular: Negative for chest pain and palpitations. Gastrointestinal: Negative for abdominal pain, blood in stool, constipation, diarrhea, nausea and vomiting. Musculoskeletal: Negative for arthralgias, myalgias and neck pain. Skin: Negative for color change and rash. Neurological: Negative for dizziness, speech difficulty, weakness and headaches. Hematological: Does not bruise/bleed easily. Psychiatric/Behavioral: Negative for agitation and behavioral problems. Objective:  Vitals:    09/29/23 1030   BP: 150/88   BP Location: Left arm   Patient Position: Sitting   Cuff Size: Large   Pulse: 60   Resp: 20   Temp: 98 °F (36.7 °C)   SpO2: 98%   Weight: (!) 156 kg (345 lb)   Height: 6' (1.829 m)     Body mass index is 46.79 kg/m². Physical Exam  Vitals and nursing note reviewed. Constitutional:       General: He is not in acute distress. Appearance: Normal appearance. HENT:      Head: Normocephalic and atraumatic. Right Ear: Tympanic membrane, ear canal and external ear normal.      Left Ear: Tympanic membrane, ear canal and external ear normal.      Nose: Congestion present. Mouth/Throat:      Mouth: Mucous membranes are moist.      Pharynx: Oropharynx is clear. No oropharyngeal exudate or posterior oropharyngeal erythema. Eyes:      General: No scleral icterus. Right eye: No discharge. Left eye: No discharge. Extraocular Movements: Extraocular movements intact. Conjunctiva/sclera: Conjunctivae normal.   Cardiovascular:      Rate and Rhythm: Normal rate and regular rhythm. Pulses: Normal pulses. Pulmonary:      Effort: Pulmonary effort is normal. No respiratory distress. Breath sounds: Normal breath sounds. No wheezing, rhonchi or rales. Abdominal:      General: Bowel sounds are normal.      Palpations: Abdomen is soft. Tenderness: There is no abdominal tenderness. Musculoskeletal:         General: Normal range of motion. Cervical back: Normal range of motion and neck supple. No muscular tenderness. Right lower leg: No edema. Left lower leg: No edema. Skin:     General: Skin is warm. Findings: No rash. Neurological:      General: No focal deficit present. Mental Status: He is alert and oriented to person, place, and time. Motor: No weakness.    Psychiatric:         Mood and Affect: Mood normal.         Behavior: Behavior normal.

## 2023-10-09 DIAGNOSIS — B35.3 TINEA PEDIS OF BOTH FEET: ICD-10-CM

## 2023-10-11 RX ORDER — KETOCONAZOLE 20 MG/G
CREAM TOPICAL DAILY
Qty: 60 G | Refills: 0 | Status: SHIPPED | OUTPATIENT
Start: 2023-10-11

## 2023-10-18 DIAGNOSIS — E11.9 TYPE 2 DIABETES MELLITUS WITHOUT COMPLICATION, WITHOUT LONG-TERM CURRENT USE OF INSULIN (HCC): ICD-10-CM

## 2023-10-19 RX ORDER — METFORMIN HYDROCHLORIDE 500 MG/1
500 TABLET, EXTENDED RELEASE ORAL
Qty: 90 TABLET | Refills: 0 | Status: SHIPPED | OUTPATIENT
Start: 2023-10-19

## 2023-10-31 ENCOUNTER — CONSULT (OUTPATIENT)
Dept: PULMONOLOGY | Facility: CLINIC | Age: 68
End: 2023-10-31
Payer: COMMERCIAL

## 2023-10-31 VITALS
WEIGHT: 315 LBS | BODY MASS INDEX: 42.66 KG/M2 | OXYGEN SATURATION: 97 % | HEART RATE: 57 BPM | SYSTOLIC BLOOD PRESSURE: 128 MMHG | HEIGHT: 72 IN | DIASTOLIC BLOOD PRESSURE: 54 MMHG

## 2023-10-31 DIAGNOSIS — E66.01 CLASS 3 SEVERE OBESITY DUE TO EXCESS CALORIES WITH SERIOUS COMORBIDITY AND BODY MASS INDEX (BMI) OF 45.0 TO 49.9 IN ADULT (HCC): Primary | ICD-10-CM

## 2023-10-31 DIAGNOSIS — G47.33 OBSTRUCTIVE SLEEP APNEA SYNDROME: ICD-10-CM

## 2023-10-31 DIAGNOSIS — J45.20 MILD INTERMITTENT ASTHMA WITHOUT COMPLICATION: ICD-10-CM

## 2023-10-31 DIAGNOSIS — I10 ESSENTIAL HYPERTENSION: ICD-10-CM

## 2023-10-31 PROBLEM — E66.813 CLASS 3 SEVERE OBESITY DUE TO EXCESS CALORIES WITH SERIOUS COMORBIDITY AND BODY MASS INDEX (BMI) OF 45.0 TO 49.9 IN ADULT (HCC): Status: ACTIVE | Noted: 2021-06-28

## 2023-10-31 PROBLEM — J45.909 ASTHMA: Status: ACTIVE | Noted: 2023-10-31

## 2023-10-31 PROCEDURE — 99204 OFFICE O/P NEW MOD 45 MIN: CPT | Performed by: INTERNAL MEDICINE

## 2023-10-31 RX ORDER — ALBUTEROL SULFATE 90 UG/1
2 AEROSOL, METERED RESPIRATORY (INHALATION) EVERY 6 HOURS PRN
Qty: 18 G | Refills: 2 | Status: SHIPPED | OUTPATIENT
Start: 2023-10-31

## 2023-10-31 NOTE — PROGRESS NOTES
Assessment/Plan:    Obstructive sleep apnea syndrome  Mr. Jordan Bright has had a long history of loud snoring interrupted sleep waking up episodes during sleep and reported apneic events. He also feels sleepy and tired during the day. His [place] sleepiness score is 9 out of 24. He has occasional morning headache and nocturia. He has been diagnosed with diabetes and also has hypertension. He does not have any regular problem driving. He had an overnight home sleep study done in July 2023 which showed severe sleep apnea with significant oxygen desaturation. On Clinical examination, he was obese and had oropharyngeal crowding. He would benefit from CPAP therapy and I have started him on auto CPAP therapy. I have advised him to use his CPAP regularly and adequately. He does not consume alcohol or smoke currently. Mild intermittent asthma  He has history of shortness of breath on exertion, mostly while climbing stairs. He has family history of asthma. He also has cough and wheezing. He has history of allergies. He is a previous smoker. He has not had any previous PFT. Odessa Whitaker On clinical examination, his chest is clear to auscultation. I have started him on albuterol empirically. I have ordered a PFT. He had a long discussion with him and answered all his questions. Class 3 severe obesity due to excess calories with serious comorbidity and body mass index (BMI) of 45.0 to 49.9 in [facility])  He is extremely obese and understands the need for weight reduction. He understands that weight reduction can significantly improve his sleep apnea and other symptoms. Essential hypertension  History of hypertension and currently he is on treatment with lisinopril hydrochlorothiazide.        Diagnoses and all orders for this visit:    Class 3 severe obesity due to excess calories with serious comorbidity and body mass index (BMI) of 45.0 to 49.9 in [facility])  -     Ambulatory Referral to Endocrinology; Future    Obstructive sleep apnea syndrome  -     Ambulatory Referral to Pulmonology  -     CPAP Auto New DME    Essential hypertension    Mild intermittent asthma without complication  -     albuterol (Ventolin HFA) 90 mcg/act inhaler; Inhale 2 puffs every 6 (six) hours as needed for wheezing  -     Complete PFT with post bronchodilator; Future          Subjective:      Patient ID: Becca Franklin is a 74 y.o. male. Mr. Jordan Bright has had a long history of loud snoring interrupted sleep waking up episodes during sleep and reported apneic events. He also feels sleepy and tired during the day. His [place] sleepiness score is 9 out of 24. He has occasional morning headache and nocturia. He has been diagnosed with diabetes and also has hypertension. He is obese and has been trying to lose weight though not successfully. He does not have any regular problem driving. He had an overnight home sleep study done in July 2023 which showed severe sleep apnea with significant oxygen desaturation. He would benefit from CPAP therapy. He does not consume alcohol or smoke currently  He has history of shortness of breath on exertion mostly while climbing stairs. He has family history of asthma. Has also cough and wheezing. He does not use any inhaler regularly. He has history of allergies. He has history of dry mouth and sinus issues. He has not had any PFT before. He is a previous smoker quit long back. He needs to lose weight and I have advised him in this regard. He also has diabetes. I offered to refer him to endocrinology. The following portions of the patient's history were reviewed and updated as appropriate: allergies, current medications, past family history, past medical history, past social history, past surgical history, and problem list.    Review of Systems   Constitutional:  Positive for fatigue.  Negative for activity change, appetite change, chills, fever and unexpected weight change. HENT:  Positive for rhinorrhea, sinus pressure, sneezing and sore throat. Negative for hearing loss and trouble swallowing. Eyes:  Negative for visual disturbance. Respiratory:  Positive for cough, shortness of breath and wheezing. Cardiovascular:  Negative for chest pain. Gastrointestinal:  Negative for abdominal pain, constipation, diarrhea, nausea and vomiting. GERD on PPI   Genitourinary:  Positive for frequency. Negative for dysuria and urgency. Prostate cancer   Musculoskeletal:  Negative for arthralgias, back pain and gait problem. Skin:  Negative for rash. Allergic/Immunologic: Positive for environmental allergies. Neurological:  Negative for dizziness, seizures, syncope, light-headedness and headaches. Psychiatric/Behavioral:  Positive for sleep disturbance. Negative for agitation and confusion. The patient is not nervous/anxious. Objective:      /54 (BP Location: Right arm, Patient Position: Sitting, Cuff Size: Large)   Pulse 57   Ht 6' (1.829 m)   Wt (!) 157 kg (347 lb)   SpO2 97%   BMI 47.06 kg/m²          Physical Exam  Vitals reviewed. Constitutional:       General: He is not in acute distress. Appearance: He is obese. He is not ill-appearing, toxic-appearing or diaphoretic. HENT:      Head: Normocephalic. Mouth/Throat:      Mouth: Mucous membranes are moist.      Comments: [place] Class 3  Eyes:      General: No scleral icterus. Conjunctiva/sclera: Conjunctivae normal.   Neck:      Comments: Short neck  Cardiovascular:      Rate and Rhythm: Normal rate and regular rhythm. Heart sounds: Normal heart sounds. No murmur heard. Pulmonary:      Effort: Pulmonary effort is normal. No respiratory distress. Breath sounds: Normal breath sounds. No stridor. No wheezing, rhonchi or rales. Chest:      Chest wall: No tenderness. Abdominal:      General: Bowel sounds are normal.      Palpations: Abdomen is soft. Tenderness: There is no abdominal tenderness. There is no guarding. Musculoskeletal:      Cervical back: Neck supple. No rigidity. Right lower leg: No edema. Left lower leg: No edema. Lymphadenopathy:      Cervical: No cervical adenopathy. Skin:     Coloration: Skin is not jaundiced or pale. Findings: No rash. Neurological:      Mental Status: He is alert and oriented to person, place, and time. Gait: Gait normal.   Psychiatric:         Mood and Affect: Mood normal.         Behavior: Behavior normal.         Thought Content:  Thought content normal.         Judgment: Judgment normal.

## 2023-11-02 DIAGNOSIS — I10 ESSENTIAL HYPERTENSION: ICD-10-CM

## 2023-11-02 DIAGNOSIS — N52.31 ERECTILE DYSFUNCTION AFTER RADICAL PROSTATECTOMY: ICD-10-CM

## 2023-11-02 DIAGNOSIS — E78.2 MIXED HYPERLIPIDEMIA: ICD-10-CM

## 2023-11-02 NOTE — ASSESSMENT & PLAN NOTE
Mr. Fransisca Castle has had a long history of loud snoring interrupted sleep waking up episodes during sleep and reported apneic events. He also feels sleepy and tired during the day. His [place] sleepiness score is 9 out of 24. He has occasional morning headache and nocturia. He has been diagnosed with diabetes and also has hypertension. He does not have any regular problem driving. He had an overnight home sleep study done in July 2023 which showed severe sleep apnea with significant oxygen desaturation. On Clinical examination, he was obese and had oropharyngeal crowding. He would benefit from CPAP therapy and I have started him on auto CPAP therapy. I have advised him to use his CPAP regularly and adequately. He does not consume alcohol or smoke currently.

## 2023-11-02 NOTE — ASSESSMENT & PLAN NOTE
He is extremely obese and understands the need for weight reduction. He understands that weight reduction can significantly improve his sleep apnea and other symptoms.

## 2023-11-02 NOTE — ASSESSMENT & PLAN NOTE
He has history of shortness of breath on exertion, mostly while climbing stairs. He has family history of asthma. He also has cough and wheezing. He has history of allergies. He is a previous smoker. He has not had any previous PFT. Dionicio Wellington On clinical examination, his chest is clear to auscultation. I have started him on albuterol empirically. I have ordered a PFT. He had a long discussion with him and answered all his questions.

## 2023-11-03 ENCOUNTER — OFFICE VISIT (OUTPATIENT)
Dept: DERMATOLOGY | Facility: CLINIC | Age: 68
End: 2023-11-03
Payer: COMMERCIAL

## 2023-11-03 VITALS — WEIGHT: 315 LBS | BODY MASS INDEX: 47.06 KG/M2

## 2023-11-03 DIAGNOSIS — L28.0 LICHEN SIMPLEX CHRONICUS: ICD-10-CM

## 2023-11-03 DIAGNOSIS — L57.0 KERATOSIS, ACTINIC: ICD-10-CM

## 2023-11-03 DIAGNOSIS — L40.8 SEBOPSORIASIS: Primary | ICD-10-CM

## 2023-11-03 DIAGNOSIS — B07.9 VERRUCA VULGARIS: ICD-10-CM

## 2023-11-03 LAB

## 2023-11-03 PROCEDURE — 99214 OFFICE O/P EST MOD 30 MIN: CPT | Performed by: DERMATOLOGY

## 2023-11-03 PROCEDURE — 17000 DESTRUCT PREMALG LESION: CPT | Performed by: DERMATOLOGY

## 2023-11-03 PROCEDURE — 17110 DESTRUCTION B9 LES UP TO 14: CPT | Performed by: DERMATOLOGY

## 2023-11-03 RX ORDER — BETAMETHASONE VALERATE 0.1 %
LOTION (ML) TOPICAL 2 TIMES DAILY
Qty: 60 ML | Refills: 3 | Status: SHIPPED | OUTPATIENT
Start: 2023-11-03

## 2023-11-03 RX ORDER — SIMVASTATIN 40 MG
40 TABLET ORAL
Qty: 90 TABLET | Refills: 0 | Status: SHIPPED | OUTPATIENT
Start: 2023-11-03

## 2023-11-03 RX ORDER — SILDENAFIL 50 MG/1
50 TABLET, FILM COATED ORAL DAILY PRN
Qty: 8 TABLET | Refills: 0 | Status: SHIPPED | OUTPATIENT
Start: 2023-11-03

## 2023-11-03 RX ORDER — LISINOPRIL AND HYDROCHLOROTHIAZIDE 20; 12.5 MG/1; MG/1
1 TABLET ORAL DAILY
Qty: 90 TABLET | Refills: 0 | Status: SHIPPED | OUTPATIENT
Start: 2023-11-03

## 2023-11-03 NOTE — PATIENT INSTRUCTIONS
ACTINIC KERATOSIS      PROCEDURE:  DESTRUCTION OF PRE-MALIGNANT LESIONS  After a thorough discussion of treatment options and risk/benefits/alternatives (including but not limited to local pain, scarring, dyspigmentation, blistering, and possible superinfection), verbal and written consent were obtained and the aforementioned lesions were treated on with cryotherapy using liquid nitrogen x 2 cycles for 5-10 seconds. The patient tolerated the procedure well, and after-care instructions were provided. Patient instructions: Your pre-cancerous lesions (called actinic keratosis) were treated with liquid nitrogen today. The treated areas will get more red, crusted over the next few days. There might be some blistering. Apply vaseline to the treated area for the next week to help it heal fully. Do not pick at the area. Return in 3-4 weeks for another round of liquid nitrogen treatment if lesion(s)  fails to fully resolve. after-care instructions were provided. SEBOPSORIASIS       Assessment and Plan:  Based on a thorough discussion of this condition and the management approach to it (including a comprehensive discussion of the known risks, side effects and potential benefits of treatment), the patient (family) agrees to implement the following specific plan:  Continue Betamethasone valerate 0.1% lotion twice a day as needed to the scalp. SKIN TAG DEFORMITY  WITH VERRUCA      Assessment and Plan:  Based on a thorough discussion of this condition and the management approach to it (including a comprehensive discussion of the known risks, side effects and potential benefits of treatment), the patient (family) agrees to implement the following specific plan:  Try to trim the tag on the cuticle. May use the Betamethasone lotion twice a day. May apply Vaseline on the area if needed.    Follow up in 2 months         PROCEDURE:  DESTRUCTION OF BENIGN LESIONS WITH CRYOTHERAPY  After a thorough discussion of treatment options and risk/benefits/alternatives (including but not limited to local pain, scarring, dyspigmentation, blistering, and possible superinfection), verbal and written consent were obtained and the aforementioned lesions were treated on with cryotherapy using liquid nitrogen x 1 cycle for 5-10 seconds. The patient tolerated the procedure well, and after-care instructions were provided.

## 2023-11-03 NOTE — PROGRESS NOTES
[facility] Note     Patient Name: Dwayne Adam  Encounter Date: 11/3/2023     Have you been cared for by a [facility]ologist in the last 3 years and, if so, which description applies to you? Yes. I have been here within the last 3 years, and my medical history has NOT changed since that time. I am MALE/not capable of bearing children. REVIEW OF SYSTEMS:  Have you recently had or currently have any of the following? No changes in my recent health. PAST MEDICAL HISTORY:  Have you personally ever had or currently have any of the following? If "YES," then please provide more detail. No changes in my medical history. HISTORY OF IMMUNOSUPPRESSION: Do you have a history of any of the following:  Systemic Immunosuppression such as Diabetes, Biologic or Immunotherapy, Chemotherapy, Organ Transplantation, Bone Marrow Transplantation? No     Answering "YES" requires the addition of the dotphrase "IMMUNOSUPPRESSED" as the first diagnosis of the patient's visit. FAMILY HISTORY:  Any "first degree relatives" (parent, brother, sister, or child) with the following? No changes in my family's known health. PATIENT EXPERIENCE:    Do you want the Dermatologist to perform a COMPLETE skin exam today including a clinical examination under the "bra and underwear" areas? NO  If necessary, do we have your permission to call and leave a detailed message on your Preferred Phone number that includes your specific medical information? Yes      Allergies   Allergen Reactions   • Penicillins Rash     sensitivity      Current Outpatient Medications:   •  betamethasone valerate (VALISONE) 0.1 % lotion, Apply topically 2 (two) times a day To the scalp as needed. , Disp: 60 mL, Rfl: 3  •  albuterol (Ventolin HFA) 90 mcg/act inhaler, Inhale 2 puffs every 6 (six) hours as needed for wheezing, Disp: 18 g, Rfl: 2  •  Ascorbic Acid (VITAMIN C PO), Take 1,000 mg by mouth daily, Disp: , Rfl:   • betamethasone dipropionate (DIPROSONE) 0.05 % ointment, Apply sparingly  1-2 times/day for up to 2 wks to R forearm rash. (Patient not taking: Reported on 10/31/2023), Disp: 45 g, Rfl: 0  •  clobetasol (TEMOVATE) 0.05 % external solution, Apply topically 2 (two) times a day (Patient not taking: Reported on 10/31/2023), Disp: 50 mL, Rfl: 3  •  Cyanocobalamin (VITAMIN B-12 PO), Take 1,000 mcg by mouth daily , Disp: , Rfl:   •  fexofenadine (ALLEGRA) 180 MG tablet, Take 180 mg by mouth daily as needed, Disp: , Rfl:   •  ketoconazole (NIZORAL) 2 % cream, Apply topically daily (Patient not taking: Reported on 10/31/2023), Disp: 60 g, Rfl: 0  •  lisinopril-hydrochlorothiazide (PRINZIDE,ZESTORETIC) 20-12.5 MG per tablet, Take 1 tablet by mouth daily, Disp: 90 tablet, Rfl: 0  •  metFORMIN (GLUCOPHAGE-XR) 500 mg 24 hr tablet, Take 1 tablet (500 mg total) by mouth daily at bedtime, Disp: 90 tablet, Rfl: 0  •  Multiple Vitamins-Minerals (MULTIVITAL PO), Take 1 tablet by mouth, Disp: , Rfl:   •  Omega-3 Fatty Acids (FISH OIL) 1000 MG CPDR, Take 1,000 mg by mouth daily, Disp: , Rfl:   •  omeprazole (PriLOSEC) 20 mg delayed release capsule, Take 20 mg by mouth as needed, Disp: , Rfl:   •  sildenafil (VIAGRA) 50 MG tablet, Take 1 tablet (50 mg total) by mouth daily as needed for erectile dysfunction, Disp: 8 tablet, Rfl: 0  •  simvastatin (ZOCOR) 40 mg tablet, Take 1 tablet (40 mg total) by mouth daily at bedtime, Disp: 90 tablet, Rfl: 0  •  triamcinolone (KENALOG) 0.1 % lotion, Apply topically 2 (two) times a day, Disp: 60 mL, Rfl: 2          Whom besides the patient is providing clinical information about today's encounter? NO ADDITIONAL HISTORIAN (patient alone provided history)    Physical Exam and Assessment/Plan by Diagnosis:    ACTINIC KERATOSIS  Physical Exam:  Anatomic Location Affected:  Right dorsal wrist  Morphological Description:   Thin pink papule(s) with gritty scale       Assessment and Plan:  Based on a thorough discussion of this condition and the management approach to it (including a comprehensive discussion of the known risks, side effects and potential benefits of treatment), the patient (family) agrees to implement the following specific plan:  Treated with cryotherapy today; written and verbal consent obtained. PROCEDURE:  DESTRUCTION OF PRE-MALIGNANT LESIONS  After a thorough discussion of treatment options and risk/benefits/alternatives (including but not limited to local pain, scarring, dyspigmentation, blistering, and possible superinfection), verbal and written consent were obtained and the aforementioned lesions were treated on with cryotherapy using liquid nitrogen x 2 cycles for 5-10 seconds. The patient tolerated the procedure well, and after-care instructions were provided. TOTAL NUMBER of 1 pre-malignant lesions were treated today on the ANATOMIC LOCATION: Right hand. Patient instructions: Your pre-cancerous lesions (called actinic keratosis) were treated with liquid nitrogen today. The treated areas will get more red, crusted over the next few days. There might be some blistering. Apply vaseline to the treated area for the next week to help it heal fully. Do not pick at the area. Return in 3-4 weeks for another round of liquid nitrogen treatment if lesion(s)  fails to fully resolve. SEBORRHEIC KERATOSIS    Physical Exam:  Anatomic Location: Bilateral arms  Morphologic Description:  Waxy "stuck-on" discrete papule  Any active signs of "inflamed" status:  NONE  Pertinent Positives:  Pertinent Negatives: Additional History of Present Condition:   ASYMPTOMATIC (per Medicare definition)    Plan:  Reviewed that these lesions are NOT cancers and cannot harm a person directly.   Under Medicare guidelines, the removal of a seborrheic keratosis is NOT covered unless the specific lesion is of medical necessity (interferes with vision, hearing, breathing), or is symptomatic (bleeding, itching, infected, inflamed). Medicare does NOT cover removal simply if the lesions are unsightly. Medicare does cover the evaluation of any lesion to determine if a lesion is or is not cancerous (i.e. skin biopsy). Asymptomatic. No treatment is required. Medical Complexity:    CHRONIC ILLNESS (expected duration of >1 year):  STABLE (i.e., AT TREATMENT GOAL). "Stable" refers to treatment goals for the individual patient. A patient not at treatment goal is NOT stable even if the condition is not changing and the patient is asymptomatic because the risk of morbidity without treatment is significant. SEBOPSORIASIS     Physical Exam:  Anatomic Location Affected:  Scalp  Morphological Description:  pink plaque with minimal scale    Additional History of Present Condition:  The patient used Betamethasone valerate lotion 0.1% lotion prescribed by Dr. Tung Mason in 2021. He recently was prescribed Triamcinolone Acetonide 0.1%lotion two times a day as needed, but he feels the triamcinolone lotion does not work as well as the Betamethasone valerate lotion. He uses over the counter standard shampoo like Suave brand. Assessment and Plan:  Based on a thorough discussion of this condition and the management approach to it (including a comprehensive discussion of the known risks, side effects and potential benefits of treatment), the patient (family) agrees to implement the following specific plan:  Continue Betamethasone valerate 0.1% lotion twice a day as needed to the scalp. Consider trial of dove sensitive   Follow up as needed or in 1 year    SKIN TAG DEFORMITY  WITH VERRUCA     Physical Exam:  Anatomic Location Affected: Third left finger   Morphological Description:  nail pitting and scaly tag along cuticle  Pertinent Positives:  Pertinent Negatives: Additional History of Present Condition:  The patient presents with a discomfort spot on the left third finger.      Assessment and Plan:  Based on a thorough discussion of this condition and the management approach to it (including a comprehensive discussion of the known risks, side effects and potential benefits of treatment), the patient (family) agrees to implement the following specific plan:  Try to trim the tag on the cuticle. Apply Betamethasone lotion twice a day. May apply Vaseline on the area if needed. Follow up in 2 months to reassess if need another round of cryotherapy      PROCEDURE:  DESTRUCTION OF BENIGN LESIONS WITH CRYOTHERAPY  After a thorough discussion of treatment options and risk/benefits/alternatives (including but not limited to local pain, scarring, dyspigmentation, blistering, and possible superinfection), verbal and written consent were obtained and the aforementioned lesions were treated on with cryotherapy using liquid nitrogen x 1 cycle for 5-10 seconds. TOTAL NUMBER of 1 lesions were treated today on the ANATOMIC LOCATION: Left third finger. The patient tolerated the procedure well, and after-care instructions were provided.        Scribe Attestation    I,:  Karla Garcia am acting as a scribe while in the presence of the attending physician.:       I,:  Riri Ingram MD personally performed the services described in this documentation    as scribed in my presence.:         Patient was seen and discussed with Dr. Harpreet Richmond PA-C

## 2023-11-09 LAB

## 2023-11-13 ENCOUNTER — TELEPHONE (OUTPATIENT)
Dept: PULMONOLOGY | Facility: CLINIC | Age: 68
End: 2023-11-13

## 2023-11-13 LAB

## 2023-11-13 NOTE — TELEPHONE ENCOUNTER
I set this pt. up in [facility] on a [employer] S11 5-15cm and gave the F30 (M) mask.

## 2023-11-14 ENCOUNTER — TELEPHONE (OUTPATIENT)
Dept: PULMONOLOGY | Facility: CLINIC | Age: 68
End: 2023-11-14

## 2023-11-17 ENCOUNTER — HOSPITAL ENCOUNTER (OUTPATIENT)
Dept: PULMONOLOGY | Facility: HOSPITAL | Age: 68
End: 2023-11-17
Attending: INTERNAL MEDICINE
Payer: COMMERCIAL

## 2023-11-17 DIAGNOSIS — J45.20 MILD INTERMITTENT ASTHMA WITHOUT COMPLICATION: ICD-10-CM

## 2023-11-17 PROCEDURE — 94760 N-INVAS EAR/PLS OXIMETRY 1: CPT

## 2023-11-17 PROCEDURE — 94729 DIFFUSING CAPACITY: CPT | Performed by: INTERNAL MEDICINE

## 2023-11-17 PROCEDURE — 94060 EVALUATION OF WHEEZING: CPT

## 2023-11-17 PROCEDURE — 94060 EVALUATION OF WHEEZING: CPT | Performed by: INTERNAL MEDICINE

## 2023-11-17 PROCEDURE — 94726 PLETHYSMOGRAPHY LUNG VOLUMES: CPT | Performed by: INTERNAL MEDICINE

## 2023-11-17 PROCEDURE — 94729 DIFFUSING CAPACITY: CPT

## 2023-11-17 PROCEDURE — 94726 PLETHYSMOGRAPHY LUNG VOLUMES: CPT

## 2023-11-17 RX ORDER — ALBUTEROL SULFATE 2.5 MG/3ML
2.5 SOLUTION RESPIRATORY (INHALATION) ONCE
Status: COMPLETED | OUTPATIENT
Start: 2023-11-17 | End: 2023-11-17

## 2023-11-17 RX ADMIN — ALBUTEROL SULFATE 2.5 MG: 2.5 SOLUTION RESPIRATORY (INHALATION) at 07:48

## 2023-11-21 ENCOUNTER — COSMETIC (OUTPATIENT)
Dept: DERMATOLOGY | Facility: CLINIC | Age: 68
End: 2023-11-21

## 2023-11-21 VITALS — TEMPERATURE: 96.6 F | BODY MASS INDEX: 42.66 KG/M2 | HEIGHT: 72 IN | WEIGHT: 315 LBS

## 2023-11-21 DIAGNOSIS — L91.8 SKIN TAG: Primary | ICD-10-CM

## 2023-11-21 PROCEDURE — SKNTG10 SKIN TAG REMOVAL UP TO 10: Performed by: DERMATOLOGY

## 2023-11-21 NOTE — PROGRESS NOTES
[facility] Note     Patient Name: Ankit Monzon  Encounter Date: 11/21/23     Have you been cared for by a [facility]ologist in the last 3 years and, if so, which description applies to you? Yes. I have been here within the last 3 years, and my medical history has NOT changed since that time. I am MALE/not capable of bearing children. REVIEW OF SYSTEMS:  Have you recently had or currently have any of the following? No changes in my recent health. PAST MEDICAL HISTORY:  Have you personally ever had or currently have any of the following? If "YES," then please provide more detail. No changes in my medical history. HISTORY OF IMMUNOSUPPRESSION: Do you have a history of any of the following:  Systemic Immunosuppression such as Diabetes, Biologic or Immunotherapy, Chemotherapy, Organ Transplantation, Bone Marrow Transplantation? YES, Diabetes Type II     Answering "YES" requires the addition of the dotphrase "IMMUNOSUPPRESSED" as the first diagnosis of the patient's visit. FAMILY HISTORY:  Any "first degree relatives" (parent, brother, sister, or child) with the following? No changes in my family's known health. PATIENT EXPERIENCE:    Do you want the Dermatologist to perform a COMPLETE skin exam today including a clinical examination under the "bra and underwear" areas? NO  If necessary, do we have your permission to call and leave a detailed message on your Preferred Phone number that includes your specific medical information? Yes      Allergies   Allergen Reactions    Penicillins Rash     sensitivity      Current Outpatient Medications:     albuterol (Ventolin HFA) 90 mcg/act inhaler, Inhale 2 puffs every 6 (six) hours as needed for wheezing, Disp: 18 g, Rfl: 2    Ascorbic Acid (VITAMIN C PO), Take 1,000 mg by mouth daily, Disp: , Rfl:     betamethasone dipropionate (DIPROSONE) 0.05 % ointment, Apply sparingly  1-2 times/day for up to 2 wks to R forearm rash. (Patient not taking: Reported on 10/31/2023), Disp: 45 g, Rfl: 0    betamethasone valerate (VALISONE) 0.1 % lotion, Apply topically 2 (two) times a day To the scalp as needed. , Disp: 60 mL, Rfl: 3    clobetasol (TEMOVATE) 0.05 % external solution, Apply topically 2 (two) times a day (Patient not taking: Reported on 10/31/2023), Disp: 50 mL, Rfl: 3    Cyanocobalamin (VITAMIN B-12 PO), Take 1,000 mcg by mouth daily , Disp: , Rfl:     fexofenadine (ALLEGRA) 180 MG tablet, Take 180 mg by mouth daily as needed, Disp: , Rfl:     ketoconazole (NIZORAL) 2 % cream, Apply topically daily (Patient not taking: Reported on 10/31/2023), Disp: 60 g, Rfl: 0    lisinopril-hydrochlorothiazide (PRINZIDE,ZESTORETIC) 20-12.5 MG per tablet, Take 1 tablet by mouth daily, Disp: 90 tablet, Rfl: 0    metFORMIN (GLUCOPHAGE-XR) 500 mg 24 hr tablet, Take 1 tablet (500 mg total) by mouth daily at bedtime, Disp: 90 tablet, Rfl: 0    Multiple Vitamins-Minerals (MULTIVITAL PO), Take 1 tablet by mouth, Disp: , Rfl:     Omega-3 Fatty Acids (FISH OIL) 1000 MG CPDR, Take 1,000 mg by mouth daily, Disp: , Rfl:     omeprazole (PriLOSEC) 20 mg delayed release capsule, Take 20 mg by mouth as needed, Disp: , Rfl:     sildenafil (VIAGRA) 50 MG tablet, Take 1 tablet (50 mg total) by mouth daily as needed for erectile dysfunction, Disp: 8 tablet, Rfl: 0    simvastatin (ZOCOR) 40 mg tablet, Take 1 tablet (40 mg total) by mouth daily at bedtime, Disp: 90 tablet, Rfl: 0    triamcinolone (KENALOG) 0.1 % lotion, Apply topically 2 (two) times a day, Disp: 60 mL, Rfl: 2          Whom besides the patient is providing clinical information about today's encounter?    NO ADDITIONAL HISTORIAN (patient alone provided history)    Physical Exam and Assessment/Plan by Diagnosis:            ACROCHORDON ("SKIN TAG")    Physical Exam:  Anatomic Location Affected:  left neck   Morphological Description: skin colored to light brown papules         Assessment and Plan:  Based on a thorough discussion of this condition and the management approach to it (including a comprehensive discussion of the known risks, side effects and potential benefits of treatment), the patient (family) agrees to implement the following specific plan:  Cosmetic removal done     Skin tags are common, soft, harmless skin lesions that are also called, in the appropriate settings, papillomas, fibroepithelial polyps, and soft fibromas. They are made up of loosely arranged collagen fibers and blood vessels surrounded by a thickened or thinned-out epidermis. Skin tags tend to develop in both men and women as we grow older. They are usually found on the skin folds (neck, armpits, groin). It is not known what specifically causes skin tags. Certain factors, though, do appear to play a role:  Chaffing and irritation from skin rubbing together  High levels of growth factors (as seen, for example, in pregnancy or in acromegaly/gigantism)  Insulin resistance  Human papillomavirus (wart virus)    We discussed that most skin tags do not need to be treated unless they are specifically causing the patient physical distress or limitation or pose a risk for a larger problem such as an infection that forms secondary to excoriation or chronic irritation. Cosmetic Note      PROCEDURE # Removal of skin tags with snip removal   After a thorough discussion of treatment options and risk/benefits/alternatives (including but not limited to local pain, scarring, dyspigmentation, persistance/recurrence of lesions), verbal and written consent were obtained and the aforementioned lesions were treated on with snip removal after anesthesia with lidocaine with 1:200,000 epinephrine. Electrocautery and aluminum chloride was used for control of minimal bleeding. TOTAL NUMBER of  10 lesions were treated today on the ANATOMIC LOCATION: left  neck .               The patient tolerated the procedure well, and verbal after-care instructions were provided.      This was cosmetic visit that patient paid out of pocket for: $150 charge for up to 10 lesions     DO  [facility]      I,:  Amparo Charlton am acting as a scribe while in the presence of the attending physician.:       I,:  Benoit Perez MD personally performed the services described in this documentation    as scribed in my presence.:

## 2023-12-28 NOTE — PROGRESS NOTES
"ADULT ANNUAL PHYSICAL  [facility] INTERNAL MEDICINE    NAME: Jean-Pierre Scherer  AGE: 68 y.o. SEX: male  : 1955     DATE: 2023     Assessment and Plan:     Problem List Items Addressed This Visit    None      Immunizations and preventive care screenings were discussed with patient today. Appropriate education was printed on patient's after visit summary.    {Prostate cancer screening - consider in males between age of 40-75 depending on age, race, and risk factors. There is difference in the guidelines in regards to the optimal age for screening.  USPSTF states to consider periodic screening in males between the age of 50 to 69. This text is informational and does not need to be selected but if you wish, you can insert standard documentation in your progress note by using F2 (Optional):43181}    Counseling:  {Annual Physical; Counselin}         No follow-ups on file.     Chief Complaint:     No chief complaint on file.     History of Present Illness:     Adult Annual Physical   Patient here for a comprehensive physical exam. The patient reports {problems:02386}.    Diet and Physical Activity  Diet/Nutrition: {annual physical; diet:96252259}.   Exercise: {annual physical; exercise:47913017}.      Depression Screening  PHQ-2/9 Depression Screening           General Health  Sleep: {annual physical; sleep:90195915}.   Hearing: {annual physical; hearin}.  Vision: {annual physical; vision:92888670}.   Dental: {annual physical; dental:79852957}.        Health  Symptoms include: {annual physical; urinary symptoms:22121::\"none\"}    Advanced Care Planning  Do you have an advanced directive? {YES/NO:74988}  Do you have a durable medical power of ? {YES/NO:49925}     Review of Systems:     Review of Systems   Past Medical History:     Past Medical History:   Diagnosis Date    Asthma     BMI 40.0-44.9, adult ([facility]) 2022    BMI " 45.0-49.9, adult ([facility]) 6/28/2021    Bronchitis 9/29/2023    Bronchospasm     Cancer ([facility])     Prostate     Chest tightness 2/17/2023    Choking episode 2/17/2023    Colon polyp     Essential hypertension 6/26/2021    GERD (gastroesophageal reflux disease)     Hearing impairment 10/11/2022    Hyperlipidemia     Hypertension     Hypocalcemia 1/11/2022    Insomnia     Leg cramps     Lump on finger, left 1/11/2022    Microalbuminuria 9/27/2021    Mixed hyperlipidemia 6/26/2021    Morbid obesity ([facility]) 5/26/2022    Nocturia     Numbness of fingers of both hands     Obstructive sleep apnea syndrome 8/28/2023    Onychomycosis of toenail 5/26/2022    Pain in both knees 5/26/2022    Paronychia of left middle finger 9/27/2021    Right foot pain 5/26/2023    Seasonal allergic rhinitis 1/11/2022    Skin lesion     Skin rash 6/26/2021    Sleep apnea     not tested yet    Snoring     Tubular adenoma of colon 6/28/2021    Type 2 diabetes mellitus without complication, without long-term current use of insulin ([facility]) 6/26/2021    Wears glasses     Weight gain       Past Surgical History:     Past Surgical History:   Procedure Laterality Date    COLONOSCOPY  05/09/2017    COLONOSCOPY      HIP SURGERY Bilateral     Hip replacement    NC COLONOSCOPY FLX DX W/COLLJ SPEC WHEN PFRMD N/A 5/9/2017    Procedure: COLONOSCOPY;  Surgeon: Dinesh Bradley MD;  [facility];  Service: Gastroenterology    PROSTATECTOMY      REPLACEMENT TOTAL KNEE Bilateral     Inactive    UPPER GASTROINTESTINAL ENDOSCOPY        Family History:     Family History   Problem Relation Age of Onset    Brain cancer Mother     Psoriasis Mother     Eczema Mother     Depression Father     Heart disease Father         Heart Artery blockages    Diabetes Father     Stroke Paternal Grandfather       Social History:     Social History     Socioeconomic History    Marital status: /Civil Union     Spouse name: Not on file    Number of children: Not on file    Years of  education: Not on file    Highest education level: Not on file   Occupational History    Occupation: Presently not working   Tobacco Use    Smoking status: Former     Current packs/day: 0.00     Average packs/day: 1.5 packs/day for 16.9 years (25.4 ttl pk-yrs)     Types: Cigarettes     Start date: 1975     Quit date: 1992     Years since quittin.6    Smokeless tobacco: Never   Vaping Use    Vaping status: Every Day   Substance and Sexual Activity    Alcohol use: Yes     Alcohol/week: 1.0 standard drink of alcohol     Types: 1 Glasses of wine per week     Comment: rarely    Drug use: No    Sexual activity: Not Currently     Partners: Female     Birth control/protection: Abstinence, Post-menopausal, Rhythm, Male Sterilization, Female Sterilization   Other Topics Concern    Not on file   Social History Narrative    Single-family home    Current work/study status: Full-time    Caffeine use: Coffee, 3 servings/day    Lives with spouse    Former smoker - Inactive 2018    Annual eye exam: Sees 1hr; wears glasses    Annual dental checkup: Sees q6months    PSA: Used to follow Urology    - As per [employer]     Social Determinants of Health     Financial Resource Strain: Not on file   Food Insecurity: Not on file   Transportation Needs: Not on file   Physical Activity: Not on file   Stress: Not on file   Social Connections: Not on file   Intimate Partner Violence: Not on file   Housing Stability: Not on file      Current Medications:     Current Outpatient Medications   Medication Sig Dispense Refill    albuterol (Ventolin HFA) 90 mcg/act inhaler Inhale 2 puffs every 6 (six) hours as needed for wheezing 18 g 2    Ascorbic Acid (VITAMIN C PO) Take 1,000 mg by mouth daily      betamethasone dipropionate (DIPROSONE) 0.05 % ointment Apply sparingly  1-2 times/day for up to 2 wks to R forearm rash. (Patient not taking: Reported on 10/31/2023) 45 g 0    betamethasone valerate (VALISONE) 0.1 % lotion Apply  topically 2 (two) times a day To the scalp as needed. 60 mL 3    clobetasol (TEMOVATE) 0.05 % external solution Apply topically 2 (two) times a day (Patient not taking: Reported on 10/31/2023) 50 mL 3    Cyanocobalamin (VITAMIN B-12 PO) Take 1,000 mcg by mouth daily       fexofenadine (ALLEGRA) 180 MG tablet Take 180 mg by mouth daily as needed      ketoconazole (NIZORAL) 2 % cream Apply topically daily (Patient not taking: Reported on 10/31/2023) 60 g 0    lisinopril-hydrochlorothiazide (PRINZIDE,ZESTORETIC) 20-12.5 MG per tablet Take 1 tablet by mouth daily 90 tablet 0    metFORMIN (GLUCOPHAGE-XR) 500 mg 24 hr tablet Take 1 tablet (500 mg total) by mouth daily at bedtime 90 tablet 0    Multiple Vitamins-Minerals (MULTIVITAL PO) Take 1 tablet by mouth      Omega-3 Fatty Acids (FISH OIL) 1000 MG CPDR Take 1,000 mg by mouth daily      omeprazole (PriLOSEC) 20 mg delayed release capsule Take 20 mg by mouth as needed      sildenafil (VIAGRA) 50 MG tablet Take 1 tablet (50 mg total) by mouth daily as needed for erectile dysfunction 8 tablet 0    simvastatin (ZOCOR) 40 mg tablet Take 1 tablet (40 mg total) by mouth daily at bedtime 90 tablet 0    triamcinolone (KENALOG) 0.1 % lotion Apply topically 2 (two) times a day 60 mL 2     No current facility-administered medications for this visit.      Allergies:     Allergies   Allergen Reactions    Penicillins Rash     sensitivity      Physical Exam:     There were no vitals taken for this visit.    Physical Exam     Bree Hemphill LPN  [facility] INTERNAL MEDICINE

## 2023-12-29 ENCOUNTER — APPOINTMENT (OUTPATIENT)
Dept: LAB | Facility: CLINIC | Age: 68
End: 2023-12-29
Payer: COMMERCIAL

## 2023-12-29 DIAGNOSIS — E11.9 TYPE 2 DIABETES MELLITUS WITHOUT COMPLICATION, WITHOUT LONG-TERM CURRENT USE OF INSULIN (HCC): ICD-10-CM

## 2023-12-29 DIAGNOSIS — E78.2 MIXED HYPERLIPIDEMIA: ICD-10-CM

## 2023-12-29 DIAGNOSIS — I10 ESSENTIAL HYPERTENSION: ICD-10-CM

## 2023-12-29 DIAGNOSIS — R80.9 MICROALBUMINURIA: ICD-10-CM

## 2023-12-29 LAB
ANION GAP SERPL CALCULATED.3IONS-SCNC: 8 MMOL/L
BUN SERPL-MCNC: 17 MG/DL (ref 5–25)
CALCIUM SERPL-MCNC: 8.8 MG/DL (ref 8.4–10.2)
CHLORIDE SERPL-SCNC: 101 MMOL/L (ref 96–108)
CHOLEST SERPL-MCNC: 181 MG/DL
CO2 SERPL-SCNC: 27 MMOL/L (ref 21–32)
CREAT SERPL-MCNC: 1.19 MG/DL (ref 0.6–1.3)
EST. AVERAGE GLUCOSE BLD GHB EST-MCNC: 203 MG/DL
GFR SERPL CREATININE-BSD FRML MDRD: 63 ML/MIN/1.73SQ M
GLUCOSE P FAST SERPL-MCNC: 150 MG/DL (ref 65–99)
HBA1C MFR BLD: 8.7 %
HDLC SERPL-MCNC: 44 MG/DL
LDLC SERPL CALC-MCNC: 94 MG/DL (ref 0–100)
NONHDLC SERPL-MCNC: 137 MG/DL
POTASSIUM SERPL-SCNC: 4.1 MMOL/L (ref 3.5–5.3)
SODIUM SERPL-SCNC: 136 MMOL/L (ref 135–147)
TRIGL SERPL-MCNC: 214 MG/DL

## 2023-12-29 PROCEDURE — 80061 LIPID PANEL: CPT

## 2023-12-29 PROCEDURE — 80048 BASIC METABOLIC PNL TOTAL CA: CPT

## 2023-12-29 PROCEDURE — 36415 COLL VENOUS BLD VENIPUNCTURE: CPT

## 2023-12-29 PROCEDURE — 83036 HEMOGLOBIN GLYCOSYLATED A1C: CPT

## 2023-12-29 PROCEDURE — 3052F HG A1C>EQUAL 8.0%<EQUAL 9.0%: CPT | Performed by: DERMATOLOGY

## 2024-01-02 ENCOUNTER — OFFICE VISIT (OUTPATIENT)
Dept: INTERNAL MEDICINE CLINIC | Facility: CLINIC | Age: 69
End: 2024-01-02
Payer: COMMERCIAL

## 2024-01-02 VITALS
DIASTOLIC BLOOD PRESSURE: 90 MMHG | BODY MASS INDEX: 42.66 KG/M2 | TEMPERATURE: 98.2 F | SYSTOLIC BLOOD PRESSURE: 150 MMHG | HEIGHT: 72 IN | WEIGHT: 315 LBS | HEART RATE: 79 BPM | OXYGEN SATURATION: 98 %

## 2024-01-02 DIAGNOSIS — K21.9 GASTROESOPHAGEAL REFLUX DISEASE WITHOUT ESOPHAGITIS: ICD-10-CM

## 2024-01-02 DIAGNOSIS — J45.20 MILD INTERMITTENT ASTHMA WITHOUT COMPLICATION: ICD-10-CM

## 2024-01-02 DIAGNOSIS — G47.33 OBSTRUCTIVE SLEEP APNEA SYNDROME: ICD-10-CM

## 2024-01-02 DIAGNOSIS — E11.9 TYPE 2 DIABETES MELLITUS WITHOUT COMPLICATION, WITHOUT LONG-TERM CURRENT USE OF INSULIN (HCC): ICD-10-CM

## 2024-01-02 DIAGNOSIS — E78.2 MIXED HYPERLIPIDEMIA: ICD-10-CM

## 2024-01-02 DIAGNOSIS — J40 BRONCHITIS: Primary | ICD-10-CM

## 2024-01-02 DIAGNOSIS — C61 PROSTATE CANCER (HCC): ICD-10-CM

## 2024-01-02 DIAGNOSIS — I10 ESSENTIAL HYPERTENSION: ICD-10-CM

## 2024-01-02 LAB
SARS-COV-2 AG UPPER RESP QL IA: NEGATIVE
VALID CONTROL: NORMAL

## 2024-01-02 PROCEDURE — 87811 SARS-COV-2 COVID19 W/OPTIC: CPT | Performed by: INTERNAL MEDICINE

## 2024-01-02 PROCEDURE — 99214 OFFICE O/P EST MOD 30 MIN: CPT | Performed by: INTERNAL MEDICINE

## 2024-01-02 RX ORDER — BENZONATATE 200 MG/1
200 CAPSULE ORAL EVERY 8 HOURS PRN
Qty: 20 CAPSULE | Refills: 0 | Status: SHIPPED | OUTPATIENT
Start: 2024-01-02

## 2024-01-02 RX ORDER — AZITHROMYCIN 250 MG/1
TABLET, FILM COATED ORAL DAILY
Qty: 6 TABLET | Refills: 0 | Status: SHIPPED | OUTPATIENT
Start: 2024-01-02 | End: 2024-01-07

## 2024-01-02 RX ORDER — ALBUTEROL SULFATE 2.5 MG/3ML
2.5 SOLUTION RESPIRATORY (INHALATION) EVERY 6 HOURS PRN
Qty: 90 ML | Refills: 0 | Status: SHIPPED | OUTPATIENT
Start: 2024-01-02

## 2024-01-02 NOTE — PROGRESS NOTES
Assessment/Plan:    1. Bronchitis  -     azithromycin (Zithromax) 250 mg tablet; Take 2 tablets (500 mg total) by mouth daily for 1 day, THEN 1 tablet (250 mg total) daily for 4 days.  -     benzonatate (TESSALON) 200 MG capsule; Take 1 capsule (200 mg total) by mouth every 8 (eight) hours as needed for cough  -     CBC and differential; Future  -     albuterol (2.5 mg/3 mL) 0.083 % nebulizer solution; Take 3 mL (2.5 mg total) by nebulization every 6 (six) hours as needed for wheezing or shortness of breath  -     COVID Only- Office Collect    2. Type 2 diabetes mellitus without complication, without long-term current use of insulin ([facility])  -     Comprehensive metabolic panel; Future  -     CBC and differential; Future  -     Albumin / creatinine urine ratio  -     UA (URINE) with reflex to Scope    3. Mild intermittent asthma without complication    4. Obstructive sleep apnea syndrome  -     CBC and differential; Future    5. Gastroesophageal reflux disease without esophagitis    6. Mixed hyperlipidemia  -     Comprehensive metabolic panel; Future    7. Essential hypertension  -     Comprehensive metabolic panel; Future  -     CBC and differential; Future  -     UA (URINE) with reflex to Scope    8. Prostate cancer ([facility])       Has a cough for last 3 days cough is more dry in nature.  COVID test negative in the office today.  Most likely bronchitis viral versus bacterial.  Patient agreed to start antibiotics and started on Z-Rg.  Also he has some chest tightness and wheezing for which he has started using inhaler 3 times day.  He has a nebulizer machine at home and would like to get the nebulizer solution which works better than inhalers I prescribed albuterol solution.  Denies any chest pain or shortness of breath.  Blood pressure elevated.  Patient does not want to add any more medication at present.  He will monitor blood pressure at home.  If persistently elevated discussed with the patient will need another  medication to add on.  Diabetes mellitus uncontrolled.  Hemoglobin A1c 8.7.  He is on metformin  mg once a day.  Advised to increase dose of metformin at present he refused.  He has appointment to see endocrinologist January 25, 2024 and he would like to discuss with the endocrinologist before changing his medication.  Discussed about seeing dietitian patient said he has seen in the past.  Continue 1800-calorie ADA diet.  He has been using CPAP for his sleep apnea.  She will track stable on omeprazole.  Cholesterol 181 triglyceride increased from 162 to 214, HDL 44, LDL 94.  He has been on simvastatin and fish oil.  Advised for low-cholesterol low-carb diet.  For prostate cancer his PSA is normal and stable.  Advised to watch diet, exercise and lose weight.    Subjective: Patient presents for follow-up.      Patient ID: Jean-Pierre Scherer is a 68 y.o. male.    HPI  68-year-old white male patient presents to follow-up his medical problems.  He denies any chest pain, shortness of breath.  He has a cough for last 3 days cough is mainly dry in nature.  He had some chest tightness and wheezing for which he started using inhaler 3 times a day and somewhat better.  Denies any fever but feels chilly.  Denies nausea vomiting diarrhea pain abdomen.    The following portions of the patient's history were reviewed and updated as appropriate:     Past Medical History:  He has a past medical history of Asthma, BMI 40.0-44.9, adult ([facility]) (1/11/2022), BMI 45.0-49.9, adult ([facility]) (6/28/2021), Bronchitis (9/29/2023), Bronchospasm, Cancer ([facility]), Chest tightness (2/17/2023), Choking episode (2/17/2023), Colon polyp, Essential hypertension (6/26/2021), GERD (gastroesophageal reflux disease), Hearing impairment (10/11/2022), Hyperlipidemia, Hypertension, Hypocalcemia (1/11/2022), Insomnia, Leg cramps, Lump on finger, left (1/11/2022), Microalbuminuria (9/27/2021), Mixed hyperlipidemia (6/26/2021), Morbid obesity ([facility]) (5/26/2022),  Nocturia, Numbness of fingers of both hands, Obstructive sleep apnea syndrome (8/28/2023), Onychomycosis of toenail (5/26/2022), Pain in both knees (5/26/2022), Paronychia of left middle finger (9/27/2021), Right foot pain (5/26/2023), Seasonal allergic rhinitis (1/11/2022), Skin lesion, Skin rash (6/26/2021), Sleep apnea, Snoring, Tubular adenoma of colon (6/28/2021), Type 2 diabetes mellitus without complication, without long-term current use of insulin (HCC) (6/26/2021), Wears glasses, and Weight gain.,  _______________________________________________________________________  Past Surgical History:   has a past surgical history that includes Hip surgery (Bilateral); Prostatectomy; pr colonoscopy flx dx w/collj spec when pfrmd (N/A, 5/9/2017); Replacement total knee (Bilateral); Colonoscopy (05/09/2017); Colonoscopy; and Upper gastrointestinal endoscopy.,  _______________________________________________________________________  Family History:  family history includes Brain cancer in his mother; Depression in his father; Diabetes in his father; Eczema in his mother; Heart disease in his father; Psoriasis in his mother; Stroke in his paternal grandfather.,  _______________________________________________________________________  Social History:   reports that he quit smoking about 31 years ago. His smoking use included cigarettes. He started smoking about 48 years ago. He has a 25.4 pack-year smoking history. He has never used smokeless tobacco. He reports current alcohol use of about 1.0 standard drink of alcohol per week. He reports that he does not use drugs.,  _______________________________________________________________________  Allergies:  is allergic to penicillins..  _______________________________________________________________________  Current Outpatient Medications   Medication Sig Dispense Refill    albuterol (2.5 mg/3 mL) 0.083 % nebulizer solution Take 3 mL (2.5 mg total) by nebulization every 6  (six) hours as needed for wheezing or shortness of breath 90 mL 0    azithromycin (Zithromax) 250 mg tablet Take 2 tablets (500 mg total) by mouth daily for 1 day, THEN 1 tablet (250 mg total) daily for 4 days. 6 tablet 0    benzonatate (TESSALON) 200 MG capsule Take 1 capsule (200 mg total) by mouth every 8 (eight) hours as needed for cough 20 capsule 0    albuterol (Ventolin HFA) 90 mcg/act inhaler Inhale 2 puffs every 6 (six) hours as needed for wheezing 18 g 2    Ascorbic Acid (VITAMIN C PO) Take 1,000 mg by mouth daily      betamethasone dipropionate (DIPROSONE) 0.05 % ointment Apply sparingly  1-2 times/day for up to 2 wks to R forearm rash. (Patient not taking: Reported on 10/31/2023) 45 g 0    betamethasone valerate (VALISONE) 0.1 % lotion Apply topically 2 (two) times a day To the scalp as needed. 60 mL 3    clobetasol (TEMOVATE) 0.05 % external solution Apply topically 2 (two) times a day (Patient not taking: Reported on 10/31/2023) 50 mL 3    Cyanocobalamin (VITAMIN B-12 PO) Take 1,000 mcg by mouth daily       fexofenadine (ALLEGRA) 180 MG tablet Take 180 mg by mouth daily as needed      ketoconazole (NIZORAL) 2 % cream Apply topically daily (Patient not taking: Reported on 10/31/2023) 60 g 0    lisinopril-hydrochlorothiazide (PRINZIDE,ZESTORETIC) 20-12.5 MG per tablet Take 1 tablet by mouth daily 90 tablet 0    metFORMIN (GLUCOPHAGE-XR) 500 mg 24 hr tablet Take 1 tablet (500 mg total) by mouth daily at bedtime 90 tablet 0    Multiple Vitamins-Minerals (MULTIVITAL PO) Take 1 tablet by mouth      Omega-3 Fatty Acids (FISH OIL) 1000 MG CPDR Take 1,000 mg by mouth daily      omeprazole (PriLOSEC) 20 mg delayed release capsule Take 20 mg by mouth as needed      sildenafil (VIAGRA) 50 MG tablet Take 1 tablet (50 mg total) by mouth daily as needed for erectile dysfunction 8 tablet 0    simvastatin (ZOCOR) 40 mg tablet Take 1 tablet (40 mg total) by mouth daily at bedtime 90 tablet 0    triamcinolone (KENALOG)  0.1 % lotion Apply topically 2 (two) times a day 60 mL 2     No current facility-administered medications for this visit.     _______________________________________________________________________  Review of Systems   Constitutional:  Positive for chills. Negative for fever.   HENT:  Negative for congestion, ear pain, hearing loss, nosebleeds, sinus pain, sore throat and trouble swallowing.    Eyes:  Negative for discharge, redness and visual disturbance.   Respiratory:  Positive for cough, chest tightness and wheezing. Negative for shortness of breath.    Cardiovascular:  Negative for chest pain and palpitations.   Gastrointestinal:  Negative for abdominal pain, blood in stool, constipation, diarrhea, nausea and vomiting.   Musculoskeletal:  Negative for myalgias and neck pain.   Skin:  Negative for color change and rash.   Neurological:  Negative for dizziness, speech difficulty, weakness and headaches.   Hematological:  Does not bruise/bleed easily.   Psychiatric/Behavioral:  Negative for agitation and behavioral problems.            Objective:  Vitals:    01/02/24 0847   BP: 150/90   Pulse: 79   Temp: 98.2 °F (36.8 °C)   SpO2: 98%   Weight: (!) 159 kg (350 lb)   Height: 6' (1.829 m)     Body mass index is 47.47 kg/m².     Physical Exam  Vitals and nursing note reviewed.   Constitutional:       General: He is not in acute distress.     Appearance: He is obese.   HENT:      Head: Normocephalic and atraumatic.      Right Ear: Ear canal and external ear normal.      Left Ear: Ear canal and external ear normal.      Nose:      Comments: Patient has a facemask on.  Eyes:      General: No scleral icterus.        Right eye: No discharge.         Left eye: No discharge.      Extraocular Movements: Extraocular movements intact.      Conjunctiva/sclera: Conjunctivae normal.   Cardiovascular:      Rate and Rhythm: Normal rate and regular rhythm.      Pulses: Normal pulses.      Heart sounds: Normal heart sounds. No murmur  heard.  Pulmonary:      Effort: Pulmonary effort is normal. No respiratory distress.      Breath sounds: Normal breath sounds. No wheezing, rhonchi or rales.   Abdominal:      General: Bowel sounds are normal.      Palpations: Abdomen is soft.      Tenderness: There is no abdominal tenderness.   Musculoskeletal:         General: Normal range of motion.      Cervical back: Normal range of motion and neck supple. No muscular tenderness.      Right lower leg: No edema.      Left lower leg: No edema.   Skin:     General: Skin is warm.      Findings: No rash.   Neurological:      General: No focal deficit present.      Mental Status: He is alert and oriented to person, place, and time.      Motor: No weakness.      Coordination: Coordination normal.   Psychiatric:         Mood and Affect: Mood normal.         Behavior: Behavior normal.       I spent 30 minutes with the patient today.  More than 50% time spent for reviewing of external notes, reviewing of the results of diagnostics test, management of care, patient education and ordering of test.

## 2024-01-02 NOTE — PATIENT INSTRUCTIONS
Patient was advised to continue present medications. discussed with the patient medications and laboratory data in detail.  Follow-up with me in 4 months or as advised.  If any blood test was ordered please do 1 week prior to next appointment unless advise to get earlier.  If you have any questions please call the office [phone]

## 2024-01-08 ENCOUNTER — OFFICE VISIT (OUTPATIENT)
Dept: PULMONOLOGY | Facility: CLINIC | Age: 69
End: 2024-01-08
Payer: COMMERCIAL

## 2024-01-08 VITALS
TEMPERATURE: 97.6 F | WEIGHT: 315 LBS | BODY MASS INDEX: 42.66 KG/M2 | SYSTOLIC BLOOD PRESSURE: 136 MMHG | HEIGHT: 72 IN | HEART RATE: 59 BPM | DIASTOLIC BLOOD PRESSURE: 73 MMHG | OXYGEN SATURATION: 97 %

## 2024-01-08 DIAGNOSIS — I10 ESSENTIAL HYPERTENSION: ICD-10-CM

## 2024-01-08 DIAGNOSIS — J45.20 MILD INTERMITTENT ASTHMA WITHOUT COMPLICATION: ICD-10-CM

## 2024-01-08 DIAGNOSIS — G47.33 OBSTRUCTIVE SLEEP APNEA SYNDROME: Primary | ICD-10-CM

## 2024-01-08 PROCEDURE — 99214 OFFICE O/P EST MOD 30 MIN: CPT | Performed by: INTERNAL MEDICINE

## 2024-01-08 NOTE — ASSESSMENT & PLAN NOTE
MrAlley Scherer has had a long history of loud snoring interrupted sleep waking up episodes during sleep and reported apneic events.  He also feels sleepy and tired during the day.  His [place] sleepiness score is 9 out of 24.  He has occasional morning headache and nocturia.  He has been diagnosed with diabetes and also has hypertension.  He does not have any regular problem driving.  He had an overnight home sleep study done in July 2023 which showed severe sleep apnea with significant oxygen desaturation.  On Clinical examination, he was obese and had oropharyngeal crowding.  He has been on autoCPAP therapy and he is using the CPAP regularly.  He is getting clinical benefit from CPAP therapy.  I have advised him to continue as before.  I had a long discussion with him and have answered all his questions.

## 2024-01-08 NOTE — PROGRESS NOTES
Assessment/Plan:    Obstructive sleep apnea syndrome  Mr. Trevon Scherer has had a long history of loud snoring interrupted sleep waking up episodes during sleep and reported apneic events.  He also feels sleepy and tired during the day.  His [place] sleepiness score is 9 out of 24.  He has occasional morning headache and nocturia.  He has been diagnosed with diabetes and also has hypertension.  He does not have any regular problem driving.  He had an overnight home sleep study done in July 2023 which showed severe sleep apnea with significant oxygen desaturation.  On Clinical examination, he was obese and had oropharyngeal crowding.  He has been on autoCPAP therapy and he is using the CPAP regularly.  He is getting clinical benefit from CPAP therapy.  I have advised him to continue as before.  I had a long discussion with him and have answered all his questions.    Mild intermittent asthma  He has history of shortness of breath on exertion, mostly while climbing stairs.  He has family history of asthma.  He also has cough and wheezing.  He has history of allergies.  He is a previous smoker.  His PFT  was unremarkable.  On clinical examination, his chest is clear to auscultation.  I advised him to continue with albuterol as needed.    Class 3 severe obesity due to excess calories with serious comorbidity and body mass index (BMI) of 45.0 to 49.9 in adult (HCC)  He is extremely obese and understands the need for weight reduction.  He understands that weight reduction can significantly improve his sleep apnea and other symptoms     Essential hypertension  He has a history of hypertension and currently is on treatment with lisinopril hydrochlorothiazide.       Diagnoses and all orders for this visit:    Obstructive sleep apnea syndrome    Mild intermittent asthma without complication    BMI 45.0-49.9, adult (HCC)    Essential hypertension          Subjective:      Patient ID: Jean-Pierre Scherer is a 68 y.o.  male.    Mr.Jeff Scherer was diagnosed with obstructive sleep apnea and has been started on CPAP therapy.  Currently he is using the CPAP for about 2 months and is using it regularly.  His residual AHI was 1.5.  He is having some minor problems with the mask but otherwise he is very motivated to use the CPAP.  He has no daytime sleepiness interrupted sleep or morning headache.  I reviewed his CPAP compliance records and they are satisfactory.  He believes that he is getting benefit from CPAP therapy.  Recently had an upper respiratory tract infection.  Currently he has some shortness of breath but no significant cough or phlegm or wheeze or chest pain.  He has history of hypertension and currently this is controlled.  He is very obese and I counseled him to lose weight.        The following portions of the patient's history were reviewed and updated as appropriate: allergies, current medications, past family history, past medical history, past social history, past surgical history, and problem list.    Review of Systems   Constitutional:  Negative for appetite change, chills, fatigue and fever.   HENT:  Positive for sinus pressure. Negative for hearing loss, rhinorrhea, sneezing, sore throat and trouble swallowing.    Eyes:  Negative for visual disturbance.   Respiratory:  Positive for shortness of breath. Negative for cough, chest tightness and wheezing.    Cardiovascular:  Negative for chest pain, palpitations and leg swelling.   Gastrointestinal:  Positive for constipation. Negative for abdominal pain, diarrhea, nausea and vomiting.   Genitourinary:  Negative for dysuria, frequency and urgency.   Musculoskeletal:  Negative for arthralgias, back pain and gait problem.   Skin:  Negative for rash.   Allergic/Immunologic: Positive for environmental allergies.   Neurological:  Negative for dizziness, syncope, light-headedness and headaches.   Psychiatric/Behavioral:  Negative for agitation and confusion. The patient  is not nervous/anxious.          Objective:      /73 (BP Location: Right arm, Patient Position: Sitting, Cuff Size: Large)   Pulse 59   Temp 97.6 °F (36.4 °C)   Ht 6' (1.829 m)   Wt (!) 158 kg (348 lb)   SpO2 97%   BMI 47.20 kg/m²          Physical Exam  Vitals reviewed.   Constitutional:       General: He is not in acute distress.     Appearance: He is obese. He is not ill-appearing, toxic-appearing or diaphoretic.   HENT:      Head: Normocephalic.      Nose: Nose normal.      Mouth/Throat:      Mouth: Mucous membranes are moist.      Pharynx: Oropharynx is clear.   Eyes:      General: No scleral icterus.     Conjunctiva/sclera: Conjunctivae normal.   Cardiovascular:      Rate and Rhythm: Normal rate and regular rhythm.      Heart sounds: Normal heart sounds. No murmur heard.  Pulmonary:      Effort: Pulmonary effort is normal. No respiratory distress.      Breath sounds: Normal breath sounds. No stridor. No wheezing, rhonchi or rales.   Chest:      Chest wall: No tenderness.   Abdominal:      General: Bowel sounds are normal.      Palpations: Abdomen is soft.      Tenderness: There is no abdominal tenderness. There is no guarding.   Musculoskeletal:      Cervical back: Neck supple. No rigidity.      Right lower leg: Edema present.      Left lower leg: Edema present.   Lymphadenopathy:      Cervical: No cervical adenopathy.   Skin:     Coloration: Skin is not jaundiced or pale.      Findings: No rash.   Neurological:      Mental Status: He is alert and oriented to person, place, and time.      Gait: Gait normal.   Psychiatric:         Mood and Affect: Mood normal.         Behavior: Behavior normal.         Thought Content: Thought content normal.         Judgment: Judgment normal.      I Spent 30 minutes of time taking care of this patient with complex medical issues.  The majority of this time was spent directly with the patient counseling as well as coordinating care.

## 2024-01-09 NOTE — ASSESSMENT & PLAN NOTE
He has history of shortness of breath on exertion, mostly while climbing stairs.  He has family history of asthma.  He also has cough and wheezing.  He has history of allergies.  He is a previous smoker.  His PFT  was unremarkable.  On clinical examination, his chest is clear to auscultation.  I advised him to continue with albuterol as needed.

## 2024-01-09 NOTE — ASSESSMENT & PLAN NOTE
He has a history of hypertension and currently is on treatment with lisinopril hydrochlorothiazide.

## 2024-01-09 NOTE — ASSESSMENT & PLAN NOTE
He is extremely obese and understands the need for weight reduction.  He understands that weight reduction can significantly improve his sleep apnea and other symptoms

## 2024-01-16 ENCOUNTER — OFFICE VISIT (OUTPATIENT)
Dept: PODIATRY | Facility: CLINIC | Age: 69
End: 2024-01-16
Payer: COMMERCIAL

## 2024-01-16 VITALS
BODY MASS INDEX: 42.66 KG/M2 | DIASTOLIC BLOOD PRESSURE: 78 MMHG | HEIGHT: 72 IN | HEART RATE: 59 BPM | SYSTOLIC BLOOD PRESSURE: 132 MMHG | WEIGHT: 315 LBS

## 2024-01-16 DIAGNOSIS — E11.9 TYPE 2 DIABETES MELLITUS WITHOUT COMPLICATION, WITHOUT LONG-TERM CURRENT USE OF INSULIN (HCC): Primary | ICD-10-CM

## 2024-01-16 PROCEDURE — 99213 OFFICE O/P EST LOW 20 MIN: CPT | Performed by: PODIATRIST

## 2024-01-16 NOTE — PATIENT INSTRUCTIONS
Foot Care for People with Diabetes   AMBULATORY CARE:   What you need to know about foot care:  Long-term high blood sugar levels can damage the blood vessels and nerves in your legs and feet. This damage makes it hard to feel pressure, pain, temperature, and touch. You may not be able to feel a cut or sore, or shoes that are too tight. Foot care is needed to prevent serious problems, such as an infection or amputation. Diabetes may cause your toes to become crooked or curved under. These changes may affect the way you walk and can lead to increased pressure on your foot. The pressure can decrease blood flow to your feet. Lack of blood flow increases your risk for a foot ulcer.  Call your care team provider if:   Your feet become numb, weak, or hard to move.    You have pus draining from a sore on your foot.    You have a wound on your foot that gets bigger, deeper, or does not heal.    You see blisters, cuts, scratches, calluses, or sores on your foot.    You have a fever, and your feet become red, warm, and swollen.    Your toenails become thick, curled, or yellow.    You find it hard to check your feet because your vision is poor.    You have questions or concerns about your condition or care.    How to care for your feet:   Check your feet each day.  Look at your whole foot, including the bottom, and between and under your toes. Check for wounds, corns, and calluses. Use a mirror to see the bottom of your feet. The skin on your feet may be shiny, tight, or darker than normal. Your feet may also be cold and pale. Feel your feet by running your hands along the tops, bottoms, sides, and between your toes. Redness, swelling, and warmth are signs of blood flow problems that can lead to a foot ulcer. Do not try to remove corns or calluses yourself. Do not ignore small problems, such as dry skin or small wounds. These can become life-threatening over time without proper care.         Wash your feet each day with soap  and warm water.  Do not use hot water, because this can injure your foot. Dry your feet gently with a towel after you wash them. Dry between and under your toes.    Apply lotion or a moisturizer on your dry feet.  Ask your care team provider what lotions are best to use. Do not put lotion or moisturizer between your toes. Moisture between your toes could lead to skin breakdown.    Cut your toenails correctly.  File or cut your toenails straight across. Use a soft brush to clean around your toenails. If your toenails are very thick, you may need to have a care team provider or specialist cut them.     Protect your feet.  Do not walk barefoot or wear your shoes without socks. Check your shoes for rocks or other objects that can hurt your feet. Wear cotton socks to help keep your feet dry. Wear socks without toe seams, or wear them with the seams inside out. Change your socks each day. Do not wear socks that are dirty or damp.    Wear shoes that fit well.  Wear shoes that do not rub against any area of your feet. Your shoes should be ½ to ¾ inch (1 to 2 centimeters) longer than your feet. Your shoes should also have extra space around the widest part of your feet. Walking or athletic shoes with laces or straps that adjust are best. Ask your care team provider for help to choose shoes that fit you best. Ask your provider if you need to wear an insert, orthotic, or bandage on your feet.         Go to your follow-up visits.  Your care team provider will do a foot exam at least 1 time each year. You may need a foot exam more often if you have nerve damage, foot deformities, or ulcers. Your provider will check for nerve damage and how well you can feel your feet. Your provider will check your shoes to see if they fit well.    Do not smoke.  Smoking can damage your blood vessels and put you at increased risk for foot ulcers. Ask your care team provider for information if you currently smoke and need help to quit.  E-cigarettes or smokeless tobacco still contain nicotine. Talk to your care team provider before you use these products.    Follow up with your diabetes care team provider or foot specialist as directed:  You will need to have your feet checked at least 1 time each year. You may need a foot exam more often if you have nerve damage, foot deformities, or ulcers. Write down your questions so you remember to ask them during your visits.  © Copyright [employer] 2023 Information is for End User's use only and may not be sold, redistributed or otherwise used for commercial purposes.  The above information is an  only. It is not intended as medical advice for individual conditions or treatments. Talk to your doctor, nurse or pharmacist before following any medical regimen to see if it is safe and effective for you.

## 2024-01-16 NOTE — PROGRESS NOTES
PATIENT:  Jean-Pierre Scherer    1955    ASSESSMENT:     1. Type 2 diabetes mellitus without complication, without long-term current use of insulin ([facility])                PLAN:  1.  Reviewed medical records.  Patient was counseled on the condition and diagnosis.    2.  Educated disease prevention and risks related to diabetes.    3.  Educated proper daily foot care and exam.  Instructed proper skin care / protection and footwear.  Instructed to identify any signs of infection and related foot problem.    4.  The recent blood work was reviewed and the last HbA1c was 8.7.  Discussed proper blood glucose control with diet and exercise.    5.  Continue to monitor symptoms of neuropathy.  ail and HPK debrided for courtesy.    6. The patient will return in 6 months for diabetic foot exam.      Subjective:      HPI  The patient presents for diabetic foot evaluation.  The blood glucose was up a little.  He has mild numbness and paresthesia in his feet.  No significant pain.  No dysfunction.  He has callus on right great toe and tenderness around toenail left great toe.       The following portions of the patient's history were reviewed and updated as appropriate: allergies, current medications, past family history, past medical history, past social history, past surgical history and problem list.  All pertinent labs and images were reviewed.    Past Medical History  Past Medical History:   Diagnosis Date    Asthma     BMI 40.0-44.9, adult ([facility]) 1/11/2022    BMI 45.0-49.9, adult ([facility]) 6/28/2021    Bronchitis 9/29/2023    Bronchospasm     Cancer ([facility])     Prostate     Chest tightness 2/17/2023    Choking episode 2/17/2023    Colon polyp     Essential hypertension 6/26/2021    GERD (gastroesophageal reflux disease)     Hearing impairment 10/11/2022    Hyperlipidemia     Hypertension     Hypocalcemia 1/11/2022    Insomnia     Leg cramps     Lump on finger, left 1/11/2022    Microalbuminuria 9/27/2021    Mixed  hyperlipidemia 6/26/2021    Morbid obesity (HCC) 5/26/2022    Nocturia     Numbness of fingers of both hands     Obstructive sleep apnea syndrome 8/28/2023    Onychomycosis of toenail 5/26/2022    Pain in both knees 5/26/2022    Paronychia of left middle finger 9/27/2021    Right foot pain 5/26/2023    Seasonal allergic rhinitis 1/11/2022    Skin lesion     Skin rash 6/26/2021    Sleep apnea     not tested yet    Snoring     Tubular adenoma of colon 6/28/2021    Type 2 diabetes mellitus without complication, without long-term current use of insulin (HCC) 6/26/2021    Wears glasses     Weight gain        Past Surgical History  Past Surgical History:   Procedure Laterality Date    COLONOSCOPY  05/09/2017    COLONOSCOPY      HIP SURGERY Bilateral     Hip replacement    SC COLONOSCOPY FLX DX W/COLLJ SPEC WHEN PFRMD N/A 5/9/2017    Procedure: COLONOSCOPY;  Surgeon: Dinesh Bradley MD;  Location: [facility];  Service: Gastroenterology    PROSTATECTOMY      REPLACEMENT TOTAL KNEE Bilateral     Inactive    UPPER GASTROINTESTINAL ENDOSCOPY          Allergies:  Penicillins    Medications:  Current Outpatient Medications   Medication Sig Dispense Refill    albuterol (2.5 mg/3 mL) 0.083 % nebulizer solution Take 3 mL (2.5 mg total) by nebulization every 6 (six) hours as needed for wheezing or shortness of breath 90 mL 0    albuterol (Ventolin HFA) 90 mcg/act inhaler Inhale 2 puffs every 6 (six) hours as needed for wheezing 18 g 2    Ascorbic Acid (VITAMIN C PO) Take 1,000 mg by mouth daily      benzonatate (TESSALON) 200 MG capsule Take 1 capsule (200 mg total) by mouth every 8 (eight) hours as needed for cough 20 capsule 0    betamethasone valerate (VALISONE) 0.1 % lotion Apply topically 2 (two) times a day To the scalp as needed. 60 mL 3    Cyanocobalamin (VITAMIN B-12 PO) Take 1,000 mcg by mouth daily       fexofenadine (ALLEGRA) 180 MG tablet Take 180 mg by mouth daily as needed      lisinopril-hydrochlorothiazide  (PRINZIDE,ZESTORETIC) 20-12.5 MG per tablet Take 1 tablet by mouth daily 90 tablet 0    metFORMIN (GLUCOPHAGE-XR) 500 mg 24 hr tablet Take 1 tablet (500 mg total) by mouth daily at bedtime 90 tablet 0    Multiple Vitamins-Minerals (MULTIVITAL PO) Take 1 tablet by mouth      Omega-3 Fatty Acids (FISH OIL) 1000 MG CPDR Take 1,000 mg by mouth daily      omeprazole (PriLOSEC) 20 mg delayed release capsule Take 20 mg by mouth as needed      sildenafil (VIAGRA) 50 MG tablet Take 1 tablet (50 mg total) by mouth daily as needed for erectile dysfunction 8 tablet 0    simvastatin (ZOCOR) 40 mg tablet Take 1 tablet (40 mg total) by mouth daily at bedtime 90 tablet 0    triamcinolone (KENALOG) 0.1 % lotion Apply topically 2 (two) times a day 60 mL 2    betamethasone dipropionate (DIPROSONE) 0.05 % ointment Apply sparingly  1-2 times/day for up to 2 wks to R forearm rash. (Patient not taking: Reported on 10/31/2023) 45 g 0    clobetasol (TEMOVATE) 0.05 % external solution Apply topically 2 (two) times a day (Patient not taking: Reported on 10/31/2023) 50 mL 3    ketoconazole (NIZORAL) 2 % cream Apply topically daily (Patient not taking: Reported on 10/31/2023) 60 g 0     No current facility-administered medications for this visit.       Social History:  Social History     Socioeconomic History    Marital status: /Civil Union     Spouse name: None    Number of children: None    Years of education: None    Highest education level: None   Occupational History    Occupation: Presently not working   Tobacco Use    Smoking status: Former     Current packs/day: 0.00     Average packs/day: 1.5 packs/day for 16.9 years (25.4 ttl pk-yrs)     Types: Cigarettes     Start date: 1975     Quit date: 1992     Years since quittin.7    Smokeless tobacco: Never   Vaping Use    Vaping status: Every Day   Substance and Sexual Activity    Alcohol use: Yes     Alcohol/week: 1.0 standard drink of alcohol     Types: 1 Glasses of wine  per week     Comment: rarely    Drug use: No    Sexual activity: Not Currently     Partners: Female     Birth control/protection: Abstinence, Post-menopausal, Rhythm, Male Sterilization, Female Sterilization   Other Topics Concern    None   Social History Narrative    Single-family home    Current work/study status: Full-time    Caffeine use: Coffee, 3 servings/day    Lives with spouse    Former smoker - Inactive 6/22/2018    Annual eye exam: Sees 1hr; wears glasses    Annual dental checkup: Sees q6months    PSA: Used to follow Urology    - As per [employer]     Social Determinants of Health     Financial Resource Strain: Not on file   Food Insecurity: Not on file   Transportation Needs: Not on file   Physical Activity: Not on file   Stress: Not on file   Social Connections: Not on file   Intimate Partner Violence: Not on file   Housing Stability: Not on file        Review of Systems   Constitutional:  Negative for appetite change, chills and fever.   Respiratory:  Negative for cough and shortness of breath.    Cardiovascular:  Negative for chest pain.   Gastrointestinal:  Negative for diarrhea, nausea and vomiting.   Musculoskeletal:  Negative for gait problem.   Skin:  Negative for wound.   Neurological:  Positive for numbness. Negative for weakness.         Objective:      /78   Pulse 59   Ht 6' (1.829 m)   Wt (!) 158 kg (348 lb)   BMI 47.20 kg/m²          Physical Exam  Vitals reviewed.   Constitutional:       General: He is not in acute distress.     Appearance: He is obese. He is not toxic-appearing.   Cardiovascular:      Rate and Rhythm: Normal rate and regular rhythm.      Pulses: Normal pulses. no weak pulses          Dorsalis pedis pulses are 2+ on the right side and 2+ on the left side.        Posterior tibial pulses are 2+ on the right side and 2+ on the left side.      Comments: No ischemia.  Pulmonary:      Effort: Pulmonary effort is normal. No respiratory distress.   Musculoskeletal:          General: No swelling, tenderness or signs of injury.      Right foot: No Charcot foot or foot drop.      Left foot: No Charcot foot or foot drop.   Feet:      Right foot:      Protective Sensation: 10 sites tested.  10 sites sensed.      Skin integrity: Callus present. No ulcer, skin breakdown or erythema.      Left foot:      Protective Sensation: 10 sites tested.  10 sites sensed.      Skin integrity: No ulcer, skin breakdown, erythema or callus.   Skin:     General: Skin is warm.      Capillary Refill: Capillary refill takes less than 2 seconds.      Coloration: Skin is not cyanotic or mottled.      Findings: No abscess, ecchymosis, erythema, rash or wound.      Nails: There is no clubbing.      Comments: Thickening and discoloration of great toenails.  Mild HPK on right hallux IPJ medially.   Neurological:      General: No focal deficit present.      Mental Status: He is alert and oriented to person, place, and time.      Cranial Nerves: No cranial nerve deficit.      Sensory: No sensory deficit.      Motor: No weakness.      Coordination: Coordination normal.      Gait: Gait normal.   Psychiatric:         Mood and Affect: Mood normal.         Behavior: Behavior normal.         Thought Content: Thought content normal.         Judgment: Judgment normal.         Diabetic Foot Exam    Patient's shoes and socks removed.    Right Foot/Ankle   Right Foot Inspection  Skin Exam: skin intact, callus and callus. No erythema, no maceration and no ulcer.     Toe Exam: No swelling, no tenderness, erythema and  no right toe deformity    Sensory   Vibration: diminished  Proprioception: intact  Monofilament testing: intact    Vascular  Capillary refills: < 3 seconds  The right DP pulse is 2+. The right PT pulse is 2+.     Left Foot/Ankle  Left Foot Inspection  Skin Exam: skin intact. No erythema, no maceration, no ulcer and no callus.     Toe Exam: No swelling, no tenderness, no erythema and no left toe deformity.      Sensory   Vibration: diminished  Proprioception: intact  Monofilament testing: intact    Vascular  Capillary refills: < 3 seconds  The left DP pulse is 2+. The left PT pulse is 2+.     Assign Risk Category  No deformity present  No loss of protective sensation  No weak pulses  Risk: 0

## 2024-01-23 DIAGNOSIS — E11.9 TYPE 2 DIABETES MELLITUS WITHOUT COMPLICATION, WITHOUT LONG-TERM CURRENT USE OF INSULIN (HCC): ICD-10-CM

## 2024-01-23 RX ORDER — METFORMIN HYDROCHLORIDE 500 MG/1
500 TABLET, EXTENDED RELEASE ORAL
Qty: 90 TABLET | Refills: 1 | Status: SHIPPED | OUTPATIENT
Start: 2024-01-23 | End: 2024-01-25

## 2024-01-25 ENCOUNTER — CONSULT (OUTPATIENT)
Dept: ENDOCRINOLOGY | Facility: CLINIC | Age: 69
End: 2024-01-25
Payer: COMMERCIAL

## 2024-01-25 VITALS
TEMPERATURE: 97.4 F | BODY MASS INDEX: 42.66 KG/M2 | DIASTOLIC BLOOD PRESSURE: 78 MMHG | HEART RATE: 55 BPM | OXYGEN SATURATION: 97 % | SYSTOLIC BLOOD PRESSURE: 132 MMHG | RESPIRATION RATE: 16 BRPM | HEIGHT: 72 IN | WEIGHT: 315 LBS

## 2024-01-25 DIAGNOSIS — E11.65 TYPE 2 DIABETES MELLITUS WITH HYPERGLYCEMIA, WITHOUT LONG-TERM CURRENT USE OF INSULIN (HCC): Primary | ICD-10-CM

## 2024-01-25 DIAGNOSIS — E78.2 MIXED HYPERLIPIDEMIA: ICD-10-CM

## 2024-01-25 DIAGNOSIS — E66.01 CLASS 3 SEVERE OBESITY DUE TO EXCESS CALORIES WITH SERIOUS COMORBIDITY AND BODY MASS INDEX (BMI) OF 45.0 TO 49.9 IN ADULT (HCC): ICD-10-CM

## 2024-01-25 DIAGNOSIS — E11.29 TYPE 2 DIABETES MELLITUS WITH OTHER DIABETIC KIDNEY COMPLICATION (HCC): ICD-10-CM

## 2024-01-25 DIAGNOSIS — I10 ESSENTIAL HYPERTENSION: ICD-10-CM

## 2024-01-25 DIAGNOSIS — R63.5 WEIGHT GAIN: ICD-10-CM

## 2024-01-25 PROBLEM — E66.813 CLASS 3 SEVERE OBESITY WITH SERIOUS COMORBIDITY AND BODY MASS INDEX (BMI) OF 45.0 TO 49.9 IN ADULT (HCC): Status: ACTIVE | Noted: 2022-01-11

## 2024-01-25 PROBLEM — E66.813 CLASS 3 SEVERE OBESITY WITH SERIOUS COMORBIDITY AND BODY MASS INDEX (BMI) OF 45.0 TO 49.9 IN ADULT (HCC): Status: RESOLVED | Noted: 2022-01-11 | Resolved: 2024-01-25

## 2024-01-25 PROBLEM — E83.51 HYPOCALCEMIA: Status: RESOLVED | Noted: 2022-01-11 | Resolved: 2024-01-25

## 2024-01-25 PROCEDURE — 99204 OFFICE O/P NEW MOD 45 MIN: CPT | Performed by: INTERNAL MEDICINE

## 2024-01-25 RX ORDER — METFORMIN HYDROCHLORIDE 500 MG/1
500 TABLET, EXTENDED RELEASE ORAL 2 TIMES DAILY WITH MEALS
Qty: 180 TABLET | Refills: 1 | Status: SHIPPED | OUTPATIENT
Start: 2024-01-25

## 2024-01-25 RX ORDER — LISINOPRIL AND HYDROCHLOROTHIAZIDE 20; 12.5 MG/1; MG/1
1 TABLET ORAL DAILY
Qty: 90 TABLET | Refills: 1 | Status: SHIPPED | OUTPATIENT
Start: 2024-01-25

## 2024-01-25 RX ORDER — SIMVASTATIN 40 MG
40 TABLET ORAL
Qty: 90 TABLET | Refills: 1 | Status: SHIPPED | OUTPATIENT
Start: 2024-01-25

## 2024-01-25 NOTE — PATIENT INSTRUCTIONS
Instructions    Diet:   Take 1500 flori/day of balanced diet with 1/3rd carbs, 1/3rd protein and rest fiber and small amount fat.   Try to include fasting for 12-16hrs -early dinner and late breakfast.  Drink at least 64 oz fluids daily  Lifestyle:   30 min brisk activity most days. [facility] training to build muscles and burn fat.  Medical fitness: follow these exercise links  Full Version - [email or web address]     Warmup - [email or web address]     Lower Body - [email or web address]     Upper Body - [email or web address]     Core -  [email or web address]    Medications: look up Wegovy, Tirzepatide, Victoza - weight loss meds.  Consider switching from medications that cause weight gain to weight neutral medications.  Other:   Make sure you are treated appropriately if you have sleep apnea.  We may consider bariatric surgery if we dont see benefit over 6-12 months.

## 2024-01-25 NOTE — PROGRESS NOTES
"  New Consult Note      CC: Type 2 diabetes, Obesity    History of Present Illness:   68 yr male with type 2 diabetes since 2010, HTN, HLD, obesity BMI 47, ROSITA, GERD, microalbuminuria. He was referred by Dr. Baldwin.    He reports significant fatigue and some nocturia. He has inability to loose weight.    Max adult weight: 352 lbs. Minimum adult weight 280 lbs late 20's.  Age 20 225 lbs.    SAGM:    Current meds:  Metformin 500mg po qdaily    Opthamology: yes  Podiatry: yes  vaccination: yes  Dental:  Pancreatitis: No    Ace/ARB: lisinopril  Statin: simvastatin  Thyroid issues: No    Physical Exam:  Body mass index is 47.74 kg/m².  /78 (BP Location: Left arm, Patient Position: Sitting)   Pulse 55   Temp (!) 97.4 °F (36.3 °C) (Tympanic)   Resp 16   Ht 6' (1.829 m)   Wt (!) 160 kg (352 lb)   SpO2 97%   BMI 47.74 kg/m²    Vitals:    01/25/24 1410   Weight: (!) 160 kg (352 lb)        Physical Exam  Constitutional:       General: He is not in acute distress.     Appearance: He is well-developed. He is not ill-appearing.   HENT:      Head: Normocephalic and atraumatic.      Nose: Nose normal.      Mouth/Throat:      Pharynx: Oropharynx is clear.   Eyes:      Extraocular Movements: Extraocular movements intact.      Conjunctiva/sclera: Conjunctivae normal.   Neck:      Thyroid: No thyromegaly.   Cardiovascular:      Rate and Rhythm: Normal rate.   Pulmonary:      Effort: Pulmonary effort is normal.   Musculoskeletal:         General: No deformity.      Cervical back: Normal range of motion.   Skin:     Capillary Refill: Capillary refill takes less than 2 seconds.      Coloration: Skin is not pale.      Findings: No rash.   Neurological:      Mental Status: He is alert and oriented to person, place, and time.   Psychiatric:         Behavior: Behavior normal.       Labs:   Lab Results   Component Value Date    HGBA1C 8.7 (H) 12/29/2023       No results found for: \"YHA5FLRWAZWV\", \"TSH\", \"O5HIXNA\", \"Z4CCHPP\", " "\"THYROIDAB\"    Lab Results   Component Value Date    CREATININE 1.19 12/29/2023    CREATININE 1.17 08/28/2023    CREATININE 1.12 05/25/2023    BUN 17 12/29/2023    K 4.1 12/29/2023     12/29/2023    CO2 27 12/29/2023     eGFR   Date Value Ref Range Status   12/29/2023 63 ml/min/1.73sq m Final       Lab Results   Component Value Date    ALT 39 08/28/2023    AST 35 08/28/2023    ALKPHOS 54 08/28/2023       Lab Results   Component Value Date    CHOLESTEROL 181 12/29/2023    CHOLESTEROL 172 05/25/2023    CHOLESTEROL 166 10/11/2022     Lab Results   Component Value Date    HDL 44 12/29/2023    HDL 47 05/25/2023    HDL 44 10/11/2022     Lab Results   Component Value Date    TRIG 214 (H) 12/29/2023    TRIG 162 (H) 05/25/2023    TRIG 243 (H) 10/11/2022     Lab Results   Component Value Date    NONHDLC 137 12/29/2023    NONHDLC 125 05/25/2023    NONHDLC 122 10/11/2022         Assessment/Plan:    Problem List Items Addressed This Visit          Endocrine    Type 2 diabetes mellitus with hyperglycemia, without long-term current use of insulin (HCC) - Primary     He is uncontrolled. Goal is 7%.    Today we discussed all aspects of diabetes including pathophysiology, risk factors, complications, SAGM, CGM, diet, lifestyle modifications, medical fitness training, diabetes education, goals of therapy, follow up needs and medications including insulin, metformin, Jardiance and GLP1 agonists.  Advised to maintain goal blood sugars 70-180mg/dL.    We added GLP1 agonist in addition to diet and lifestyle changes.  Start medical fitness training.    Follow up in 3-6 months.    Lab Results   Component Value Date    HGBA1C 8.7 (H) 12/29/2023            Relevant Medications    tirzepatide (Mounjaro) 2.5 MG/0.5ML    tirzepatide 5 MG/0.5ML (Start on 2/22/2024)    tirzepatide 7.5 MG/0.5ML (Start on 3/21/2024)    metFORMIN (GLUCOPHAGE-XR) 500 mg 24 hr tablet    Other Relevant Orders    Hemoglobin A1C    Albumin / creatinine urine ratio "    C-peptide    Insulin, fasting    Type 2 diabetes mellitus with other diabetic kidney complication (HCC)    Relevant Medications    tirzepatide (Mounjaro) 2.5 MG/0.5ML    tirzepatide 5 MG/0.5ML (Start on 2/22/2024)    tirzepatide 7.5 MG/0.5ML (Start on 3/21/2024)    metFORMIN (GLUCOPHAGE-XR) 500 mg 24 hr tablet       Other    Mixed hyperlipidemia     Continue simvastatin for now.  Will reassess ASCVD risk score after lipid testing and then consider high potency steroids.         Class 3 severe obesity due to excess calories with serious comorbidity and body mass index (BMI) of 45.0 to 49.9 in adult ([facility])     Today we discussed all aspects of obesity and metabolism including pathophysiology, risk factors, complications, goal of sustaining weight loss long term, usual propensity to regain weight with short term approaches, follow up needs and medications - efficacy and limitations.   Discussed role of endocrinopathies, inflammatory conditions, sleep disorders, mental health disorders, lifestyle issues and polypharmacy and weight gain.  Briefly discussed bariatric surgery.  Diet: carb controlled diet <1500cal/day/ VLCD, 64oz fluids/day   lifestyle modifications: intermittent fasting and 10,000 steps/day  medical fitness training: muscle building  education: nutrition input           Relevant Orders    Ambulatory Referral to Medical Fitness Exercise Specialist    Weight gain     R/o underlying hypothyridism         Relevant Orders    T4, free    TSH, 3rd generation         I have spent a total time of 45 minutes on 01/25/24 in caring for this patient including greater than 50% of this time was spent in counseling/coordination of care as listed above.       Discussed with the patient and all questioned fully answered. He will contact me with concerns.    Lee Camacho

## 2024-01-27 NOTE — ASSESSMENT & PLAN NOTE
Today we discussed all aspects of obesity and metabolism including pathophysiology, risk factors, complications, goal of sustaining weight loss long term, usual propensity to regain weight with short term approaches, follow up needs and medications - efficacy and limitations.   Discussed role of endocrinopathies, inflammatory conditions, sleep disorders, mental health disorders, lifestyle issues and polypharmacy and weight gain.  Briefly discussed bariatric surgery.  Diet: carb controlled diet <1500cal/day/ VLCD, 64oz fluids/day   lifestyle modifications: intermittent fasting and 10,000 steps/day  medical fitness training: muscle building  education: nutrition input

## 2024-01-27 NOTE — ASSESSMENT & PLAN NOTE
Continue simvastatin for now.  Will reassess ASCVD risk score after lipid testing and then consider high potency steroids.

## 2024-01-27 NOTE — ASSESSMENT & PLAN NOTE
He is uncontrolled. Goal is 7%.    Today we discussed all aspects of diabetes including pathophysiology, risk factors, complications, SAGM, CGM, diet, lifestyle modifications, medical fitness training, diabetes education, goals of therapy, follow up needs and medications including insulin, metformin, Jardiance and GLP1 agonists.  Advised to maintain goal blood sugars 70-180mg/dL.    We added GLP1 agonist in addition to diet and lifestyle changes.  Start medical fitness training.    Follow up in 3-6 months.    Lab Results   Component Value Date    HGBA1C 8.7 (H) 12/29/2023

## 2024-01-28 ENCOUNTER — VBI (OUTPATIENT)
Dept: ADMINISTRATIVE | Facility: OTHER | Age: 69
End: 2024-01-28

## 2024-01-31 ENCOUNTER — OFFICE VISIT (OUTPATIENT)
Dept: DERMATOLOGY | Facility: CLINIC | Age: 69
End: 2024-01-31
Payer: COMMERCIAL

## 2024-01-31 VITALS — BODY MASS INDEX: 42.66 KG/M2 | TEMPERATURE: 98.2 F | WEIGHT: 315 LBS | HEIGHT: 72 IN

## 2024-01-31 DIAGNOSIS — L57.0 KERATOSIS, ACTINIC: ICD-10-CM

## 2024-01-31 DIAGNOSIS — L40.8 SEBOPSORIASIS: ICD-10-CM

## 2024-01-31 DIAGNOSIS — M71.30 MYXOID CYST: Primary | ICD-10-CM

## 2024-01-31 PROCEDURE — 99213 OFFICE O/P EST LOW 20 MIN: CPT | Performed by: STUDENT IN AN ORGANIZED HEALTH CARE EDUCATION/TRAINING PROGRAM

## 2024-01-31 NOTE — PATIENT INSTRUCTIONS
"NEOPLASM OF UNCERTAIN BEHAVIOR: likely digital myxoid cyst    Assessment and Plan:  Based on a thorough discussion of this condition and the management approach to it (including a comprehensive discussion of the known risks, side effects and potential benefits of treatment), the patient (family) agrees to implement the following specific plan:  Referral to hand surgery with this request: Evaluate left third finger for digital myxoid cyst vs other neoplasm. Pt will need surgical removal. Amb referral to hand surgeon placed in Epic. Dr. Tung Canas - Hand Surgeon [address] [place] [address] phone number: [phone]      XEROSIS (\"DRY SKIN\")      Assessment and Plan:  Based on a thorough discussion of this condition and the management approach to it (including a comprehensive discussion of the known risks, side effects and potential benefits of treatment), the patient (family) agrees to implement the following specific plan:  Reviewed gentle skin care in detail (no hot showers, limited soap usage to armpits, private area and feet, no washcloth, gentle pat dry with towel following shower, moisturizing with creams, ointments- not lotions)   Use moisturizer like Eucerin,Cerave, Vanicream or Aveeno Cream 3 times a day for the dry skin             "

## 2024-01-31 NOTE — PROGRESS NOTES
"[facility] Note     Patient Name: Jean-Pierre Scherer  Encounter Date: 1/31/2024     Have you been cared for by a [facility] Dermatologist in the last 3 years and, if so, which description applies to you?    Yes.  I have been here within the last 3 years, and my medical history has NOT changed since that time.  I am MALE/not capable of bearing children.    REVIEW OF SYSTEMS:  Have you recently had or currently have any of the following? No changes in my recent health.   PAST MEDICAL HISTORY:  Have you personally ever had or currently have any of the following?  If \"YES,\" then please provide more detail. No changes in my medical history.   HISTORY OF IMMUNOSUPPRESSION: Do you have a history of any of the following:  Systemic Immunosuppression such as Diabetes, Biologic or Immunotherapy, Chemotherapy, Organ Transplantation, Bone Marrow Transplantation?  No     Answering \"YES\" requires the addition of the dotphrase \"IMMUNOSUPPRESSED\" as the first diagnosis of the patient's visit.   FAMILY HISTORY:  Any \"first degree relatives\" (parent, brother, sister, or child) with the following?    No changes in my family's known health.   PATIENT EXPERIENCE:    Do you want the Dermatologist to perform a COMPLETE skin exam today including a clinical examination under the \"bra and underwear\" areas?  NO  If necessary, do we have your permission to call and leave a detailed message on your Preferred Phone number that includes your specific medical information?  Yes      Allergies   Allergen Reactions    Penicillins Rash     sensitivity      Current Outpatient Medications:     albuterol (2.5 mg/3 mL) 0.083 % nebulizer solution, Take 3 mL (2.5 mg total) by nebulization every 6 (six) hours as needed for wheezing or shortness of breath, Disp: 90 mL, Rfl: 0    albuterol (Ventolin HFA) 90 mcg/act inhaler, Inhale 2 puffs every 6 (six) hours as needed for wheezing, Disp: 18 g, Rfl: 2    Ascorbic Acid (VITAMIN C PO), Take " 1,000 mg by mouth daily, Disp: , Rfl:     benzonatate (TESSALON) 200 MG capsule, Take 1 capsule (200 mg total) by mouth every 8 (eight) hours as needed for cough, Disp: 20 capsule, Rfl: 0    betamethasone dipropionate (DIPROSONE) 0.05 % ointment, Apply sparingly  1-2 times/day for up to 2 wks to R forearm rash., Disp: 45 g, Rfl: 0    betamethasone valerate (VALISONE) 0.1 % lotion, Apply topically 2 (two) times a day To the scalp as needed., Disp: 60 mL, Rfl: 3    clobetasol (TEMOVATE) 0.05 % external solution, Apply topically 2 (two) times a day (Patient not taking: Reported on 10/31/2023), Disp: 50 mL, Rfl: 3    Cyanocobalamin (VITAMIN B-12 PO), Take 1,000 mcg by mouth daily , Disp: , Rfl:     fexofenadine (ALLEGRA) 180 MG tablet, Take 180 mg by mouth daily as needed, Disp: , Rfl:     ketoconazole (NIZORAL) 2 % cream, Apply topically daily (Patient not taking: Reported on 10/31/2023), Disp: 60 g, Rfl: 0    lisinopril-hydrochlorothiazide (PRINZIDE,ZESTORETIC) 20-12.5 MG per tablet, TAKE 1 TABLET BY MOUTH DAILY, Disp: 90 tablet, Rfl: 1    metFORMIN (GLUCOPHAGE-XR) 500 mg 24 hr tablet, Take 1 tablet (500 mg total) by mouth 2 (two) times a day with meals, Disp: 180 tablet, Rfl: 1    Multiple Vitamins-Minerals (MULTIVITAL PO), Take 1 tablet by mouth, Disp: , Rfl:     Omega-3 Fatty Acids (FISH OIL) 1000 MG CPDR, Take 1,000 mg by mouth daily, Disp: , Rfl:     omeprazole (PriLOSEC) 20 mg delayed release capsule, Take 20 mg by mouth as needed, Disp: , Rfl:     sildenafil (VIAGRA) 50 MG tablet, Take 1 tablet (50 mg total) by mouth daily as needed for erectile dysfunction, Disp: 8 tablet, Rfl: 0    simvastatin (ZOCOR) 40 mg tablet, TAKE 1 TABLET (40 MG TOTAL) BY MOUTH DAILY AT BEDTIME, Disp: 90 tablet, Rfl: 1    tirzepatide (Mounjaro) 2.5 MG/0.5ML, Inject 0.5 mL (2.5 mg total) under the skin once a week, Disp: 2 mL, Rfl: 0    [START ON 2/22/2024] tirzepatide 5 MG/0.5ML, Inject 0.5 mL (5 mg total) under the skin once a week Do  not start before February 22, 2024., Disp: 2 mL, Rfl: 0    [START ON 3/21/2024] tirzepatide 7.5 MG/0.5ML, Inject 0.5 mL (7.5 mg total) under the skin once a week Do not start before March 21, 2024., Disp: 6 mL, Rfl: 0    triamcinolone (KENALOG) 0.1 % lotion, Apply topically 2 (two) times a day, Disp: 60 mL, Rfl: 2          Whom besides the patient is providing clinical information about today's encounter?   NO ADDITIONAL HISTORIAN (patient alone provided history)    Physical Exam and Assessment/Plan by Diagnosis:      ACTINIC KERATOSES - RESOLVED    Physical Exam:  Anatomic Location Affected:  Right dorsal wrist   Morphological Description:  Scar      Assessment and Plan:  Based on a thorough discussion of this condition and the management approach to it (including a comprehensive discussion of the known risks, side effects and potential benefits of treatment), the patient (family) agrees to implement the following specific plan:  Treated with cryotherapy on 11/3/2023 by Dr. Erazo  No residual lesion      NEOPLASM OF UNCERTAIN BEHAVIOR: likely digital myxoid cyst      Physical Exam:  Anatomic Location Affected:  Left Third finger   Morphological Description:  crusted small 0.2x0.2 cm papule on proximal nail fold with longitudinal depression in nail plate  Pertinent Positives:  Pertinent Negatives:    Additional History of Present Condition:  Pt reports clear jelly material has come out of area.    Assessment and Plan:  Based on a thorough discussion of this condition and the management approach to it (including a comprehensive discussion of the known risks, side effects and potential benefits of treatment), the patient (family) agrees to implement the following specific plan:  Referral to hand surgery with this request: Evaluate left third finger for digital myxoid cyst vs other neoplasm. Pt will need surgical removal. Amb referral to hand surgeon placed in Epic.      SEBOPSORIASIS - WELL CONTROL      Physical  "Exam:  Anatomic Location Affected:  Scalp  Morphological Description:  pink plaque with minimal scale     Additional History of Present Condition:  The patient uses Betamethasone valerate lotion 0.1% lotion prescribed by Dr. Elena in 2021.     Assessment and Plan:  Based on a thorough discussion of this condition and the management approach to it (including a comprehensive discussion of the known risks, side effects and potential benefits of treatment), the patient (family) agrees to implement the following specific plan:  Continue Betamethasone valerate 0.1% lotion daily as needed to the scalp.  Consider trial of dove sensitive  Cautioned risk of skin wrinkling      XEROSIS (\"DRY SKIN\")    Physical Exam:  Anatomic Location Affected:  Diffuse   Morphological Description:  xerosis  Pertinent Positives:  Pertinent Negatives:    Additional History of Present Condition:  N/A    Assessment and Plan:  Based on a thorough discussion of this condition and the management approach to it (including a comprehensive discussion of the known risks, side effects and potential benefits of treatment), the patient (family) agrees to implement the following specific plan:  Reviewed gentle skin care in detail (no hot showers, limited soap usage to armpits, private area and feet, no washcloth, gentle pat dry with towel following shower, moisturizing with creams, ointments- not lotions)   Use moisturizer like Eucerin,Cerave, Vanicream or Aveeno Cream 3 times a day for the dry skin               This encounter (70834 or 82536) has a MODERATE level of medical decision making (MDM) given the presence of at least 2 of the elements of MDM (below):  *MODERATE number and complexity of problems addressed: 1 or more chronic illnesses with exacerbation, progression, or side effects of treatment; OR 2 or more stable chronic illnesses; OR 1 undiagnosed new problem with uncertain prognosis; OR 1 acute illness with systemic symptoms; OR 1 acute " uncomplicated injury  *MODERATE amount and/or complexity of data reviewed: this includes review of previous notes and/or lab tests, independent interpretation of tests performed by another healthcare professional, ordering tests, assessment requiring independent historian, or discussion with other healthcare professionals.  *MODERATE risk of complications and/or morbidity or mortality of patient management (for example, prescription drug management, decisions regarding minor surgery with identified patient or procedure risk factors).    Scribe Attestation      I,:  Carissa Moran am acting as a scribe while in the presence of the attending physician.:       I,:  Christine Ballesteros MD personally performed the services described in this documentation    as scribed in my presence.:

## 2024-02-07 ENCOUNTER — FITNESS (OUTPATIENT)
Age: 69
End: 2024-02-07

## 2024-02-07 DIAGNOSIS — E66.01 CLASS 3 SEVERE OBESITY DUE TO EXCESS CALORIES WITH SERIOUS COMORBIDITY AND BODY MASS INDEX (BMI) OF 45.0 TO 49.9 IN ADULT (HCC): ICD-10-CM

## 2024-02-07 NOTE — PROGRESS NOTES
Fitness Plan of Care    Referring Provider: Lee Camacho MD  Diagnosis: Obesity    Risk Factors  Cholesterol: no  Smoking: no  HTN: yes  DM: yes  Obesity: yes  Inactivity: yes  Stress: no    Lifestyle Assessment Score  LMA Initial Score: 47      Exercise Prescription  Cardiovascular  3x/week. 150 min / week. Modalities including treadmill, recumbent bike, elliptical. Target HR:125-130     Strength Training  3x/week. Upper and lower body exercises. 3 sets of 10-12 repetitions. 60-80% of 1 rep max.         Weight PRE: 335 lb 4 oz (152 kg)     Body fat percentage PRE: 51.1     BMI PRE: 45.5

## 2024-02-09 DIAGNOSIS — I10 ESSENTIAL HYPERTENSION: ICD-10-CM

## 2024-02-10 RX ORDER — LISINOPRIL AND HYDROCHLOROTHIAZIDE 20; 12.5 MG/1; MG/1
1 TABLET ORAL DAILY
Qty: 90 TABLET | Refills: 0 | Status: SHIPPED | OUTPATIENT
Start: 2024-02-10

## 2024-02-20 ENCOUNTER — TELEPHONE (OUTPATIENT)
Age: 69
End: 2024-02-20

## 2024-02-20 DIAGNOSIS — B35.3 TINEA PEDIS OF BOTH FEET: ICD-10-CM

## 2024-02-20 RX ORDER — KETOCONAZOLE 20 MG/G
CREAM TOPICAL DAILY
Qty: 60 G | Refills: 0 | Status: SHIPPED | OUTPATIENT
Start: 2024-02-20

## 2024-02-20 NOTE — TELEPHONE ENCOUNTER
Caller: [patient    Doctor: Sharyn    Reason for call: pt called asking why he was coming in for an appt, he was told what the appt was for. He understood what the appt is for.    Call back#: [phone]

## 2024-02-27 ENCOUNTER — OFFICE VISIT (OUTPATIENT)
Dept: OBGYN CLINIC | Facility: CLINIC | Age: 69
End: 2024-02-27
Payer: COMMERCIAL

## 2024-02-27 ENCOUNTER — APPOINTMENT (OUTPATIENT)
Dept: RADIOLOGY | Facility: AMBULARY SURGERY CENTER | Age: 69
End: 2024-02-27
Attending: STUDENT IN AN ORGANIZED HEALTH CARE EDUCATION/TRAINING PROGRAM
Payer: COMMERCIAL

## 2024-02-27 VITALS — WEIGHT: 315 LBS | HEIGHT: 72 IN | BODY MASS INDEX: 42.66 KG/M2

## 2024-02-27 DIAGNOSIS — M71.30 MYXOID CYST: ICD-10-CM

## 2024-02-27 DIAGNOSIS — Z01.818 PREOP TESTING: Primary | ICD-10-CM

## 2024-02-27 DIAGNOSIS — M67.40 MUCOID CYST OF JOINT: ICD-10-CM

## 2024-02-27 PROCEDURE — 99204 OFFICE O/P NEW MOD 45 MIN: CPT | Performed by: STUDENT IN AN ORGANIZED HEALTH CARE EDUCATION/TRAINING PROGRAM

## 2024-02-27 PROCEDURE — 73140 X-RAY EXAM OF FINGER(S): CPT

## 2024-02-27 RX ORDER — CHLORHEXIDINE GLUCONATE ORAL RINSE 1.2 MG/ML
15 SOLUTION DENTAL ONCE
Status: CANCELLED | OUTPATIENT
Start: 2024-02-27 | End: 2024-02-27

## 2024-02-27 RX ORDER — CHLORHEXIDINE GLUCONATE ORAL RINSE 1.2 MG/ML
15 SOLUTION DENTAL ONCE
OUTPATIENT
Start: 2024-02-27 | End: 2024-02-27

## 2024-02-27 NOTE — PROGRESS NOTES
ORTHOPAEDIC HAND, WRIST, AND ELBOW OFFICE  VISIT      ASSESSMENT/PLAN:      Diagnoses and all orders for this visit:    Preop testing  -     Ambulatory referral to Family Practice; Future  -     CBC and differential; Future  -     Basic metabolic panel; Future  -     EKG 12 lead; Future    Mucoid cyst of joint  -     Ambulatory Referral to Hand Surgery  -     XR finger left third digit-middle; Future  -     Case request operating room: EXCISION GANGLION CYST LEFT MIDDLE FINGER; Standing  -     Case request operating room: EXCISION GANGLION CYST LEFT MIDDLE FINGER    Other orders  -     Diet NPO; Sips with meds; Standing  -     Nursing Communication Warmimg Interventions Implemented; Standing  -     Nursing Communication CHG bath, have staff wash entire body (neck down) per pre-op bathing protocol. Routine, evening prior to, and day of surgery.; Standing  -     Nursing Communication Swab both nares with Povidone-Iodine solution, EXCLUDE if patient has shellfish/Iodine allergy. Routine, day of surgery, on call to OR; Standing  -     chlorhexidine (PERIDEX) 0.12 % oral rinse 15 mL  -     Void on call to OR; Standing  -     Insert peripheral IV; Standing  -     ceFAZolin (ANCEF) 3,000 mg in dextrose 5 % 100 mL IVPB          68 y.o. male with left middle finger mucoid cyst.  Treatment options and expected outcomes were discussed.  The patient verbalized understanding of exam findings and treatment plan.   The patient was given the opportunity to ask questions.  Questions were answered to the patient's satisfaction.  The patient decided to move forward with excision of left middle finger mucoid cyst. Typical postop recovery d/w pt today.   We reiterated the importance of trying not to pop the cyst and keeping this area clean if it does pop as it could lead to an infx spreading to the joint. Pt states understanding.      Follow Up:  After surgery       To Do Next Visit:  Re-evaluation of current  issue      Discussions:  Ganglion Cyst Excision: The anatomy and physiology of the ganglion was discussed with the patient today in the office.  Fluid leaking out of the joint surface typically creates a small sac, which can enlarge and cause pain or limitation of motion.  Treatment options include observation, aspiration, or surgical incision were discussed with the patient today.  Observation typically lead to resolution and approximately 10% of patients, aspiration least resolution approximately 50% of patients, and surgical excision lead to resolution in approximately 97% of patients.  After discussion with the patient today, the patient voiced understanding of all treatment options.The patient has elected excision of the ganglion.The risks and benefits of the procedure were explained to the patient, which include, but are not limited to: Bleeding, infection, recurrence, pain, scar, damage to tendons, damage to nerves, and damage to blood vessels, failure to give desired results and complications related to anesthesia.  These risks, along with alternative conservative treatment options, and postoperative protocols were voiced back and understood by the patient.  All questions were answered to the patient's satisfaction.  The patient agrees to comply with a standard postoperative protocol, and is willing to proceed.  Education was provided via written and auditory forms.  There were no barriers to learning. Written handouts regarding wound care, incision and scar care, and general preoperative information was provided to the patient.  Prior to surgery, the patient may be requested to stop all anti-inflammatory medications.  Prophylactic aspirin, Plavix, and Coumadin may be allowed to be continued.  Medications including vitamin E., ginkgo, and fish oil are requested to be stopped approximately one week prior to surgery.  Hypertensive medications and beta blockers, if taken, should be continued.      Jorge  MD Sharyn  Attending, Orthopaedic Surgery  Hand, Wrist, and Elbow Surgery  [facility]    ______________________________________________________________________________________________    CHIEF COMPLAINT:  Chief Complaint   Patient presents with   • Left Middle Finger - Pain       SUBJECTIVE:  Patient is a 68 y.o. LHD male who presents today for evaluation and treatment of left middle finger mass. Pt states this has been present for 1-2 years. Describes it has swelling and then he squeezes it and fluid comes out. He says fluid has come out of this multiple times. Usually is a thicker fluid, but sometimes it gets infected and looks like pus. At that point he'll be placed on abx and it improves. Dr. Erazo performed cryotherapy on 11/3/23, but it again returned. Sent by dermatology to consider surgical excision.   Referred for evaluation by Christine Ballesteros MD.     I have personally reviewed all the relevant PMH, PSH, SH, FH, Medications and allergies      PAST MEDICAL HISTORY:  Past Medical History:   Diagnosis Date   • Asthma    • BMI 40.0-44.9, adult ([facility]) 1/11/2022   • BMI 45.0-49.9, adult ([facility]) 6/28/2021   • Bronchitis 9/29/2023   • Bronchospasm    • Cancer ([facility])     Prostate    • Chest tightness 2/17/2023   • Choking episode 2/17/2023   • Colon polyp    • Essential hypertension 6/26/2021   • GERD (gastroesophageal reflux disease)    • Hearing impairment 10/11/2022   • Hyperlipidemia    • Hypertension    • Hypocalcemia 1/11/2022   • Insomnia    • Leg cramps    • Lump on finger, left 1/11/2022   • Microalbuminuria 9/27/2021   • Mixed hyperlipidemia 6/26/2021   • Morbid obesity (HCC) 5/26/2022   • Nocturia    • Numbness of fingers of both hands    • Obstructive sleep apnea syndrome 8/28/2023   • Onychomycosis of toenail 5/26/2022   • Pain in both knees 5/26/2022   • Paronychia of left middle finger 9/27/2021   • Right foot pain 5/26/2023   • Seasonal allergic rhinitis 1/11/2022   • Skin  lesion    • Skin rash 2021   • Sleep apnea     not tested yet   • Snoring    • Tubular adenoma of colon 2021   • Type 2 diabetes mellitus without complication, without long-term current use of insulin (HCC) 2021   • Wears glasses    • Weight gain        PAST SURGICAL HISTORY:  Past Surgical History:   Procedure Laterality Date   • COLONOSCOPY  2017   • COLONOSCOPY     • HIP SURGERY Bilateral     Hip replacement   • CT COLONOSCOPY FLX DX W/COLLJ SPEC WHEN PFRMD N/A 2017    Procedure: COLONOSCOPY;  Surgeon: Dinesh Bradley MD;  [facility];  Service: Gastroenterology   • PROSTATECTOMY     • REPLACEMENT TOTAL KNEE Bilateral     Inactive   • UPPER GASTROINTESTINAL ENDOSCOPY         FAMILY HISTORY:  Family History   Problem Relation Age of Onset   • Brain cancer Mother    • Psoriasis Mother    • Eczema Mother    • Depression Father    • Heart disease Father         Heart Artery blockages   • Diabetes Father    • Stroke Paternal Grandfather        SOCIAL HISTORY:  Social History     Tobacco Use   • Smoking status: Former     Current packs/day: 0.00     Average packs/day: 1.5 packs/day for 16.9 years (25.4 ttl pk-yrs)     Types: Cigarettes     Start date: 1975     Quit date: 1992     Years since quittin.8   • Smokeless tobacco: Never   Vaping Use   • Vaping status: Every Day   Substance Use Topics   • Alcohol use: Yes     Alcohol/week: 1.0 standard drink of alcohol     Types: 1 Glasses of wine per week     Comment: rarely   • Drug use: No       MEDICATIONS:    Current Outpatient Medications:   •  albuterol (2.5 mg/3 mL) 0.083 % nebulizer solution, Take 3 mL (2.5 mg total) by nebulization every 6 (six) hours as needed for wheezing or shortness of breath, Disp: 90 mL, Rfl: 0  •  albuterol (Ventolin HFA) 90 mcg/act inhaler, Inhale 2 puffs every 6 (six) hours as needed for wheezing, Disp: 18 g, Rfl: 2  •  Ascorbic Acid (VITAMIN C PO), Take 1,000 mg by mouth daily, Disp: , Rfl:   •   benzonatate (TESSALON) 200 MG capsule, Take 1 capsule (200 mg total) by mouth every 8 (eight) hours as needed for cough, Disp: 20 capsule, Rfl: 0  •  betamethasone dipropionate (DIPROSONE) 0.05 % ointment, Apply sparingly  1-2 times/day for up to 2 wks to R forearm rash., Disp: 45 g, Rfl: 0  •  betamethasone valerate (VALISONE) 0.1 % lotion, Apply topically 2 (two) times a day To the scalp as needed., Disp: 60 mL, Rfl: 3  •  Cyanocobalamin (VITAMIN B-12 PO), Take 1,000 mcg by mouth daily , Disp: , Rfl:   •  fexofenadine (ALLEGRA) 180 MG tablet, Take 180 mg by mouth daily as needed, Disp: , Rfl:   •  ketoconazole (NIZORAL) 2 % cream, Apply topically daily, Disp: 60 g, Rfl: 0  •  lisinopril-hydrochlorothiazide (PRINZIDE,ZESTORETIC) 20-12.5 MG per tablet, Take 1 tablet by mouth daily, Disp: 90 tablet, Rfl: 0  •  metFORMIN (GLUCOPHAGE-XR) 500 mg 24 hr tablet, Take 1 tablet (500 mg total) by mouth 2 (two) times a day with meals, Disp: 180 tablet, Rfl: 1  •  Multiple Vitamins-Minerals (MULTIVITAL PO), Take 1 tablet by mouth, Disp: , Rfl:   •  Omega-3 Fatty Acids (FISH OIL) 1000 MG CPDR, Take 1,000 mg by mouth daily, Disp: , Rfl:   •  omeprazole (PriLOSEC) 20 mg delayed release capsule, Take 20 mg by mouth as needed, Disp: , Rfl:   •  sildenafil (VIAGRA) 50 MG tablet, Take 1 tablet (50 mg total) by mouth daily as needed for erectile dysfunction, Disp: 8 tablet, Rfl: 0  •  simvastatin (ZOCOR) 40 mg tablet, TAKE 1 TABLET (40 MG TOTAL) BY MOUTH DAILY AT BEDTIME, Disp: 90 tablet, Rfl: 1  •  tirzepatide (Mounjaro) 2.5 MG/0.5ML, Inject 0.5 mL (2.5 mg total) under the skin once a week, Disp: 2 mL, Rfl: 0  •  tirzepatide 5 MG/0.5ML, Inject 0.5 mL (5 mg total) under the skin once a week Do not start before February 22, 2024., Disp: 2 mL, Rfl: 0  •  [START ON 3/21/2024] tirzepatide 7.5 MG/0.5ML, Inject 0.5 mL (7.5 mg total) under the skin once a week Do not start before March 21, 2024., Disp: 6 mL, Rfl: 0  •  triamcinolone (KENALOG)  0.1 % lotion, Apply topically 2 (two) times a day, Disp: 60 mL, Rfl: 2  •  clobetasol (TEMOVATE) 0.05 % external solution, Apply topically 2 (two) times a day (Patient not taking: Reported on 10/31/2023), Disp: 50 mL, Rfl: 3    ALLERGIES:  Allergies   Allergen Reactions   • Penicillins Rash     sensitivity           REVIEW OF SYSTEMS:  Musculoskeletal:        As noted in HPI.   All other systems reviewed and are negative.    VITALS:  There were no vitals filed for this visit.    LABS:  HgA1c:   Lab Results   Component Value Date    HGBA1C 8.7 (H) 12/29/2023     BMP:   Lab Results   Component Value Date    CALCIUM 8.8 12/29/2023    K 4.1 12/29/2023    CO2 27 12/29/2023     12/29/2023    BUN 17 12/29/2023    CREATININE 1.19 12/29/2023       _____________________________________________________  PHYSICAL EXAMINATION:  General: Well developed and well nourished, alert & oriented x 3, appears comfortable  Psychiatric: Normal  HEENT: Normocephalic, Atraumatic Trachea Midline, No torticollis  Pulmonary: No audible wheezing or respiratory distress   Abdomen/GI: Non tender, non distended   Cardiovascular: No pitting edema, 2+ radial pulse   Skin: No Erythema, No Lacerations, No Fluctuation, No Ulcerations  Neurovascular: Sensation Intact to the Median, Ulnar, Radial Nerve, Motor Intact to the Median, Ulnar, Radial Nerve, and Pulses Intact  Musculoskeletal: Normal, except as noted in detailed exam and in HPI.      MUSCULOSKELETAL EXAMINATION:  Left Middle Finger:  Centralized mucoid cyst along the DIP joint  6mm W x  4mm L x 2mm H  + Nail changes   Gelatinous fluid is expressed from this area after patient squeezed the area.   Currently no signs of infection.  Brisk capillary refill.  ___________________________________________________  STUDIES REVIEWED:  Xrays of the left middle finger were reviewed and independently interpreted in PACS by Dr. Coles and demonstrate no acute osseous abnormalities or signs of  osteomyelitis.          PROCEDURES PERFORMED:  Procedures  No Procedures performed today    _____________________________________________________      Scribe Attestation    I,:  Dania Jim PA-C am acting as a scribe while in the presence of the attending physician.:       I,:  Jorge Coles MD personally performed the services described in this documentation    as scribed in my presence.:

## 2024-03-22 ENCOUNTER — TELEPHONE (OUTPATIENT)
Age: 69
End: 2024-03-22

## 2024-03-22 NOTE — TELEPHONE ENCOUNTER
Referral from Ortho in [place] for pre-op appt for sx on 5/16/24 with Dr. Coles- pt is scheduled for phys on 4/9 - no avails with Dr. Kate to move appt to so pre op can be completed within 30 days- please review and schedule patient.

## 2024-05-08 ENCOUNTER — OFFICE VISIT (OUTPATIENT)
Dept: LAB | Facility: CLINIC | Age: 69
End: 2024-05-08
Payer: COMMERCIAL

## 2024-05-08 ENCOUNTER — LAB (OUTPATIENT)
Dept: LAB | Facility: CLINIC | Age: 69
End: 2024-05-08
Payer: COMMERCIAL

## 2024-05-08 DIAGNOSIS — I10 ESSENTIAL HYPERTENSION: ICD-10-CM

## 2024-05-08 DIAGNOSIS — E11.65 TYPE 2 DIABETES MELLITUS WITH HYPERGLYCEMIA, WITHOUT LONG-TERM CURRENT USE OF INSULIN (HCC): ICD-10-CM

## 2024-05-08 DIAGNOSIS — R63.5 WEIGHT GAIN: ICD-10-CM

## 2024-05-08 DIAGNOSIS — J40 BRONCHITIS: ICD-10-CM

## 2024-05-08 DIAGNOSIS — Z01.818 PREOP TESTING: ICD-10-CM

## 2024-05-08 DIAGNOSIS — E11.9 TYPE 2 DIABETES MELLITUS WITHOUT COMPLICATION, WITHOUT LONG-TERM CURRENT USE OF INSULIN (HCC): ICD-10-CM

## 2024-05-08 DIAGNOSIS — G47.33 OBSTRUCTIVE SLEEP APNEA SYNDROME: ICD-10-CM

## 2024-05-08 DIAGNOSIS — E78.2 MIXED HYPERLIPIDEMIA: ICD-10-CM

## 2024-05-08 LAB
ALBUMIN SERPL BCP-MCNC: 4 G/DL (ref 3.5–5)
ALP SERPL-CCNC: 50 U/L (ref 34–104)
ALT SERPL W P-5'-P-CCNC: 27 U/L (ref 7–52)
ANION GAP SERPL CALCULATED.3IONS-SCNC: 7 MMOL/L (ref 4–13)
AST SERPL W P-5'-P-CCNC: 24 U/L (ref 13–39)
BACTERIA UR QL AUTO: ABNORMAL /HPF
BASOPHILS # BLD AUTO: 0.03 THOUSANDS/ÂΜL (ref 0–0.1)
BASOPHILS NFR BLD AUTO: 0 % (ref 0–1)
BILIRUB SERPL-MCNC: 0.54 MG/DL (ref 0.2–1)
BILIRUB UR QL STRIP: NEGATIVE
BUN SERPL-MCNC: 26 MG/DL (ref 5–25)
CALCIUM SERPL-MCNC: 9.2 MG/DL (ref 8.4–10.2)
CHLORIDE SERPL-SCNC: 104 MMOL/L (ref 96–108)
CLARITY UR: CLEAR
CO2 SERPL-SCNC: 27 MMOL/L (ref 21–32)
COLOR UR: ABNORMAL
CREAT SERPL-MCNC: 1.31 MG/DL (ref 0.6–1.3)
CREAT UR-MCNC: 114 MG/DL
EOSINOPHIL # BLD AUTO: 0.21 THOUSAND/ÂΜL (ref 0–0.61)
EOSINOPHIL NFR BLD AUTO: 3 % (ref 0–6)
ERYTHROCYTE [DISTWIDTH] IN BLOOD BY AUTOMATED COUNT: 12.7 % (ref 11.6–15.1)
EST. AVERAGE GLUCOSE BLD GHB EST-MCNC: 143 MG/DL
GFR SERPL CREATININE-BSD FRML MDRD: 56 ML/MIN/1.73SQ M
GLUCOSE P FAST SERPL-MCNC: 129 MG/DL (ref 65–99)
GLUCOSE UR STRIP-MCNC: NEGATIVE MG/DL
HBA1C MFR BLD: 6.6 %
HCT VFR BLD AUTO: 39.5 % (ref 36.5–49.3)
HGB BLD-MCNC: 12.8 G/DL (ref 12–17)
HGB UR QL STRIP.AUTO: NEGATIVE
IMM GRANULOCYTES # BLD AUTO: 0.02 THOUSAND/UL (ref 0–0.2)
IMM GRANULOCYTES NFR BLD AUTO: 0 % (ref 0–2)
INSULIN SERPL-ACNC: 20.01 UIU/ML (ref 1.9–23)
KETONES UR STRIP-MCNC: NEGATIVE MG/DL
LEUKOCYTE ESTERASE UR QL STRIP: NEGATIVE
LYMPHOCYTES # BLD AUTO: 1.52 THOUSANDS/ÂΜL (ref 0.6–4.47)
LYMPHOCYTES NFR BLD AUTO: 21 % (ref 14–44)
MCH RBC QN AUTO: 31 PG (ref 26.8–34.3)
MCHC RBC AUTO-ENTMCNC: 32.4 G/DL (ref 31.4–37.4)
MCV RBC AUTO: 96 FL (ref 82–98)
MICROALBUMIN UR-MCNC: 344.1 MG/L
MICROALBUMIN/CREAT 24H UR: 302 MG/G CREATININE (ref 0–30)
MONOCYTES # BLD AUTO: 0.47 THOUSAND/ÂΜL (ref 0.17–1.22)
MONOCYTES NFR BLD AUTO: 7 % (ref 4–12)
MUCOUS THREADS UR QL AUTO: ABNORMAL
NEUTROPHILS # BLD AUTO: 4.86 THOUSANDS/ÂΜL (ref 1.85–7.62)
NEUTS SEG NFR BLD AUTO: 69 % (ref 43–75)
NITRITE UR QL STRIP: NEGATIVE
NON-SQ EPI CELLS URNS QL MICRO: ABNORMAL /HPF
NRBC BLD AUTO-RTO: 0 /100 WBCS
PH UR STRIP.AUTO: 5.5 [PH]
PLATELET # BLD AUTO: 211 THOUSANDS/UL (ref 149–390)
PMV BLD AUTO: 11.1 FL (ref 8.9–12.7)
POTASSIUM SERPL-SCNC: 5.1 MMOL/L (ref 3.5–5.3)
PROT SERPL-MCNC: 7.3 G/DL (ref 6.4–8.4)
PROT UR STRIP-MCNC: ABNORMAL MG/DL
RBC # BLD AUTO: 4.13 MILLION/UL (ref 3.88–5.62)
RBC #/AREA URNS AUTO: ABNORMAL /HPF
SODIUM SERPL-SCNC: 138 MMOL/L (ref 135–147)
SP GR UR STRIP.AUTO: 1.02 (ref 1–1.03)
T4 FREE SERPL-MCNC: 0.64 NG/DL (ref 0.61–1.12)
TSH SERPL DL<=0.05 MIU/L-ACNC: 3.75 UIU/ML (ref 0.45–4.5)
UROBILINOGEN UR STRIP-ACNC: <2 MG/DL
WBC # BLD AUTO: 7.11 THOUSAND/UL (ref 4.31–10.16)
WBC #/AREA URNS AUTO: ABNORMAL /HPF

## 2024-05-08 PROCEDURE — 93005 ELECTROCARDIOGRAM TRACING: CPT

## 2024-05-08 PROCEDURE — 83525 ASSAY OF INSULIN: CPT

## 2024-05-08 PROCEDURE — 80053 COMPREHEN METABOLIC PANEL: CPT

## 2024-05-08 PROCEDURE — 84439 ASSAY OF FREE THYROXINE: CPT

## 2024-05-08 PROCEDURE — 83036 HEMOGLOBIN GLYCOSYLATED A1C: CPT

## 2024-05-08 PROCEDURE — 36415 COLL VENOUS BLD VENIPUNCTURE: CPT

## 2024-05-08 PROCEDURE — 85025 COMPLETE CBC W/AUTO DIFF WBC: CPT

## 2024-05-08 PROCEDURE — 84681 ASSAY OF C-PEPTIDE: CPT

## 2024-05-08 PROCEDURE — 84443 ASSAY THYROID STIM HORMONE: CPT

## 2024-05-09 ENCOUNTER — CONSULT (OUTPATIENT)
Dept: INTERNAL MEDICINE CLINIC | Facility: CLINIC | Age: 69
End: 2024-05-09
Payer: COMMERCIAL

## 2024-05-09 VITALS
DIASTOLIC BLOOD PRESSURE: 76 MMHG | HEIGHT: 72 IN | BODY MASS INDEX: 41.72 KG/M2 | SYSTOLIC BLOOD PRESSURE: 124 MMHG | TEMPERATURE: 97.6 F | RESPIRATION RATE: 18 BRPM | HEART RATE: 60 BPM | OXYGEN SATURATION: 99 % | WEIGHT: 308 LBS

## 2024-05-09 DIAGNOSIS — E11.65 TYPE 2 DIABETES MELLITUS WITH HYPERGLYCEMIA, WITHOUT LONG-TERM CURRENT USE OF INSULIN (HCC): ICD-10-CM

## 2024-05-09 DIAGNOSIS — E78.2 MIXED HYPERLIPIDEMIA: ICD-10-CM

## 2024-05-09 DIAGNOSIS — J45.20 MILD INTERMITTENT ASTHMA WITHOUT COMPLICATION: ICD-10-CM

## 2024-05-09 DIAGNOSIS — Z86.010 HISTORY OF COLON POLYPS: ICD-10-CM

## 2024-05-09 DIAGNOSIS — J30.89 SEASONAL ALLERGIC RHINITIS DUE TO OTHER ALLERGIC TRIGGER: ICD-10-CM

## 2024-05-09 DIAGNOSIS — G47.33 OBSTRUCTIVE SLEEP APNEA SYNDROME: ICD-10-CM

## 2024-05-09 DIAGNOSIS — K21.9 GASTROESOPHAGEAL REFLUX DISEASE WITHOUT ESOPHAGITIS: ICD-10-CM

## 2024-05-09 DIAGNOSIS — R22.32 LUMP ON FINGER, LEFT: ICD-10-CM

## 2024-05-09 DIAGNOSIS — Z01.818 PREOPERATIVE EVALUATION OF A MEDICAL CONDITION TO RULE OUT SURGICAL CONTRAINDICATIONS (TAR REQUIRED): Primary | ICD-10-CM

## 2024-05-09 DIAGNOSIS — R80.9 MICROALBUMINURIA: ICD-10-CM

## 2024-05-09 DIAGNOSIS — I10 ESSENTIAL HYPERTENSION: ICD-10-CM

## 2024-05-09 DIAGNOSIS — R79.89 ELEVATED SERUM CREATININE: ICD-10-CM

## 2024-05-09 LAB
ATRIAL RATE: 53 BPM
C PEPTIDE SERPL-MCNC: 5.2 NG/ML (ref 1.1–4.4)
P AXIS: 68 DEGREES
PR INTERVAL: 166 MS
QRS AXIS: 18 DEGREES
QRSD INTERVAL: 94 MS
QT INTERVAL: 420 MS
QTC INTERVAL: 394 MS
T WAVE AXIS: 72 DEGREES
VENTRICULAR RATE: 53 BPM

## 2024-05-09 PROCEDURE — 93010 ELECTROCARDIOGRAM REPORT: CPT | Performed by: INTERNAL MEDICINE

## 2024-05-09 PROCEDURE — 99214 OFFICE O/P EST MOD 30 MIN: CPT | Performed by: INTERNAL MEDICINE

## 2024-05-09 RX ORDER — SIMVASTATIN 40 MG
40 TABLET ORAL
Qty: 90 TABLET | Refills: 1 | Status: SHIPPED | OUTPATIENT
Start: 2024-05-09

## 2024-05-09 RX ORDER — CHLORAL HYDRATE 500 MG
1000 CAPSULE ORAL 2 TIMES DAILY
COMMUNITY

## 2024-05-09 RX ORDER — LISINOPRIL AND HYDROCHLOROTHIAZIDE 20; 12.5 MG/1; MG/1
1 TABLET ORAL DAILY
Qty: 90 TABLET | Refills: 1 | Status: SHIPPED | OUTPATIENT
Start: 2024-05-09

## 2024-05-09 NOTE — PATIENT INSTRUCTIONS
Patient was advised to continue present medications. discussed with the patient medications and laboratory data in detail.  Follow-up with me in 3 months or as advised.  If any blood test was ordered please do 1 week prior to next appointment unless advise to get earlier.  If you have any questions please call the office [phone]

## 2024-05-09 NOTE — PROGRESS NOTES
Assessment/Plan:    1. Preoperative evaluation of a medical condition to rule out surgical contraindications (TAR required)    2. Essential hypertension  -     lisinopril-hydrochlorothiazide (PRINZIDE,ZESTORETIC) 20-12.5 MG per tablet; Take 1 tablet by mouth daily  -     Basic metabolic panel; Future    3. Mild intermittent asthma without complication    4. Obstructive sleep apnea syndrome    5. Seasonal allergic rhinitis due to other allergic trigger    6. Gastroesophageal reflux disease without esophagitis    7. Type 2 diabetes mellitus with hyperglycemia, without long-term current use of insulin ([facility])  -     Basic metabolic panel; Future    8. Microalbuminuria    9. Mixed hyperlipidemia  -     simvastatin (ZOCOR) 40 mg tablet; Take 1 tablet (40 mg total) by mouth daily at bedtime  -     Lipid panel; Future    10. BMI 40.0-44.9, adult ([facility])    11. Elevated serum creatinine  -     Basic metabolic panel; Future    12. Lump on finger, left    13. History of colon polyps       Discussion/summary/plan:    He is going for left middle finger ganglion cyst surgery.  EKG, blood test reviewed medically cleared and stable for surgery.  EKG no new changes from last year February 2023.  Had a cardiac evaluation May 2023.    Blood pressure well-controlled advised to continue present medications and low-salt diet.  Asthma is well-controlled he used the inhaler about 3 months ago occasionally uses a inhaler.  He has been using CPAP for his sleep apnea.  Since he is on the CPAP he is not requiring to use a Allegra except occasionally.  GE reflux well-controlled on omeprazole he which he takes only as needed.  Diabetes mellitus well-controlled on diet.  A1c decreased from 8.7-6.6.  He is not take any medications for diabetes mellitus.  He is now on medical fitness exercises program and has lost significant weight.  Microalbuminuria improving.  On lisinopril.  His cholesterol is 181, triglyceride 214, HDL 44, LDL 90 4 December 2023  on simvastatin and fish oil.  He has been taking his simvastatin as well as fish oil.  Since he is lost weight his lipid panel must be much better will follow lipid panel  .  Advised to continue present medications and low-cholesterol low-carb diet.  His BUN/creatinine increased most likely Prerenal azotemia.  Patient states that he is not drinking enough fluids.  Advised to increase fluid intake.  Will follow electrolytes renal function.  Has a history of colon polyp last colonoscopy 7/2021 was advised follow-up in 5 years.    Subjective: Patient presents for preop medical clearance as well as follow-up his medical problems.      Patient ID: Jean-Pierre Scherer is a 68 y.o. male.    HPI  68-year-old white male patient presents for preop medical clearance as well as follow-up his medical problems.  He is going for surgery of his left middle finger ganglion cyst.  Denies any chest pain, shortness of breath, pain in abdomen.  Denies any cough, fever, chills.  Denies any nausea vomiting diarrhea.  He was seen by endocrinologist was started on Mounjaro but he never started taking Mounjarno. he also stopped taking metformin.  Now he is on medical fitness exercise program losing weight and watching diet very closely.  Was seen by dermatologist.    The following portions of the patient's history were reviewed and updated as appropriate:     Past Medical History:  He has a past medical history of Asthma, BMI 40.0-44.9, adult ([facility]) (01/11/2022), BMI 40.0-44.9, adult ([facility]) (05/09/2024), BMI 45.0-49.9, adult ([facility]) (06/28/2021), Bronchitis (09/29/2023), Bronchospasm, Cancer ([facility]), Chest tightness (02/17/2023), Choking episode (02/17/2023), Colon polyp, CPAP (continuous positive airway pressure) dependence, Elevated serum creatinine (05/09/2024), Essential hypertension (06/26/2021), GERD (gastroesophageal reflux disease), Hearing impairment (10/11/2022), Hyperlipidemia, Hypertension, Hypocalcemia (01/11/2022), Insomnia, Leg  cramps, Lump on finger, left (01/11/2022), Microalbuminuria (09/27/2021), Mixed hyperlipidemia (06/26/2021), Morbid obesity (HCC) (05/26/2022), Nocturia, Numbness of fingers of both hands, Obstructive sleep apnea syndrome (08/28/2023), Onychomycosis of toenail (05/26/2022), Pain in both knees (05/26/2022), Paronychia of left middle finger (09/27/2021), Preoperative evaluation of a medical condition to rule out surgical contraindications (TAR required) (05/09/2024), Right foot pain (05/26/2023), Seasonal allergic rhinitis (01/11/2022), Skin lesion, Skin rash (06/26/2021), Sleep apnea, Snoring, Tubular adenoma of colon (06/28/2021), Type 2 diabetes mellitus without complication, without long-term current use of insulin (HCC) (06/26/2021), Wears glasses, and Weight gain.,  _______________________________________________________________________  Past Surgical History:   has a past surgical history that includes Hip surgery (Bilateral); Prostatectomy; pr colonoscopy flx dx w/collj spec when pfrmd (N/A, 05/09/2017); Colonoscopy (05/09/2017); Colonoscopy; and Upper gastrointestinal endoscopy.,  _______________________________________________________________________  Family History:  family history includes Brain cancer in his mother; Depression in his father; Diabetes in his father; Eczema in his mother; Heart disease in his father; Psoriasis in his mother; Stroke in his paternal grandfather.,  _______________________________________________________________________  Social History:   reports that he quit smoking about 32 years ago. His smoking use included cigarettes. He started smoking about 48 years ago. He has a 25.4 pack-year smoking history. He has never used smokeless tobacco. He reports current alcohol use of about 1.0 standard drink of alcohol per week. He reports that he does not use drugs.,  _______________________________________________________________________  Allergies:  is allergic to  penicillins..  _______________________________________________________________________  Current Outpatient Medications   Medication Sig Dispense Refill    albuterol (2.5 mg/3 mL) 0.083 % nebulizer solution Take 3 mL (2.5 mg total) by nebulization every 6 (six) hours as needed for wheezing or shortness of breath 90 mL 0    albuterol (Ventolin HFA) 90 mcg/act inhaler Inhale 2 puffs every 6 (six) hours as needed for wheezing 18 g 2    Ascorbic Acid (VITAMIN C PO) Take 1,000 mg by mouth daily      betamethasone valerate (VALISONE) 0.1 % lotion Apply topically 2 (two) times a day To the scalp as needed. 60 mL 3    Cyanocobalamin (VITAMIN B-12 PO) Take 1,000 mcg by mouth daily       fexofenadine (ALLEGRA) 180 MG tablet Take 180 mg by mouth daily as needed      ketoconazole (NIZORAL) 2 % cream Apply topically daily 60 g 0    lisinopril-hydrochlorothiazide (PRINZIDE,ZESTORETIC) 20-12.5 MG per tablet Take 1 tablet by mouth daily 90 tablet 1    Multiple Vitamins-Minerals (MULTIVITAL PO) Take 1 tablet by mouth      Omega-3 Fatty Acids (fish oil) 1,000 mg Take 1,000 mg by mouth 2 (two) times a day      omeprazole (PriLOSEC) 20 mg delayed release capsule Take 20 mg by mouth as needed      sildenafil (VIAGRA) 50 MG tablet Take 1 tablet (50 mg total) by mouth daily as needed for erectile dysfunction 8 tablet 0    simvastatin (ZOCOR) 40 mg tablet Take 1 tablet (40 mg total) by mouth daily at bedtime 90 tablet 1    triamcinolone (KENALOG) 0.1 % lotion Apply topically 2 (two) times a day 60 mL 2    betamethasone dipropionate (DIPROSONE) 0.05 % ointment Apply sparingly  1-2 times/day for up to 2 wks to R forearm rash. (Patient not taking: Reported on 5/9/2024) 45 g 0     No current facility-administered medications for this visit.     _______________________________________________________________________  Review of Systems   Constitutional:  Negative for chills and fever.   HENT:  Negative for congestion, ear pain, hearing loss,  nosebleeds, sinus pain, sore throat and trouble swallowing.    Eyes:  Negative for discharge, redness and visual disturbance.   Respiratory:  Negative for cough, chest tightness and shortness of breath.    Cardiovascular:  Negative for chest pain and palpitations.   Gastrointestinal:  Negative for abdominal pain, blood in stool, constipation, diarrhea, nausea and vomiting.   Genitourinary:  Negative for dysuria, flank pain and hematuria.   Musculoskeletal:  Negative for arthralgias, myalgias and neck pain.   Skin:  Negative for color change and rash.   Neurological:  Negative for dizziness, speech difficulty, weakness and headaches.   Hematological:  Does not bruise/bleed easily.   Psychiatric/Behavioral:  Negative for agitation and behavioral problems.            Objective:  Vitals:    05/09/24 0907   BP: 124/76   BP Location: Left arm   Patient Position: Sitting   Cuff Size: Large   Pulse: 60   Resp: 18   Temp: 97.6 °F (36.4 °C)   TempSrc: Temporal   SpO2: 99%   Weight: (!) 140 kg (308 lb)   Height: 6' (1.829 m)     Body mass index is 41.77 kg/m².     Physical Exam  Vitals and nursing note reviewed.   Constitutional:       General: He is not in acute distress.     Appearance: Normal appearance.   HENT:      Head: Normocephalic and atraumatic.      Right Ear: Ear canal and external ear normal.      Left Ear: Ear canal and external ear normal.      Nose: Nose normal.      Mouth/Throat:      Mouth: Mucous membranes are moist.   Eyes:      General: No scleral icterus.        Right eye: No discharge.         Left eye: No discharge.      Extraocular Movements: Extraocular movements intact.      Conjunctiva/sclera: Conjunctivae normal.   Cardiovascular:      Rate and Rhythm: Normal rate and regular rhythm.      Pulses: Normal pulses.      Heart sounds: Normal heart sounds. No murmur heard.  Pulmonary:      Effort: Pulmonary effort is normal. No respiratory distress.      Breath sounds: Normal breath sounds. No wheezing,  rhonchi or rales.   Abdominal:      General: Bowel sounds are normal.      Palpations: Abdomen is soft.      Tenderness: There is no abdominal tenderness.   Musculoskeletal:         General: Normal range of motion.      Cervical back: Normal range of motion and neck supple. No muscular tenderness.      Right lower leg: No edema.      Left lower leg: No edema.      Comments: Has a swelling/lump  of left middle finger   Skin:     General: Skin is warm.   Neurological:      General: No focal deficit present.      Mental Status: He is alert and oriented to person, place, and time.      Motor: No weakness.      Coordination: Coordination normal.   Psychiatric:         Mood and Affect: Mood normal.         Behavior: Behavior normal.       I spent 30 minutes with the patient today.  More than 50% time spent for reviewing of external notes, reviewing of the results of diagnostics test, management of care, patient education and ordering of test.

## 2024-05-10 ENCOUNTER — ANESTHESIA EVENT (OUTPATIENT)
Dept: PERIOP | Facility: HOSPITAL | Age: 69
End: 2024-05-10
Payer: COMMERCIAL

## 2024-05-13 NOTE — PRE-PROCEDURE INSTRUCTIONS
Pre-Surgery Instructions:   Medication Instructions    albuterol (2.5 mg/3 mL) 0.083 % nebulizer solution Uses PRN- OK to take day of surgery    albuterol (Ventolin HFA) 90 mcg/act inhaler Uses PRN- OK to take day of surgery    Ascorbic Acid (VITAMIN C PO) Stop taking 7 days prior to surgery.    fexofenadine (ALLEGRA) 180 MG tablet Hold day of surgery.    Multiple Vitamins-Minerals (MULTIVITAL PO) Stop taking 7 days prior to surgery.    omeprazole (PriLOSEC) 20 mg delayed release capsule Take day of surgery.                   Medication instructions for day surgery reviewed. Please use only a sip of water to take your instructed medications. Avoid all over the counter vitamins, supplements and NSAIDS for one week prior to surgery per anesthesia guidelines. Tylenol is ok to take as needed.     You will receive a call one business day prior to surgery with an arrival time and hospital directions. If your surgery is scheduled on a Monday, the hospital will be calling you on the Friday prior to your surgery. If you have not heard from anyone by 8pm, please call the hospital supervisor through the hospital  at [phone]. ([place] [phone] or [place] [phone]).    Do not eat or drink anything after midnight the night before your surgery, including candy, mints, lifesavers, or chewing gum. Do not drink alcohol 24hrs before your surgery. Try not to smoke at least 24hrs before your surgery.       Follow the pre surgery showering instructions as listed in the “My Surgical Experience Booklet” or otherwise provided by your surgeon's office. Do not use a blade to shave the surgical area 1 week before surgery. It is okay to use a clean electric clippers up to 24 hours before surgery. Do not apply any lotions, creams, including makeup, cologne, deodorant, or perfumes after showering on the day of your surgery. Do not use dry shampoo, hair spray, hair gel, or any type of hair products.     No contact lenses,  eye make-up, or artificial eyelashes. Remove nail polish, including gel polish, and any artificial, gel, or acrylic nails if possible. Remove all jewelry including rings and body piercing jewelry.     Wear causal clothing that is easy to take on and off. Consider your type of surgery.    Keep any valuables, jewelry, piercings at home. Please bring any specially ordered equipment (sling, braces) if indicated.    Arrange for a responsible person to drive you to and from the hospital on the day of your surgery. Please confirm the visitor policy for the day of your procedure when you receive your phone call with an arrival time.     Call the surgeon's office with any new illnesses, exposures, or additional questions prior to surgery.    Please reference your “My Surgical Experience Booklet” for additional information to prepare for your upcoming surgery.

## 2024-05-16 ENCOUNTER — ANESTHESIA (OUTPATIENT)
Dept: PERIOP | Facility: HOSPITAL | Age: 69
End: 2024-05-16
Payer: COMMERCIAL

## 2024-05-16 ENCOUNTER — HOSPITAL ENCOUNTER (OUTPATIENT)
Facility: HOSPITAL | Age: 69
Setting detail: OUTPATIENT SURGERY
Discharge: HOME | End: 2024-05-16
Attending: STUDENT IN AN ORGANIZED HEALTH CARE EDUCATION/TRAINING PROGRAM | Admitting: STUDENT IN AN ORGANIZED HEALTH CARE EDUCATION/TRAINING PROGRAM
Payer: COMMERCIAL

## 2024-05-16 VITALS
TEMPERATURE: 96.9 F | OXYGEN SATURATION: 97 % | HEART RATE: 45 BPM | WEIGHT: 309 LBS | DIASTOLIC BLOOD PRESSURE: 62 MMHG | SYSTOLIC BLOOD PRESSURE: 108 MMHG | RESPIRATION RATE: 20 BRPM | BODY MASS INDEX: 41.91 KG/M2

## 2024-05-16 DIAGNOSIS — R22.32 LUMP ON FINGER, LEFT: Primary | ICD-10-CM

## 2024-05-16 LAB — GLUCOSE SERPL-MCNC: 125 MG/DL (ref 65–140)

## 2024-05-16 PROCEDURE — 26160 REMOVE TENDON SHEATH LESION: CPT | Performed by: STUDENT IN AN ORGANIZED HEALTH CARE EDUCATION/TRAINING PROGRAM

## 2024-05-16 PROCEDURE — NC001 PR NO CHARGE: Performed by: STUDENT IN AN ORGANIZED HEALTH CARE EDUCATION/TRAINING PROGRAM

## 2024-05-16 PROCEDURE — 82948 REAGENT STRIP/BLOOD GLUCOSE: CPT

## 2024-05-16 PROCEDURE — 26160 REMOVE TENDON SHEATH LESION: CPT | Performed by: PHYSICIAN ASSISTANT

## 2024-05-16 RX ORDER — PROPOFOL 10 MG/ML
INJECTION, EMULSION INTRAVENOUS CONTINUOUS PRN
Status: DISCONTINUED | OUTPATIENT
Start: 2024-05-16 | End: 2024-05-16

## 2024-05-16 RX ORDER — KETOROLAC TROMETHAMINE 30 MG/ML
INJECTION, SOLUTION INTRAMUSCULAR; INTRAVENOUS AS NEEDED
Status: DISCONTINUED | OUTPATIENT
Start: 2024-05-16 | End: 2024-05-16

## 2024-05-16 RX ORDER — LIDOCAINE HYDROCHLORIDE 10 MG/ML
INJECTION, SOLUTION EPIDURAL; INFILTRATION; INTRACAUDAL; PERINEURAL AS NEEDED
Status: DISCONTINUED | OUTPATIENT
Start: 2024-05-16 | End: 2024-05-16

## 2024-05-16 RX ORDER — HYDROMORPHONE HCL/PF 1 MG/ML
0.5 SYRINGE (ML) INJECTION
Status: DISCONTINUED | OUTPATIENT
Start: 2024-05-16 | End: 2024-05-16 | Stop reason: HOSPADM

## 2024-05-16 RX ORDER — FENTANYL CITRATE 50 UG/ML
INJECTION, SOLUTION INTRAMUSCULAR; INTRAVENOUS AS NEEDED
Status: DISCONTINUED | OUTPATIENT
Start: 2024-05-16 | End: 2024-05-16

## 2024-05-16 RX ORDER — MIDAZOLAM HYDROCHLORIDE 2 MG/2ML
INJECTION, SOLUTION INTRAMUSCULAR; INTRAVENOUS AS NEEDED
Status: DISCONTINUED | OUTPATIENT
Start: 2024-05-16 | End: 2024-05-16

## 2024-05-16 RX ORDER — CEFAZOLIN SODIUM 1 G/50ML
1000 SOLUTION INTRAVENOUS ONCE
Status: COMPLETED | OUTPATIENT
Start: 2024-05-16 | End: 2024-05-16

## 2024-05-16 RX ORDER — ALBUTEROL SULFATE 2.5 MG/3ML
2.5 SOLUTION RESPIRATORY (INHALATION) ONCE AS NEEDED
Status: DISCONTINUED | OUTPATIENT
Start: 2024-05-16 | End: 2024-05-16 | Stop reason: HOSPADM

## 2024-05-16 RX ORDER — ACETAMINOPHEN 325 MG/1
650 TABLET ORAL EVERY 6 HOURS PRN
Status: DISCONTINUED | OUTPATIENT
Start: 2024-05-16 | End: 2024-05-16 | Stop reason: HOSPADM

## 2024-05-16 RX ORDER — FENTANYL CITRATE/PF 50 MCG/ML
25 SYRINGE (ML) INJECTION
Status: DISCONTINUED | OUTPATIENT
Start: 2024-05-16 | End: 2024-05-16 | Stop reason: HOSPADM

## 2024-05-16 RX ORDER — MAGNESIUM HYDROXIDE 1200 MG/15ML
LIQUID ORAL AS NEEDED
Status: DISCONTINUED | OUTPATIENT
Start: 2024-05-16 | End: 2024-05-16 | Stop reason: HOSPADM

## 2024-05-16 RX ORDER — CHLORHEXIDINE GLUCONATE ORAL RINSE 1.2 MG/ML
15 SOLUTION DENTAL ONCE
Status: DISCONTINUED | OUTPATIENT
Start: 2024-05-16 | End: 2024-05-16 | Stop reason: HOSPADM

## 2024-05-16 RX ORDER — MEPERIDINE HYDROCHLORIDE 25 MG/ML
12.5 INJECTION INTRAMUSCULAR; INTRAVENOUS; SUBCUTANEOUS ONCE
Status: DISCONTINUED | OUTPATIENT
Start: 2024-05-16 | End: 2024-05-16 | Stop reason: HOSPADM

## 2024-05-16 RX ORDER — ONDANSETRON 2 MG/ML
4 INJECTION INTRAMUSCULAR; INTRAVENOUS EVERY 6 HOURS PRN
Status: DISCONTINUED | OUTPATIENT
Start: 2024-05-16 | End: 2024-05-16 | Stop reason: HOSPADM

## 2024-05-16 RX ORDER — CEFAZOLIN SODIUM 2 G/50ML
2000 SOLUTION INTRAVENOUS ONCE
Status: COMPLETED | OUTPATIENT
Start: 2024-05-16 | End: 2024-05-16

## 2024-05-16 RX ORDER — SODIUM CHLORIDE, SODIUM LACTATE, POTASSIUM CHLORIDE, CALCIUM CHLORIDE 600; 310; 30; 20 MG/100ML; MG/100ML; MG/100ML; MG/100ML
INJECTION, SOLUTION INTRAVENOUS CONTINUOUS PRN
Status: DISCONTINUED | OUTPATIENT
Start: 2024-05-16 | End: 2024-05-16

## 2024-05-16 RX ORDER — OXYCODONE HYDROCHLORIDE 5 MG/1
5 TABLET ORAL EVERY 6 HOURS PRN
Status: CANCELLED | OUTPATIENT
Start: 2024-05-16

## 2024-05-16 RX ORDER — PROMETHAZINE HYDROCHLORIDE 25 MG/ML
12.5 INJECTION, SOLUTION INTRAMUSCULAR; INTRAVENOUS ONCE AS NEEDED
Status: DISCONTINUED | OUTPATIENT
Start: 2024-05-16 | End: 2024-05-16 | Stop reason: HOSPADM

## 2024-05-16 RX ORDER — HYDROCODONE BITARTRATE AND ACETAMINOPHEN 5; 325 MG/1; MG/1
1 TABLET ORAL EVERY 6 HOURS PRN
Qty: 6 TABLET | Refills: 0 | Status: SHIPPED | OUTPATIENT
Start: 2024-05-16

## 2024-05-16 RX ORDER — PROPOFOL 10 MG/ML
INJECTION, EMULSION INTRAVENOUS AS NEEDED
Status: DISCONTINUED | OUTPATIENT
Start: 2024-05-16 | End: 2024-05-16

## 2024-05-16 RX ORDER — ONDANSETRON 2 MG/ML
INJECTION INTRAMUSCULAR; INTRAVENOUS AS NEEDED
Status: DISCONTINUED | OUTPATIENT
Start: 2024-05-16 | End: 2024-05-16

## 2024-05-16 RX ORDER — LABETALOL HYDROCHLORIDE 5 MG/ML
5 INJECTION, SOLUTION INTRAVENOUS
Status: DISCONTINUED | OUTPATIENT
Start: 2024-05-16 | End: 2024-05-16 | Stop reason: HOSPADM

## 2024-05-16 RX ORDER — SODIUM CHLORIDE, SODIUM LACTATE, POTASSIUM CHLORIDE, CALCIUM CHLORIDE 600; 310; 30; 20 MG/100ML; MG/100ML; MG/100ML; MG/100ML
125 INJECTION, SOLUTION INTRAVENOUS CONTINUOUS
Status: DISCONTINUED | OUTPATIENT
Start: 2024-05-16 | End: 2024-05-16 | Stop reason: HOSPADM

## 2024-05-16 RX ORDER — ONDANSETRON 2 MG/ML
4 INJECTION INTRAMUSCULAR; INTRAVENOUS ONCE AS NEEDED
Status: DISCONTINUED | OUTPATIENT
Start: 2024-05-16 | End: 2024-05-16 | Stop reason: HOSPADM

## 2024-05-16 RX ADMIN — FENTANYL CITRATE 50 MCG: 50 INJECTION INTRAMUSCULAR; INTRAVENOUS at 07:38

## 2024-05-16 RX ADMIN — KETOROLAC TROMETHAMINE 15 MG: 30 INJECTION, SOLUTION INTRAMUSCULAR; INTRAVENOUS at 08:11

## 2024-05-16 RX ADMIN — LIDOCAINE HYDROCHLORIDE 50 MG: 10 INJECTION, SOLUTION EPIDURAL; INFILTRATION; INTRACAUDAL; PERINEURAL at 07:37

## 2024-05-16 RX ADMIN — PROPOFOL 100 MCG/KG/MIN: 10 INJECTION, EMULSION INTRAVENOUS at 07:37

## 2024-05-16 RX ADMIN — MIDAZOLAM 2 MG: 1 INJECTION INTRAMUSCULAR; INTRAVENOUS at 07:31

## 2024-05-16 RX ADMIN — SODIUM CHLORIDE, SODIUM LACTATE, POTASSIUM CHLORIDE, AND CALCIUM CHLORIDE: .6; .31; .03; .02 INJECTION, SOLUTION INTRAVENOUS at 07:31

## 2024-05-16 RX ADMIN — PROPOFOL 60 MG: 10 INJECTION, EMULSION INTRAVENOUS at 07:37

## 2024-05-16 RX ADMIN — FENTANYL CITRATE 50 MCG: 50 INJECTION INTRAMUSCULAR; INTRAVENOUS at 07:34

## 2024-05-16 RX ADMIN — CEFAZOLIN SODIUM 1000 MG: 1 SOLUTION INTRAVENOUS at 07:44

## 2024-05-16 RX ADMIN — CEFAZOLIN SODIUM 2000 MG: 2 SOLUTION INTRAVENOUS at 07:35

## 2024-05-16 RX ADMIN — ONDANSETRON 4 MG: 2 INJECTION INTRAMUSCULAR; INTRAVENOUS at 07:58

## 2024-05-16 NOTE — DISCHARGE INSTR - AVS FIRST PAGE
Discharge Instructions - Orthopedics  Jean-Pierre Scherer 68 y.o. male MRN: [id number]  Unit/Bed#: AN OR MAIN    Weight Bearing Status:                                           2lbs weight bearing restriction left upper extremity     DVT prophylaxis:  N/a     Pain:  Continue analgesics as directed    Dressing Instructions:   Please keep clean, dry and intact until follow up   Okay To begin showering.  Keep dressing dry at all times     Appt Instructions:   If you do not have your appointment, please call the clinic at [phone]  Otherwise follow up as scheduled.    Contact the office sooner if you experience any increased numbness/tingling in the extremities.

## 2024-05-16 NOTE — H&P
ORTHOPAEDIC HAND, WRIST, AND ELBOW OFFICE  VISIT        ASSESSMENT/PLAN:       Diagnoses and all orders for this visit:     Preop testing  -     Ambulatory referral to Family Practice; Future  -     CBC and differential; Future  -     Basic metabolic panel; Future  -     EKG 12 lead; Future     Mucoid cyst of joint  -     Ambulatory Referral to Hand Surgery  -     XR finger left third digit-middle; Future  -     Case request operating room: EXCISION GANGLION CYST LEFT MIDDLE FINGER; Standing  -     Case request operating room: EXCISION GANGLION CYST LEFT MIDDLE FINGER     Other orders  -     Diet NPO; Sips with meds; Standing  -     Nursing Communication Warmimg Interventions Implemented; Standing  -     Nursing Communication CHG bath, have staff wash entire body (neck down) per pre-op bathing protocol. Routine, evening prior to, and day of surgery.; Standing  -     Nursing Communication Swab both nares with Povidone-Iodine solution, EXCLUDE if patient has shellfish/Iodine allergy. Routine, day of surgery, on call to OR; Standing  -     chlorhexidine (PERIDEX) 0.12 % oral rinse 15 mL  -     Void on call to OR; Standing  -     Insert peripheral IV; Standing  -     ceFAZolin (ANCEF) 3,000 mg in dextrose 5 % 100 mL IVPB              68 y.o. male with left middle finger mucoid cyst.  Treatment options and expected outcomes were discussed.  The patient verbalized understanding of exam findings and treatment plan.   The patient was given the opportunity to ask questions.  Questions were answered to the patient's satisfaction.  The patient decided to move forward with excision of left middle finger mucoid cyst. Typical postop recovery d/w pt today.   We reiterated the importance of trying not to pop the cyst and keeping this area clean if it does pop as it could lead to an infx spreading to the joint. Pt states understanding.        Follow Up:  After surgery         To Do Next Visit:  Re-evaluation of current issue         Discussions:  Ganglion Cyst Excision: The anatomy and physiology of the ganglion was discussed with the patient today in the office.  Fluid leaking out of the joint surface typically creates a small sac, which can enlarge and cause pain or limitation of motion.  Treatment options include observation, aspiration, or surgical incision were discussed with the patient today.  Observation typically lead to resolution and approximately 10% of patients, aspiration least resolution approximately 50% of patients, and surgical excision lead to resolution in approximately 97% of patients.  After discussion with the patient today, the patient voiced understanding of all treatment options.The patient has elected excision of the ganglion.The risks and benefits of the procedure were explained to the patient, which include, but are not limited to: Bleeding, infection, recurrence, pain, scar, damage to tendons, damage to nerves, and damage to blood vessels, failure to give desired results and complications related to anesthesia.  These risks, along with alternative conservative treatment options, and postoperative protocols were voiced back and understood by the patient.  All questions were answered to the patient's satisfaction.  The patient agrees to comply with a standard postoperative protocol, and is willing to proceed.  Education was provided via written and auditory forms.  There were no barriers to learning. Written handouts regarding wound care, incision and scar care, and general preoperative information was provided to the patient.  Prior to surgery, the patient may be requested to stop all anti-inflammatory medications.  Prophylactic aspirin, Plavix, and Coumadin may be allowed to be continued.  Medications including vitamin E., ginkgo, and fish oil are requested to be stopped approximately one week prior to surgery.  Hypertensive medications and beta blockers, if taken, should be continued.        Jorge Coles,  MD  Attending, Orthopaedic Surgery  Hand, Wrist, and Elbow Surgery  [facility]     ______________________________________________________________________________________________     CHIEF COMPLAINT:      Chief Complaint   Patient presents with    Left Middle Finger - Pain         SUBJECTIVE:  Patient is a 68 y.o. LHD male who presents today for evaluation and treatment of left middle finger mass. Pt states this has been present for 1-2 years. Describes it has swelling and then he squeezes it and fluid comes out. He says fluid has come out of this multiple times. Usually is a thicker fluid, but sometimes it gets infected and looks like pus. At that point he'll be placed on abx and it improves. Dr. Erazo performed cryotherapy on 11/3/23, but it again returned. Sent by dermatology to consider surgical excision.   Referred for evaluation by Christine Ballesteros MD.      I have personally reviewed all the relevant PMH, PSH, SH, FH, Medications and allergies        PAST MEDICAL HISTORY:  Medical History        Past Medical History:   Diagnosis Date    Asthma      BMI 40.0-44.9, adult (HCC) 1/11/2022    BMI 45.0-49.9, adult ([facility]) 6/28/2021    Bronchitis 9/29/2023    Bronchospasm      Cancer (HCC)       Prostate     Chest tightness 2/17/2023    Choking episode 2/17/2023    Colon polyp      Essential hypertension 6/26/2021    GERD (gastroesophageal reflux disease)      Hearing impairment 10/11/2022    Hyperlipidemia      Hypertension      Hypocalcemia 1/11/2022    Insomnia      Leg cramps      Lump on finger, left 1/11/2022    Microalbuminuria 9/27/2021    Mixed hyperlipidemia 6/26/2021    Morbid obesity (HCC) 5/26/2022    Nocturia      Numbness of fingers of both hands      Obstructive sleep apnea syndrome 8/28/2023    Onychomycosis of toenail 5/26/2022    Pain in both knees 5/26/2022    Paronychia of left middle finger 9/27/2021    Right foot pain 5/26/2023    Seasonal allergic rhinitis 1/11/2022    Skin  lesion      Skin rash 2021    Sleep apnea       not tested yet    Snoring      Tubular adenoma of colon 2021    Type 2 diabetes mellitus without complication, without long-term current use of insulin (HCC) 2021    Wears glasses      Weight gain              PAST SURGICAL HISTORY:  Surgical History         Past Surgical History:   Procedure Laterality Date    COLONOSCOPY   2017    COLONOSCOPY        HIP SURGERY Bilateral       Hip replacement    DE COLONOSCOPY FLX DX W/COLLJ SPEC WHEN PFRMD N/A 2017     Procedure: COLONOSCOPY;  Surgeon: Dinesh Bradley MD;  [facility]: [facility];  Service: Gastroenterology    PROSTATECTOMY        REPLACEMENT TOTAL KNEE Bilateral       Inactive    UPPER GASTROINTESTINAL ENDOSCOPY                FAMILY HISTORY:  Family History         Family History   Problem Relation Age of Onset    Brain cancer Mother      Psoriasis Mother      Eczema Mother      Depression Father      Heart disease Father           Heart Artery blockages    Diabetes Father      Stroke Paternal Grandfather              SOCIAL HISTORY:  Social History   Social History            Tobacco Use    Smoking status: Former       Current packs/day: 0.00       Average packs/day: 1.5 packs/day for 16.9 years (25.4 ttl pk-yrs)       Types: Cigarettes       Start date: 1975       Quit date: 1992       Years since quittin.8    Smokeless tobacco: Never   Vaping Use    Vaping status: Every Day   Substance Use Topics    Alcohol use: Yes       Alcohol/week: 1.0 standard drink of alcohol       Types: 1 Glasses of wine per week       Comment: rarely    Drug use: No            MEDICATIONS:    Current Medications      Current Outpatient Medications:     albuterol (2.5 mg/3 mL) 0.083 % nebulizer solution, Take 3 mL (2.5 mg total) by nebulization every 6 (six) hours as needed for wheezing or shortness of breath, Disp: 90 mL, Rfl: 0    albuterol (Ventolin HFA) 90 mcg/act inhaler, Inhale 2 puffs every 6  (six) hours as needed for wheezing, Disp: 18 g, Rfl: 2    Ascorbic Acid (VITAMIN C PO), Take 1,000 mg by mouth daily, Disp: , Rfl:     benzonatate (TESSALON) 200 MG capsule, Take 1 capsule (200 mg total) by mouth every 8 (eight) hours as needed for cough, Disp: 20 capsule, Rfl: 0    betamethasone dipropionate (DIPROSONE) 0.05 % ointment, Apply sparingly  1-2 times/day for up to 2 wks to R forearm rash., Disp: 45 g, Rfl: 0    betamethasone valerate (VALISONE) 0.1 % lotion, Apply topically 2 (two) times a day To the scalp as needed., Disp: 60 mL, Rfl: 3    Cyanocobalamin (VITAMIN B-12 PO), Take 1,000 mcg by mouth daily , Disp: , Rfl:     fexofenadine (ALLEGRA) 180 MG tablet, Take 180 mg by mouth daily as needed, Disp: , Rfl:     ketoconazole (NIZORAL) 2 % cream, Apply topically daily, Disp: 60 g, Rfl: 0    lisinopril-hydrochlorothiazide (PRINZIDE,ZESTORETIC) 20-12.5 MG per tablet, Take 1 tablet by mouth daily, Disp: 90 tablet, Rfl: 0    metFORMIN (GLUCOPHAGE-XR) 500 mg 24 hr tablet, Take 1 tablet (500 mg total) by mouth 2 (two) times a day with meals, Disp: 180 tablet, Rfl: 1    Multiple Vitamins-Minerals (MULTIVITAL PO), Take 1 tablet by mouth, Disp: , Rfl:     Omega-3 Fatty Acids (FISH OIL) 1000 MG CPDR, Take 1,000 mg by mouth daily, Disp: , Rfl:     omeprazole (PriLOSEC) 20 mg delayed release capsule, Take 20 mg by mouth as needed, Disp: , Rfl:     sildenafil (VIAGRA) 50 MG tablet, Take 1 tablet (50 mg total) by mouth daily as needed for erectile dysfunction, Disp: 8 tablet, Rfl: 0    simvastatin (ZOCOR) 40 mg tablet, TAKE 1 TABLET (40 MG TOTAL) BY MOUTH DAILY AT BEDTIME, Disp: 90 tablet, Rfl: 1    tirzepatide (Mounjaro) 2.5 MG/0.5ML, Inject 0.5 mL (2.5 mg total) under the skin once a week, Disp: 2 mL, Rfl: 0    tirzepatide 5 MG/0.5ML, Inject 0.5 mL (5 mg total) under the skin once a week Do not start before February 22, 2024., Disp: 2 mL, Rfl: 0    [START ON 3/21/2024] tirzepatide 7.5 MG/0.5ML, Inject 0.5 mL (7.5  mg total) under the skin once a week Do not start before March 21, 2024., Disp: 6 mL, Rfl: 0    triamcinolone (KENALOG) 0.1 % lotion, Apply topically 2 (two) times a day, Disp: 60 mL, Rfl: 2    clobetasol (TEMOVATE) 0.05 % external solution, Apply topically 2 (two) times a day (Patient not taking: Reported on 10/31/2023), Disp: 50 mL, Rfl: 3        ALLERGIES:  Allergies[]Expand by Default         Allergies   Allergen Reactions    Penicillins Rash       sensitivity                  REVIEW OF SYSTEMS:  Musculoskeletal:        As noted in HPI.   All other systems reviewed and are negative.     VITALS:  There were no vitals filed for this visit.     LABS:  HgA1c:         Lab Results   Component Value Date     HGBA1C 8.7 (H) 12/29/2023      BMP:         Lab Results   Component Value Date     CALCIUM 8.8 12/29/2023     K 4.1 12/29/2023     CO2 27 12/29/2023      12/29/2023     BUN 17 12/29/2023     CREATININE 1.19 12/29/2023         _____________________________________________________  PHYSICAL EXAMINATION:  /63   Pulse (!) 52   Temp (!) 97.3 °F (36.3 °C) (Temporal)   Resp 16   Wt (!) 140 kg (309 lb)   SpO2 95%   BMI 41.91 kg/m²   General: Well developed and well nourished, alert & oriented x 3, appears comfortable  Psychiatric: Normal  HEENT: Normocephalic, Atraumatic Trachea Midline, No torticollis  Pulmonary: No audible wheezing or respiratory distress   Abdomen/GI: Non tender, non distended   Cardiovascular: No pitting edema, 2+ radial pulse   Skin: No Erythema, No Lacerations, No Fluctuation, No Ulcerations  Neurovascular: Sensation Intact to the Median, Ulnar, Radial Nerve, Motor Intact to the Median, Ulnar, Radial Nerve, and Pulses Intact  Musculoskeletal: Normal, except as noted in detailed exam and in HPI.        MUSCULOSKELETAL EXAMINATION:  Left Middle Finger:  Centralized mucoid cyst along the DIP joint  6mm W x  4mm L x 2mm H  + Nail changes   Gelatinous fluid is expressed from this area  after patient squeezed the area.   Currently no signs of infection.  Brisk capillary refill.  ___________________________________________________  STUDIES REVIEWED:  Xrays of the left middle finger were reviewed and independently interpreted in PACS by Dr. Coles and demonstrate no acute osseous abnormalities or signs of osteomyelitis.

## 2024-05-16 NOTE — OP NOTE
OPERATIVE REPORT  PATIENT NAME: Jean-Pierre Scherer    :  1955  MRN: [id number]  Pt Location: [facility] OR ROOM 05    SURGERY DATE: 2024     Surgeons and Role:     * Jorge Coles MD - Primary     * Padilla Magaña PA-C - Assisting    Physician Assistant need: A physician assistant was required during the procedure for retraction, tissue handling, dissection, and suturing. No qualified resident was available.    Pre-Op Diagnosis Codes:   Left middle finger mucous cyst    Post-Op Diagnosis Codes:  Left middle finger mucous cyst    Procedure(s) (LRB):  EXCISION GANGLION (MUCOUS) CYST LEFT MIDDLE FINGER (Left)    Specimen(s):  * No specimens in log *    Estimated Blood Loss:   Minimal    Drains:  None    Implants:  * No implants in log *    Anesthesia Type:   IV Sedation with Anesthesia    Operative Indications:  Patient is a 68 y.o. male evaluated in clinic with symptoms and clinical exam consistent with Left middle finger mucous cyst.  We discussed treatment options with patient including conservative management and surgical options. Discussed nonoperative management with observation.  We discussed risks in general including pain, bleeding, stiffness, infection, need for further surgeries, damage to neurovascular structures.   We discussed specific risks of surgery including, the possibility of recurrence, extensor lag, nail deformity. Patient had current nail deformity and discussed risk of continued deformity. Patient elected for surgical management.     Operative Findings:   3 x 3 x 3 mm mucous cyst of Left middle finger DIP joint that was able to be excised.  Dorsal osteophytes of the DIP joint was excised     Complications:   None     Procedure and Technique:  Patient was identified in the preoperative holding area.  Surgical sites were marked with patient participation. Patient was taken back to the operating theater.  Anesthesia was induced. Extremity was prepped and draped typical fashion.   Formal time-out was performed confirming site, patient, and procedure. All present were in agreement. A 50-50 mixture of 1% lidocaine without epinephrine and 0.25% bupivacaine without epinephrine was used for local field block.        Tourniquet was inflated. A transverse incision was made over the DIP joint with flap extending distally.  Full-thickness skin flaps were elevated.  The mass was encountered and sharply dissected from the skin. Mass measured 3x3x3 mm.  The mass was taken from the stalk from the DIP joint.  The sac of the cyst was fully excised sharply. The osteophytes at this were smoothed with a synovial rongeur.     Wound was thoroughly irrigated. Skin was closed with 4-0 chromic in horizontal mattress fashion.  Wound was dressed with Xeroform, 4 x 4, Brenda wrap, and finger sock.  Tourniquet was deflated.  Patient was taken to PACU in stable condition.     I was present for the entire procedure     Postoperative Plan:  Patient may range digits as tolerated.  We will limit weightbearing to a couple of pounds for the first 2 weeks.  We will see patient back at the 2-week postoperative candice.                  Patient Disposition:  PACU         SIGNATURE: Jorge Coles MD  DATE: May 16, 2024  TIME: 8:49 AM

## 2024-05-16 NOTE — ANESTHESIA POSTPROCEDURE EVALUATION
Post-Op Assessment Note    CV Status:  Stable  Pain Score: 0    Pain management: adequate       Mental Status:  Alert and awake   Hydration Status:  Stable   PONV Controlled:  None   Airway Patency:  Patent     Post Op Vitals Reviewed: Yes    No anethesia notable event occurred.    Staff: CRNA               BP      Temp      Pulse  53   Resp   12   SpO2   97

## 2024-05-16 NOTE — ANESTHESIA PREPROCEDURE EVALUATION
Procedure:  EXCISION GANGLION CYST LEFT MIDDLE FINGER (Left: Finger)    Relevant Problems   CARDIO   (+) Essential hypertension   (+) Mixed hyperlipidemia   (+) SOB (shortness of breath) on exertion      ENDO   (+) Type 2 diabetes mellitus with hyperglycemia, without long-term current use of insulin (HCC)   (+) Type 2 diabetes mellitus with other diabetic kidney complication (HCC)      GI/HEPATIC   (+) Gastroesophageal reflux disease without esophagitis      /RENAL   (+) Prostate cancer (HCC)      PULMONARY   (+) Asthma   (+) Mild intermittent asthma   (+) Obstructive sleep apnea syndrome   (+) SOB (shortness of breath) on exertion   (+) Sleep apnea        Physical Exam    Airway    Mallampati score: III  TM Distance: >3 FB  Neck ROM: full     Dental   No notable dental hx     Cardiovascular      Pulmonary      Other Findings        Anesthesia Plan  ASA Score- 3     Anesthesia Type- IV sedation with anesthesia with ASA Monitors.         Additional Monitors:     Airway Plan:     Comment: I have seen the patient and reviewed the history.  Patient to receive IV sedation with full ASA monitors.  Risks discussed with the patient, consent signed.  .       Plan Factors-Exercise tolerance (METS): >4 METS.    Chart reviewed. EKG reviewed.  Existing labs reviewed. Patient summary reviewed.                  Induction- intravenous.    Postoperative Plan-         Informed Consent- Anesthetic plan and risks discussed with patient.  I personally reviewed this patient with the CRNA. Discussed and agreed on the Anesthesia Plan with the CRNA..

## 2024-05-21 ENCOUNTER — TELEPHONE (OUTPATIENT)
Age: 69
End: 2024-05-21

## 2024-05-21 NOTE — TELEPHONE ENCOUNTER
Caller: Patient    Doctor: Sharyn    Reason for call: Patient had Surgery w/ Dr Coles last Thursday.  Patient is due in for his post-op on 5/28, but has noticed that the protective gauze covering the dressing is deteriorating.  The wound dressing itself is intact.    Patient states it is a gauze covering with a wrist strap, to keep the dressing in place.  He says it is starting to break apart.  He has tried to find replacements but was unable to find it.    Please call patient back to advise.    Call back#: [phone]

## 2024-05-22 NOTE — TELEPHONE ENCOUNTER
LVM letting patient know of Dr. Coles's message and if patient is uncomfortable with doing so he can give us a call back to be put on the schedule for this Friday.

## 2024-05-23 ENCOUNTER — TELEMEDICINE (OUTPATIENT)
Dept: ENDOCRINOLOGY | Facility: CLINIC | Age: 69
End: 2024-05-23
Payer: COMMERCIAL

## 2024-05-23 ENCOUNTER — TELEPHONE (OUTPATIENT)
Dept: ENDOCRINOLOGY | Facility: CLINIC | Age: 69
End: 2024-05-23

## 2024-05-23 DIAGNOSIS — E78.2 MIXED HYPERLIPIDEMIA: ICD-10-CM

## 2024-05-23 DIAGNOSIS — E11.29 TYPE 2 DIABETES MELLITUS WITH OTHER DIABETIC KIDNEY COMPLICATION (HCC): ICD-10-CM

## 2024-05-23 DIAGNOSIS — E11.65 TYPE 2 DIABETES MELLITUS WITH HYPERGLYCEMIA, WITHOUT LONG-TERM CURRENT USE OF INSULIN (HCC): Primary | ICD-10-CM

## 2024-05-23 PROBLEM — E66.01 CLASS 3 SEVERE OBESITY DUE TO EXCESS CALORIES WITH SERIOUS COMORBIDITY AND BODY MASS INDEX (BMI) OF 45.0 TO 49.9 IN ADULT (HCC): Status: RESOLVED | Noted: 2021-06-28 | Resolved: 2024-05-23

## 2024-05-23 PROBLEM — E66.813 CLASS 3 SEVERE OBESITY DUE TO EXCESS CALORIES WITH SERIOUS COMORBIDITY AND BODY MASS INDEX (BMI) OF 45.0 TO 49.9 IN ADULT (HCC): Status: RESOLVED | Noted: 2021-06-28 | Resolved: 2024-05-23

## 2024-05-23 PROCEDURE — 99214 OFFICE O/P EST MOD 30 MIN: CPT | Performed by: INTERNAL MEDICINE

## 2024-05-23 NOTE — ASSESSMENT & PLAN NOTE
He is much improved. He is not interested to use medication.    Today we agreed to continue with diet and lifestyle change which seems to be helping both weight loss and diabetes.    We agreed to defe medications until next review in 6 months.he will contact me if weight plateaus in which case, will consider a gLP1 agonist.    Follow up in 6 months.          Lab Results   Component Value Date    HGBA1C 6.6 (H) 05/08/2024

## 2024-05-23 NOTE — PROGRESS NOTES
REQUIRED DOCUMENTATION:      1. This service was provided via [employer]  2. Provider located at [place], [place].  3. TeleMed provider: Lee Camacho MD.  4. Identify all parties in room with patient during tele consult:  5.Patient was then informed that this was a Telemedicine visit and that the exam was being conducted confidentially over secure lines. My office door was closed. No one else was in the room.  Patient acknowledged consent and understanding of privacy and security of the Telemedicine visit, and gave us permission to have the assistant stay in the room in order to assist with the history and to conduct the exam.  I informed the patient that I have reviewed their record in Epic and presented the opportunity for them to ask any questions regarding the visit today.  The patient agreed to participate.     Patient is aware this is a billable service.       Follow-up Patient Progress Note      CC: Type 2 diabetes, Obesity     History of Present Illness:   68 yr male with type 2 diabetes since 2010, HTN, HLD, obesity BMI 47, ROSITA, GERD, microalbuminuria. Last visit was 1/25/24.     He reports significant improvement.     Max adult weight: 352 lbs. Minimum adult weight 280 lbs late 20's.  Age 20 225 lbs.     SAGM:     Current meds: none     Opthamology: yes  Podiatry: yes  vaccination: yes  Dental:  Pancreatitis: No     Ace/ARB: lisinopril  Statin: simvastatin  Thyroid issues: No      Physical Exam:  There is no height or weight on file to calculate BMI.  There were no vitals taken for this visit.   There were no vitals filed for this visit.     Physical Exam  Constitutional:       General: He is not in acute distress.     Appearance: He is well-developed.   HENT:      Head: Normocephalic and atraumatic.      Nose: Nose normal.   Eyes:      Conjunctiva/sclera: Conjunctivae normal.   Pulmonary:      Effort: Pulmonary effort is normal.   Abdominal:      General: There is no distension.    Musculoskeletal:      Cervical back: Normal range of motion and neck supple.   Skin:     Findings: No rash.      Comments: No icterus   Neurological:      Mental Status: He is alert and oriented to person, place, and time.         Labs:   Lab Results   Component Value Date    HGBA1C 6.6 (H) 05/08/2024       Lab Results   Component Value Date    SQP6XAZBYVMD 3.752 05/08/2024       Lab Results   Component Value Date    CREATININE 1.31 (H) 05/08/2024    CREATININE 1.19 12/29/2023    CREATININE 1.17 08/28/2023    BUN 26 (H) 05/08/2024    K 5.1 05/08/2024     05/08/2024    CO2 27 05/08/2024     eGFR   Date Value Ref Range Status   05/08/2024 56 ml/min/1.73sq m Final       Lab Results   Component Value Date    ALT 27 05/08/2024    AST 24 05/08/2024    ALKPHOS 50 05/08/2024       Lab Results   Component Value Date    CHOLESTEROL 181 12/29/2023    CHOLESTEROL 172 05/25/2023    CHOLESTEROL 166 10/11/2022     Lab Results   Component Value Date    HDL 44 12/29/2023    HDL 47 05/25/2023    HDL 44 10/11/2022     Lab Results   Component Value Date    TRIG 214 (H) 12/29/2023    TRIG 162 (H) 05/25/2023    TRIG 243 (H) 10/11/2022     Lab Results   Component Value Date    NONHDLC 137 12/29/2023    NONHDLC 125 05/25/2023    NONHDLC 122 10/11/2022         Assessment/Plan:    1. Type 2 diabetes mellitus with hyperglycemia, without long-term current use of insulin (HCC)  -     Hemoglobin A1C; Future  -     Albumin / creatinine urine ratio; Future  2. BMI 40.0-44.9, adult ([facility])  3. Mixed hyperlipidemia  4. Type 2 diabetes mellitus with other diabetic kidney complication ([facility])  Assessment & Plan:  He is much improved. He is not interested to use medication.    Today we agreed to continue with diet and lifestyle change which seems to be helping both weight loss and diabetes.    We agreed to defe medications until next review in 6 months.he will contact me if weight plateaus in which case, will consider a gLP1 agonist.    Follow up  in 6 months.          Lab Results   Component Value Date    HGBA1C 6.6 (H) 05/08/2024           I have spent a total time of 32 minutes on 05/23/24 in caring for this patient including greater than 50% of this time was spent in counseling/coordination of care as listed above.       Discussed with the patient and all questioned fully answered. He will contact me with concerns.    Lee Camacho

## 2024-05-24 ENCOUNTER — TELEPHONE (OUTPATIENT)
Dept: ENDOCRINOLOGY | Facility: CLINIC | Age: 69
End: 2024-05-24

## 2024-05-24 NOTE — TELEPHONE ENCOUNTER
M for patient to call office back to schedule 6 mo follow up with Dr. Camacho around [facility]

## 2024-05-28 ENCOUNTER — OFFICE VISIT (OUTPATIENT)
Dept: OBGYN CLINIC | Facility: CLINIC | Age: 69
End: 2024-05-28

## 2024-05-28 VITALS — WEIGHT: 308 LBS | BODY MASS INDEX: 41.72 KG/M2 | HEIGHT: 72 IN

## 2024-05-28 DIAGNOSIS — Z98.890 STATUS POST SURGERY: Primary | ICD-10-CM

## 2024-05-28 PROCEDURE — 99024 POSTOP FOLLOW-UP VISIT: CPT | Performed by: STUDENT IN AN ORGANIZED HEALTH CARE EDUCATION/TRAINING PROGRAM

## 2024-05-28 NOTE — PROGRESS NOTES
Assessment/Plan:  Patient ID: 68 y.o. male   Surgery: Excision Ganglion Cyst Left Middle Finger - Left  Date of Surgery: 5/16/2024    Resume activities as tolerated. Follow-up with us as needed    Follow Up:  PRN        CHIEF COMPLAINT:  Chief Complaint   Patient presents with   • Left Middle Finger - Post-op         SUBJECTIVE:  Patient is a 68 y.o. year old male who presents for follow up after Excision Ganglion Cyst Left Middle Finger - Left. Today patient states he is doing well. States pain is minimal.    PHYSICAL EXAMINATION:  General: Well developed and well nourished, alert and oriented x 3, appears comfortable  Psychiatric: Normal    MUSCULOSKELETAL EXAMINATION:  Incision: Clean, dry, intact  Surgery Site: normal, no evidence of infection   Range of Motion: As expected. Loose fist  Neurovascular status: Neuro intact, good cap refill  Sutures removed    STUDIES REVIEWED:  No new studies to review       PROCEDURES PERFORMED:  Procedures  No Procedures performed today    Scribe Attestation    I,:   am acting as a scribe while in the presence of the attending physician.:       I,:   personally performed the services described in this documentation    as scribed in my presence.:

## 2024-06-27 ENCOUNTER — OFFICE VISIT (OUTPATIENT)
Dept: PULMONOLOGY | Facility: CLINIC | Age: 69
End: 2024-06-27
Payer: COMMERCIAL

## 2024-06-27 VITALS
SYSTOLIC BLOOD PRESSURE: 100 MMHG | WEIGHT: 295 LBS | OXYGEN SATURATION: 97 % | HEART RATE: 50 BPM | HEIGHT: 72 IN | TEMPERATURE: 97.9 F | DIASTOLIC BLOOD PRESSURE: 64 MMHG | BODY MASS INDEX: 39.96 KG/M2

## 2024-06-27 DIAGNOSIS — J45.20 MILD INTERMITTENT ASTHMA WITHOUT COMPLICATION: ICD-10-CM

## 2024-06-27 DIAGNOSIS — J30.1 SEASONAL ALLERGIC RHINITIS DUE TO POLLEN: ICD-10-CM

## 2024-06-27 DIAGNOSIS — I10 ESSENTIAL HYPERTENSION: ICD-10-CM

## 2024-06-27 DIAGNOSIS — G47.33 OBSTRUCTIVE SLEEP APNEA SYNDROME: Primary | ICD-10-CM

## 2024-06-27 PROCEDURE — 99214 OFFICE O/P EST MOD 30 MIN: CPT | Performed by: INTERNAL MEDICINE

## 2024-06-27 NOTE — PROGRESS NOTES
Ambulatory Visit  Name: Jean-Pierre Scherer      : 1955      MRN: [id number]  Encounter Provider: Raisa Baldwin MD  Encounter Date: 2024   Encounter department: [facility] P[facility]MON[facility] & [facility]    Assessment & Plan   1. Obstructive sleep apnea syndrome  Assessment & Plan:  Mr. Trevon Scherer had a long history of loud snoring interrupted sleep waking up episodes during sleep and reported apneic events.  He also felt sleepy and tired during the day.  His [place] sleepiness score is 9 out of 24.  He had occasional morning headache and nocturia.  He has  diabetes and hypertension. He had an overnight home sleep study done in 2023 which showed severe sleep apnea with significant oxygen desaturation.  On Clinical examination, he was very obese and had oropharyngeal crowding.  He has been on autoCPAP therapy and he is currently using the CPAP regularly.  CPAP compliance is good and his residual AHI low.  He is getting clinical benefit from CPAP therapy and is medically necessary for him..  I have advised him to continue as before.  I had a long discussion with him and have answered all his questions.   2. Mild intermittent asthma without complication  Assessment & Plan:  He has history of shortness of breath on exertion, mostly while climbing stairs.  He has family history of asthma.  He also has cough and wheezing.  He has history of allergies.  He is a previous smoker.  His PFT  was unremarkable.  On clinical examination, his chest is clear to auscultation.  I advised him to continue with albuterol as needed.     3. Essential hypertension  Assessment & Plan:  He has history of hypertension and currently this is controlled with lisinopril hydrochlorothiazide.  4. Seasonal allergic rhinitis due to pollen  Assessment & Plan:  He has seasonal allergic rhinitis and was using Allegra.      History of Present Illness     Jean-Pierre Scherer is a 68 y.o. male with past medical  history of obstructive sleep apnea, mild intermittent asthma obesity and hypertension who has come for follow-up for his obstructive sleep apnea currently on CPAP therapy 5-15 cmH2O using a fullface mask.  He has been using the CPAP regularly and is comfortable with the mask and pressure.  He has no significant daytime sleepiness or morning headache.  He is very motivated to continue on CPAP therapy.  I reviewed his CPAP compliance records and they are satisfactory.  His residual AHI was low.  He is getting regular supplies.  He also has mild intermittent asthma but rarely uses any inhaler.  He has history of allergies but after being started on CPAP therapy he has not found any need to use the allergy medication.  He is obese and understands the need for weight reduction.  He has voluntarily lost some weight.    Review of Systems   Constitutional:  Negative for appetite change, chills, fatigue and fever.   HENT:  Negative for ear pain, hearing loss, postnasal drip, rhinorrhea, sneezing, sore throat, trouble swallowing and voice change.    Eyes:  Negative for visual disturbance.   Respiratory:  Positive for shortness of breath. Negative for cough, chest tightness and wheezing.    Cardiovascular:  Negative for chest pain, palpitations and leg swelling.   Gastrointestinal:  Negative for abdominal pain, constipation, diarrhea, nausea and vomiting.   Genitourinary:  Negative for dysuria, frequency and urgency.   Musculoskeletal:  Negative for myalgias.   Skin:  Negative for rash.   Allergic/Immunologic: Negative for environmental allergies.   Neurological:  Negative for dizziness, syncope, light-headedness and headaches.   Psychiatric/Behavioral:  Negative for agitation, confusion and sleep disturbance. The patient is not nervous/anxious.        Objective     /64 (BP Location: Left arm, Patient Position: Standing, Cuff Size: Standard)   Pulse (!) 50   Temp 97.9 °F (36.6 °C) (Tympanic)   Ht 6' (1.829 m)   Wt  134 kg (295 lb)   SpO2 97%   BMI 40.01 kg/m²     Physical Exam  Vitals reviewed.   Constitutional:       General: He is not in acute distress.     Appearance: He is obese. He is not ill-appearing, toxic-appearing or diaphoretic.   HENT:      Head: Normocephalic.      Mouth/Throat:      Mouth: Mucous membranes are moist.      Pharynx: Oropharynx is clear.   Eyes:      General: No scleral icterus.     Conjunctiva/sclera: Conjunctivae normal.   Cardiovascular:      Rate and Rhythm: Normal rate and regular rhythm.      Heart sounds: Normal heart sounds. No murmur heard.  Pulmonary:      Effort: Pulmonary effort is normal. No respiratory distress.      Breath sounds: Normal breath sounds. No stridor. No wheezing, rhonchi or rales.   Chest:      Chest wall: No tenderness.   Abdominal:      General: Bowel sounds are normal.      Palpations: Abdomen is soft.      Tenderness: There is no abdominal tenderness. There is no guarding.   Musculoskeletal:      Cervical back: Neck supple. No rigidity.      Right lower leg: No edema.      Left lower leg: No edema.   Lymphadenopathy:      Cervical: No cervical adenopathy.   Skin:     Coloration: Skin is not jaundiced or pale.      Findings: No rash.   Neurological:      Mental Status: He is alert and oriented to person, place, and time.      Gait: Gait normal.   Psychiatric:         Mood and Affect: Mood normal.         Behavior: Behavior normal.         Thought Content: Thought content normal.         Judgment: Judgment normal.       Administrative Statements           Answers submitted by the patient for this visit:  Pulmonology Questionnaire (Submitted on 6/26/2024)  Chief Complaint: Primary symptoms  When did you first notice your symptoms?: more than 1 month ago  How often do your symptoms occur?: rarely  Since you first noticed this problem, how has it changed?: gradually improving  Do you have shortness of breath that occurs with effort or exertion?: No  Do you have ear  congestion?: No  Do you have heartburn?: No  Do you have fatigue?: No  Do you have nasal congestion?: No  Do you have shortness of breath when lying flat?: No  Do you have shortness of breath when you wake up?: No  Do you have sweats?: No  Have you experienced weight loss?: Yes  Which of the following makes your symptoms worse?: nothing  Which of the following makes your symptoms better?: nothing  Spent 30 minutes of time take care of this patient with complex medical issues.  The majority of this time was spent directly with the patient counseling as well as coordinating care.

## 2024-06-27 NOTE — ASSESSMENT & PLAN NOTE
He has history of hypertension and currently this is controlled with lisinopril hydrochlorothiazide.

## 2024-06-27 NOTE — ASSESSMENT & PLAN NOTE
Mr. Trevon Scherer had a long history of loud snoring interrupted sleep waking up episodes during sleep and reported apneic events.  He also felt sleepy and tired during the day.  His [place] sleepiness score is 9 out of 24.  He had occasional morning headache and nocturia.  He has  diabetes and hypertension. He had an overnight home sleep study done in July 2023 which showed severe sleep apnea with significant oxygen desaturation.  On Clinical examination, he was very obese and had oropharyngeal crowding.  He has been on autoCPAP therapy and he is currently using the CPAP regularly.  CPAP compliance is good and his residual AHI low.  He is getting clinical benefit from CPAP therapy and is medically necessary for him..  I have advised him to continue as before.  I had a long discussion with him and have answered all his questions.

## 2024-07-26 DIAGNOSIS — I10 ESSENTIAL HYPERTENSION: ICD-10-CM

## 2024-07-26 DIAGNOSIS — E78.2 MIXED HYPERLIPIDEMIA: ICD-10-CM

## 2024-07-26 RX ORDER — SIMVASTATIN 40 MG
40 TABLET ORAL
Qty: 90 TABLET | Refills: 1 | Status: SHIPPED | OUTPATIENT
Start: 2024-07-26

## 2024-07-26 RX ORDER — LISINOPRIL AND HYDROCHLOROTHIAZIDE 20; 12.5 MG/1; MG/1
1 TABLET ORAL DAILY
Qty: 90 TABLET | Refills: 1 | Status: SHIPPED | OUTPATIENT
Start: 2024-07-26

## 2024-08-01 LAB
LEFT EYE DIABETIC RETINOPATHY: NORMAL
RIGHT EYE DIABETIC RETINOPATHY: NORMAL

## 2024-08-05 ENCOUNTER — RA CDI HCC (OUTPATIENT)
Dept: OTHER | Facility: HOSPITAL | Age: 69
End: 2024-08-05

## 2024-08-07 ENCOUNTER — OFFICE VISIT (OUTPATIENT)
Dept: URGENT CARE | Facility: CLINIC | Age: 69
End: 2024-08-07
Payer: COMMERCIAL

## 2024-08-07 VITALS
OXYGEN SATURATION: 98 % | TEMPERATURE: 98.2 F | SYSTOLIC BLOOD PRESSURE: 167 MMHG | HEART RATE: 61 BPM | RESPIRATION RATE: 22 BRPM | DIASTOLIC BLOOD PRESSURE: 77 MMHG

## 2024-08-07 DIAGNOSIS — J06.9 UPPER RESPIRATORY TRACT INFECTION, UNSPECIFIED TYPE: ICD-10-CM

## 2024-08-07 DIAGNOSIS — U07.1 COVID-19: Primary | ICD-10-CM

## 2024-08-07 LAB
SARS-COV-2 AG UPPER RESP QL IA: POSITIVE
VALID CONTROL: ABNORMAL

## 2024-08-07 PROCEDURE — 99213 OFFICE O/P EST LOW 20 MIN: CPT | Performed by: NURSE PRACTITIONER

## 2024-08-07 PROCEDURE — 87811 SARS-COV-2 COVID19 W/OPTIC: CPT | Performed by: NURSE PRACTITIONER

## 2024-08-07 NOTE — PROGRESS NOTES
[facility]        NAME: Jean-Pierre Scherer is a 68 y.o. male  : 1955    MRN: [id number]  DATE: 2024  TIME: 8:47 AM    Assessment and Plan   COVID-19 [U07.1]  1. COVID-19        2. Upper respiratory tract infection, unspecified type  Poct Covid 19 Rapid Antigen Test        COVID test in office is positive.  Recommended over-the-counter symptomatic treatment.  CDC guidelines reviewed with patient.    Patient Instructions       Follow up with PCP in 3-5 days.  Proceed to  ER if symptoms worsen.    Chief Complaint     Chief Complaint   Patient presents with    Fever     Couple days ago pt was having sinus issues, cough, felt feverish, chest tightness and was experiencing chills. Took tylenol last night, with no relief.         History of Present Illness       Patient is a 68-year-old male presenting with 2 to 3 days of sinus congestion, cough, chills, and chest tightness.  He took Tylenol with no relief.  He states he has been traveling recently.  Wife at home beginning with similar symptoms.  Home COVID testing completed    Fever  Associated symptoms include chills, congestion, coughing, a fever and a sore throat. Pertinent negatives include no abdominal pain, fatigue, headaches, myalgias, nausea, rash or vomiting.       Review of Systems   Review of Systems   Constitutional:  Positive for chills and fever. Negative for activity change and fatigue.   HENT:  Positive for congestion, rhinorrhea and sore throat. Negative for ear pain and sinus pain.    Respiratory:  Positive for cough. Negative for shortness of breath.    Gastrointestinal:  Negative for abdominal pain, diarrhea, nausea and vomiting.   Musculoskeletal:  Negative for myalgias.   Skin:  Negative for rash.   Neurological:  Negative for headaches.         Current Medications       Current Outpatient Medications:     Ascorbic Acid (VITAMIN C PO), Take 1,000 mg by mouth daily, Disp: , Rfl:     betamethasone dipropionate  (DIPROSONE) 0.05 % ointment, Apply sparingly  1-2 times/day for up to 2 wks to R forearm rash., Disp: 45 g, Rfl: 0    betamethasone valerate (VALISONE) 0.1 % lotion, Apply topically 2 (two) times a day To the scalp as needed., Disp: 60 mL, Rfl: 3    Cyanocobalamin (VITAMIN B-12 PO), Take 1,000 mcg by mouth daily , Disp: , Rfl:     fexofenadine (ALLEGRA) 180 MG tablet, Take 180 mg by mouth daily as needed, Disp: , Rfl:     ketoconazole (NIZORAL) 2 % cream, Apply topically daily, Disp: 60 g, Rfl: 0    lisinopril-hydrochlorothiazide (PRINZIDE,ZESTORETIC) 20-12.5 MG per tablet, TAKE 1 TABLET BY MOUTH DAILY, Disp: 90 tablet, Rfl: 1    Multiple Vitamins-Minerals (MULTIVITAL PO), Take 1 tablet by mouth, Disp: , Rfl:     Omega-3 Fatty Acids (fish oil) 1,000 mg, Take 1,000 mg by mouth 2 (two) times a day, Disp: , Rfl:     omeprazole (PriLOSEC) 20 mg delayed release capsule, Take 20 mg by mouth as needed, Disp: , Rfl:     simvastatin (ZOCOR) 40 mg tablet, TAKE 1 TABLET (40 MG TOTAL) BY MOUTH DAILY AT BEDTIME, Disp: 90 tablet, Rfl: 1    triamcinolone (KENALOG) 0.1 % lotion, Apply topically 2 (two) times a day, Disp: 60 mL, Rfl: 2    albuterol (2.5 mg/3 mL) 0.083 % nebulizer solution, Take 3 mL (2.5 mg total) by nebulization every 6 (six) hours as needed for wheezing or shortness of breath (Patient not taking: Reported on 8/7/2024), Disp: 90 mL, Rfl: 0    albuterol (Ventolin HFA) 90 mcg/act inhaler, Inhale 2 puffs every 6 (six) hours as needed for wheezing (Patient not taking: Reported on 8/7/2024), Disp: 18 g, Rfl: 2    HYDROcodone-acetaminophen (Norco) 5-325 mg per tablet, Take 1 tablet by mouth every 6 (six) hours as needed for pain Max Daily Amount: 4 tablets (Patient not taking: Reported on 8/7/2024), Disp: 6 tablet, Rfl: 0    sildenafil (VIAGRA) 50 MG tablet, Take 1 tablet (50 mg total) by mouth daily as needed for erectile dysfunction (Patient not taking: Reported on 8/7/2024), Disp: 8 tablet, Rfl: 0    Current Allergies      Allergies as of 08/07/2024 - Reviewed 08/07/2024   Allergen Reaction Noted    Penicillins Rash 11/19/2012            The following portions of the patient's history were reviewed and updated as appropriate: allergies, current medications, past family history, past medical history, past social history, past surgical history and problem list.     Past Medical History:   Diagnosis Date    Asthma     BMI 40.0-44.9, adult ([facility]) 01/11/2022    BMI 40.0-44.9, adult ([facility]) 05/09/2024    BMI 45.0-49.9, adult ([facility]) 06/28/2021    Bronchitis 09/29/2023    Bronchospasm     Cancer ([facility])     Prostate     Chest tightness 02/17/2023    Choking episode 02/17/2023    Colon polyp     CPAP (continuous positive airway pressure) dependence     Elevated serum creatinine 05/09/2024    Essential hypertension 06/26/2021    GERD (gastroesophageal reflux disease)     Hearing impairment 10/11/2022    Hyperlipidemia     Hypertension     Hypocalcemia 01/11/2022    Insomnia     Leg cramps     Lump on finger, left 01/11/2022    Microalbuminuria 09/27/2021    Mixed hyperlipidemia 06/26/2021    Morbid obesity ([facility]) 05/26/2022    Nocturia     Numbness of fingers of both hands     Obstructive sleep apnea syndrome 08/28/2023    Onychomycosis of toenail 05/26/2022    Pain in both knees 05/26/2022    Paronychia of left middle finger 09/27/2021    Preoperative evaluation of a medical condition to rule out surgical contraindications (TAR required) 05/09/2024    Right foot pain 05/26/2023    Seasonal allergic rhinitis 01/11/2022    Skin lesion     Skin rash 06/26/2021    Sleep apnea     not tested yet    Snoring     Tubular adenoma of colon 06/28/2021    Type 2 diabetes mellitus without complication, without long-term current use of insulin ([facility]) 06/26/2021    Wears glasses     Weight gain        Past Surgical History:   Procedure Laterality Date    COLONOSCOPY  05/09/2017    COLONOSCOPY      HIP SURGERY Bilateral     Hip replacement    CA COLONOSCOPY FLX  DX W/COLLJ SPEC WHEN PFRMD N/A 05/09/2017    Procedure: COLONOSCOPY;  Surgeon: Dinesh Bradley MD;  [facility];  Service: Gastroenterology    CO EXCISION GANGLION WRIST DORSAL/VOLAR PRIMARY Left 5/16/2024    Procedure: EXCISION GANGLION CYST LEFT MIDDLE FINGER;  Surgeon: Jorge Coles MD;  Location[facility];  Service: Orthopedics    PROSTATECTOMY      UPPER GASTROINTESTINAL ENDOSCOPY         Family History   Problem Relation Age of Onset    Brain cancer Mother     Psoriasis Mother     Eczema Mother     Depression Father     Heart disease Father         Heart Artery blockages    Diabetes Father     Stroke Paternal Grandfather          Medications have been verified.        Objective   /77 (BP Location: Right arm, Patient Position: Sitting, Cuff Size: Standard)   Pulse 61   Temp 98.2 °F (36.8 °C) (Temporal)   Resp 22   SpO2 98%        Physical Exam     Physical Exam  Vitals reviewed.   Constitutional:       General: He is awake. He is not in acute distress.     Appearance: Normal appearance.   HENT:      Head: Normocephalic.      Right Ear: Hearing, tympanic membrane, ear canal and external ear normal.      Left Ear: Hearing, tympanic membrane, ear canal and external ear normal.      Nose: Nose normal.      Mouth/Throat:      Lips: Pink.      Pharynx: Oropharynx is clear.   Cardiovascular:      Rate and Rhythm: Normal rate and regular rhythm.      Heart sounds: Normal heart sounds, S1 normal and S2 normal.   Skin:     General: Skin is warm and moist.   Neurological:      General: No focal deficit present.      Mental Status: He is alert and oriented to person, place, and time.   Psychiatric:         Behavior: Behavior is cooperative.

## 2024-08-09 ENCOUNTER — TELEPHONE (OUTPATIENT)
Age: 69
End: 2024-08-09

## 2024-08-09 NOTE — TELEPHONE ENCOUNTER
Received call from patient's daughter Kimberlee post Covid diagnosis 8/7 [facility]  with no orders for medication. Kimberlee is requesting order for cough medication. RN advised of OTC cough medication or check with pharmacist for recommendation. If requesting stronger cough medication, patient would have to return to [facility] for evaluation and orders. No OV available today in office.

## 2024-08-11 ENCOUNTER — APPOINTMENT (OUTPATIENT)
Dept: LAB | Facility: CLINIC | Age: 69
End: 2024-08-11
Payer: COMMERCIAL

## 2024-08-11 DIAGNOSIS — E11.65 TYPE 2 DIABETES MELLITUS WITH HYPERGLYCEMIA, WITHOUT LONG-TERM CURRENT USE OF INSULIN (HCC): ICD-10-CM

## 2024-08-11 DIAGNOSIS — I10 ESSENTIAL HYPERTENSION: ICD-10-CM

## 2024-08-11 DIAGNOSIS — R79.89 ELEVATED SERUM CREATININE: ICD-10-CM

## 2024-08-11 DIAGNOSIS — E78.2 MIXED HYPERLIPIDEMIA: ICD-10-CM

## 2024-08-11 LAB
ANION GAP SERPL CALCULATED.3IONS-SCNC: 7 MMOL/L (ref 4–13)
BUN SERPL-MCNC: 17 MG/DL (ref 5–25)
CALCIUM SERPL-MCNC: 9.3 MG/DL (ref 8.4–10.2)
CHLORIDE SERPL-SCNC: 101 MMOL/L (ref 96–108)
CHOLEST SERPL-MCNC: 156 MG/DL
CO2 SERPL-SCNC: 30 MMOL/L (ref 21–32)
CREAT SERPL-MCNC: 1.22 MG/DL (ref 0.6–1.3)
CREAT UR-MCNC: 150.2 MG/DL
EST. AVERAGE GLUCOSE BLD GHB EST-MCNC: 140 MG/DL
GFR SERPL CREATININE-BSD FRML MDRD: 61 ML/MIN/1.73SQ M
GLUCOSE P FAST SERPL-MCNC: 106 MG/DL (ref 65–99)
HBA1C MFR BLD: 6.5 %
HDLC SERPL-MCNC: 42 MG/DL
LDLC SERPL CALC-MCNC: 83 MG/DL (ref 0–100)
MICROALBUMIN UR-MCNC: 270.5 MG/L
MICROALBUMIN/CREAT 24H UR: 180 MG/G CREATININE (ref 0–30)
NONHDLC SERPL-MCNC: 114 MG/DL
POTASSIUM SERPL-SCNC: 4.9 MMOL/L (ref 3.5–5.3)
SODIUM SERPL-SCNC: 138 MMOL/L (ref 135–147)
TRIGL SERPL-MCNC: 154 MG/DL

## 2024-08-11 PROCEDURE — 36415 COLL VENOUS BLD VENIPUNCTURE: CPT

## 2024-08-11 PROCEDURE — 80048 BASIC METABOLIC PNL TOTAL CA: CPT

## 2024-08-11 PROCEDURE — 83036 HEMOGLOBIN GLYCOSYLATED A1C: CPT

## 2024-08-11 PROCEDURE — 3060F POS MICROALBUMINURIA REV: CPT | Performed by: PODIATRIST

## 2024-08-11 PROCEDURE — 80061 LIPID PANEL: CPT

## 2024-08-11 PROCEDURE — 82570 ASSAY OF URINE CREATININE: CPT

## 2024-08-11 PROCEDURE — 82043 UR ALBUMIN QUANTITATIVE: CPT

## 2024-08-12 ENCOUNTER — TELEMEDICINE (OUTPATIENT)
Dept: INTERNAL MEDICINE CLINIC | Facility: CLINIC | Age: 69
End: 2024-08-12
Payer: COMMERCIAL

## 2024-08-12 DIAGNOSIS — E11.9 TYPE 2 DIABETES MELLITUS WITHOUT COMPLICATION, WITHOUT LONG-TERM CURRENT USE OF INSULIN (HCC): ICD-10-CM

## 2024-08-12 DIAGNOSIS — E78.2 MIXED HYPERLIPIDEMIA: ICD-10-CM

## 2024-08-12 DIAGNOSIS — N52.31 ERECTILE DYSFUNCTION AFTER RADICAL PROSTATECTOMY: ICD-10-CM

## 2024-08-12 DIAGNOSIS — I10 ESSENTIAL HYPERTENSION: Primary | ICD-10-CM

## 2024-08-12 DIAGNOSIS — R80.9 MICROALBUMINURIA: ICD-10-CM

## 2024-08-12 DIAGNOSIS — K21.9 GASTROESOPHAGEAL REFLUX DISEASE WITHOUT ESOPHAGITIS: ICD-10-CM

## 2024-08-12 DIAGNOSIS — Z12.5 SCREENING PSA (PROSTATE SPECIFIC ANTIGEN): ICD-10-CM

## 2024-08-12 DIAGNOSIS — C61 PROSTATE CANCER (HCC): ICD-10-CM

## 2024-08-12 DIAGNOSIS — U07.1 COVID-19: ICD-10-CM

## 2024-08-12 DIAGNOSIS — Z79.899 HIGH RISK MEDICATION USE: ICD-10-CM

## 2024-08-12 PROCEDURE — 99213 OFFICE O/P EST LOW 20 MIN: CPT | Performed by: INTERNAL MEDICINE

## 2024-08-12 PROCEDURE — 3066F NEPHROPATHY DOC TX: CPT | Performed by: PODIATRIST

## 2024-08-12 NOTE — PROGRESS NOTES
Virtual Regular Visit  Name: Jean-Pierre Scherer      : 1955      MRN: [id number]  Encounter Provider: Rach Kate MD  Encounter Date: 2024   Encounter department: [facility] INTERNAL MEDICINE    Verification of patient location:    Patient is located at Home in the following state in which I hold an active license [place]    Assessment & Plan   1. Essential hypertension  -     Comprehensive metabolic panel; Future  2. Type 2 diabetes mellitus without complication, without long-term current use of insulin (HCC)  Assessment & Plan:    Lab Results   Component Value Date    HGBA1C 6.5 (H) 2024     Orders:  -     Comprehensive metabolic panel; Future  3. Gastroesophageal reflux disease without esophagitis  4. Mixed hyperlipidemia  -     Comprehensive metabolic panel; Future  5. Microalbuminuria  6. COVID-19  7. Prostate cancer (HCC)  -     PSA, Total Screen; Future  8. Erectile dysfunction after radical prostatectomy  9. High risk medication use  -     Comprehensive metabolic panel; Future  10. Screening PSA (prostate specific antigen)  -     PSA, Total Screen; Future       Discussion/summary/plan:    He is blood pressure elevated when he went to urgent care.  But he was sick had COVID-19 symptoms.  For now advised to continue present dose of Prinzide 20/12.5 mg 1 tablet daily.  His COVID-19 symptoms are better.  Denies any fever chills chest pain.  Breathing is okay.  Diabetes mellitus diet controlled.  Hemoglobin A1c decreased to 6.5 so advised to continue 1800-calorie ADA diet.  GE reflux symptoms are controlled on diet and occasionally uses omeprazole as well.  Hyperlipidemia better cholesterol 153, triglyceride decreased from 214-1 54, HDL 42,, LDL 83 advised to continue present dose of simvastatin , fish oil and low-cholesterol low carbs diet.  For prostrate CA gets yearly PSA which is unchanged.  For erectile dysfunction occasionally takes sildenafil.  His  microalbumin has been improving on ACE inhibitor.  And diabetes mellitus diet controlled.    Encounter provider Rach Kate MD    The patient was identified by name and date of birth. Jean-Pierre Scherer was informed that this is a telemedicine visit and that the visit is being conducted through the Epic Embedded platform. He agrees to proceed..  My office door was closed. No one else was in the room.  He acknowledged consent and understanding of privacy and security of the video platform. The patient has agreed to participate and understands they can discontinue the visit at any time.    Patient is aware this is a billable service.     History of Present Illness     HPI  68-year-old white male patient presents for follow-up of his medical problems.  Last week he had cold symptoms went to urgent care diagnosed with COVID-19 infection.  Patient denies cough, has  sinus congestion symptoms are improving and getting better also.Denies any fever or chills.  Denies chest pain.  Breathing is okay.  Denies nausea vomiting diarrhea pain in abdomen.    Review of Systems   Constitutional:  Negative for chills and fever.   HENT:  Positive for congestion (Improving). Negative for ear pain, nosebleeds, sinus pain, sore throat and trouble swallowing.    Eyes:  Negative for discharge, redness and visual disturbance.   Respiratory:  Positive for cough (Improving). Negative for chest tightness and shortness of breath.    Cardiovascular:  Negative for chest pain and palpitations.   Gastrointestinal:  Negative for abdominal pain, blood in stool, constipation, diarrhea, nausea and vomiting.   Genitourinary:  Negative for dysuria, flank pain and hematuria.   Musculoskeletal:  Negative for arthralgias, myalgias and neck pain.   Skin:  Negative for color change and rash.   Neurological:  Negative for dizziness, speech difficulty, weakness and headaches.   Psychiatric/Behavioral:  Negative for agitation and behavioral  problems.                Medical History Reviewed by provider this encounter:       Current Outpatient Medications on File Prior to Visit   Medication Sig Dispense Refill   • albuterol (2.5 mg/3 mL) 0.083 % nebulizer solution Take 3 mL (2.5 mg total) by nebulization every 6 (six) hours as needed for wheezing or shortness of breath (Patient not taking: Reported on 8/7/2024) 90 mL 0   • albuterol (Ventolin HFA) 90 mcg/act inhaler Inhale 2 puffs every 6 (six) hours as needed for wheezing (Patient not taking: Reported on 8/7/2024) 18 g 2   • Ascorbic Acid (VITAMIN C PO) Take 1,000 mg by mouth daily     • betamethasone dipropionate (DIPROSONE) 0.05 % ointment Apply sparingly  1-2 times/day for up to 2 wks to R forearm rash. 45 g 0   • betamethasone valerate (VALISONE) 0.1 % lotion Apply topically 2 (two) times a day To the scalp as needed. 60 mL 3   • Cyanocobalamin (VITAMIN B-12 PO) Take 1,000 mcg by mouth daily      • fexofenadine (ALLEGRA) 180 MG tablet Take 180 mg by mouth daily as needed     • ketoconazole (NIZORAL) 2 % cream Apply topically daily 60 g 0   • lisinopril-hydrochlorothiazide (PRINZIDE,ZESTORETIC) 20-12.5 MG per tablet TAKE 1 TABLET BY MOUTH DAILY 90 tablet 1   • Multiple Vitamins-Minerals (MULTIVITAL PO) Take 1 tablet by mouth     • Omega-3 Fatty Acids (fish oil) 1,000 mg Take 1,000 mg by mouth 2 (two) times a day     • omeprazole (PriLOSEC) 20 mg delayed release capsule Take 20 mg by mouth as needed     • sildenafil (VIAGRA) 50 MG tablet Take 1 tablet (50 mg total) by mouth daily as needed for erectile dysfunction (Patient not taking: Reported on 8/7/2024) 8 tablet 0   • simvastatin (ZOCOR) 40 mg tablet TAKE 1 TABLET (40 MG TOTAL) BY MOUTH DAILY AT BEDTIME 90 tablet 1   • triamcinolone (KENALOG) 0.1 % lotion Apply topically 2 (two) times a day 60 mL 2   • [DISCONTINUED] HYDROcodone-acetaminophen (Norco) 5-325 mg per tablet Take 1 tablet by mouth every 6 (six) hours as needed for pain Max Daily Amount:  4 tablets (Patient not taking: Reported on 2024) 6 tablet 0     No current facility-administered medications on file prior to visit.      Social History     Tobacco Use   • Smoking status: Former     Current packs/day: 0.00     Average packs/day: 1.5 packs/day for 16.9 years (25.4 ttl pk-yrs)     Types: Cigarettes     Start date: 1975     Quit date: 1992     Years since quittin.2   • Smokeless tobacco: Never   Vaping Use   • Vaping status: Never Used   Substance and Sexual Activity   • Alcohol use: Yes     Alcohol/week: 1.0 standard drink of alcohol     Types: 1 Glasses of wine per week     Comment: rarely   • Drug use: No   • Sexual activity: Not Currently     Partners: Female     Birth control/protection: Abstinence, Post-menopausal, Rhythm, Male Sterilization, Female Sterilization     Objective     There were no vitals taken for this visit.  Physical Exam  Constitutional:       Appearance: He is obese.   Eyes:      General:         Right eye: No discharge.         Left eye: No discharge.   Pulmonary:      Effort: Pulmonary effort is normal.   Musculoskeletal:         General: Normal range of motion.      Cervical back: Normal range of motion.   Neurological:      General: No focal deficit present.      Mental Status: He is alert and oriented to person, place, and time.   Psychiatric:         Mood and Affect: Mood normal.         Behavior: Behavior normal.           Visit Time  Total Visit Duration: 21minutes

## 2024-08-12 NOTE — PATIENT INSTRUCTIONS
Patient was advised to continue present medications. discussed with the patient medications and laboratory data in detail.  Follow-up with me in 4 months or as advised.  If any blood test was ordered please do 1 week prior to next appointment unless advise to get earlier.  If you have any questions please call the office [phone]

## 2024-09-03 ENCOUNTER — OFFICE VISIT (OUTPATIENT)
Dept: PODIATRY | Facility: CLINIC | Age: 69
End: 2024-09-03
Payer: COMMERCIAL

## 2024-09-03 VITALS
HEART RATE: 49 BPM | HEIGHT: 72 IN | SYSTOLIC BLOOD PRESSURE: 146 MMHG | WEIGHT: 290 LBS | DIASTOLIC BLOOD PRESSURE: 76 MMHG | BODY MASS INDEX: 39.28 KG/M2

## 2024-09-03 DIAGNOSIS — E11.9 TYPE 2 DIABETES MELLITUS WITHOUT COMPLICATION, WITHOUT LONG-TERM CURRENT USE OF INSULIN (HCC): Primary | ICD-10-CM

## 2024-09-03 PROCEDURE — 1159F MED LIST DOCD IN RCRD: CPT | Performed by: PODIATRIST

## 2024-09-03 PROCEDURE — 99213 OFFICE O/P EST LOW 20 MIN: CPT | Performed by: PODIATRIST

## 2024-09-03 PROCEDURE — 3078F DIAST BP <80 MM HG: CPT | Performed by: PODIATRIST

## 2024-09-03 PROCEDURE — 3077F SYST BP >= 140 MM HG: CPT | Performed by: PODIATRIST

## 2024-09-03 PROCEDURE — 1160F RVW MEDS BY RX/DR IN RCRD: CPT | Performed by: PODIATRIST

## 2024-09-03 NOTE — PROGRESS NOTES
PATIENT:  Jean-Pierre Scherer    1955    ASSESSMENT:     1. Type 2 diabetes mellitus without complication, without long-term current use of insulin ([facility])                  PLAN:  1.  Reviewed medical records.  Patient was counseled on the condition and diagnosis.    2.  Educated disease prevention and risks related to diabetes.    3.  Educated proper daily foot care and exam.  Instructed proper skin care / protection and footwear.  Instructed to identify any signs of infection and related foot problem.    4.  The recent blood work was reviewed and the last HbA1c was 6.5.  Discussed proper blood glucose control with diet and exercise.    5.  Continue to monitor symptoms of neuropathy.  HPK debrided for courtesy.    6. The patient will return in 6 months for diabetic foot exam.      Subjective:      HPI  The patient presents for diabetic foot evaluation.  The blood glucose is under control.  He has mild numbness and paresthesia in his feet.  No significant neurologic pain.  No dysfunction.  He has callus on right great toe with pain.  No acute pedal problems.      The following portions of the patient's history were reviewed and updated as appropriate: allergies, current medications, past family history, past medical history, past social history, past surgical history and problem list.  All pertinent labs and images were reviewed.    Past Medical History  Past Medical History:   Diagnosis Date    Asthma     BMI 40.0-44.9, adult ([facility]) 01/11/2022    BMI 40.0-44.9, adult ([facility]) 05/09/2024    BMI 45.0-49.9, adult ([facility]) 06/28/2021    Bronchitis 09/29/2023    Bronchospasm     Cancer ([facility])     Prostate     Chest tightness 02/17/2023    Choking episode 02/17/2023    Colon polyp     COVID-19 08/12/2024    CPAP (continuous positive airway pressure) dependence     Elevated serum creatinine 05/09/2024    Essential hypertension 06/26/2021    GERD (gastroesophageal reflux disease)     Hearing impairment 10/11/2022     Hyperlipidemia     Hypertension     Hypocalcemia 01/11/2022    Insomnia     Leg cramps     Lump on finger, left 01/11/2022    Microalbuminuria 09/27/2021    Mixed hyperlipidemia 06/26/2021    Morbid obesity (HCC) 05/26/2022    Nocturia     Numbness of fingers of both hands     Obstructive sleep apnea syndrome 08/28/2023    Onychomycosis of toenail 05/26/2022    Pain in both knees 05/26/2022    Paronychia of left middle finger 09/27/2021    Preoperative evaluation of a medical condition to rule out surgical contraindications (TAR required) 05/09/2024    Right foot pain 05/26/2023    Seasonal allergic rhinitis 01/11/2022    Skin lesion     Skin rash 06/26/2021    Sleep apnea     not tested yet    Snoring     Tubular adenoma of colon 06/28/2021    Type 2 diabetes mellitus without complication, without long-term current use of insulin (HCC) 06/26/2021    Wears glasses     Weight gain        Past Surgical History  Past Surgical History:   Procedure Laterality Date    COLONOSCOPY  05/09/2017    COLONOSCOPY      HIP SURGERY Bilateral     Hip replacement    FL COLONOSCOPY FLX DX W/COLLJ SPEC WHEN PFRMD N/A 05/09/2017    Procedure: COLONOSCOPY;  Surgeon: Dinesh Bradley MD;  Location: [facility];  Service: Gastroenterology    FL EXCISION GANGLION WRIST DORSAL/VOLAR PRIMARY Left 5/16/2024    Procedure: EXCISION GANGLION CYST LEFT MIDDLE FINGER;  Surgeon: Jorge Coles MD;  Location: [facility];  Service: Orthopedics    PROSTATECTOMY      UPPER GASTROINTESTINAL ENDOSCOPY          Allergies:  Penicillins    Medications:  Current Outpatient Medications   Medication Sig Dispense Refill    Ascorbic Acid (VITAMIN C PO) Take 1,000 mg by mouth daily      betamethasone dipropionate (DIPROSONE) 0.05 % ointment Apply sparingly  1-2 times/day for up to 2 wks to R forearm rash. 45 g 0    betamethasone valerate (VALISONE) 0.1 % lotion Apply topically 2 (two) times a day To the scalp as needed. 60 mL 3    Cyanocobalamin (VITAMIN B-12 PO)  Take 1,000 mcg by mouth daily       fexofenadine (ALLEGRA) 180 MG tablet Take 180 mg by mouth daily as needed      ketoconazole (NIZORAL) 2 % cream Apply topically daily 60 g 0    lisinopril-hydrochlorothiazide (PRINZIDE,ZESTORETIC) 20-12.5 MG per tablet TAKE 1 TABLET BY MOUTH DAILY 90 tablet 1    Multiple Vitamins-Minerals (MULTIVITAL PO) Take 1 tablet by mouth      Omega-3 Fatty Acids (fish oil) 1,000 mg Take 1,000 mg by mouth 2 (two) times a day      omeprazole (PriLOSEC) 20 mg delayed release capsule Take 20 mg by mouth as needed      simvastatin (ZOCOR) 40 mg tablet TAKE 1 TABLET (40 MG TOTAL) BY MOUTH DAILY AT BEDTIME 90 tablet 1    triamcinolone (KENALOG) 0.1 % lotion Apply topically 2 (two) times a day 60 mL 2    albuterol (2.5 mg/3 mL) 0.083 % nebulizer solution Take 3 mL (2.5 mg total) by nebulization every 6 (six) hours as needed for wheezing or shortness of breath (Patient not taking: Reported on 2024) 90 mL 0    albuterol (Ventolin HFA) 90 mcg/act inhaler Inhale 2 puffs every 6 (six) hours as needed for wheezing (Patient not taking: Reported on 2024) 18 g 2    sildenafil (VIAGRA) 50 MG tablet Take 1 tablet (50 mg total) by mouth daily as needed for erectile dysfunction (Patient not taking: Reported on 2024) 8 tablet 0     No current facility-administered medications for this visit.       Social History:  Social History     Socioeconomic History    Marital status: /Civil Union     Spouse name: None    Number of children: None    Years of education: None    Highest education level: None   Occupational History    Occupation: Presently not working   Tobacco Use    Smoking status: Former     Current packs/day: 0.00     Average packs/day: 1.5 packs/day for 16.9 years (25.4 ttl pk-yrs)     Types: Cigarettes     Start date: 1975     Quit date: 1992     Years since quittin.3    Smokeless tobacco: Never   Vaping Use    Vaping status: Never Used   Substance and Sexual Activity     Alcohol use: Yes     Alcohol/week: 1.0 standard drink of alcohol     Types: 1 Glasses of wine per week     Comment: rarely    Drug use: No    Sexual activity: Not Currently     Partners: Female     Birth control/protection: Abstinence, Post-menopausal, Rhythm, Male Sterilization, Female Sterilization   Other Topics Concern    None   Social History Narrative    Single-family home    Current work/study status: Full-time    Caffeine use: Coffee, 3 servings/day    Lives with spouse    Former smoker - Inactive 6/22/2018    Annual eye exam: Sees 1hr; wears glasses    Annual dental checkup: Sees q6months    PSA: Used to follow Urology    - As per [employer]     Social Determinants of Health     Financial Resource Strain: Not on file   Food Insecurity: No Food Insecurity (8/12/2024)    Hunger Vital Sign     Worried About Running Out of Food in the Last Year: Never true     Ran Out of Food in the Last Year: Never true   Transportation Needs: No Transportation Needs (8/12/2024)    PRAPARE - Transportation     Lack of Transportation (Medical): No     Lack of Transportation (Non-Medical): No   Physical Activity: Not on file   Stress: Not on file   Social Connections: Not on file   Intimate Partner Violence: Not on file   Housing Stability: Low Risk  (8/12/2024)    Housing Stability Vital Sign     Unable to Pay for Housing in the Last Year: No     Number of Times Moved in the Last Year: 0     Homeless in the Last Year: No        Review of Systems   Constitutional:  Negative for appetite change, chills and fever.   Respiratory:  Negative for cough and shortness of breath.    Cardiovascular:  Negative for chest pain.   Gastrointestinal:  Negative for diarrhea, nausea and vomiting.   Musculoskeletal:  Negative for gait problem.   Skin:  Negative for wound.   Neurological:  Positive for numbness. Negative for weakness.         Objective:      /76   Pulse (!) 49   Ht 6' (1.829 m)   Wt 132 kg (290 lb)   BMI 39.33 kg/m²           Physical Exam  Vitals reviewed.   Constitutional:       General: He is not in acute distress.     Appearance: He is obese. He is not toxic-appearing.   Cardiovascular:      Rate and Rhythm: Normal rate and regular rhythm.      Pulses: Normal pulses. no weak pulses.           Dorsalis pedis pulses are 2+ on the right side and 2+ on the left side.        Posterior tibial pulses are 2+ on the right side and 2+ on the left side.      Comments: No ischemia.  Pulmonary:      Effort: Pulmonary effort is normal. No respiratory distress.   Musculoskeletal:         General: No swelling, tenderness or signs of injury.      Right foot: No Charcot foot or foot drop.      Left foot: No Charcot foot or foot drop.   Feet:      Right foot:      Protective Sensation: 10 sites tested.  10 sites sensed.      Skin integrity: Callus present. No ulcer, skin breakdown or erythema.      Left foot:      Protective Sensation: 10 sites tested.  10 sites sensed.      Skin integrity: No ulcer, skin breakdown, erythema or callus.   Skin:     General: Skin is warm.      Capillary Refill: Capillary refill takes less than 2 seconds.      Coloration: Skin is not cyanotic or mottled.      Findings: No abscess, ecchymosis, erythema, rash or wound.      Nails: There is no clubbing.      Comments: Thickening and discoloration of great toenails.  Mild HPK on right hallux IPJ medially.   Neurological:      General: No focal deficit present.      Mental Status: He is alert and oriented to person, place, and time.      Cranial Nerves: No cranial nerve deficit.      Sensory: No sensory deficit.      Motor: No weakness.      Coordination: Coordination normal.      Gait: Gait normal.   Psychiatric:         Mood and Affect: Mood normal.         Behavior: Behavior normal.         Thought Content: Thought content normal.         Judgment: Judgment normal.         Diabetic Foot Exam    Patient's shoes and socks removed.    Right Foot/Ankle   Right Foot  Inspection  Skin Exam: skin intact, callus and callus. No erythema, no maceration and no ulcer.     Toe Exam: No swelling, no tenderness, erythema and  no right toe deformity    Sensory   Vibration: diminished  Proprioception: intact  Monofilament testing: intact    Vascular  Capillary refills: < 3 seconds  The right DP pulse is 2+. The right PT pulse is 2+.     Left Foot/Ankle  Left Foot Inspection  Skin Exam: skin intact. No erythema, no maceration, no ulcer and no callus.     Toe Exam: No swelling, no tenderness, no erythema and no left toe deformity.     Sensory   Vibration: diminished  Proprioception: intact  Monofilament testing: intact    Vascular  Capillary refills: < 3 seconds  The left DP pulse is 2+. The left PT pulse is 2+.     Assign Risk Category  No deformity present  No loss of protective sensation  No weak pulses  Risk: 0

## 2024-11-03 DIAGNOSIS — B35.3 TINEA PEDIS OF BOTH FEET: ICD-10-CM

## 2024-11-03 DIAGNOSIS — I10 ESSENTIAL HYPERTENSION: ICD-10-CM

## 2024-11-03 DIAGNOSIS — E78.2 MIXED HYPERLIPIDEMIA: ICD-10-CM

## 2024-11-03 DIAGNOSIS — N52.31 ERECTILE DYSFUNCTION AFTER RADICAL PROSTATECTOMY: ICD-10-CM

## 2024-11-04 RX ORDER — LISINOPRIL AND HYDROCHLOROTHIAZIDE 12.5; 2 MG/1; MG/1
1 TABLET ORAL DAILY
Qty: 90 TABLET | Refills: 1 | Status: SHIPPED | OUTPATIENT
Start: 2024-11-04

## 2024-11-04 RX ORDER — SIMVASTATIN 40 MG
40 TABLET ORAL
Qty: 90 TABLET | Refills: 1 | Status: SHIPPED | OUTPATIENT
Start: 2024-11-04

## 2024-11-04 RX ORDER — SILDENAFIL 50 MG/1
50 TABLET, FILM COATED ORAL DAILY PRN
Qty: 8 TABLET | Refills: 2 | Status: SHIPPED | OUTPATIENT
Start: 2024-11-04

## 2024-11-05 RX ORDER — KETOCONAZOLE 20 MG/G
CREAM TOPICAL DAILY
Qty: 60 G | Refills: 0 | Status: SHIPPED | OUTPATIENT
Start: 2024-11-05

## 2024-11-06 ENCOUNTER — TELEPHONE (OUTPATIENT)
Dept: PODIATRY | Facility: CLINIC | Age: 69
End: 2024-11-06

## 2024-11-06 ENCOUNTER — TELEPHONE (OUTPATIENT)
Dept: INTERNAL MEDICINE CLINIC | Facility: CLINIC | Age: 69
End: 2024-11-06

## 2024-11-06 ENCOUNTER — APPOINTMENT (OUTPATIENT)
Dept: LAB | Facility: CLINIC | Age: 69
End: 2024-11-06
Payer: COMMERCIAL

## 2024-11-06 DIAGNOSIS — E11.9 TYPE 2 DIABETES MELLITUS WITHOUT COMPLICATION, WITHOUT LONG-TERM CURRENT USE OF INSULIN (HCC): ICD-10-CM

## 2024-11-06 DIAGNOSIS — Z79.899 HIGH RISK MEDICATION USE: ICD-10-CM

## 2024-11-06 DIAGNOSIS — I10 ESSENTIAL HYPERTENSION: ICD-10-CM

## 2024-11-06 DIAGNOSIS — C61 PROSTATE CANCER (HCC): ICD-10-CM

## 2024-11-06 DIAGNOSIS — Z12.5 SCREENING PSA (PROSTATE SPECIFIC ANTIGEN): ICD-10-CM

## 2024-11-06 DIAGNOSIS — E78.2 MIXED HYPERLIPIDEMIA: ICD-10-CM

## 2024-11-06 LAB
ALBUMIN SERPL BCG-MCNC: 4 G/DL (ref 3.5–5)
ALP SERPL-CCNC: 40 U/L (ref 34–104)
ALT SERPL W P-5'-P-CCNC: 11 U/L (ref 7–52)
ANION GAP SERPL CALCULATED.3IONS-SCNC: 7 MMOL/L (ref 4–13)
AST SERPL W P-5'-P-CCNC: 16 U/L (ref 13–39)
BILIRUB SERPL-MCNC: 0.6 MG/DL (ref 0.2–1)
BUN SERPL-MCNC: 20 MG/DL (ref 5–25)
CALCIUM SERPL-MCNC: 8.7 MG/DL (ref 8.4–10.2)
CHLORIDE SERPL-SCNC: 104 MMOL/L (ref 96–108)
CO2 SERPL-SCNC: 27 MMOL/L (ref 21–32)
CREAT SERPL-MCNC: 1.33 MG/DL (ref 0.6–1.3)
GFR SERPL CREATININE-BSD FRML MDRD: 55 ML/MIN/1.73SQ M
GLUCOSE P FAST SERPL-MCNC: 104 MG/DL (ref 65–99)
POTASSIUM SERPL-SCNC: 4.6 MMOL/L (ref 3.5–5.3)
PROT SERPL-MCNC: 6.6 G/DL (ref 6.4–8.4)
PSA SERPL-MCNC: <0.008 NG/ML (ref 0–4)
SODIUM SERPL-SCNC: 138 MMOL/L (ref 135–147)

## 2024-11-06 PROCEDURE — G0103 PSA SCREENING: HCPCS

## 2024-11-06 PROCEDURE — 36415 COLL VENOUS BLD VENIPUNCTURE: CPT

## 2024-11-06 PROCEDURE — 80053 COMPREHEN METABOLIC PANEL: CPT

## 2024-11-06 NOTE — TELEPHONE ENCOUNTER
----- Message from Rach Kate MD sent at 11/6/2024  9:55 AM EST -----  Please call patient that his blood sugar is 104 slightly elevated.  His kidney numbers slightly elevated also so advised to maintain hydration.  Continue present medications to see me as scheduled and watch diet for sugar and carbs intake as well.

## 2024-11-06 NOTE — TELEPHONE ENCOUNTER
Patient advised and states that he has been exercising and needs to work on staying better hydrated. He will also be seeing Dr. Camacho on 11/8/24.

## 2024-11-06 NOTE — TELEPHONE ENCOUNTER
Left message for patient letting him know that Dr. Sandhu did renew his prescription for ketoconazole (NIZORAL) 2 % cream at at [facility], [place], [place]. You should be able  that prescription soon. Dr. Sandhu also sent this message to your myChart.  If you have any address question please reach out to us either via phone or [facility].  Thank you and have a good day.

## 2024-11-08 ENCOUNTER — OFFICE VISIT (OUTPATIENT)
Dept: ENDOCRINOLOGY | Facility: CLINIC | Age: 69
End: 2024-11-08
Payer: COMMERCIAL

## 2024-11-08 VITALS
HEIGHT: 72 IN | TEMPERATURE: 97.6 F | SYSTOLIC BLOOD PRESSURE: 100 MMHG | DIASTOLIC BLOOD PRESSURE: 62 MMHG | WEIGHT: 284 LBS | OXYGEN SATURATION: 97 % | BODY MASS INDEX: 38.47 KG/M2 | HEART RATE: 60 BPM

## 2024-11-08 DIAGNOSIS — E11.9 TYPE 2 DIABETES MELLITUS WITHOUT COMPLICATION, WITHOUT LONG-TERM CURRENT USE OF INSULIN (HCC): ICD-10-CM

## 2024-11-08 DIAGNOSIS — E66.812 CLASS 2 SEVERE OBESITY WITH SERIOUS COMORBIDITY AND BODY MASS INDEX (BMI) OF 38.0 TO 38.9 IN ADULT, UNSPECIFIED OBESITY TYPE (HCC): ICD-10-CM

## 2024-11-08 DIAGNOSIS — E11.29 TYPE 2 DIABETES MELLITUS WITH OTHER DIABETIC KIDNEY COMPLICATION (HCC): Primary | ICD-10-CM

## 2024-11-08 DIAGNOSIS — E66.01 CLASS 2 SEVERE OBESITY WITH SERIOUS COMORBIDITY AND BODY MASS INDEX (BMI) OF 38.0 TO 38.9 IN ADULT, UNSPECIFIED OBESITY TYPE (HCC): ICD-10-CM

## 2024-11-08 DIAGNOSIS — E78.2 MIXED HYPERLIPIDEMIA: ICD-10-CM

## 2024-11-08 PROCEDURE — 99214 OFFICE O/P EST MOD 30 MIN: CPT | Performed by: INTERNAL MEDICINE

## 2024-11-08 NOTE — ASSESSMENT & PLAN NOTE
He has lost 20 lbs.    Patient has Obesity BMI 38 complicated by ROSITA.   He lost significant weight with lifestyle changes only.  Next goal is 250 lbs.

## 2024-11-08 NOTE — PROGRESS NOTES
Follow-up Patient Progress Note      CC: Type 2 diabetes, Obesity     History of Present Illness:   68 yr male with type 2 diabetes since 2010, HTN, HLD, obesity BMI 47, ROSITA, GERD, microalbuminuria. Last visit was 5/23/24.     Since last visit, he lost about 20 lbs.     Max adult weight: 352 lbs. Minimum adult weight 280 lbs late 20's.  Age 20 225 lbs.     SAGM:     Current meds: none     Opthamology: yes  Podiatry: yes  vaccination: yes  Dental:  Pancreatitis: No     Ace/ARB: lisinopril  Statin: simvastatin  Thyroid issues: No      Physical Exam:  Body mass index is 38.52 kg/m².  /62 (BP Location: Left arm, Patient Position: Sitting, Cuff Size: Standard)   Pulse 60   Temp 97.6 °F (36.4 °C) (Temporal)   Ht 6' (1.829 m)   Wt 129 kg (284 lb)   SpO2 97%   BMI 38.52 kg/m²    Vitals:    11/08/24 0837   Weight: 129 kg (284 lb)        Physical Exam  Constitutional:       General: He is not in acute distress.     Appearance: He is well-developed.   HENT:      Head: Normocephalic and atraumatic.      Nose: Nose normal.   Eyes:      Conjunctiva/sclera: Conjunctivae normal.   Pulmonary:      Effort: Pulmonary effort is normal.   Abdominal:      General: There is no distension.   Musculoskeletal:      Cervical back: Normal range of motion and neck supple.   Skin:     Findings: No rash.      Comments: No icterus   Neurological:      Mental Status: He is alert and oriented to person, place, and time.         Labs:   Lab Results   Component Value Date    HGBA1C 6.5 (H) 08/11/2024       Lab Results   Component Value Date    XCN5OLPDAVAG 3.752 05/08/2024       Lab Results   Component Value Date    CREATININE 1.33 (H) 11/06/2024    CREATININE 1.22 08/11/2024    CREATININE 1.31 (H) 05/08/2024    BUN 20 11/06/2024    K 4.6 11/06/2024     11/06/2024    CO2 27 11/06/2024     eGFR   Date Value Ref Range Status   11/06/2024 55 ml/min/1.73sq m Final       Lab Results   Component Value Date    ALT 11 11/06/2024    AST  16 11/06/2024    ALKPHOS 40 11/06/2024       Lab Results   Component Value Date    CHOLESTEROL 156 08/11/2024    CHOLESTEROL 181 12/29/2023    CHOLESTEROL 172 05/25/2023     Lab Results   Component Value Date    HDL 42 08/11/2024    HDL 44 12/29/2023    HDL 47 05/25/2023     Lab Results   Component Value Date    TRIG 154 (H) 08/11/2024    TRIG 214 (H) 12/29/2023    TRIG 162 (H) 05/25/2023     Lab Results   Component Value Date    NONHDLC 114 08/11/2024    NONHDLC 137 12/29/2023    NONHDLC 125 05/25/2023         Assessment/Plan:    1. Type 2 diabetes mellitus with other diabetic kidney complication (HCC)  -     Hemoglobin A1C; Future  2. Type 2 diabetes mellitus without complication, without long-term current use of insulin (HCC)  Assessment & Plan:  He is reasonably controlled. He lost 20 lbs.    He is overall stable but I suspect he will need a GLP1 agonist to achieve his goal of A1c <6%.  He hopes to avoid medications.  He has good response to [facility] fitness training program.    Follow up in 3 months.    Lab Results   Component Value Date    HGBA1C 6.5 (H) 08/11/2024     3. Class 2 severe obesity with serious comorbidity and body mass index (BMI) of 38.0 to 38.9 in adult, unspecified obesity type (HCC)  Assessment & Plan:  He has lost 20 lbs.    Patient has Obesity BMI 38 complicated by ROSITA.   He lost significant weight with lifestyle changes only.  Next goal is 250 lbs.    4. Mixed hyperlipidemia  Assessment & Plan:  Continue simvastatin.        I have spent a total time of  minutes on 11/08/24 in caring for this patient including greater than 50% of this time was spent in counseling/coordination of care as listed above.       Discussed with the patient and all questioned fully answered. He will contact me with concerns.    Lee Camacho

## 2024-12-05 ENCOUNTER — RA CDI HCC (OUTPATIENT)
Dept: OTHER | Facility: HOSPITAL | Age: 69
End: 2024-12-05

## 2024-12-12 ENCOUNTER — OFFICE VISIT (OUTPATIENT)
Dept: INTERNAL MEDICINE CLINIC | Facility: CLINIC | Age: 69
End: 2024-12-12
Payer: COMMERCIAL

## 2024-12-12 VITALS
TEMPERATURE: 97.6 F | HEART RATE: 54 BPM | OXYGEN SATURATION: 98 % | BODY MASS INDEX: 38.06 KG/M2 | WEIGHT: 281 LBS | HEIGHT: 72 IN | DIASTOLIC BLOOD PRESSURE: 72 MMHG | SYSTOLIC BLOOD PRESSURE: 134 MMHG

## 2024-12-12 DIAGNOSIS — Z23 NEED FOR PROPHYLACTIC VACCINATION AND INOCULATION AGAINST INFLUENZA: ICD-10-CM

## 2024-12-12 DIAGNOSIS — R00.1 BRADYCARDIA: ICD-10-CM

## 2024-12-12 DIAGNOSIS — R22.9 LUMP OF SKIN: ICD-10-CM

## 2024-12-12 DIAGNOSIS — D12.6 TUBULAR ADENOMA OF COLON: ICD-10-CM

## 2024-12-12 DIAGNOSIS — E78.2 MIXED HYPERLIPIDEMIA: ICD-10-CM

## 2024-12-12 DIAGNOSIS — Z00.00 ANNUAL PHYSICAL EXAM: Primary | ICD-10-CM

## 2024-12-12 DIAGNOSIS — R21 SKIN RASH: ICD-10-CM

## 2024-12-12 DIAGNOSIS — E11.9 TYPE 2 DIABETES MELLITUS WITHOUT COMPLICATION, WITHOUT LONG-TERM CURRENT USE OF INSULIN (HCC): ICD-10-CM

## 2024-12-12 DIAGNOSIS — I10 ESSENTIAL HYPERTENSION: ICD-10-CM

## 2024-12-12 DIAGNOSIS — J45.20 MILD INTERMITTENT ASTHMA WITHOUT COMPLICATION: ICD-10-CM

## 2024-12-12 DIAGNOSIS — C61 PROSTATE CANCER (HCC): ICD-10-CM

## 2024-12-12 DIAGNOSIS — G47.33 OBSTRUCTIVE SLEEP APNEA SYNDROME: ICD-10-CM

## 2024-12-12 DIAGNOSIS — K21.9 GASTROESOPHAGEAL REFLUX DISEASE WITHOUT ESOPHAGITIS: ICD-10-CM

## 2024-12-12 DIAGNOSIS — R80.9 MICROALBUMINURIA: ICD-10-CM

## 2024-12-12 PROCEDURE — 90662 IIV NO PRSV INCREASED AG IM: CPT | Performed by: INTERNAL MEDICINE

## 2024-12-12 PROCEDURE — 99397 PER PM REEVAL EST PAT 65+ YR: CPT | Performed by: INTERNAL MEDICINE

## 2024-12-12 PROCEDURE — 99213 OFFICE O/P EST LOW 20 MIN: CPT | Performed by: INTERNAL MEDICINE

## 2024-12-12 PROCEDURE — 90471 IMMUNIZATION ADMIN: CPT | Performed by: INTERNAL MEDICINE

## 2024-12-12 RX ORDER — ALBUTEROL SULFATE 90 UG/1
2 INHALANT RESPIRATORY (INHALATION) EVERY 6 HOURS PRN
COMMUNITY

## 2024-12-12 RX ORDER — TRIAMCINOLONE ACETONIDE 1 MG/G
OINTMENT TOPICAL 2 TIMES DAILY
Qty: 80 G | Refills: 0 | Status: SHIPPED | OUTPATIENT
Start: 2024-12-12

## 2024-12-12 NOTE — ASSESSMENT & PLAN NOTE
He has a lump left middle finger near nailbed area.  Had seen surgery in the past advised to see surgeon for reevaluation.  Orders:  •  Ambulatory Referral to General Surgery; Future

## 2024-12-12 NOTE — ASSESSMENT & PLAN NOTE
He has a dry scaly skin rash slightly brownish discoloration on the medial aspect of the both upper thigh area.  No discharge no redness no vesicles.  Questionable dermatitis vs  eczema.  Will start triamcinolone ointment and refer to dermatologist.  Orders:  •  Ambulatory Referral to Dermatology; Future  •  triamcinolone (KENALOG) 0.1 % ointment; Apply topically 2 (two) times a day

## 2024-12-12 NOTE — ASSESSMENT & PLAN NOTE
He occasionally takes omeprazole.  He has been watching diet and losing weight.  Orders:  •  CBC and differential; Future

## 2024-12-12 NOTE — PROGRESS NOTES
Adult Annual Physical  Name: Jean-Pierre Scherer      : 1955      MRN: [id number]  Encounter Provider: Rach Kate MD  Encounter Date: 2024   Encounter department: [facility] INTERNAL MEDICINE    Assessment & Plan  Annual physical exam    Orders:  •  CBC and differential; Future  •  Basic metabolic panel; Future    Essential hypertension  Systolic blood pressure slightly elevated.  Will observe for now on present medications.  Patient has been losing weight.  Advised for low-salt diet.  If persistently elevated will adjust medication.  Orders:  •  CBC and differential; Future  •  Basic metabolic panel; Future    Mild intermittent asthma without complication  Had seen pulmonary specialist.  Occasionally uses albuterol inhaler.       Obstructive sleep apnea syndrome  He has been seen and followed by pulmonary specialist.  He uses his CPAP regularly.       Gastroesophageal reflux disease without esophagitis  He occasionally takes omeprazole.  He has been watching diet and losing weight.  Orders:  •  CBC and differential; Future    Tubular adenoma of colon  History of colon polyp had colonoscopy  was advised follow-up in 5 years.       Type 2 diabetes mellitus without complication, without long-term current use of insulin (HCC)  Diabetes mellitus well-controlled on diet.  Advised to continue 1800-calorie ADA diet.  He has been seen and followed by an endocrinologist.  Lab Results   Component Value Date    HGBA1C 6.5 (H) 2024     Orders:  •  Basic metabolic panel; Future    Mixed hyperlipidemia  Cholesterol 156, triglyceride 154, HDL 42, LDL 83 advised to continue present dose of statin therapy as well as low-cholesterol low carbs diet.  Orders:  •  Lipid panel; Future    Microalbuminuria  He is on ACE inhibitor.  Will follow yearly microalbumin.  Orders:  •  Basic metabolic panel; Future    Prostate cancer (HCC)  His PSA less than 0.008.       Bradycardia  He  has bradycardia.  He is asymptomatic.  Had seen a cardiologist in the past.  Will refer to cardiologist.  Orders:  •  Ambulatory Referral to Cardiology; Future    Lump of skin  He has a lump left middle finger near nailbed area.  Had seen surgery in the past advised to see surgeon for reevaluation.  Orders:  •  Ambulatory Referral to General Surgery; Future    Skin rash  He has a dry scaly skin rash slightly brownish discoloration on the medial aspect of the both upper thigh area.  No discharge no redness no vesicles.  Questionable dermatitis vs  eczema.  Will start triamcinolone ointment and refer to dermatologist.  Orders:  •  Ambulatory Referral to Dermatology; Future  •  triamcinolone (KENALOG) 0.1 % ointment; Apply topically 2 (two) times a day    Need for prophylactic vaccination and inoculation against influenza    Orders:  •  influenza vaccine, high-dose, PF 0.5 mL (Fluzone High Dose)    BMI 38.0-38.9,adult  Patient  was advised to decrease portion size.  Advised to decrease carb, sugar, cholesterol intake.  Advised to exercise 3-5 times per week.  Advised to lose weight.  He lost about 67 pounds weight by watching diet, exercise.         Immunizations and preventive care screenings were discussed with patient today. Appropriate education was printed on patient's after visit summary.    Discussed risks and benefits of prostate cancer screening. We discussed the controversial history of PSA screening for prostate cancer in the [place] as well as the risk of over detection and over treatment of prostate cancer by way of PSA screening.  The patient understands that PSA blood testing is an imperfect way to screen for prostate cancer and that elevated PSA levels in the blood may also be caused by infection, inflammation, prostatic trauma or manipulation, urological procedures, or by benign prostatic enlargement.    The role of the digital rectal examination in prostate cancer screening was also discussed  and I discussed with him that there is large interobserver variability in the findings of digital rectal examination.    Counseling:  Alcohol/drug use: discussed moderation in alcohol intake, the recommendations for healthy alcohol use, and avoidance of illicit drug use.  Dental Health: discussed importance of regular tooth brushing, flossing, and dental visits.  Injury prevention: discussed safety/seat belts, safety helmets, smoke detectors, carbon monoxide detectors, and smoking near bedding or upholstery.  Exercise: the importance of regular exercise/physical activity was discussed. Recommend exercise 3-5 times per week for at least 30 minutes.          History of Present Illness     Adult Annual Physical  69-year-old white male patient presents for annual wellness exam as well as follow-up his medical problems.    Review of Systems   Constitutional:  Negative for chills and fever.   HENT:  Negative for congestion, ear pain, hearing loss, nosebleeds, sinus pain, sore throat and trouble swallowing.    Eyes:  Negative for discharge, redness and visual disturbance.   Respiratory:  Negative for cough, chest tightness and shortness of breath.    Cardiovascular:  Negative for chest pain and palpitations.   Gastrointestinal:  Negative for abdominal pain, blood in stool, constipation, diarrhea, nausea and vomiting.   Genitourinary:  Negative for dysuria, flank pain and hematuria.   Musculoskeletal:  Negative for arthralgias, myalgias and neck pain.   Skin:  Positive for rash (Upper thigh medially bilaterally). Negative for color change.   Neurological:  Negative for dizziness, speech difficulty, weakness and headaches.   Hematological:  Does not bruise/bleed easily.   Psychiatric/Behavioral:  Negative for agitation and behavioral problems.        Pertinent Medical History   As above.      Medical History Reviewed by provider this encounter:  Tobacco  Allergies  Meds  Problems  Med Hx  Surg Hx  Fam Hx      .  Current Outpatient Medications on File Prior to Visit   Medication Sig Dispense Refill   • albuterol (PROVENTIL HFA,VENTOLIN HFA) 90 mcg/act inhaler Inhale 2 puffs every 6 (six) hours as needed for wheezing     • Ascorbic Acid (VITAMIN C PO) Take 1,000 mg by mouth daily     • betamethasone dipropionate (DIPROSONE) 0.05 % ointment Apply sparingly  1-2 times/day for up to 2 wks to R forearm rash. 45 g 0   • betamethasone valerate (VALISONE) 0.1 % lotion Apply topically 2 (two) times a day To the scalp as needed. 60 mL 3   • Cyanocobalamin (VITAMIN B-12 PO) Take 1,000 mcg by mouth daily      • fexofenadine (ALLEGRA) 180 MG tablet Take 180 mg by mouth daily as needed     • ketoconazole (NIZORAL) 2 % cream Apply topically daily 60 g 0   • lisinopril-hydrochlorothiazide (PRINZIDE,ZESTORETIC) 20-12.5 MG per tablet Take 1 tablet by mouth daily 90 tablet 1   • Multiple Vitamins-Minerals (MULTIVITAL PO) Take 1 tablet by mouth     • Omega-3 Fatty Acids (fish oil) 1,000 mg Take 1,000 mg by mouth 2 (two) times a day     • omeprazole (PriLOSEC) 20 mg delayed release capsule Take 20 mg by mouth as needed     • sildenafil (VIAGRA) 50 MG tablet Take 1 tablet (50 mg total) by mouth daily as needed for erectile dysfunction 8 tablet 2   • simvastatin (ZOCOR) 40 mg tablet Take 1 tablet (40 mg total) by mouth daily at bedtime 90 tablet 1   • triamcinolone (KENALOG) 0.1 % lotion Apply topically 2 (two) times a day 60 mL 2   • [DISCONTINUED] albuterol (2.5 mg/3 mL) 0.083 % nebulizer solution Take 3 mL (2.5 mg total) by nebulization every 6 (six) hours as needed for wheezing or shortness of breath (Patient not taking: Reported on 8/7/2024) 90 mL 0   • [DISCONTINUED] albuterol (Ventolin HFA) 90 mcg/act inhaler Inhale 2 puffs every 6 (six) hours as needed for wheezing (Patient not taking: Reported on 8/7/2024) 18 g 2     No current facility-administered medications on file prior to visit.      Social History     Tobacco Use   • Smoking  status: Former     Current packs/day: 0.00     Average packs/day: 1.5 packs/day for 16.9 years (25.4 ttl pk-yrs)     Types: Cigarettes     Start date: 1975     Quit date: 1992     Years since quittin.6   • Smokeless tobacco: Never   Vaping Use   • Vaping status: Never Used   Substance and Sexual Activity   • Alcohol use: Yes     Alcohol/week: 1.0 standard drink of alcohol     Types: 1 Glasses of wine per week     Comment: rarely   • Drug use: No   • Sexual activity: Not Currently     Partners: Female     Birth control/protection: Abstinence, Post-menopausal, Rhythm, Male Sterilization, Female Sterilization       Objective   /72   Pulse (!) 54   Temp 97.6 °F (36.4 °C)   Ht 6' (1.829 m)   Wt 127 kg (281 lb)   SpO2 98%   BMI 38.11 kg/m²     Physical Exam  Vitals and nursing note reviewed.   Constitutional:       General: He is not in acute distress.     Appearance: He is well-developed. He is obese.   HENT:      Head: Normocephalic and atraumatic.      Right Ear: Tympanic membrane, ear canal and external ear normal. There is no impacted cerumen.      Left Ear: Tympanic membrane, ear canal and external ear normal. There is no impacted cerumen.      Nose: Nose normal.      Mouth/Throat:      Pharynx: Oropharynx is clear.   Eyes:      General: No scleral icterus.        Right eye: No discharge.         Left eye: No discharge.      Extraocular Movements: Extraocular movements intact.      Conjunctiva/sclera: Conjunctivae normal.   Cardiovascular:      Rate and Rhythm: Normal rate and regular rhythm.      Pulses: Normal pulses.      Heart sounds: Normal heart sounds. No murmur heard.  Pulmonary:      Effort: Pulmonary effort is normal. No respiratory distress.      Breath sounds: Normal breath sounds. No wheezing, rhonchi or rales.   Abdominal:      General: Bowel sounds are normal.      Palpations: Abdomen is soft.      Tenderness: There is no abdominal tenderness.   Musculoskeletal:          General: No swelling. Normal range of motion.      Cervical back: Normal range of motion and neck supple.      Right lower leg: No edema.      Left lower leg: No edema.      Comments: Has a small lump left middle finger near nailbed area.   Skin:     General: Skin is warm and dry.      Capillary Refill: Capillary refill takes less than 2 seconds.      Findings: Rash (Has a dry scaly slightly brown  skin rash upper thigh medially bilaterally.  There is no vesicles, no redness, no discharge.) present.   Neurological:      General: No focal deficit present.      Mental Status: He is alert and oriented to person, place, and time.      Motor: No weakness.   Psychiatric:         Mood and Affect: Mood normal.         Behavior: Behavior normal.

## 2024-12-12 NOTE — ASSESSMENT & PLAN NOTE
He is on ACE inhibitor.  Will follow yearly microalbumin.  Orders:  •  Basic metabolic panel; Future

## 2024-12-12 NOTE — ASSESSMENT & PLAN NOTE
Cholesterol 156, triglyceride 154, HDL 42, LDL 83 advised to continue present dose of statin therapy as well as low-cholesterol low carbs diet.  Orders:  •  Lipid panel; Future

## 2024-12-12 NOTE — PATIENT INSTRUCTIONS
Patient was advised to continue present medications. discussed with the patient medications and laboratory data in detail.  Follow-up with me in 3 months or as advised.  If any blood test was ordered please do 1 week prior to next appointment unless advise to get earlier.  If you have any questions please call the office [phone]

## 2024-12-12 NOTE — ASSESSMENT & PLAN NOTE
Systolic blood pressure slightly elevated.  Will observe for now on present medications.  Patient has been losing weight.  Advised for low-salt diet.  If persistently elevated will adjust medication.  Orders:  •  CBC and differential; Future  •  Basic metabolic panel; Future

## 2024-12-12 NOTE — ASSESSMENT & PLAN NOTE
He has bradycardia.  He is asymptomatic.  Had seen a cardiologist in the past.  Will refer to cardiologist.  Orders:  •  Ambulatory Referral to Cardiology; Future

## 2024-12-12 NOTE — ASSESSMENT & PLAN NOTE
Diabetes mellitus well-controlled on diet.  Advised to continue 1800-calorie ADA diet.  He has been seen and followed by an endocrinologist.  Lab Results   Component Value Date    HGBA1C 6.5 (H) 08/11/2024     Orders:  •  Basic metabolic panel; Future

## 2024-12-13 NOTE — ASSESSMENT & PLAN NOTE
Patient  was advised to decrease portion size.  Advised to decrease carb, sugar, cholesterol intake.  Advised to exercise 3-5 times per week.  Advised to lose weight.  He lost about 67 pounds weight by watching diet, exercise.

## 2025-01-08 ENCOUNTER — CONSULT (OUTPATIENT)
Dept: SURGERY | Facility: CLINIC | Age: 70
End: 2025-01-08
Payer: COMMERCIAL

## 2025-01-08 VITALS
TEMPERATURE: 96.8 F | OXYGEN SATURATION: 98 % | HEIGHT: 72 IN | BODY MASS INDEX: 39.52 KG/M2 | WEIGHT: 291.8 LBS | SYSTOLIC BLOOD PRESSURE: 120 MMHG | HEART RATE: 57 BPM | DIASTOLIC BLOOD PRESSURE: 72 MMHG

## 2025-01-08 DIAGNOSIS — R22.9 LUMP OF SKIN: Primary | ICD-10-CM

## 2025-01-08 PROCEDURE — 99213 OFFICE O/P EST LOW 20 MIN: CPT | Performed by: SURGERY

## 2025-01-08 NOTE — PROGRESS NOTES
Name: Jean-Pierre Scherer      : 1955      MRN: [id number]  Encounter Provider: Felix Acevedo MD  Encounter Date: 2025   Encounter department: [facility] GENERAL SURGERY [facility]  :  Assessment & Plan  Lump of skin    Orders:    Ambulatory Referral to General Surgery  I explained to the patient that there is no infection present.  I also recommended him to call his hand surgeon for follow-up.  Follow-up with me as needed.      History of Present Illness   69-year-old male patient who had surgery for a mucous cyst of the left middle finger by the hand surgeon came to see me because he was worried about deformity at the site of surgery.  His surgery was done 8 months ago.  No complaints of pain, no discharge from the site.  He says that he is happy that the cuticle of his nail has grown back.  He says he is okay with it but he thinks that his wife does not like the deformity.  No other complaints.      Jean-Pierre Scherer is a 69 y.o. male who presents   History obtained from: patient    Review of Systems   All other systems reviewed and are negative.    Medical History Reviewed by provider this encounter:  Tobacco  Allergies  Meds  Problems  Med Hx  Surg Hx  Fam Hx     .  Past Medical History   Past Medical History:   Diagnosis Date    Annual physical exam 2024    Asthma     BMI 40.0-44.9, adult ([facility]) 2022    BMI 40.0-44.9, adult ([facility]) 2024    BMI 45.0-49.9, adult ([facility]) 2021    Bradycardia 2024    Bronchitis 2023    Bronchospasm     Cancer ([facility])     Prostate     Chest tightness 2023    Choking episode 2023    Colon polyp     COVID-19 2024    CPAP (continuous positive airway pressure) dependence     Elevated serum creatinine 2024    Essential hypertension 2021    GERD (gastroesophageal reflux disease)     Hearing impairment 10/11/2022    Hyperlipidemia     Hypertension     Hypocalcemia 2022    Insomnia     Leg cramps      Lump of skin 12/12/2024    Lump on finger, left 01/11/2022    Microalbuminuria 09/27/2021    Mixed hyperlipidemia 06/26/2021    Morbid obesity (HCC) 05/26/2022    Nocturia     Numbness of fingers of both hands     Obesity Always    Obstructive sleep apnea syndrome 08/28/2023    Onychomycosis of toenail 05/26/2022    Pain in both knees 05/26/2022    Paronychia of left middle finger 09/27/2021    Preoperative evaluation of a medical condition to rule out surgical contraindications (TAR required) 05/09/2024    Right foot pain 05/26/2023    Seasonal allergic rhinitis 01/11/2022    Skin lesion     Skin rash 06/26/2021    Sleep apnea     not tested yet    Snoring     Tubular adenoma of colon 06/28/2021    Type 2 diabetes mellitus without complication, without long-term current use of insulin (HCC) 06/26/2021    Wears glasses     Weight gain      Past Surgical History:   Procedure Laterality Date    COLONOSCOPY  05/09/2017    COLONOSCOPY      HIP SURGERY Bilateral     Hip replacement    NJ COLONOSCOPY FLX DX W/COLLJ SPEC WHEN PFRMD N/A 05/09/2017    Procedure: COLONOSCOPY;  Surgeon: Dinesh Bradley MD;  Location: [facility];  Service: Gastroenterology    NJ EXCISION GANGLION WRIST DORSAL/VOLAR PRIMARY Left 5/16/2024    Procedure: EXCISION GANGLION CYST LEFT MIDDLE FINGER;  Surgeon: Jorge Coles MD;  Location: [facility];  Service: Orthopedics    PROSTATECTOMY      UPPER GASTROINTESTINAL ENDOSCOPY       Family History   Problem Relation Age of Onset    Brain cancer Mother     Psoriasis Mother     Eczema Mother     Cancer Mother         Brain Tumor    Depression Father     Heart disease Father         Heart Artery blockages    Diabetes Father     Stroke Paternal Grandfather       reports that he quit smoking about 32 years ago. His smoking use included cigarettes. He started smoking about 49 years ago. He has a 25.4 pack-year smoking history. He has never used smokeless tobacco. He reports current alcohol use of about  1.0 standard drink of alcohol per week. He reports that he does not use drugs.  Current Outpatient Medications on File Prior to Visit   Medication Sig Dispense Refill    albuterol (PROVENTIL HFA,VENTOLIN HFA) 90 mcg/act inhaler Inhale 2 puffs every 6 (six) hours as needed for wheezing      Ascorbic Acid (VITAMIN C PO) Take 1,000 mg by mouth daily      betamethasone dipropionate (DIPROSONE) 0.05 % ointment Apply sparingly  1-2 times/day for up to 2 wks to R forearm rash. 45 g 0    betamethasone valerate (VALISONE) 0.1 % lotion Apply topically 2 (two) times a day To the scalp as needed. 60 mL 3    Cyanocobalamin (VITAMIN B-12 PO) Take 1,000 mcg by mouth daily       fexofenadine (ALLEGRA) 180 MG tablet Take 180 mg by mouth daily as needed      ketoconazole (NIZORAL) 2 % cream Apply topically daily 60 g 0    lisinopril-hydrochlorothiazide (PRINZIDE,ZESTORETIC) 20-12.5 MG per tablet Take 1 tablet by mouth daily 90 tablet 1    Multiple Vitamins-Minerals (MULTIVITAL PO) Take 1 tablet by mouth      Omega-3 Fatty Acids (fish oil) 1,000 mg Take 1,000 mg by mouth 2 (two) times a day      omeprazole (PriLOSEC) 20 mg delayed release capsule Take 20 mg by mouth as needed      sildenafil (VIAGRA) 50 MG tablet Take 1 tablet (50 mg total) by mouth daily as needed for erectile dysfunction 8 tablet 2    simvastatin (ZOCOR) 40 mg tablet Take 1 tablet (40 mg total) by mouth daily at bedtime 90 tablet 1    triamcinolone (KENALOG) 0.1 % lotion Apply topically 2 (two) times a day 60 mL 2    triamcinolone (KENALOG) 0.1 % ointment Apply topically 2 (two) times a day 80 g 0     No current facility-administered medications on file prior to visit.     Allergies   Allergen Reactions    Penicillins Rash     sensitivity      Current Outpatient Medications on File Prior to Visit   Medication Sig Dispense Refill    albuterol (PROVENTIL HFA,VENTOLIN HFA) 90 mcg/act inhaler Inhale 2 puffs every 6 (six) hours as needed for wheezing      Ascorbic Acid  (VITAMIN C PO) Take 1,000 mg by mouth daily      betamethasone dipropionate (DIPROSONE) 0.05 % ointment Apply sparingly  1-2 times/day for up to 2 wks to R forearm rash. 45 g 0    betamethasone valerate (VALISONE) 0.1 % lotion Apply topically 2 (two) times a day To the scalp as needed. 60 mL 3    Cyanocobalamin (VITAMIN B-12 PO) Take 1,000 mcg by mouth daily       fexofenadine (ALLEGRA) 180 MG tablet Take 180 mg by mouth daily as needed      ketoconazole (NIZORAL) 2 % cream Apply topically daily 60 g 0    lisinopril-hydrochlorothiazide (PRINZIDE,ZESTORETIC) 20-12.5 MG per tablet Take 1 tablet by mouth daily 90 tablet 1    Multiple Vitamins-Minerals (MULTIVITAL PO) Take 1 tablet by mouth      Omega-3 Fatty Acids (fish oil) 1,000 mg Take 1,000 mg by mouth 2 (two) times a day      omeprazole (PriLOSEC) 20 mg delayed release capsule Take 20 mg by mouth as needed      sildenafil (VIAGRA) 50 MG tablet Take 1 tablet (50 mg total) by mouth daily as needed for erectile dysfunction 8 tablet 2    simvastatin (ZOCOR) 40 mg tablet Take 1 tablet (40 mg total) by mouth daily at bedtime 90 tablet 1    triamcinolone (KENALOG) 0.1 % lotion Apply topically 2 (two) times a day 60 mL 2    triamcinolone (KENALOG) 0.1 % ointment Apply topically 2 (two) times a day 80 g 0     No current facility-administered medications on file prior to visit.      Social History     Tobacco Use    Smoking status: Former     Current packs/day: 0.00     Average packs/day: 1.5 packs/day for 16.9 years (25.4 ttl pk-yrs)     Types: Cigarettes     Start date: 1975     Quit date: 1992     Years since quittin.6    Smokeless tobacco: Never   Vaping Use    Vaping status: Never Used   Substance and Sexual Activity    Alcohol use: Yes     Alcohol/week: 1.0 standard drink of alcohol     Types: 1 Glasses of wine per week     Comment: rarely    Drug use: No    Sexual activity: Not Currently     Partners: Female     Birth control/protection: Male  Sterilization        Objective   /72 (BP Location: Left arm, Patient Position: Sitting, Cuff Size: Standard)   Pulse 57   Temp (!) 96.8 °F (36 °C) (Tympanic)   Ht 6' (1.829 m)   Wt 132 kg (291 lb 12.8 oz)   SpO2 98%   BMI 39.58 kg/m²      Physical Exam  Vitals reviewed.   Constitutional:       Appearance: Normal appearance.   HENT:      Nose: Nose normal.   Cardiovascular:      Rate and Rhythm: Normal rate and regular rhythm.   Pulmonary:      Effort: Pulmonary effort is normal.      Breath sounds: Normal breath sounds.   Musculoskeletal:      Comments: Left middle finger there is a subcutaneous deformity of the DIP joint.  There is no redness.  No tenderness.  No drainage.  The nail is not deformed.  The movement of the joint is adequate.   Neurological:      Mental Status: He is alert.         Administrative Statements   I have spent a total time of 20 minutes in caring for this patient on the day of the visit/encounter including Importance of tx compliance, Impressions, Counseling / Coordination of care, Documenting in the medical record, Reviewing / ordering tests, medicine, procedures  , Obtaining or reviewing history  , and Communicating with other healthcare professionals .

## 2025-01-08 NOTE — ASSESSMENT & PLAN NOTE
Orders:    Ambulatory Referral to General Surgery  I explained to the patient that there is no infection present.  I also recommended him to call his hand surgeon for follow-up.  Follow-up with me as needed.

## 2025-01-21 ENCOUNTER — HOSPITAL ENCOUNTER (OUTPATIENT)
Facility: HOSPITAL | Age: 70
Setting detail: OBSERVATION
Discharge: HOME | End: 2025-01-22
Attending: EMERGENCY MEDICINE | Admitting: HOSPITALIST
Payer: COMMERCIAL

## 2025-01-21 ENCOUNTER — APPOINTMENT (OUTPATIENT)
Dept: RADIOLOGY | Facility: AMBULARY SURGERY CENTER | Age: 70
End: 2025-01-21
Attending: STUDENT IN AN ORGANIZED HEALTH CARE EDUCATION/TRAINING PROGRAM
Payer: COMMERCIAL

## 2025-01-21 ENCOUNTER — OFFICE VISIT (OUTPATIENT)
Dept: OBGYN CLINIC | Facility: CLINIC | Age: 70
End: 2025-01-21
Payer: COMMERCIAL

## 2025-01-21 VITALS — BODY MASS INDEX: 39.55 KG/M2 | WEIGHT: 292 LBS | HEIGHT: 72 IN

## 2025-01-21 DIAGNOSIS — I48.92 ATRIAL FLUTTER WITH RAPID VENTRICULAR RESPONSE (HCC): Primary | ICD-10-CM

## 2025-01-21 DIAGNOSIS — R22.9 LUMP OF SKIN: Primary | ICD-10-CM

## 2025-01-21 DIAGNOSIS — R22.32 FINGER MASS, LEFT: ICD-10-CM

## 2025-01-21 DIAGNOSIS — I48.92 ATRIAL FLUTTER, UNSPECIFIED TYPE (HCC): ICD-10-CM

## 2025-01-21 DIAGNOSIS — R22.9 LUMP OF SKIN: ICD-10-CM

## 2025-01-21 LAB
ALBUMIN SERPL BCG-MCNC: 4.1 G/DL (ref 3.5–5)
ALP SERPL-CCNC: 43 U/L (ref 34–104)
ALT SERPL W P-5'-P-CCNC: 12 U/L (ref 7–52)
ANION GAP SERPL CALCULATED.3IONS-SCNC: 9 MMOL/L (ref 4–13)
AST SERPL W P-5'-P-CCNC: 17 U/L (ref 13–39)
BASOPHILS # BLD AUTO: 0.03 THOUSANDS/ΜL (ref 0–0.1)
BASOPHILS NFR BLD AUTO: 0 % (ref 0–1)
BILIRUB SERPL-MCNC: 0.36 MG/DL (ref 0.2–1)
BUN SERPL-MCNC: 26 MG/DL (ref 5–25)
CALCIUM SERPL-MCNC: 8.9 MG/DL (ref 8.4–10.2)
CARDIAC TROPONIN I PNL SERPL HS: 5 NG/L (ref ?–50)
CHLORIDE SERPL-SCNC: 104 MMOL/L (ref 96–108)
CO2 SERPL-SCNC: 25 MMOL/L (ref 21–32)
CREAT SERPL-MCNC: 1.3 MG/DL (ref 0.6–1.3)
EOSINOPHIL # BLD AUTO: 0.15 THOUSAND/ΜL (ref 0–0.61)
EOSINOPHIL NFR BLD AUTO: 2 % (ref 0–6)
ERYTHROCYTE [DISTWIDTH] IN BLOOD BY AUTOMATED COUNT: 12.7 % (ref 11.6–15.1)
GFR SERPL CREATININE-BSD FRML MDRD: 56 ML/MIN/1.73SQ M
GLUCOSE SERPL-MCNC: 120 MG/DL (ref 65–140)
HCT VFR BLD AUTO: 40.9 % (ref 36.5–49.3)
HGB BLD-MCNC: 13.9 G/DL (ref 12–17)
IMM GRANULOCYTES # BLD AUTO: 0.02 THOUSAND/UL (ref 0–0.2)
IMM GRANULOCYTES NFR BLD AUTO: 0 % (ref 0–2)
LYMPHOCYTES # BLD AUTO: 2.13 THOUSANDS/ΜL (ref 0.6–4.47)
LYMPHOCYTES NFR BLD AUTO: 29 % (ref 14–44)
MCH RBC QN AUTO: 31.2 PG (ref 26.8–34.3)
MCHC RBC AUTO-ENTMCNC: 34 G/DL (ref 31.4–37.4)
MCV RBC AUTO: 92 FL (ref 82–98)
MONOCYTES # BLD AUTO: 0.53 THOUSAND/ΜL (ref 0.17–1.22)
MONOCYTES NFR BLD AUTO: 7 % (ref 4–12)
NEUTROPHILS # BLD AUTO: 4.6 THOUSANDS/ΜL (ref 1.85–7.62)
NEUTS SEG NFR BLD AUTO: 62 % (ref 43–75)
NRBC BLD AUTO-RTO: 0 /100 WBCS
PLATELET # BLD AUTO: 237 THOUSANDS/UL (ref 149–390)
PMV BLD AUTO: 11 FL (ref 8.9–12.7)
POTASSIUM SERPL-SCNC: 4.3 MMOL/L (ref 3.5–5.3)
PROT SERPL-MCNC: 7.2 G/DL (ref 6.4–8.4)
RBC # BLD AUTO: 4.45 MILLION/UL (ref 3.88–5.62)
SODIUM SERPL-SCNC: 138 MMOL/L (ref 135–147)
WBC # BLD AUTO: 7.46 THOUSAND/UL (ref 4.31–10.16)

## 2025-01-21 PROCEDURE — 83036 HEMOGLOBIN GLYCOSYLATED A1C: CPT | Performed by: PHYSICIAN ASSISTANT

## 2025-01-21 PROCEDURE — 99214 OFFICE O/P EST MOD 30 MIN: CPT | Performed by: STUDENT IN AN ORGANIZED HEALTH CARE EDUCATION/TRAINING PROGRAM

## 2025-01-21 PROCEDURE — 80053 COMPREHEN METABOLIC PANEL: CPT

## 2025-01-21 PROCEDURE — 84484 ASSAY OF TROPONIN QUANT: CPT

## 2025-01-21 PROCEDURE — 84443 ASSAY THYROID STIM HORMONE: CPT | Performed by: EMERGENCY MEDICINE

## 2025-01-21 PROCEDURE — 84439 ASSAY OF FREE THYROXINE: CPT | Performed by: EMERGENCY MEDICINE

## 2025-01-21 PROCEDURE — 99285 EMERGENCY DEPT VISIT HI MDM: CPT | Performed by: EMERGENCY MEDICINE

## 2025-01-21 PROCEDURE — 83735 ASSAY OF MAGNESIUM: CPT | Performed by: EMERGENCY MEDICINE

## 2025-01-21 PROCEDURE — 73140 X-RAY EXAM OF FINGER(S): CPT

## 2025-01-21 PROCEDURE — 99285 EMERGENCY DEPT VISIT HI MDM: CPT

## 2025-01-21 PROCEDURE — 96374 THER/PROPH/DIAG INJ IV PUSH: CPT

## 2025-01-21 PROCEDURE — 93005 ELECTROCARDIOGRAM TRACING: CPT

## 2025-01-21 PROCEDURE — 36415 COLL VENOUS BLD VENIPUNCTURE: CPT

## 2025-01-21 PROCEDURE — 85025 COMPLETE CBC W/AUTO DIFF WBC: CPT

## 2025-01-21 RX ORDER — DILTIAZEM HYDROCHLORIDE 5 MG/ML
25 INJECTION INTRAVENOUS ONCE
Status: COMPLETED | OUTPATIENT
Start: 2025-01-21 | End: 2025-01-21

## 2025-01-21 RX ADMIN — DILTIAZEM HYDROCHLORIDE 25 MG: 5 INJECTION INTRAVENOUS at 22:57

## 2025-01-21 RX ADMIN — APIXABAN 5 MG: 5 TABLET, FILM COATED ORAL at 23:50

## 2025-01-21 NOTE — PROGRESS NOTES
ORTHOPAEDIC HAND, WRIST, AND ELBOW OFFICE  VISIT      ASSESSMENT/PLAN:      Diagnoses and all orders for this visit:    Lump of skin  -     Ambulatory Referral to Orthopedic Surgery  -     XR finger left third digit-middle; Future    Finger mass, left  -     US MSK limited; Future  -     Ambulatory Referral to PT/OT Hand Therapy; Future          69 y.o. male s/p excision L middle finger mucous cyst 5/16/24  Anatomy of the condition/s as well as treatment options and expected outcomes were discussed.  Any pertinent imaging or lab results were reviewed with the patient.   The patient verbalized understanding of exam findings and treatment plan.   The patient was given the opportunity to ask questions.  Questions were answered to the patient's satisfaction.  The patient decided to move forward with US evaluation to help determine if this is cyst recurrence or just scar.  In addition, will have him meet with therapy for scar massage/desensitization.  Lastly, discussed that since pt does bowl, this may have led to increased scar formation in that area and may need modifications with this.      Follow Up:  Will message pt through [employer] with US results      To Do Next Visit:  Re-evaluation of current issue      Discussions:  The anatomy and physiology of the diagnosis(es) was(were) discussed with the patient today in the office. Treatment options and recommendations were discussed, as well as expected future outcomes.       Jorge Coles MD  Attending, Orthopaedic Surgery  Hand, Wrist, and Elbow Surgery  [facility]    ______________________________________________________________________________________________    CHIEF COMPLAINT:  Chief Complaint   Patient presents with    Left Middle Finger - Mass       SUBJECTIVE:  Patient is a 69 y.o. LHD male who presents today for evaluation and treatment of L middle finger mass . Pt known to us being s/p excision L middle finger ganglion 5/16/24. States  he's had a residual mass here since around the time of surgery. He describes rare occasions that this can cause pain. He states that his symptoms prior to surgery of nail deformity and drainage have resolved and overall the finger is improved.     Hobbies: Criss     I have personally reviewed all the relevant PMH, PSH, SH, FH, Medications and allergies      PAST MEDICAL HISTORY:  Past Medical History:   Diagnosis Date    Annual physical exam 12/12/2024    Asthma     BMI 40.0-44.9, adult ([facility]) 01/11/2022    BMI 40.0-44.9, adult ([facility]) 05/09/2024    BMI 45.0-49.9, adult ([facility]) 06/28/2021    Bradycardia 12/12/2024    Bronchitis 09/29/2023    Bronchospasm     Cancer ([facility])     Prostate     Chest tightness 02/17/2023    Choking episode 02/17/2023    Colon polyp     COVID-19 08/12/2024    CPAP (continuous positive airway pressure) dependence     Elevated serum creatinine 05/09/2024    Essential hypertension 06/26/2021    GERD (gastroesophageal reflux disease)     Hearing impairment 10/11/2022    Hyperlipidemia     Hypertension     Hypocalcemia 01/11/2022    Insomnia     Leg cramps     Lump of skin 12/12/2024    Lump on finger, left 01/11/2022    Microalbuminuria 09/27/2021    Mixed hyperlipidemia 06/26/2021    Morbid obesity ([facility]) 05/26/2022    Nocturia     Numbness of fingers of both hands     Obesity Always    Obstructive sleep apnea syndrome 08/28/2023    Onychomycosis of toenail 05/26/2022    Pain in both knees 05/26/2022    Paronychia of left middle finger 09/27/2021    Preoperative evaluation of a medical condition to rule out surgical contraindications (TAR required) 05/09/2024    Right foot pain 05/26/2023    Seasonal allergic rhinitis 01/11/2022    Skin lesion     Skin rash 06/26/2021    Sleep apnea     not tested yet    Snoring     Tubular adenoma of colon 06/28/2021    Type 2 diabetes mellitus without complication, without long-term current use of insulin ([facility]) 06/26/2021    Wears glasses     Weight gain         PAST SURGICAL HISTORY:  Past Surgical History:   Procedure Laterality Date    COLONOSCOPY  2017    COLONOSCOPY      HIP SURGERY Bilateral     Hip replacement    [place] COLONOSCOPY FLX DX W/COLLJ SPEC WHEN PFRMD N/A 2017    Procedure: COLONOSCOPY;  Surgeon: Dinesh Bradley MD;  [facility];  Service: Gastroenterology    [place] EXCISION GANGLION WRIST DORSAL/VOLAR PRIMARY Left 2024    Procedure: EXCISION GANGLION CYST LEFT MIDDLE FINGER;  Surgeon: Jorge Coles MD;  [facility];  Service: Orthopedics    PROSTATECTOMY      UPPER GASTROINTESTINAL ENDOSCOPY         FAMILY HISTORY:  Family History   Problem Relation Age of Onset    Brain cancer Mother     Psoriasis Mother     Eczema Mother     Cancer Mother         Brain Tumor    Depression Father     Heart disease Father         Heart Artery blockages    Diabetes Father     Stroke Paternal Grandfather        SOCIAL HISTORY:  Social History     Tobacco Use    Smoking status: Former     Current packs/day: 0.00     Average packs/day: 1.5 packs/day for 16.9 years (25.4 ttl pk-yrs)     Types: Cigarettes     Start date: 1975     Quit date: 1992     Years since quittin.7    Smokeless tobacco: Never   Vaping Use    Vaping status: Never Used   Substance Use Topics    Alcohol use: Yes     Alcohol/week: 1.0 standard drink of alcohol     Types: 1 Glasses of wine per week     Comment: rarely    Drug use: No       MEDICATIONS:    Current Outpatient Medications:     albuterol (PROVENTIL HFA,VENTOLIN HFA) 90 mcg/act inhaler, Inhale 2 puffs every 6 (six) hours as needed for wheezing, Disp: , Rfl:     Ascorbic Acid (VITAMIN C PO), Take 1,000 mg by mouth daily, Disp: , Rfl:     betamethasone dipropionate (DIPROSONE) 0.05 % ointment, Apply sparingly  1-2 times/day for up to 2 wks to R forearm rash., Disp: 45 g, Rfl: 0    betamethasone valerate (VALISONE) 0.1 % lotion, Apply topically 2 (two) times a day To the scalp as needed., Disp: 60 mL,  Rfl: 3    Cyanocobalamin (VITAMIN B-12 PO), Take 1,000 mcg by mouth daily , Disp: , Rfl:     fexofenadine (ALLEGRA) 180 MG tablet, Take 180 mg by mouth daily as needed, Disp: , Rfl:     ketoconazole (NIZORAL) 2 % cream, Apply topically daily, Disp: 60 g, Rfl: 0    lisinopril-hydrochlorothiazide (PRINZIDE,ZESTORETIC) 20-12.5 MG per tablet, Take 1 tablet by mouth daily, Disp: 90 tablet, Rfl: 1    Multiple Vitamins-Minerals (MULTIVITAL PO), Take 1 tablet by mouth, Disp: , Rfl:     Omega-3 Fatty Acids (fish oil) 1,000 mg, Take 1,000 mg by mouth 2 (two) times a day, Disp: , Rfl:     omeprazole (PriLOSEC) 20 mg delayed release capsule, Take 20 mg by mouth as needed, Disp: , Rfl:     sildenafil (VIAGRA) 50 MG tablet, Take 1 tablet (50 mg total) by mouth daily as needed for erectile dysfunction, Disp: 8 tablet, Rfl: 2    simvastatin (ZOCOR) 40 mg tablet, Take 1 tablet (40 mg total) by mouth daily at bedtime, Disp: 90 tablet, Rfl: 1    triamcinolone (KENALOG) 0.1 % lotion, Apply topically 2 (two) times a day, Disp: 60 mL, Rfl: 2    triamcinolone (KENALOG) 0.1 % ointment, Apply topically 2 (two) times a day, Disp: 80 g, Rfl: 0    ALLERGIES:  Allergies   Allergen Reactions    Penicillins Rash     sensitivity           REVIEW OF SYSTEMS:  Musculoskeletal:        As noted in HPI.   All other systems reviewed and are negative.    VITALS:  There were no vitals filed for this visit.    LABS:  HgA1c:   Lab Results   Component Value Date    HGBA1C 6.5 (H) 08/11/2024     BMP:   Lab Results   Component Value Date    CALCIUM 8.7 11/06/2024    K 4.6 11/06/2024    CO2 27 11/06/2024     11/06/2024    BUN 20 11/06/2024    CREATININE 1.33 (H) 11/06/2024       _____________________________________________________  PHYSICAL EXAMINATION:  General: Well developed and well nourished, alert & oriented x 3, appears comfortable  Psychiatric: Normal  HEENT: Normocephalic, Atraumatic Trachea Midline, No torticollis  Pulmonary: No audible  wheezing or respiratory distress   Abdomen/GI: Non tender, non distended   Cardiovascular: No pitting edema, 2+ radial pulse   Skin: No Erythema, No Lacerations, No Fluctuation, No Ulcerations  Neurovascular: Sensation Intact to the Median, Ulnar, Radial Nerve, Motor Intact to the Median, Ulnar, Radial Nerve, and Pulses Intact  Musculoskeletal: Normal, except as noted in detailed exam and in HPI.      MUSCULOSKELETAL EXAMINATION:  Left Middle Finger:  See photos.  Pt with hard mass located dorsal to the DIP joint.  This does not appear to be affecting the nail.  No open areas or drainage.  No signs of infx.  FROM.  Nontender to palpation of the joint.  Brisk cap refill.     ___________________________________________________  STUDIES REVIEWED:  Xrays of the left middle finger were reviewed and independently interpreted in PACS by Dr. Coles and demonstrate no bony abnormalities in the area of pt's mass.          PROCEDURES PERFORMED:  Procedures  No Procedures performed today    _____________________________________________________      Scribe Attestation      I,:  Dania Jim PA-C am acting as a scribe while in the presence of the attending physician.:       I,:  Jorge Coles MD personally performed the services described in this documentation    as scribed in my presence.:

## 2025-01-21 NOTE — Clinical Note
Case was discussed with NAVEEN and the patient's admission status was agreed to be Admission Status: observation status to the service of Dr. Thomas .

## 2025-01-22 VITALS
HEART RATE: 60 BPM | OXYGEN SATURATION: 99 % | SYSTOLIC BLOOD PRESSURE: 131 MMHG | DIASTOLIC BLOOD PRESSURE: 65 MMHG | RESPIRATION RATE: 18 BRPM | TEMPERATURE: 98.1 F

## 2025-01-22 PROBLEM — I48.92 ATRIAL FLUTTER (HCC): Status: ACTIVE | Noted: 2025-01-22

## 2025-01-22 PROBLEM — R79.89 TSH ELEVATION: Status: ACTIVE | Noted: 2025-01-22

## 2025-01-22 LAB
2HR DELTA HS TROPONIN: 2 NG/L
4HR DELTA HS TROPONIN: 5 NG/L
ATRIAL RATE: 141 BPM
ATRIAL RATE: 288 BPM
ATRIAL RATE: 375 BPM
CARDIAC TROPONIN I PNL SERPL HS: 10 NG/L (ref ?–50)
CARDIAC TROPONIN I PNL SERPL HS: 7 NG/L (ref ?–50)
EST. AVERAGE GLUCOSE BLD GHB EST-MCNC: 131 MG/DL
HBA1C MFR BLD: 6.2 %
MAGNESIUM SERPL-MCNC: 1.9 MG/DL (ref 1.9–2.7)
PR INTERVAL: 176 MS
QRS AXIS: 62 DEGREES
QRS AXIS: 67 DEGREES
QRS AXIS: 70 DEGREES
QRSD INTERVAL: 82 MS
QRSD INTERVAL: 84 MS
QRSD INTERVAL: 90 MS
QT INTERVAL: 276 MS
QT INTERVAL: 320 MS
QT INTERVAL: 432 MS
QTC INTERVAL: 405 MS
QTC INTERVAL: 420 MS
QTC INTERVAL: 422 MS
T WAVE AXIS: 126 DEGREES
T WAVE AXIS: 77 DEGREES
T WAVE AXIS: 87 DEGREES
T4 FREE SERPL-MCNC: 0.78 NG/DL (ref 0.61–1.12)
TSH SERPL DL<=0.05 MIU/L-ACNC: 5.33 UIU/ML (ref 0.45–4.5)
VENTRICULAR RATE: 104 BPM
VENTRICULAR RATE: 141 BPM
VENTRICULAR RATE: 53 BPM

## 2025-01-22 PROCEDURE — 84484 ASSAY OF TROPONIN QUANT: CPT | Performed by: PHYSICIAN ASSISTANT

## 2025-01-22 PROCEDURE — NC001 PR NO CHARGE: Performed by: STUDENT IN AN ORGANIZED HEALTH CARE EDUCATION/TRAINING PROGRAM

## 2025-01-22 PROCEDURE — 36415 COLL VENOUS BLD VENIPUNCTURE: CPT

## 2025-01-22 PROCEDURE — 93010 ELECTROCARDIOGRAM REPORT: CPT | Performed by: STUDENT IN AN ORGANIZED HEALTH CARE EDUCATION/TRAINING PROGRAM

## 2025-01-22 PROCEDURE — 99236 HOSP IP/OBS SAME DATE HI 85: CPT | Performed by: STUDENT IN AN ORGANIZED HEALTH CARE EDUCATION/TRAINING PROGRAM

## 2025-01-22 PROCEDURE — 99214 OFFICE O/P EST MOD 30 MIN: CPT | Performed by: STUDENT IN AN ORGANIZED HEALTH CARE EDUCATION/TRAINING PROGRAM

## 2025-01-22 RX ORDER — METOPROLOL SUCCINATE 25 MG/1
25 TABLET, EXTENDED RELEASE ORAL DAILY
Status: DISCONTINUED | OUTPATIENT
Start: 2025-01-22 | End: 2025-01-22 | Stop reason: HOSPADM

## 2025-01-22 RX ORDER — METOPROLOL SUCCINATE 25 MG/1
25 TABLET, EXTENDED RELEASE ORAL DAILY
Qty: 30 TABLET | Refills: 0 | Status: SHIPPED | OUTPATIENT
Start: 2025-01-23

## 2025-01-22 RX ORDER — INSULIN LISPRO 100 [IU]/ML
1-6 INJECTION, SOLUTION INTRAVENOUS; SUBCUTANEOUS
Status: DISCONTINUED | OUTPATIENT
Start: 2025-01-22 | End: 2025-01-22 | Stop reason: HOSPADM

## 2025-01-22 RX ORDER — INSULIN LISPRO 100 [IU]/ML
1-5 INJECTION, SOLUTION INTRAVENOUS; SUBCUTANEOUS
Status: DISCONTINUED | OUTPATIENT
Start: 2025-01-22 | End: 2025-01-22 | Stop reason: HOSPADM

## 2025-01-22 RX ORDER — ALBUTEROL SULFATE 90 UG/1
2 INHALANT RESPIRATORY (INHALATION) EVERY 6 HOURS PRN
Status: DISCONTINUED | OUTPATIENT
Start: 2025-01-22 | End: 2025-01-22 | Stop reason: HOSPADM

## 2025-01-22 RX ORDER — PRAVASTATIN SODIUM 80 MG/1
80 TABLET ORAL
Status: DISCONTINUED | OUTPATIENT
Start: 2025-01-22 | End: 2025-01-22 | Stop reason: HOSPADM

## 2025-01-22 RX ORDER — PANTOPRAZOLE SODIUM 40 MG/1
40 TABLET, DELAYED RELEASE ORAL
Status: DISCONTINUED | OUTPATIENT
Start: 2025-01-22 | End: 2025-01-22 | Stop reason: HOSPADM

## 2025-01-22 RX ORDER — LISINOPRIL 10 MG/1
20 TABLET ORAL DAILY
Status: DISCONTINUED | OUTPATIENT
Start: 2025-01-22 | End: 2025-01-22 | Stop reason: HOSPADM

## 2025-01-22 RX ORDER — HYDROCHLOROTHIAZIDE 12.5 MG/1
12.5 TABLET ORAL DAILY
Status: DISCONTINUED | OUTPATIENT
Start: 2025-01-22 | End: 2025-01-22 | Stop reason: HOSPADM

## 2025-01-22 RX ADMIN — APIXABAN 5 MG: 5 TABLET, FILM COATED ORAL at 09:23

## 2025-01-22 RX ADMIN — METOPROLOL SUCCINATE 25 MG: 25 TABLET, EXTENDED RELEASE ORAL at 09:23

## 2025-01-22 NOTE — H&P
"H&P - Hospitalist   Name: Jean-Pierre Scherer 69 y.o. male I MRN: [id number]  Unit/Bed#: ED-28 I Date of Admission: 1/21/2025   Date of Service: 1/22/2025 I Hospital Day: 0     Assessment & Plan  Atrial flutter (HCC)  Presented after his apple watch notified him that his HR was in the 140s.   Initial EKG showed atrial flutter with repeat showing sinus tachycardia, rate 140s.   Received 25 mg of IV Cardizem and HR improved with intermittent bradycardia noted since then.  CHADS2-VASc 3  Started on Eliquis in the ED; continue on admission.   HR currently in the 50s, sinus rhythm.   Obtain echo.   Cardiology consulted.   Monitor on telemetry.   TSH elevation  TSH 5.333.  Follow-up on free T4.  Essential hypertension  Continue lisinopril, HCTZ.   Mild intermittent asthma  Without acute exacerbation.   Continue prn albuterol   Obstructive sleep apnea syndrome  CPAP HS.  Reports compliance   Type 2 diabetes mellitus without complication, without long-term current use of insulin (HCC)  Lab Results   Component Value Date    HGBA1C 6.5 (H) 08/11/2024       No results for input(s): \"POCGLU\" in the last 72 hours.    Blood Sugar Average: Last 72 hrs:    Diet-controlled. Reports that he has been exercising, losing weight over the past year.   Monitor accu-checks AC and HS.   SSI coverage.   Hypoglycemia protocol.        VTE Pharmacologic Prophylaxis: VTE Score: 3 Moderate Risk (Score 3-4) - Pharmacological DVT Prophylaxis Ordered: apixaban (Eliquis).  Code Status: No Order   Discussion with family: Updated  (wife) at bedside.    Anticipated Length of Stay: Patient will be admitted on an observation basis with an anticipated length of stay of less than 2 midnights secondary to atrial flutter.    History of Present Illness   Chief Complaint: rapid heart rate    Jean-Pierre Scherer is a 69 y.o. male with a PMH of HTN, type 2 DM, GERD, HLD, ROSITA on CPAP, prostate CA and obesity who presents with rapid heart rate. Patient reports " that around 8:30pm he was sitting down watching television when his Apple watch alarmed that his HR was elevated in the 140s. He reports that for the next hour is HR fluctuated from . He notes that its seems like his HR would drop when he was up moving around but when still his HR was high. He notes some flushing with onset but denies chest pain, SOB, palpitations or dizziness/ lightheadedness. He contacted his PCPs office who recommended that he come to the hospital for evaluation. He reports that he has otherwise been in his usual state of health lately and denies recent illness/ fevers/ chills, cough or change in appetite. He denies any recent increased stress, caffeine intake or alcohol. He reports compliance with his CPAP over the past year.     Review of Systems   Constitutional:  Positive for diaphoresis (flushing sensation). Negative for activity change, appetite change, chills, fatigue and fever.   Respiratory:  Negative for cough and shortness of breath.    Cardiovascular:  Negative for chest pain, palpitations and leg swelling.   Gastrointestinal:  Negative for abdominal pain, diarrhea and nausea.   Genitourinary:  Negative for difficulty urinating.   Neurological:  Negative for dizziness and light-headedness.   All other systems reviewed and are negative.      Historical Information   Past Medical History:   Diagnosis Date    Annual physical exam 12/12/2024    Asthma     BMI 40.0-44.9, adult ([facility]) 01/11/2022    BMI 40.0-44.9, adult ([facility]) 05/09/2024    BMI 45.0-49.9, adult ([facility]) 06/28/2021    Bradycardia 12/12/2024    Bronchitis 09/29/2023    Bronchospasm     Cancer ([facility])     Prostate     Chest tightness 02/17/2023    Choking episode 02/17/2023    Colon polyp     COVID-19 08/12/2024    CPAP (continuous positive airway pressure) dependence     Elevated serum creatinine 05/09/2024    Essential hypertension 06/26/2021    GERD (gastroesophageal reflux disease)     Hearing impairment 10/11/2022     Hyperlipidemia     Hypertension     Hypocalcemia 2022    Insomnia     Leg cramps     Lump of skin 2024    Lump on finger, left 2022    Microalbuminuria 2021    Mixed hyperlipidemia 2021    Morbid obesity (HCC) 2022    Nocturia     Numbness of fingers of both hands     Obesity Always    Obstructive sleep apnea syndrome 2023    Onychomycosis of toenail 2022    Pain in both knees 2022    Paronychia of left middle finger 2021    Preoperative evaluation of a medical condition to rule out surgical contraindications (TAR required) 2024    Right foot pain 2023    Seasonal allergic rhinitis 2022    Skin lesion     Skin rash 2021    Sleep apnea     not tested yet    Snoring     Tubular adenoma of colon 2021    Type 2 diabetes mellitus without complication, without long-term current use of insulin (HCC) 2021    Wears glasses     Weight gain      Past Surgical History:   Procedure Laterality Date    COLONOSCOPY  2017    COLONOSCOPY      HIP SURGERY Bilateral     Hip replacement    [place] COLONOSCOPY FLX DX W/COLLJ SPEC WHEN PFRMD N/A 2017    Procedure: COLONOSCOPY;  Surgeon: Dinesh Bradley MD;  [facility];  Service: Gastroenterology    UT EXCISION GANGLION WRIST DORSAL/VOLAR PRIMARY Left 2024    Procedure: EXCISION GANGLION CYST LEFT MIDDLE FINGER;  Surgeon: Jorge Coles MD;  [facility];  Service: Orthopedics    PROSTATECTOMY      UPPER GASTROINTESTINAL ENDOSCOPY       Social History     Tobacco Use    Smoking status: Former     Current packs/day: 0.00     Average packs/day: 1.5 packs/day for 16.9 years (25.4 ttl pk-yrs)     Types: Cigarettes     Start date: 1975     Quit date: 1992     Years since quittin.7    Smokeless tobacco: Never   Vaping Use    Vaping status: Never Used   Substance and Sexual Activity    Alcohol use: Yes     Alcohol/week: 1.0 standard drink of alcohol      Types: 1 Glasses of wine per week     Comment: rarely    Drug use: No    Sexual activity: Not Currently     Partners: Female     Birth control/protection: Male Sterilization     E-Cigarette/Vaping    E-Cigarette Use Never User      E-Cigarette/Vaping Substances    Nicotine No     THC No     CBD No     Flavoring No     Other No     Unknown No        Social History:  Marital Status: /Civil Union   Occupation:   Patient Pre-hospital Living Situation: Home  Patient Pre-hospital Level of Mobility: walks  Patient Pre-hospital Diet Restrictions:     Meds/Allergies   I have reviewed home medications with a medical source (PCP, Pharmacy, other).  Prior to Admission medications    Medication Sig Start Date End Date Taking? Authorizing Provider   albuterol (PROVENTIL HFA,VENTOLIN HFA) 90 mcg/act inhaler Inhale 2 puffs every 6 (six) hours as needed for wheezing    Historical Provider, MD   Ascorbic Acid (VITAMIN C PO) Take 1,000 mg by mouth daily    Historical Provider, MD   betamethasone dipropionate (DIPROSONE) 0.05 % ointment Apply sparingly  1-2 times/day for up to 2 wks to R forearm rash. 8/23/21   Davis Elena MD   betamethasone valerate (VALISONE) 0.1 % lotion Apply topically 2 (two) times a day To the scalp as needed. 11/3/23   Marie Love PA-C   Cyanocobalamin (VITAMIN B-12 PO) Take 1,000 mcg by mouth daily     Historical Provider, MD   fexofenadine (ALLEGRA) 180 MG tablet Take 180 mg by mouth daily as needed    Historical Provider, MD   ketoconazole (NIZORAL) 2 % cream Apply topically daily 11/5/24   Anoop Sandhu DPM   lisinopril-hydrochlorothiazide (PRINZIDE,ZESTORETIC) 20-12.5 MG per tablet Take 1 tablet by mouth daily 11/4/24   Rach Kate MD   Multiple Vitamins-Minerals (MULTIVITAL PO) Take 1 tablet by mouth    Historical Provider, MD   Omega-3 Fatty Acids (fish oil) 1,000 mg Take 1,000 mg by mouth 2 (two) times a day    Historical Provider, MD   omeprazole (PriLOSEC) 20 mg delayed  release capsule Take 20 mg by mouth as needed    Historical Provider, MD   sildenafil (VIAGRA) 50 MG tablet Take 1 tablet (50 mg total) by mouth daily as needed for erectile dysfunction 11/4/24   Rach Kate MD   simvastatin (ZOCOR) 40 mg tablet Take 1 tablet (40 mg total) by mouth daily at bedtime 11/4/24   Rach Kate MD   triamcinolone (KENALOG) 0.1 % lotion Apply topically 2 (two) times a day 4/4/23   Sheron Fonseca MD   triamcinolone (KENALOG) 0.1 % ointment Apply topically 2 (two) times a day 12/12/24   Rach Kate MD     Allergies   Allergen Reactions    Penicillins Rash     sensitivity       Objective :  Temp:  [98.1 °F (36.7 °C)] 98.1 °F (36.7 °C)  HR:  [] 60  BP: (126-153)/(70-99) 126/70  Resp:  [18-20] 18  SpO2:  [96 %-98 %] 98 %  O2 Device: None (Room air)    Physical Exam  Vitals and nursing note reviewed.   Constitutional:       General: He is not in acute distress.     Appearance: He is obese. He is not diaphoretic.   HENT:      Head: Normocephalic and atraumatic.   Eyes:      Conjunctiva/sclera: Conjunctivae normal.   Cardiovascular:      Rate and Rhythm: Regular rhythm. Bradycardia present.   Pulmonary:      Effort: Pulmonary effort is normal. No respiratory distress.      Breath sounds: Normal breath sounds.   Abdominal:      General: Bowel sounds are normal.      Palpations: Abdomen is soft.      Tenderness: There is no abdominal tenderness.   Musculoskeletal:      Right lower leg: No edema.      Left lower leg: No edema.   Skin:     General: Skin is warm and dry.      Coloration: Skin is not pale.   Neurological:      Mental Status: He is alert and oriented to person, place, and time.   Psychiatric:         Mood and Affect: Mood normal.          Lines/Drains:            Lab Results: I have reviewed the following results:  Results from last 7 days   Lab Units 01/21/25  2241   WBC Thousand/uL 7.46   HEMOGLOBIN g/dL 13.9    HEMATOCRIT % 40.9   PLATELETS Thousands/uL 237   SEGS PCT % 62   LYMPHO PCT % 29   MONO PCT % 7   EOS PCT % 2     Results from last 7 days   Lab Units 01/21/25  2241   SODIUM mmol/L 138   POTASSIUM mmol/L 4.3   CHLORIDE mmol/L 104   CO2 mmol/L 25   BUN mg/dL 26*   CREATININE mg/dL 1.30   ANION GAP mmol/L 9   CALCIUM mg/dL 8.9   ALBUMIN g/dL 4.1   TOTAL BILIRUBIN mg/dL 0.36   ALK PHOS U/L 43   ALT U/L 12   AST U/L 17   GLUCOSE RANDOM mg/dL 120             Lab Results   Component Value Date    HGBA1C 6.5 (H) 08/11/2024    HGBA1C 6.6 (H) 05/08/2024    HGBA1C 8.7 (H) 12/29/2023           Imaging Results Review: No pertinent imaging studies reviewed.  Other Study Results Review: EKG was reviewed.         ** Please Note: This note has been constructed using a voice recognition system. **

## 2025-01-22 NOTE — ASSESSMENT & PLAN NOTE
Presented after his apple watch notified him that his HR was in the 140s.   Initial EKG showed atrial flutter with repeat showing sinus tachycardia, rate 140s.   Received 25 mg of IV Cardizem and HR improved with intermittent bradycardia noted since then.  CHADS2-VASc 3  Started on Eliquis in the ED; continue on admission.   HR currently in the 50s, sinus rhythm.   Obtain echo.   Cardiology consulted.   Monitor on telemetry.

## 2025-01-22 NOTE — ASSESSMENT & PLAN NOTE
Presented to the ED on 1/21 for further evaluation of ' elevated heart rates ' noted on his Apple Watch.  Asymptomatic without complaints of CP, palpitations, SOB/TREADWELL, or feelings of dizziness/lightheadedness.  ECG on admission demonstrated atrial flutter with variable AVB, rate 104 bpm.  In the ED he received 25 mg IV Cardizem bolus w/improvement in HRs; has subsequently reverted back to NSR early this morning.   Telemetry review-NSR, heart rates currently in the 60s  CHADS2-VASc 3  Cardio consulted  Cleared for D/C on eliquis 5 mg bid and toprol xl 25 mg daily.  Outpatient cardiology follow-up

## 2025-01-22 NOTE — DISCHARGE SUMMARY
"Discharge Summary - Hospitalist   Name: Jean-Pierre Scherer 69 y.o. male I MRN: [id number]  Unit/Bed#: ED-28 I Date of Admission: 1/21/2025   Date of Service: 1/22/2025 I Hospital Day: 0     Assessment & Plan  Atrial flutter (HCC)  Presented to the ED on 1/21 for further evaluation of ' elevated heart rates ' noted on his Apple Watch.  Asymptomatic without complaints of CP, palpitations, SOB/TREADWELL, or feelings of dizziness/lightheadedness.  ECG on admission demonstrated atrial flutter with variable AVB, rate 104 bpm.  In the ED he received 25 mg IV Cardizem bolus w/improvement in HRs; has subsequently reverted back to NSR early this morning.   Telemetry review-NSR, heart rates currently in the 60s  CHADS2-VASc 3  Cardio consulted  Cleared for D/C on eliquis 5 mg bid and toprol xl 25 mg daily.  Outpatient cardiology follow-up    TSH elevation  TSH 5.333.  0.78  Essential hypertension  Continue lisinopril, HCTZ.   Started on Toprol-XL 25 mg daily  Mild intermittent asthma  Without acute exacerbation.   Continue prn albuterol   Obstructive sleep apnea syndrome  CPAP HS.  Reports compliance   Type 2 diabetes mellitus without complication, without long-term current use of insulin (HCC)  Lab Results   Component Value Date    HGBA1C 6.2 (H) 01/21/2025       No results for input(s): \"POCGLU\" in the last 72 hours.    Blood Sugar Average: Last 72 hrs:    Diet-controlled. Reports that he has been exercising, losing weight over the past year.   Monitor accu-checks AC and HS.   SSI coverage.   Hypoglycemia protocol.       Medical Problems       Resolved Problems  Date Reviewed: 1/21/2025   None       Discharging Physician / Practitioner: Brayan Joel DO  PCP: Rach Kate MD  Admission Date:   Admission Orders (From admission, onward)       Ordered        01/22/25 0009  Place in Observation  Once                          Discharge Date: 01/22/25    Consultations During Hospital Stay:  Cardiology    Procedures " Performed:   none    Significant Findings / Test Results:   Atrial flutter with RVR    Incidental Findings:   See above   I reviewed the above mentioned incidental findings with the patient and/or family and they expressed understanding.    Test Results Pending at Discharge (will require follow up):   None     Outpatient Tests Requested:  Cardiology f/u    Complications:  none    Reason for Admission: Atrial Flutter with RVR    Hospital Course:   Jean-Pierre Scherer is a 69 y.o. male patient who originally presented to the hospital on 1/21/2025 due to elevated heart rates noted on his Apple Watch.  Patient was found to be in atrial flutter with RVR with heart rate 104 bpm.  In the ED patient was started on Eliquis 5 mg twice daily and received 25 mg of IV Cardizem bolus resulting in improvement in heart rate. Patient converted to normal sinus rhythm this morning.  Cardiology was consulted and started patient on Toprol-XL 25 mg daily.  Patient has been cleared for discharge with outpatient cardiology follow-up.          Please see above list of diagnoses and related plan for additional information.     Condition at Discharge: stable    Discharge Day Visit / Exam:   Subjective:  Pt states he feels well denies any symptoms  Vitals: Blood Pressure: 130/61 (01/22/25 1430)  Pulse: 59 (01/22/25 1430)  Temperature: 98.1 °F (36.7 °C) (01/21/25 2254)  Temp Source: Oral (01/21/25 2254)  Respirations: 18 (01/22/25 1430)  SpO2: 100 % (01/22/25 1430)  Physical Exam  Vitals and nursing note reviewed.   Constitutional:       General: He is not in acute distress.     Appearance: He is well-developed. He is obese.   HENT:      Head: Normocephalic and atraumatic.   Eyes:      Conjunctiva/sclera: Conjunctivae normal.   Cardiovascular:      Rate and Rhythm: Normal rate and regular rhythm.      Heart sounds: No murmur heard.  Pulmonary:      Effort: Pulmonary effort is normal. No respiratory distress.      Breath sounds: Normal breath sounds.    Abdominal:      Tenderness: There is no abdominal tenderness.   Musculoskeletal:         General: No swelling.   Skin:     General: Skin is warm and dry.      Capillary Refill: Capillary refill takes less than 2 seconds.   Neurological:      Mental Status: He is alert.   Psychiatric:         Mood and Affect: Mood normal.          Discussion with Family: Updated  (wife) at bedside.    Discharge instructions/Information to patient and family:   See after visit summary for information provided to patient and family.      Provisions for Follow-Up Care:  See after visit summary for information related to follow-up care and any pertinent home health orders.      Mobility at time of Discharge:   Basic Mobility Inpatient Raw Score: 24  JH-HLM Goal: 8: Walk 250 feet or more  JH-HLM Achieved: 8: Walk 250 feet ot more  HLM Goal achieved. Continue to encourage appropriate mobility.     Disposition:   Home    Planned Readmission: no    Discharge Medications:  See after visit summary for reconciled discharge medications provided to patient and/or family.      Administrative Statements   Discharge Statement:  I have spent a total time of 35 minutes in caring for this patient on the day of the visit/encounter. >30 minutes of time was spent on: Diagnostic results, Prognosis, Risks and benefits of tx options, Instructions for management, and Patient and family education.    **Please Note: This note may have been constructed using a voice recognition system**

## 2025-01-22 NOTE — ED PROVIDER NOTES
"Time reflects when diagnosis was documented in both MDM as applicable and the Disposition within this note       Time User Action Codes Description Comment    1/21/2025 11:55 PM Bob Gallegos Add [E87.1] Hyponatremia     1/21/2025 11:55 PM Bob Gallegos Add [I10] Hypertension     1/21/2025 11:59 PM Jesse Hewitt Modify [I10] Hypertension     1/21/2025 11:59 PM Jesse Hewitt Remove [E87.1] Hyponatremia     1/21/2025 11:59 PM Jesse Hewitt Remove [I10] Hypertension     1/22/2025 12:08 AM Bob Gallegos Add [I48.92] Atrial flutter with rapid ventricular response (HCC)           ED Disposition       ED Disposition   Admit    Condition   Stable    Date/Time   Wed Jan 22, 2025 12:08 AM    Comment   Case was discussed with NAVEEN and the patient's admission status was agreed to be Admission Status: observation status to the service of Dr. Thomas .               Assessment & Plan       Medical Decision Making  Patient is a 69-year-old male with a history of hypertension, diabetes, obstructive sleep apnea on CPAP at home presenting to the emergency room because his Apple Watch alerted him that his heart rate was in the 140s.  Patient reports that when he ran back to the house to grab his wallet he felt mildly more short of breath than usual.  However otherwise denies any chest pain, lightheadedness, dizziness, nausea, vomiting, abdominal pain/cramping, or lower extremity edema.  Denies any prior history of MI/CVA.  However, does report a history of a \"irregular rhythm\" found during cardiology visit but did not details.     ED course as below    Patient found to be tachycardic into the 140s, asymptomatic  Monitor was indicative of patient flipping between what appeared to be a-flutter with variable block and SVT, EKG showed the same.  Decision was made to rate control with diltiazem bolus followed by drip.  After diltiazem bolus patient's heart rate decreased to 60, still showing a flutter on the monitor.  " "However, as noted the patient's heart rate would intermittently decrease to low 40s and rarely as low as 38.  However, upon multiple reassessments patient remained asymptomatic.  Diltiazem drip was held at this time given patient's bradycardia.    Review of prior records shows a cardiology note which contains EKG interpretation with sinus arrhythmia.  I believe this is the \"irregular rhythm\" that patient was referring to.  No prior history of SVT, A-fib, or a flutter.  Patient is [id number] 3 for age, diabetes and hypertension.  5 mg Eliquis twice daily initiated here in the ED as patient remains in a flutter.    Given patient's variable decrease in heart rate down to 40 and below, and to admit patient for observation with telemetry for further evaluation and management of his new onset atrial flutter and subsequent bradycardia after diltiazem bolus.    Case discussed with [facility] internal medicine, patient admitted in stable condition.    Amount and/or Complexity of Data Reviewed  Labs: ordered.    Risk  Prescription drug management.  Decision regarding hospitalization.        ED Course as of 01/22/25 0138 Tue Jan 21, 2025   2335 [id number] 3 for age, diabetes, and hypertension.  Will initiate Eliquis here in the ED 5 mg BID   Wed Jan 22, 2025 0135 ECG 10:09 PM shows atrial flutter with variable block, rate 104, 1 PVC, normal axis, no ST/T-segment changes indicative of acute ischemia   0136 Repeat ECG 10:17 PM now shows sinus tachycardia rate 141   0136 Repeat ECG 11:16 PM shows atrial flutter with variable block and reduced ventricular response rate 53.       Medications   diltiazem (CARDIZEM) 125 mg in sodium chloride 0.9 % 125 mL infusion (0 mg/hr Intravenous Hold 1/21/25 2333)   apixaban (ELIQUIS) tablet 5 mg (5 mg Oral Given 1/21/25 2350)   diltiazem (CARDIZEM) injection 25 mg (25 mg Intravenous Given 1/21/25 2257)       ED Risk Strat Scores          [id number] SCORE      Flowsheet Row Most Recent " Value   [id number]    Age 1 Filed at: 01/21/2025 2333   Sex 0 Filed at: 01/21/2025 2333   CHF History 0 Filed at: 01/21/2025 2333   HTN History 1 Filed at: 01/21/2025 2333   Stroke or TIA Symptoms Previously 0 Filed at: 01/21/2025 2333   Vascular Disease History 0 Filed at: 01/21/2025 2333   Diabetes History 1 Filed at: 01/21/2025 2333   [id number] Score 3 Filed at: 01/21/2025 2333                                            History of Present Illness       Chief Complaint   Patient presents with    Rapid Heart Rate     Pt states his apple watch notified him that he has a rapid heart rate in the 140s. -CP, SOB, dizziness. Pt states he feels a little flushed.       Past Medical History:   Diagnosis Date    Annual physical exam 12/12/2024    Asthma     BMI 40.0-44.9, adult (formerly [facility]) 01/11/2022    BMI 40.0-44.9, adult (formerly [facility]) 05/09/2024    BMI 45.0-49.9, adult (formerly [facility]) 06/28/2021    Bradycardia 12/12/2024    Bronchitis 09/29/2023    Bronchospasm     Cancer (formerly [facility])     Prostate     Chest tightness 02/17/2023    Choking episode 02/17/2023    Colon polyp     COVID-19 08/12/2024    CPAP (continuous positive airway pressure) dependence     Elevated serum creatinine 05/09/2024    Essential hypertension 06/26/2021    GERD (gastroesophageal reflux disease)     Hearing impairment 10/11/2022    Hyperlipidemia     Hypertension     Hypocalcemia 01/11/2022    Insomnia     Leg cramps     Lump of skin 12/12/2024    Lump on finger, left 01/11/2022    Microalbuminuria 09/27/2021    Mixed hyperlipidemia 06/26/2021    Morbid obesity (HCC) 05/26/2022    Nocturia     Numbness of fingers of both hands     Obesity Always    Obstructive sleep apnea syndrome 08/28/2023    Onychomycosis of toenail 05/26/2022    Pain in both knees 05/26/2022    Paronychia of left middle finger 09/27/2021    Preoperative evaluation of a medical condition to rule out surgical contraindications (TAR required) 05/09/2024    Right foot pain 05/26/2023    Seasonal allergic  rhinitis 2022    Skin lesion     Skin rash 2021    Sleep apnea     not tested yet    Snoring     Tubular adenoma of colon 2021    Type 2 diabetes mellitus without complication, without long-term current use of insulin (HCC) 2021    Wears glasses     Weight gain       Past Surgical History:   Procedure Laterality Date    COLONOSCOPY  2017    COLONOSCOPY      HIP SURGERY Bilateral     Hip replacement    UT COLONOSCOPY FLX DX W/COLLJ SPEC WHEN PFRMD N/A 2017    Procedure: COLONOSCOPY;  Surgeon: Dinesh Bradley MD;  Location: [facility];  Service: Gastroenterology    UT EXCISION GANGLION WRIST DORSAL/VOLAR PRIMARY Left 2024    Procedure: EXCISION GANGLION CYST LEFT MIDDLE FINGER;  Surgeon: Jorge Coles MD;  Location: [facility];  Service: Orthopedics    PROSTATECTOMY      UPPER GASTROINTESTINAL ENDOSCOPY        Family History   Problem Relation Age of Onset    Brain cancer Mother     Psoriasis Mother     Eczema Mother     Cancer Mother         Brain Tumor    Depression Father     Heart disease Father         Heart Artery blockages    Diabetes Father     Stroke Paternal Grandfather       Social History     Tobacco Use    Smoking status: Former     Current packs/day: 0.00     Average packs/day: 1.5 packs/day for 16.9 years (25.4 ttl pk-yrs)     Types: Cigarettes     Start date: 1975     Quit date: 1992     Years since quittin.7    Smokeless tobacco: Never   Vaping Use    Vaping status: Never Used   Substance Use Topics    Alcohol use: Yes     Alcohol/week: 1.0 standard drink of alcohol     Types: 1 Glasses of wine per week     Comment: rarely    Drug use: No      E-Cigarette/Vaping    E-Cigarette Use Never User       E-Cigarette/Vaping Substances    Nicotine No     THC No     CBD No     Flavoring No     Other No     Unknown No       I have reviewed and agree with the history as documented.     Patient is a 69-year-old male with a history of hypertension, diabetes,  "obstructive sleep apnea on CPAP at home presenting to the emergency room because his Apple Watch alerted him that his heart rate was in the 140s.  Patient reports that when he ran back to the house to grab his wallet he felt mildly more short of breath than usual.  However otherwise denies any chest pain, lightheadedness, dizziness, nausea, vomiting, abdominal pain/cramping, or lower extremity edema.  Denies any prior history of MI/CVA.  However, does report a history of a \"irregular rhythm\" found during cardiology visit but did not details.         Rapid Heart Rate  Associated symptoms: shortness of breath        Review of Systems   Respiratory:  Positive for shortness of breath.    All other systems reviewed and are negative.          Objective       ED Triage Vitals   Temperature Pulse Blood Pressure Respirations SpO2 Patient Position - Orthostatic VS   01/21/25 2254 01/21/25 2204 01/21/25 2204 01/21/25 2204 01/21/25 2204 --   98.1 °F (36.7 °C) (!) 143 141/89 20 98 %       Temp Source Heart Rate Source BP Location FiO2 (%) Pain Score    01/21/25 2254 01/21/25 2204 01/21/25 2204 -- --    Oral Monitor Right arm        Vitals      Date and Time Temp Pulse SpO2 Resp BP Pain Score FACES Pain Rating User   01/21/25 2323 -- 60 98 % 18 126/70 -- -- KB   01/21/25 2303 -- 72 96 % 20 140/74 -- -- KB   01/21/25 2254 98.1 °F (36.7 °C) -- -- -- -- -- -- LA   01/21/25 2230 -- 141 96 % 20 153/99 -- -- LA   01/21/25 2204 -- 143 98 % 20 141/89 -- -- KB            Physical Exam  Vitals and nursing note reviewed.   Constitutional:       General: He is not in acute distress.     Appearance: Normal appearance.   HENT:      Head: Normocephalic and atraumatic.   Eyes:      Extraocular Movements: Extraocular movements intact.      Conjunctiva/sclera: Conjunctivae normal.   Cardiovascular:      Rate and Rhythm: Tachycardia present. Rhythm irregular.      Pulses: Normal pulses.      Heart sounds: Normal heart sounds. No murmur " heard.  Pulmonary:      Effort: Pulmonary effort is normal. No respiratory distress.      Breath sounds: Normal breath sounds.   Abdominal:      General: There is no distension.      Palpations: Abdomen is soft.      Tenderness: There is no abdominal tenderness. There is no guarding or rebound.   Musculoskeletal:         General: No tenderness. Normal range of motion.      Cervical back: Normal range of motion.      Right lower leg: Edema present.      Left lower leg: Edema present.      Comments: 1+ pitting edema to bilateral lower extremities.   Skin:     General: Skin is warm and dry.      Capillary Refill: Capillary refill takes less than 2 seconds.      Findings: No rash.   Neurological:      General: No focal deficit present.      Mental Status: He is alert and oriented to person, place, and time.      Sensory: No sensory deficit.   Psychiatric:         Mood and Affect: Mood normal.         Behavior: Behavior normal.         Results Reviewed       Procedure Component Value Units Date/Time    HS Troponin I 2hr [[id number]] Collected: 01/22/25 0123    Lab Status: No result Specimen: Blood from Arm, Right     HS Troponin I 4hr [[id number]]     Lab Status: No result Specimen: Blood     Magnesium [[id number]]  (Normal) Collected: 01/21/25 2241    Lab Status: Final result Specimen: Blood from Arm, Left Updated: 01/22/25 0000     Magnesium 1.9 mg/dL     TSH, 3rd generation with Free T4 reflex [[id number]]  (Abnormal) Collected: 01/21/25 2241    Lab Status: Final result Specimen: Blood from Arm, Left Updated: 01/22/25 0000     TSH 3RD GENERATON 5.333 uIU/mL     T4, free [[id number]] Collected: 01/21/25 2241    Lab Status: In process Specimen: Blood from Arm, Left Updated: 01/22/25 0000    HS Troponin 0hr (reflex protocol) [[id number]]  (Normal) Collected: 01/21/25 2241    Lab Status: Final result Specimen: Blood from Arm, Left Updated: 01/21/25 2319     hs TnI 0hr 5 ng/L     Comprehensive metabolic panel [[id number]]   (Abnormal) Collected: 01/21/25 2241    Lab Status: Final result Specimen: Blood from Arm, Left Updated: 01/21/25 2313     Sodium 138 mmol/L      Potassium 4.3 mmol/L      Chloride 104 mmol/L      CO2 25 mmol/L      ANION GAP 9 mmol/L      BUN 26 mg/dL      Creatinine 1.30 mg/dL      Glucose 120 mg/dL      Calcium 8.9 mg/dL      AST 17 U/L      ALT 12 U/L      Alkaline Phosphatase 43 U/L      Total Protein 7.2 g/dL      Albumin 4.1 g/dL      Total Bilirubin 0.36 mg/dL      eGFR 56 ml/min/1.73sq m     Narrative:      National Kid[employer] Disease Foundation guidelines for Chronic Kidney Disease (CKD):     Stage 1 with normal or high GFR (GFR > 90 mL/min/1.73 square meters)    Stage 2 Mild CKD (GFR = 60-89 mL/min/1.73 square meters)    Stage 3A Moderate CKD (GFR = 45-59 mL/min/1.73 square meters)    Stage 3B Moderate CKD (GFR = 30-44 mL/min/1.73 square meters)    Stage 4 Severe CKD (GFR = 15-29 mL/min/1.73 square meters)    Stage 5 End Stage CKD (GFR <15 mL/min/1.73 square meters)  Note: GFR calculation is accurate only with a steady state creatinine    CBC and differential [[id number]] Collected: 01/21/25 2241    Lab Status: Final result Specimen: Blood from Arm, Left Updated: 01/21/25 2300     WBC 7.46 Thousand/uL      RBC 4.45 Million/uL      Hemoglobin 13.9 g/dL      Hematocrit 40.9 %      MCV 92 fL      MCH 31.2 pg      MCHC 34.0 g/dL      RDW 12.7 %      MPV 11.0 fL      Platelets 237 Thousands/uL      nRBC 0 /100 WBCs      Segmented % 62 %      Immature Grans % 0 %      Lymphocytes % 29 %      Monocytes % 7 %      Eosinophils Relative 2 %      Basophils Relative 0 %      Absolute Neutrophils 4.60 Thousands/µL      Absolute Immature Grans 0.02 Thousand/uL      Absolute Lymphocytes 2.13 Thousands/µL      Absolute Monocytes 0.53 Thousand/µL      Eosinophils Absolute 0.15 Thousand/µL      Basophils Absolute 0.03 Thousands/µL             No orders to display       Procedures    ED Medication and Procedure Management   Prior  to Admission Medications   Prescriptions Last Dose Informant Patient Reported? Taking?   Ascorbic Acid (VITAMIN C PO)  Self Yes No   Sig: Take 1,000 mg by mouth daily   Cyanocobalamin (VITAMIN B-12 PO)  Self Yes No   Sig: Take 1,000 mcg by mouth daily    Multiple Vitamins-Minerals (MULTIVITAL PO)  Self Yes No   Sig: Take 1 tablet by mouth   Omega-3 Fatty Acids (fish oil) 1,000 mg   Yes No   Sig: Take 1,000 mg by mouth 2 (two) times a day   albuterol (PROVENTIL HFA,VENTOLIN HFA) 90 mcg/act inhaler   Yes No   Sig: Inhale 2 puffs every 6 (six) hours as needed for wheezing   betamethasone dipropionate (DIPROSONE) 0.05 % ointment  Self No No   Sig: Apply sparingly  1-2 times/day for up to 2 wks to R forearm rash.   betamethasone valerate (VALISONE) 0.1 % lotion   No No   Sig: Apply topically 2 (two) times a day To the scalp as needed.   fexofenadine (ALLEGRA) 180 MG tablet  Self Yes No   Sig: Take 180 mg by mouth daily as needed   ketoconazole (NIZORAL) 2 % cream   No No   Sig: Apply topically daily   lisinopril-hydrochlorothiazide (PRINZIDE,ZESTORETIC) 20-12.5 MG per tablet   No No   Sig: Take 1 tablet by mouth daily   omeprazole (PriLOSEC) 20 mg delayed release capsule  Self Yes No   Sig: Take 20 mg by mouth as needed   sildenafil (VIAGRA) 50 MG tablet   No No   Sig: Take 1 tablet (50 mg total) by mouth daily as needed for erectile dysfunction   simvastatin (ZOCOR) 40 mg tablet   No No   Sig: Take 1 tablet (40 mg total) by mouth daily at bedtime   triamcinolone (KENALOG) 0.1 % lotion  Self No No   Sig: Apply topically 2 (two) times a day   triamcinolone (KENALOG) 0.1 % ointment   No No   Sig: Apply topically 2 (two) times a day      Facility-Administered Medications: None     Patient's Medications   Discharge Prescriptions    No medications on file     No discharge procedures on file.  ED SEPSIS DOCUMENTATION   Time reflects when diagnosis was documented in both MDM as applicable and the Disposition within this note        Time User Action Codes Description Comment    1/21/2025 11:55 PM Bob Gallegos Add [E87.1] Hyponatremia     1/21/2025 11:55 PM Bob Gallegos Add [I10] Hypertension     1/21/2025 11:59 PM Jesse Hewitt Modify [I10] Hypertension     1/21/2025 11:59 PM Jesse Hewitt Remove [E87.1] Hyponatremia     1/21/2025 11:59 PM Jesse Hewitt Remove [I10] Hypertension     1/22/2025 12:08 AM Bob Gallegos Add [I48.92] Atrial flutter with rapid ventricular response (HCC)                  Bob Gallegos MD  01/22/25 0138

## 2025-01-22 NOTE — ED NOTES
No morning labs ordered at this time- RN to update provider at this time     Vickie Johnson, ZOEY  01/22/25 0456

## 2025-01-22 NOTE — CONSULTS
Cardiology   Jean-Pierre Scherer 69 y.o. male MRN: [id number]  Unit/Bed#: ED-28 Encounter: [id number]      Reason for Consult / Principal Problem: Newly diagnosed atrial flutter    Physician Requesting Consult:  Brayan Joel DO    Outpatient cardiologist: Dr. Jeny Garcia    Assessment  1. Newly diagnosed atrial flutter w/ RVR (POA).  -Presented to the ED on 1/21 for further evaluation of ' elevated heart rates ' noted on his Apple Watch.  Asymptomatic without complaints of CP, palpitations, SOB/TREADWELL, or feelings of dizziness/lightheadedness.  -ECG on admission demonstrated atrial flutter with variable AVB, rate 104 bpm.  In the ED he received 25 mg IV Cardizem bolus w/improvement in HRs; has subsequently reverted back to NSR early this morning.   -Telemetry review-NSR, heart rates currently in the 60s  -Outpatient rate/rhythm control; none  -Inpatient rate/rhythm control; none.  -[id number] score = 3 (age, HTN, DM) - has been started on Eliquis 5 mg twice daily this admission  -HS troponin levels 5-/7-/10  -Electrolytes stable on BMP.  -TSH level 5.3/free T4 0.78  2. Hypertension  -BP last recorded 137/63  -Outpatient BP regimen; lisinopril-HCTZ 20-12.5 mg daily  -Inpatient BP regimen; lisinopril-HCTZ 20-12.5 mg daily  3. Dyslipidemia  -Lipid profile 8/11/2024; cholesterol 156, triglyceride 154, HDL 42 and LDL calculated at 83.  -On pravastatin 80 mg daily.  4. DM type II  -HbA1c 6.2  5. CKD stage III  -Baseline creatinine around 1.1-1.3  -Creatinine 1.33 on admission, currently 1.30  6. ROSITA -utilizes CPAP at at bedtime    Plan  -Patient denies any specific cardiac or respiratory complaints this a.m.  No current supplemental O2 requirements with stable O2 saturations.  Warm/well-perfused and euvolemic on exam.  -Currently maintaining normal sinus rhythm with heart rates in the 60s.  Start metoprolol succinate 25 mg daily.  -Continue apixaban 5 mg twice daily -will need to be price checked by the primary  team.  -Continue lisinopril-HCTZ 20-12.5 mg daily.  -Continue statin.  -Reinforced continued compliance with CPAP therapy at at bedtime.  -Follow-up TTE imaging results - pending these results if unremarkable anticipate discharge later today.  -We will arrange follow-up with cardiology upon discharge      HPI: Jean-Pierre Scherer 69 y.o. year old male with a medical history of hypertension, hyperlipidemia, DM type II, CAD stage III, GERD, ROSITA, and obesity.  Former cigarette smoker, denies excessive alcohol or recreational/illicit drug use.  Family history significant for coronary disease (stenting and subsequent bypass) in his father in his mid to late 50s.  He presents to the [facility] as of ' rapid heart rates ' noted on his Apple Watch.  He denied chest pain palpitations, SOB/TREADWELL or any feelings of dizziness/lightheadedness.  Initial ECG in the ED demonstrated atrial flutter with variable AVB,  bpm.  In the ED he received IV Cardizem bolus 25 mg x 1 with reversion back to normal sinus rhythm.  He was also initiated on apixaban 5 mg twice daily for systemic AC by the primary team.  Cardiology has not been consulted for further treatment recommendation/management.    Family History:   Family History   Problem Relation Age of Onset    Brain cancer Mother     Psoriasis Mother     Eczema Mother     Cancer Mother         Brain Tumor    Depression Father     Heart disease Father         Heart Artery blockages    Diabetes Father     Stroke Paternal Grandfather      Historical Information   Past Medical History:   Diagnosis Date    Annual physical exam 12/12/2024    Asthma     BMI 40.0-44.9, adult ([facility]) 01/11/2022    BMI 40.0-44.9, adult ([facility]) 05/09/2024    BMI 45.0-49.9, adult ([facility]) 06/28/2021    Bradycardia 12/12/2024    Bronchitis 09/29/2023    Bronchospasm     Cancer ([facility])     Prostate     Chest tightness 02/17/2023    Choking episode 02/17/2023    Colon polyp     COVID-19 08/12/2024    CPAP (continuous positive  airway pressure) dependence     Elevated serum creatinine 05/09/2024    Essential hypertension 06/26/2021    GERD (gastroesophageal reflux disease)     Hearing impairment 10/11/2022    Hyperlipidemia     Hypertension     Hypocalcemia 01/11/2022    Insomnia     Leg cramps     Lump of skin 12/12/2024    Lump on finger, left 01/11/2022    Microalbuminuria 09/27/2021    Mixed hyperlipidemia 06/26/2021    Morbid obesity (HCC) 05/26/2022    Nocturia     Numbness of fingers of both hands     Obesity Always    Obstructive sleep apnea syndrome 08/28/2023    Onychomycosis of toenail 05/26/2022    Pain in both knees 05/26/2022    Paronychia of left middle finger 09/27/2021    Preoperative evaluation of a medical condition to rule out surgical contraindications (TAR required) 05/09/2024    Right foot pain 05/26/2023    Seasonal allergic rhinitis 01/11/2022    Skin lesion     Skin rash 06/26/2021    Sleep apnea     not tested yet    Snoring     Tubular adenoma of colon 06/28/2021    Type 2 diabetes mellitus without complication, without long-term current use of insulin (HCC) 06/26/2021    Wears glasses     Weight gain      Past Surgical History:   Procedure Laterality Date    COLONOSCOPY  05/09/2017    COLONOSCOPY      HIP SURGERY Bilateral     Hip replacement    AZ COLONOSCOPY FLX DX W/COLLJ SPEC WHEN PFRMD N/A 05/09/2017    Procedure: COLONOSCOPY;  Surgeon: Dinesh Bradley MD;  Location: [facility];  Service: Gastroenterology    AZ EXCISION GANGLION WRIST DORSAL/VOLAR PRIMARY Left 5/16/2024    Procedure: EXCISION GANGLION CYST LEFT MIDDLE FINGER;  Surgeon: Jorge Coles MD;  Location: [facility];  Service: Orthopedics    PROSTATECTOMY      UPPER GASTROINTESTINAL ENDOSCOPY       Social History   Social History     Substance and Sexual Activity   Alcohol Use Yes    Alcohol/week: 1.0 standard drink of alcohol    Types: 1 Glasses of wine per week    Comment: rarely     Social History     Substance and Sexual Activity   Drug Use  No     Social History     Tobacco Use   Smoking Status Former    Current packs/day: 0.00    Average packs/day: 1.5 packs/day for 16.9 years (25.4 ttl pk-yrs)    Types: Cigarettes    Start date: 1975    Quit date: 1992    Years since quittin.7   Smokeless Tobacco Never     Family History:   Family History   Problem Relation Age of Onset    Brain cancer Mother     Psoriasis Mother     Eczema Mother     Cancer Mother         Brain Tumor    Depression Father     Heart disease Father         Heart Artery blockages    Diabetes Father     Stroke Paternal Grandfather        Review of Systems:  Review of Systems   Constitutional:  Negative for chills, fatigue and fever.   Eyes:  Negative for visual disturbance.   Respiratory:  Negative for cough, chest tightness and shortness of breath.    Cardiovascular:  Negative for chest pain, palpitations and leg swelling.   Gastrointestinal:  Negative for abdominal pain.   Neurological:  Negative for dizziness, light-headedness and headaches.   All other systems reviewed and are negative.          Scheduled Meds:  Current Facility-Administered Medications   Medication Dose Route Frequency Provider Last Rate    albuterol  2 puff Inhalation Q6H PRN Cintia Hurt PA-C      apixaban  5 mg Oral BID Cintia Hurt PA-C      lisinopril  20 mg Oral Daily Cintia Hurt PA-C      And    hydroCHLOROthiazide  12.5 mg Oral Daily Cintia Hurt PA-C      insulin lispro  1-5 Units Subcutaneous HS Cintia Hurt PA-C      insulin lispro  1-6 Units Subcutaneous TID AC Cintia Hurt PA-C      pantoprazole  40 mg Oral Early Morning Cintia Hurt PA-C      pravastatin  80 mg Oral Daily With Dinner Cintia Hurt PA-C       Continuous Infusions:   PRN Meds:.  albuterol  all current active meds have been reviewed and current meds:   Current Facility-Administered Medications:     albuterol (PROVENTIL HFA,VENTOLIN HFA) inhaler 2 puff, Q6H PRN     apixaban (ELIQUIS) tablet 5 mg, BID    lisinopril (ZESTRIL) tablet 20 mg, Daily **AND** hydroCHLOROthiazide tablet 12.5 mg, Daily    insulin lispro (HumALOG/ADMELOG) 100 units/mL subcutaneous injection 1-5 Units, HS    insulin lispro (HumALOG/ADMELOG) 100 units/mL subcutaneous injection 1-6 Units, TID AC **AND** Fingerstick Glucose (POCT), TID AC    pantoprazole (PROTONIX) EC tablet 40 mg, Early Morning    pravastatin (PRAVACHOL) tablet 80 mg, Daily With Dinner    Allergies   Allergen Reactions    Penicillins Rash     sensitivity       Objective   Vitals: Blood pressure 115/58, pulse 56, temperature 98.1 °F (36.7 °C), temperature source Oral, resp. rate 18, SpO2 96%., There is no height or weight on file to calculate BMI.,   Orthostatic Blood Pressures      Flowsheet Row Most Recent Value   Blood Pressure 115/58 filed at 01/22/2025 0700   Patient Position - Orthostatic VS Lying filed at 01/22/2025 0700            No intake or output data in the 24 hours ending 01/22/25 0821    Invasive Devices       Peripheral Intravenous Line  Duration             Peripheral IV 01/21/25 Right;Ventral (anterior) Forearm <1 day                    Physical Exam:  Physical Exam  Vitals and nursing note reviewed.   Constitutional:       General: He is not in acute distress.     Appearance: He is obese. He is not diaphoretic.   HENT:      Head: Normocephalic and atraumatic.   Eyes:      General: No scleral icterus.  Cardiovascular:      Rate and Rhythm: Normal rate and regular rhythm.      Pulses: Normal pulses.      Heart sounds: Normal heart sounds.   Pulmonary:      Effort: Pulmonary effort is normal.      Breath sounds: Normal breath sounds. No wheezing or rales.   Abdominal:      Palpations: Abdomen is soft.   Musculoskeletal:      Right lower leg: No edema.      Left lower leg: No edema.   Skin:     General: Skin is warm and dry.      Capillary Refill: Capillary refill takes less than 2 seconds.   Neurological:      General: No  focal deficit present.      Mental Status: He is alert and oriented to person, place, and time.   Psychiatric:         Mood and Affect: Mood normal.         Lab Results:   Recent Results (from the past 24 hours)   ECG 12 lead    Collection Time: 01/21/25 10:09 PM   Result Value Ref Range    Ventricular Rate 104 BPM    Atrial Rate 288 BPM    SC Interval  ms    QRSD Interval 82 ms    QT Interval 320 ms    QTC Interval 420 ms    P Axis  degrees    QRS Axis 62 degrees    T Wave Axis 87 degrees   ECG 12 lead    Collection Time: 01/21/25 10:17 PM   Result Value Ref Range    Ventricular Rate 141 BPM    Atrial Rate 141 BPM    SC Interval 176 ms    QRSD Interval 90 ms    QT Interval 276 ms    QTC Interval 422 ms    P Axis  degrees    QRS Axis 67 degrees    T Wave Axis 126 degrees   CBC and differential    Collection Time: 01/21/25 10:41 PM   Result Value Ref Range    WBC 7.46 4.31 - 10.16 Thousand/uL    RBC 4.45 3.88 - 5.62 Million/uL    Hemoglobin 13.9 12.0 - 17.0 g/dL    Hematocrit 40.9 36.5 - 49.3 %    MCV 92 82 - 98 fL    MCH 31.2 26.8 - 34.3 pg    MCHC 34.0 31.4 - 37.4 g/dL    RDW 12.7 11.6 - 15.1 %    MPV 11.0 8.9 - 12.7 fL    Platelets 237 149 - 390 Thousands/uL    nRBC 0 /100 WBCs    Segmented % 62 43 - 75 %    Immature Grans % 0 0 - 2 %    Lymphocytes % 29 14 - 44 %    Monocytes % 7 4 - 12 %    Eosinophils Relative 2 0 - 6 %    Basophils Relative 0 0 - 1 %    Absolute Neutrophils 4.60 1.85 - 7.62 Thousands/µL    Absolute Immature Grans 0.02 0.00 - 0.20 Thousand/uL    Absolute Lymphocytes 2.13 0.60 - 4.47 Thousands/µL    Absolute Monocytes 0.53 0.17 - 1.22 Thousand/µL    Eosinophils Absolute 0.15 0.00 - 0.61 Thousand/µL    Basophils Absolute 0.03 0.00 - 0.10 Thousands/µL   Comprehensive metabolic panel    Collection Time: 01/21/25 10:41 PM   Result Value Ref Range    Sodium 138 135 - 147 mmol/L    Potassium 4.3 3.5 - 5.3 mmol/L    Chloride 104 96 - 108 mmol/L    CO2 25 21 - 32 mmol/L    ANION GAP 9 4 - 13 mmol/L     "BUN 26 (H) 5 - 25 mg/dL    Creatinine 1.30 0.60 - 1.30 mg/dL    Glucose 120 65 - 140 mg/dL    Calcium 8.9 8.4 - 10.2 mg/dL    AST 17 13 - 39 U/L    ALT 12 7 - 52 U/L    Alkaline Phosphatase 43 34 - 104 U/L    Total Protein 7.2 6.4 - 8.4 g/dL    Albumin 4.1 3.5 - 5.0 g/dL    Total Bilirubin 0.36 0.20 - 1.00 mg/dL    eGFR 56 ml/min/1.73sq m   HS Troponin 0hr (reflex protocol)    Collection Time: 01/21/25 10:41 PM   Result Value Ref Range    hs TnI 0hr 5 \"Refer to ACS Flowchart\"- see link ng/L   Magnesium    Collection Time: 01/21/25 10:41 PM   Result Value Ref Range    Magnesium 1.9 1.9 - 2.7 mg/dL   TSH, 3rd generation with Free T4 reflex    Collection Time: 01/21/25 10:41 PM   Result Value Ref Range    TSH 3RD GENERATON 5.333 (H) 0.450 - 4.500 uIU/mL   T4, free    Collection Time: 01/21/25 10:41 PM   Result Value Ref Range    Free T4 0.78 0.61 - 1.12 ng/dL   Hemoglobin A1c w/EAG Estimation (Prechecked if no A1C within 90 days)    Collection Time: 01/21/25 10:41 PM   Result Value Ref Range    Hemoglobin A1C 6.2 (H) Normal 4.0-5.6%; PreDiabetic 5.7-6.4%; Diabetic >=6.5%; Glycemic control for adults with diabetes <7.0% %     mg/dl   ECG 12 lead    Collection Time: 01/21/25 11:16 PM   Result Value Ref Range    Ventricular Rate 53 BPM    Atrial Rate 375 BPM    MS Interval  ms    QRSD Interval 84 ms    QT Interval 432 ms    QTC Interval 405 ms    P Axis  degrees    QRS Axis 70 degrees    T Wave [place] 77 degrees   HS Troponin I 2hr    Collection Time: 01/22/25  1:23 AM   Result Value Ref Range    hs TnI 2hr 7 \"Refer to ACS Flowchart\"- see link ng/L    Delta 2hr hsTnI 2 <20 ng/L   HS Troponin I 4hr    Collection Time: 01/22/25  3:23 AM   Result Value Ref Range    hs TnI 4hr 10 \"Refer to ACS Flowchart\"- see link ng/L    Delta 4hr hsTnI 5 <20 ng/L     * Please Note: Fluency DirectDictation voice to text software may have been used in the creation of this document. **  "

## 2025-01-22 NOTE — DISCHARGE INSTR - AVS FIRST PAGE
Dear Trevon,    Cardiology started you on eliquis 5 mg twice daily and toprol xl 25 mg daily.    Please follow up with cardiology in the outpatient setting.

## 2025-01-22 NOTE — ED ATTENDING ATTESTATION
1/21/2025  I, Jesse Hewitt MD, saw and evaluated the patient. I have discussed the patient with the resident/non-physician practitioner and agree with the resident's/non-physician practitioner's findings, Plan of Care, and MDM as documented in the resident's/non-physician practitioner's note, except where noted. All available labs and Radiology studies were reviewed.  I was present for key portions of any procedure(s) performed by the resident/non-physician practitioner and I was immediately available to provide assistance.       At this point I agree with the current assessment done in the Emergency Department.  I have conducted an independent evaluation of this patient a history and physical is as follows:    ED Course         Critical Care Time  Procedures

## 2025-01-22 NOTE — ASSESSMENT & PLAN NOTE
"Lab Results   Component Value Date    HGBA1C 6.2 (H) 01/21/2025       No results for input(s): \"POCGLU\" in the last 72 hours.    Blood Sugar Average: Last 72 hrs:    Diet-controlled. Reports that he has been exercising, losing weight over the past year.   Monitor accu-checks AC and HS.   SSI coverage.   Hypoglycemia protocol.    "

## 2025-01-22 NOTE — CASE MANAGEMENT
Case Management Discharge Planning Note    Patient name Jean-Pierre Scherer  Location ED-28/ED-28 MRN [id number]  : 1955 Date 2025       Current Admission Date: 2025  Current Admission Diagnosis:Atrial flutter (HCC)   Patient Active Problem List    Diagnosis Date Noted Date Diagnosed    Atrial flutter (HCC) 2025     TSH elevation 2025     Annual physical exam 2024     Bradycardia 2024     Lump of skin 2024     BMI 38.0-38.9,adult 2024     Type 2 diabetes mellitus without complication, without long-term current use of insulin (HCC) 2024     COVID-19 2024     Preoperative evaluation of a medical condition to rule out surgical contraindications (TAR required) 2024     Class 2 severe obesity with serious comorbidity and body mass index (BMI) of 38.0 to 38.9 in adult ([facility]) 2024     Elevated serum creatinine 2024     Weight gain 2024     Asthma 10/31/2023     Bronchitis 2023     Obstructive sleep apnea syndrome 2023     Right foot pain 2023     Chest tightness 2023     SOB (shortness of breath) on exertion 2023     Choking episode 2023     Hearing impairment 10/11/2022     Onychomycosis of toenail 2022     Pain in both knees 2022     Seasonal allergic rhinitis 2022     Lump on finger, left 2022     Mild intermittent asthma 2021     Sleep apnea 2021     Paronychia of left middle finger 2021     Microalbuminuria 2021     History of colon polyps 09/15/2021     Gastroesophageal reflux disease without esophagitis 09/15/2021     Tubular adenoma of colon 2021     Essential hypertension 2021     Mixed hyperlipidemia 2021     Skin rash 2021     Erectile dysfunction after radical prostatectomy 2018     Prostate cancer (HCC) 2012       LOS (days): 0  Geometric Mean LOS (GMLOS) (days):   Days to GMLOS:     OBJECTIVE:             Current admission status: Observation   Preferred Pharmacy:   [facility], [place], [place] - [address]  [address] [facility] [address]  Phone: [phone] Fax: [phone]    [place], [place] - [address] [place] [id number]  Phone: [phone] Fax: [phone]    Primary Care Provider: Rach Kate MD    Primary Insurance: [facility]  Secondary Insurance:     DISCHARGE DETAILS:    Other Referral/Resources/Interventions Provided:  Interventions: Prescription Price Check  Referral Comments: CM consulted for price check. CM contacted the patient's pharmacy:  [facility] for a price check on Eliquis. Pharmacy informed CM that the pt's copay would be $50.00 for the Eliquis. Pharmacy informed CM that the pt's other medication Meoprolol would $8.98. CM notified provider.

## 2025-01-23 ENCOUNTER — VBI (OUTPATIENT)
Dept: INTERNAL MEDICINE CLINIC | Facility: CLINIC | Age: 70
End: 2025-01-23

## 2025-01-23 NOTE — TELEPHONE ENCOUNTER
01/23/25 10:55 AM    Patient contacted post ED visit, [facility] department spoke with patient/caregiver and outreach was successful.    Thank you.  Juan Alvarez MA  [facility]

## 2025-01-27 ENCOUNTER — RESULTS FOLLOW-UP (OUTPATIENT)
Dept: OBGYN CLINIC | Facility: CLINIC | Age: 70
End: 2025-01-27

## 2025-01-27 ENCOUNTER — ANCILLARY ORDERS (OUTPATIENT)
Dept: OBGYN CLINIC | Facility: HOSPITAL | Age: 70
End: 2025-01-27

## 2025-01-27 ENCOUNTER — HOSPITAL ENCOUNTER (OUTPATIENT)
Dept: ULTRASOUND IMAGING | Facility: HOSPITAL | Age: 70
Discharge: HOME | End: 2025-01-27
Payer: COMMERCIAL

## 2025-01-27 DIAGNOSIS — R22.32 FINGER MASS, LEFT: Primary | ICD-10-CM

## 2025-01-27 DIAGNOSIS — R22.32 FINGER MASS, LEFT: ICD-10-CM

## 2025-01-27 DIAGNOSIS — M79.89 SWELLING OF LEFT MIDDLE FINGER: ICD-10-CM

## 2025-01-27 PROCEDURE — 76882 US LMTD JT/FCL EVL NVASC XTR: CPT

## 2025-01-27 NOTE — TELEPHONE ENCOUNTER
Called and discussed ultrasound result of no discrete fluid collection. Recommended therapy and order placed. May see us as needed.

## 2025-02-03 ENCOUNTER — OFFICE VISIT (OUTPATIENT)
Dept: CARDIOLOGY CLINIC | Facility: CLINIC | Age: 70
End: 2025-02-03
Payer: COMMERCIAL

## 2025-02-03 VITALS
DIASTOLIC BLOOD PRESSURE: 76 MMHG | WEIGHT: 289 LBS | HEIGHT: 72 IN | BODY MASS INDEX: 39.14 KG/M2 | HEART RATE: 47 BPM | TEMPERATURE: 97.6 F | OXYGEN SATURATION: 96 % | SYSTOLIC BLOOD PRESSURE: 142 MMHG

## 2025-02-03 DIAGNOSIS — I48.92 ATRIAL FLUTTER, UNSPECIFIED TYPE (HCC): ICD-10-CM

## 2025-02-03 DIAGNOSIS — I10 ESSENTIAL HYPERTENSION: ICD-10-CM

## 2025-02-03 DIAGNOSIS — E78.2 MIXED HYPERLIPIDEMIA: ICD-10-CM

## 2025-02-03 DIAGNOSIS — G47.33 OBSTRUCTIVE SLEEP APNEA SYNDROME: ICD-10-CM

## 2025-02-03 DIAGNOSIS — E11.9 TYPE 2 DIABETES MELLITUS WITHOUT COMPLICATION, WITHOUT LONG-TERM CURRENT USE OF INSULIN (HCC): ICD-10-CM

## 2025-02-03 PROCEDURE — 99214 OFFICE O/P EST MOD 30 MIN: CPT

## 2025-02-03 NOTE — PROGRESS NOTES
Jean-Pierre Tinajeroows  1955  [id number]  [facility] [address] [facility] [address] [phone]    1. Atrial flutter, unspecified type (HCC)  POCT ECG    Ambulatory referral to Cardiac Electrophysiology    Zio Monitor      2. Essential hypertension        3. Mixed hyperlipidemia        4. Obstructive sleep apnea syndrome        5. Type 2 diabetes mellitus without complication, without long-term current use of insulin (HCC)            Summary/Discussion:  Newly diagnosed atrial flutter w/ RVR   - s/p hospital admission from 1/21/2025-1/22/2025 for new onset atrial flutter w/ RVR; subsequently reverted back to NSR early this morning. Cardiology was consulted   he was initiated on metoprolol succinate 25 mg daily and Eliquis 5 mg twice daily for stroke prevention with a [id number] score of 3'  troponin negative x 3, electrolytes stable on BMP, TSH level 5.3/free T4 0.78  - EKG today demonstrates sinus bradycardia, heart rate 46 bpm, nonspecific ST and T wave abnormality  - echo (4/2023): LV wall thickness mildly increased, moderate concentric hypertrophy, LVEF 55 to 60%, wall motion cannot be accurately assessed, G2 DD, normal RV--noted to be a technically difficult study/quality was poor  - today he reports that he has not started Eliquis and/or metoprolol due to him not being interested in long-term medications unless absolutely needed.  Detailed discussion had on risk/benefits of AC given atrial flutter and stroke risk.  We also discussed potential management options including rate control, rhythm control including formal logical interventions in which he seems agreeable to EP evaluation to discuss ablation versus antiarrhythmic therapy-- referral placed today  - due to his baseline bradycardia ok to continue off of metoprolol at this time.  As noted above recommendations would be for Eliquis 5 mg twice daily for stroke prevention in which he declines use of AC  during our visit today  - recommend 2 week zio to 2 assess any recurrent atrial flutter/average heart rate given his bradycardia    Sinus bradycardia:  - hx of   - EKG as noted above  - currently asymptomatic  - reports regular exercise in which he is able to get his heart rates up around the 120s. Heart rates reviewed on his smart watch during our visit today-- ranging from  bpm. Low suspicion for chronotropic incompetence although could consider further evaluation with an exercise stress test in the future if indicated   - he has not started metoprolol as noted above.  Okay to continue to off of metoprolol at this time  - obtain 1 week Zio    Hypertension:  - elevated today, 142/76  reports that he was running late for appointment today in which he was rushing. Prior documented blood pressures stable   - continue present medication regimen  - lifestyle modification   - close blood pressure monitoring     Dyslipidemia:  - lipid profile 8/11/2024; cholesterol 156, triglyceride 154, HDL 42 and LDL calculated at 83  - continue pravastatin 80 mg daily    DM type II:  - A1c 6.2    ROSITA:  - utilizes CPAP at at bedtime       Interval History: Jean-Pierre Scherer is a 69 y.o. year old male with history mentioned in problem list who presents to the office today for a hospital follow up.     Since his hospital follow up he has no significant cardiac complaints. He denies any chest pain/pressure/discomfort or shortness of breath. He denies lower extremity edema, orthopnea, and PND. He denies lightheadedness, dizziness, and syncope. He denies palpitations. He has not received any notifications of recurrent atrial flutter on his smart watch.  His functional capacity remains unchanged and he does workout regularly without any significant cardiac limitations.  He reports that he has not started his metoprolol and/or Eliquis as instructed upon discharge.  He is not keen on long-term medications unless they are absolutely  needed.    He will RTO on 3/19/2025 with Dr. Garcia or sooner if necessary. He will call with any concerns.       Medical Problems       Problem List       Prostate cancer (HCC)    Erectile dysfunction after radical prostatectomy    Essential hypertension    Mixed hyperlipidemia    Skin rash    Tubular adenoma of colon    History of colon polyps    Gastroesophageal reflux disease without esophagitis    Paronychia of left middle finger    Microalbuminuria    Mild intermittent asthma    Sleep apnea    Overview Signed 9/30/2021  9:43 AM by Paula Pizarro MD   Not officially tested         Seasonal allergic rhinitis    Lump on finger, left    Onychomycosis of toenail    Pain in both knees    Hearing impairment    Chest tightness    SOB (shortness of breath) on exertion    Choking episode    Right foot pain    Obstructive sleep apnea syndrome    Bronchitis    Asthma    Weight gain    Preoperative evaluation of a medical condition to rule out surgical contraindications (TAR required)    Class 2 severe obesity with serious comorbidity and body mass index (BMI) of 38.0 to 38.9 in adult (HCC)    Elevated serum creatinine    Type 2 diabetes mellitus without complication, without long-term current use of insulin ([facility])      Lab Results   Component Value Date    HGBA1C 6.2 (H) 01/21/2025         COVID-19    Annual physical exam    Bradycardia    Lump of skin    BMI 38.0-38.9,adult    Atrial flutter (HCC)    TSH elevation        Past Medical History:   Diagnosis Date    Annual physical exam 12/12/2024    Asthma     BMI 40.0-44.9, adult (HCC) 01/11/2022    BMI 40.0-44.9, adult (HCC) 05/09/2024    BMI 45.0-49.9, adult ([facility]) 06/28/2021    Bradycardia 12/12/2024    Bronchitis 09/29/2023    Bronchospasm     Cancer (HCC)     Prostate     Chest tightness 02/17/2023    Choking episode 02/17/2023    Colon polyp     COVID-19 08/12/2024    CPAP (continuous positive airway pressure) dependence     Elevated serum creatinine 05/09/2024     Essential hypertension 2021    GERD (gastroesophageal reflux disease)     Hearing impairment 10/11/2022    Hyperlipidemia     Hypertension     Hypocalcemia 2022    Insomnia     Leg cramps     Lump of skin 2024    Lump on finger, left 2022    Microalbuminuria 2021    Mixed hyperlipidemia 2021    Morbid obesity (HCC) 2022    Nocturia     Numbness of fingers of both hands     Obesity Always    Obstructive sleep apnea syndrome 2023    Onychomycosis of toenail 2022    Pain in both knees 2022    Paronychia of left middle finger 2021    Preoperative evaluation of a medical condition to rule out surgical contraindications (TAR required) 2024    Right foot pain 2023    Seasonal allergic rhinitis 2022    Skin lesion     Skin rash 2021    Sleep apnea     not tested yet    Snoring     Tubular adenoma of colon 2021    Type 2 diabetes mellitus without complication, without long-term current use of insulin (HCC) 2021    Wears glasses     Weight gain      Social History     Socioeconomic History    Marital status: /Civil Union     Spouse name: Not on file    Number of children: Not on file    Years of education: Not on file    Highest education level: Not on file   Occupational History    Occupation: Presently not working   Tobacco Use    Smoking status: Former     Current packs/day: 0.00     Average packs/day: 1.5 packs/day for 16.9 years (25.4 ttl pk-yrs)     Types: Cigarettes     Start date: 1975     Quit date: 1992     Years since quittin.7    Smokeless tobacco: Never   Vaping Use    Vaping status: Never Used   Substance and Sexual Activity    Alcohol use: Yes     Alcohol/week: 1.0 standard drink of alcohol     Types: 1 Glasses of wine per week     Comment: rarely    Drug use: No    Sexual activity: Not Currently     Partners: Female     Birth control/protection: Male Sterilization   Other Topics Concern     Not on file   Social History Narrative    Single-family home    Current work/study status: Full-time    Caffeine use: Coffee, 3 servings/day    Lives with spouse    Former smoker - Inactive 2018    Annual eye exam: Sees 1hr; wears glasses    Annual dental checkup: Sees q6months    PSA: Used to follow Urology    - As per [employer]     Social Drivers of Health     Financial Resource Strain: Not on file   Food Insecurity: No Food Insecurity (2025)    Nursing - Inadequate Food Risk Classification     Worried About Running Out of Food in the Last Year: Never true     Ran Out of Food in the Last Year: Never true     Ran Out of Food in the Last Year: Never true   Transportation Needs: No Transportation Needs (2025)    Nursing - Transportation Risk Classification     Lack of Transportation: Not on file     Lack of Transportation: No   Physical Activity: Not on file   Stress: Not on file   Social Connections: Not on file   Intimate Partner Violence: Unknown (2025)    Nursing IPS     Feels Physically and Emotionally Safe: Not on file     Physically Hurt by Someone: Not on file     Humiliated or Emotionally Abused by Someone: Not on file     Physically Hurt by Someone: No     Hurt or Threatened by Someone: No   Housing Stability: Unknown (2025)    Nursing: Inadequate Housing Risk Classification     Has Housing: Not on file     Worried About Losing Housing: Not on file     Unable to Get Utilities: Not on file     Unable to Pay for Housing in the Last Year: No     Has Housin      Family History   Problem Relation Age of Onset    Brain cancer Mother     Psoriasis Mother     Eczema Mother     Cancer Mother         Brain Tumor    Depression Father     Heart disease Father         Heart Artery blockages    Diabetes Father     Stroke Paternal Grandfather      Past Surgical History:   Procedure Laterality Date    COLONOSCOPY  2017    COLONOSCOPY      HIP SURGERY Bilateral     Hip replacement     IL COLONOSCOPY FLX DX W/COLLJ SPEC WHEN PFRMD N/A 05/09/2017    Procedure: COLONOSCOPY;  Surgeon: Dinesh Bradley MD;  Location: [facility];  Service: Gastroenterology    IL EXCISION GANGLION WRIST DORSAL/VOLAR PRIMARY Left 5/16/2024    Procedure: EXCISION GANGLION CYST LEFT MIDDLE FINGER;  Surgeon: Jorge Coles MD;  Location: [facility];  Service: Orthopedics    PROSTATECTOMY      UPPER GASTROINTESTINAL ENDOSCOPY         Current Outpatient Medications:     albuterol (PROVENTIL HFA,VENTOLIN HFA) 90 mcg/act inhaler, Inhale 2 puffs every 6 (six) hours as needed for wheezing, Disp: , Rfl:     Ascorbic Acid (VITAMIN C PO), Take 1,000 mg by mouth daily, Disp: , Rfl:     betamethasone dipropionate (DIPROSONE) 0.05 % ointment, Apply sparingly  1-2 times/day for up to 2 wks to R forearm rash., Disp: 45 g, Rfl: 0    betamethasone valerate (VALISONE) 0.1 % lotion, Apply topically 2 (two) times a day To the scalp as needed., Disp: 60 mL, Rfl: 3    Cyanocobalamin (VITAMIN B-12 PO), Take 1,000 mcg by mouth daily , Disp: , Rfl:     fexofenadine (ALLEGRA) 180 MG tablet, Take 180 mg by mouth daily as needed, Disp: , Rfl:     ketoconazole (NIZORAL) 2 % cream, Apply topically daily, Disp: 60 g, Rfl: 0    lisinopril-hydrochlorothiazide (PRINZIDE,ZESTORETIC) 20-12.5 MG per tablet, Take 1 tablet by mouth daily, Disp: 90 tablet, Rfl: 1    Multiple Vitamins-Minerals (MULTIVITAL PO), Take 1 tablet by mouth, Disp: , Rfl:     Omega-3 Fatty Acids (fish oil) 1,000 mg, Take 1,000 mg by mouth 2 (two) times a day, Disp: , Rfl:     omeprazole (PriLOSEC) 20 mg delayed release capsule, Take 20 mg by mouth as needed, Disp: , Rfl:     sildenafil (VIAGRA) 50 MG tablet, Take 1 tablet (50 mg total) by mouth daily as needed for erectile dysfunction, Disp: 8 tablet, Rfl: 2    simvastatin (ZOCOR) 40 mg tablet, Take 1 tablet (40 mg total) by mouth daily at bedtime, Disp: 90 tablet, Rfl: 1    triamcinolone (KENALOG) 0.1 % lotion, Apply topically 2 (two)  "times a day, Disp: 60 mL, Rfl: 2    triamcinolone (KENALOG) 0.1 % ointment, Apply topically 2 (two) times a day, Disp: 80 g, Rfl: 0    apixaban (ELIQUIS) 5 mg, Take 1 tablet (5 mg total) by mouth 2 (two) times a day (Patient not taking: Reported on 2/5/2025), Disp: 60 tablet, Rfl: 0    metoprolol succinate (TOPROL-XL) 25 mg 24 hr tablet, Take 1 tablet (25 mg total) by mouth daily (Patient not taking: Reported on 2/5/2025), Disp: 30 tablet, Rfl: 0  Allergies   Allergen Reactions    Penicillins Rash     sensitivity       Labs:     Chemistry        Component Value Date/Time    K 4.3 01/21/2025 2241     01/21/2025 2241    CO2 25 01/21/2025 2241    BUN 26 (H) 01/21/2025 2241    CREATININE 1.30 01/21/2025 2241        Component Value Date/Time    CALCIUM 8.9 01/21/2025 2241    ALKPHOS 43 01/21/2025 2241    AST 17 01/21/2025 2241    ALT 12 01/21/2025 2241            No results found for: \"CHOL\"  Lab Results   Component Value Date    HDL 42 08/11/2024    HDL 44 12/29/2023    HDL 47 05/25/2023     Lab Results   Component Value Date    LDLCALC 83 08/11/2024    LDLCALC 94 12/29/2023    LDLCALC 93 05/25/2023     Lab Results   Component Value Date    TRIG 154 (H) 08/11/2024    TRIG 214 (H) 12/29/2023    TRIG 162 (H) 05/25/2023     No results found for: \"CHOLHDL\"    Imaging: US MSK limited  Result Date: 1/27/2025  Narrative: ULTRASOUND MSK LIMITED TECHNIQUE:   Using a high frequency transducer, the area of clinical concern along the dorsal wall of the third DIP joint was scanned with grayscale and power Doppler imaging INDICATION:   R22.32: Localized swelling, mass and lump, left upper limb. History of ganglion cyst removal. COMPARISON: X-ray 1/21/2025 FINDINGS: Mild soft tissue swelling along the dorsum of the third DIP joint. No localized mass or ganglion cyst. Mild third DIP joint osteoarthritis with small osteophytes, without hyperemia.     Impression: Mild soft tissue swelling along the dorsum of the third DIP joint " without localized mass or ganglion cyst. Workstation performed: [id number]     XR finger left third digit-middle  Result Date: 1/22/2025  Narrative: XR FINGER LEFT THIRD DIGIT-MIDDLE INDICATION: R22.9: Localized swelling, mass and lump, unspecified. COMPARISON: 2/27/2024 For the purposes of institution wide universal language the following terms will apply: (thumb=1st digit/finger, index finger=2nd digit/finger, long finger=3rd digit/finger, ring=4th digit/finger and small finger=5th digit/finger) FINDINGS: No acute fracture or dislocation. No significant degenerative changes. No lytic or blastic osseous lesion. Unremarkable soft tissues.     Impression: No acute osseous abnormality. Computerized Assisted Algorithm (CAA) may have been used to analyze all applicable images. Workstation performed: [id number]       ECG:  sinus bradycardia, heart rate 46 bpm, nonspecific ST and T wave abnormality    Review of Systems   All other systems reviewed and are negative.      Vitals:    02/03/25 1313   BP: 142/76   Pulse: (!) 47   Temp: 97.6 °F (36.4 °C)   SpO2: 96%     Vitals:    02/03/25 1313   Weight: 131 kg (289 lb)     Height: 6' (182.9 cm)   Body mass index is 39.2 kg/m².    Physical Exam  Vitals and nursing note reviewed.   Constitutional:       General: He is not in acute distress.     Appearance: He is not ill-appearing.   HENT:      Head: Normocephalic.      Nose: Nose normal.      Mouth/Throat:      Mouth: Mucous membranes are moist.      Pharynx: Oropharynx is clear.   Cardiovascular:      Rate and Rhythm: Regular rhythm. Bradycardia present.      Heart sounds: No murmur heard.  Pulmonary:      Breath sounds: Decreased breath sounds present.   Musculoskeletal:         General: Normal range of motion.      Cervical back: Normal range of motion.      Right lower leg: Edema (trace) present.      Left lower leg: Edema (trace) present.   Skin:     General: Skin is warm and dry.   Neurological:      Mental Status: He  is alert and oriented to person, place, and time.

## 2025-02-05 PROCEDURE — 93000 ELECTROCARDIOGRAM COMPLETE: CPT

## 2025-03-04 ENCOUNTER — OFFICE VISIT (OUTPATIENT)
Dept: PODIATRY | Facility: CLINIC | Age: 70
End: 2025-03-04
Payer: MEDICARE

## 2025-03-04 VITALS — HEIGHT: 72 IN | WEIGHT: 278 LBS | BODY MASS INDEX: 37.65 KG/M2

## 2025-03-04 DIAGNOSIS — E11.40 TYPE 2 DIABETES MELLITUS WITH DIABETIC NEUROPATHY, UNSPECIFIED WHETHER LONG TERM INSULIN USE (HCC): Primary | ICD-10-CM

## 2025-03-04 DIAGNOSIS — L85.3 XEROSIS OF SKIN: ICD-10-CM

## 2025-03-04 DIAGNOSIS — B35.3 TINEA PEDIS OF BOTH FEET: ICD-10-CM

## 2025-03-04 PROCEDURE — 99213 OFFICE O/P EST LOW 20 MIN: CPT | Performed by: PODIATRIST

## 2025-03-04 RX ORDER — AMMONIUM LACTATE 12 G/100G
CREAM TOPICAL AS NEEDED
Qty: 385 G | Refills: 3 | Status: SHIPPED | OUTPATIENT
Start: 2025-03-04

## 2025-03-04 RX ORDER — KETOCONAZOLE 20 MG/G
CREAM TOPICAL DAILY
Qty: 60 G | Refills: 4 | Status: SHIPPED | OUTPATIENT
Start: 2025-03-04

## 2025-03-04 NOTE — PATIENT INSTRUCTIONS
"Patient Education     Foot care for people with diabetes   The Basics   Written by the doctors and editors at [facility]   Why is foot care important if I have diabetes? -- Diabetes can cause nerve damage if your blood sugar is high for a long time. The medical term for this is \"diabetic neuropathy.\"  If you have problems with the nerves in your feet, you might not be able to feel pain in your foot. Normally, people feel pain when they get a cut or a blister on their foot. The pain tells them that they need to treat their cut so it can heal. But people with nerve damage might not feel any pain when their feet get hurt. They might not even know that they have a cut, so they might not treat it. Problems that aren't treated right away can get much worse. For example, an untreated cut can get infected and turn into an open sore.  High blood sugar can also damage blood vessels and decrease blood flow to your feet. This can weaken your skin and make wounds take longer to heal. You are also more likely to get an infection if you have high blood sugar.  How do I take care of my feet? -- Taking good care of your feet can help prevent foot problems. You should:   Wash your feet every day with soap and warm water. Pat your feet dry, and be sure to dry the skin between your toes.   Keep your feet moisturized. Put lotion on the tops and bottoms of your feet, but not between your toes.   Check your feet every day (figure 1). Look for cuts, blisters, redness, or swelling. Use a mirror, or ask someone to help you check the bottoms of your feet. Check all parts of the foot, especially between the toes. Look for broken skin, ulcers, blisters, or redness.   Trim your toenails straight across when needed (figure 2). Do not cut the corners of your toenails. File rough edges. Do not cut your cuticles. Ask for help if you cannot see well or have problems reaching your feet.   Ask your doctor or nurse to check your feet at each visit. Take " your shoes and socks off for these checks.   See a foot care provider (such as a podiatrist) if you have an ingrown toenail, corn, or callus. Do not try to remove corns and calluses yourself.  How do I protect my feet from injury? -- There are several ways to protect your feet. You can:   Wear shoes and socks at all times, even at home. Do not walk barefoot. Wear swim shoes if you go to the beach or a swimming pool.   Choose shoes that fit well. They should not be not too tight or too loose. Your shoes should have plenty of room for your toes (figure 3). Your doctor might give you a prescription for special shoes. Check to see if they are covered by your insurance.   Check your shoes each time before you put them on to make sure that the lining is smooth. Also check to make sure that there is nothing inside the shoes before putting them on.   Do not wear shoes that expose any part of the foot, like sandals, thongs, or clogs.   Wear cotton socks that fit loosely. Do not wear shoes without socks.   Protect your feet from heat and cold. Test bath water before putting your feet in it to make sure that it is not too hot. Do not walk barefoot on hot ground. Take extra care when going outside in the cold and wear warm socks.  What else should I know? -- You can lower your risk for foot problems by keeping your blood sugar levels as close to your goal as possible. Other things you can do include:   Move your ankles and toes often to help with blood flow. You can wear a support stocking to help with swelling.   Walk often. Regular walking helps blood flow.   If you smoke, try to quit. Your doctor or nurse can help. Smoking causes poor blood flow to your feet and can damage your nerves.  When should I call the doctor? -- Call your doctor or nurse for advice if you have:   A fever of 100.4°F (38°C) or higher, chills, or a wound that will not heal   Swelling, redness, warmth around a wound, a foul smell coming from a wound, or  yellowish, greenish, or bloody discharge   Sores or blisters on your feet that hurt more or less than you would expect   Numbness or tingling in your foot or leg   Corns, calluses, blisters, or new sores on your foot   Very dry, scaly, or cracked skin on your feet   Changes in the way your foot joints or arch look  All topics are updated as new evidence becomes available and our peer review process is complete.  This topic retrieved from [employer] on: Mar 13, 2024.  Topic 415697 Version 2.0  Release: 32.2.4 - C32.71  © 2024 UpToDate, Inc. and/or its affiliates. All rights reserved.  figure 1: Foot check for people with diabetes     People with diabetes should check both of their feet every day. It is important to check your feet all over, including in between your toes. If you can't see the bottom of your foot, use a mirror or ask another person to check for you. Let your doctor or nurse know if you find any:  Redness   Cuts or cracks in the skin   Blisters   Swelling   Graphic 42928 Version 3.0  figure 2: Trim your toenails     Trim your toenails straight across and smooth them with a nail file.  Graphic 85299 Version 2.0  figure 3: Correct shoe shape     Choose shoes that fit the right way and are not too tight or too loose. Your shoes should have plenty of room for your toes.  Graphic 33626 Version 2.0  Consumer Information Use and Disclaimer   Disclaimer: This generalized information is a limited summary of diagnosis, treatment, and/or medication information. It is not meant to be comprehensive and should be used as a tool to help the user understand and/or assess potential diagnostic and treatment options. It does NOT include all information about conditions, treatments, medications, side effects, or risks that may apply to a specific patient. It is not intended to be medical advice or a substitute for the medical advice, diagnosis, or treatment of a health care provider based on the health care provider's  examination and assessment of a patient's specific and unique circumstances. Patients must speak with a health care provider for complete information about their health, medical questions, and treatment options, including any risks or benefits regarding use of medications. This information does not endorse any treatments or medications as safe, effective, or approved for treating a specific patient. UpToDate, Inc. and its affiliates disclaim any warranty or liability relating to this information or the use thereof.The use of this information is governed by the Terms of Use, available at https://www.[employer]uwer.com/en/know/clinical-effectiveness-terms. 2024© UpToDate, Inc. and its affiliates and/or licensors. All rights reserved.  Copyright   © 2024 UpToDate, Inc. and/or its affiliates. All rights reserved.

## 2025-03-04 NOTE — PROGRESS NOTES
PATIENT:  Jean-Pierre Scherer    1955    ASSESSMENT:     1. Type 2 diabetes mellitus with diabetic neuropathy, unspecified whether long term insulin use ([facility])        2. Xerosis of skin  ammonium lactate (LAC-HYDRIN) 12 % cream      3. Tinea pedis of both feet  ketoconazole (NIZORAL) 2 % cream                PLAN:  1.  Reviewed medical records.  Patient was counseled on the condition and diagnosis.    2.  Educated disease prevention and risks related to diabetes.    3.  Educated proper daily foot care and exam.  Instructed proper skin care / protection and footwear.    4.  The recent blood work was reviewed and the last HbA1c was 6.2.  Discussed proper blood glucose control with diet and exercise.    5.  Continue to monitor symptoms of neuropathy.  HPK debrided for courtesy.    6. Rx: Ammonium lactate cream for dry skin.  Renewed Ketoconazole.    7. The patient will return in 6 months for diabetic foot exam.      Subjective:      HPI  The patient presents for diabetic foot evaluation.  The blood glucose is under control.  He has mild numbness and paresthesia in his feet.  No significant neurologic pain.  No dysfunction.  He has callus on right great toe and left lateral foot.  No acute pedal problems.      The following portions of the patient's history were reviewed and updated as appropriate: allergies, current medications, past family history, past medical history, past social history, past surgical history and problem list.  All pertinent labs and images were reviewed.    Past Medical History  Past Medical History:   Diagnosis Date    Annual physical exam 12/12/2024    Asthma     BMI 40.0-44.9, adult ([facility]) 01/11/2022    BMI 40.0-44.9, adult ([facility]) 05/09/2024    BMI 45.0-49.9, adult ([facility]) 06/28/2021    Bradycardia 12/12/2024    Bronchitis 09/29/2023    Bronchospasm     Cancer ([facility])     Prostate     Chest tightness 02/17/2023    Choking episode 02/17/2023    Colon polyp     COVID-19 08/12/2024    CPAP  (continuous positive airway pressure) dependence     Elevated serum creatinine 05/09/2024    Essential hypertension 06/26/2021    GERD (gastroesophageal reflux disease)     Hearing impairment 10/11/2022    Hyperlipidemia     Hypertension     Hypocalcemia 01/11/2022    Insomnia     Leg cramps     Lump of skin 12/12/2024    Lump on finger, left 01/11/2022    Microalbuminuria 09/27/2021    Mixed hyperlipidemia 06/26/2021    Morbid obesity (HCC) 05/26/2022    Nocturia     Numbness of fingers of both hands     Obesity Always    Obstructive sleep apnea syndrome 08/28/2023    Onychomycosis of toenail 05/26/2022    Pain in both knees 05/26/2022    Paronychia of left middle finger 09/27/2021    Preoperative evaluation of a medical condition to rule out surgical contraindications (TAR required) 05/09/2024    Right foot pain 05/26/2023    Seasonal allergic rhinitis 01/11/2022    Skin lesion     Skin rash 06/26/2021    Sleep apnea     not tested yet    Snoring     Tubular adenoma of colon 06/28/2021    Type 2 diabetes mellitus without complication, without long-term current use of insulin (HCC) 06/26/2021    Wears glasses     Weight gain        Past Surgical History  Past Surgical History:   Procedure Laterality Date    COLONOSCOPY  05/09/2017    COLONOSCOPY      HIP SURGERY Bilateral     Hip replacement    OH COLONOSCOPY FLX DX W/COLLJ SPEC WHEN PFRMD N/A 05/09/2017    Procedure: COLONOSCOPY;  Surgeon: Dinesh Bradley MD;  Location: [facility];  Service: Gastroenterology    OH EXCISION GANGLION WRIST DORSAL/VOLAR PRIMARY Left 5/16/2024    Procedure: EXCISION GANGLION CYST LEFT MIDDLE FINGER;  Surgeon: Jorge Coles MD;  Location: [facility];  Service: Orthopedics    PROSTATECTOMY      UPPER GASTROINTESTINAL ENDOSCOPY          Allergies:  Penicillins    Medications:  Current Outpatient Medications   Medication Sig Dispense Refill    albuterol (PROVENTIL HFA,VENTOLIN HFA) 90 mcg/act inhaler Inhale 2 puffs every 6 (six) hours  as needed for wheezing      ammonium lactate (LAC-HYDRIN) 12 % cream Apply topically as needed for dry skin 385 g 3    Ascorbic Acid (VITAMIN C PO) Take 1,000 mg by mouth daily      betamethasone dipropionate (DIPROSONE) 0.05 % ointment Apply sparingly  1-2 times/day for up to 2 wks to R forearm rash. 45 g 0    betamethasone valerate (VALISONE) 0.1 % lotion Apply topically 2 (two) times a day To the scalp as needed. 60 mL 3    Cyanocobalamin (VITAMIN B-12 PO) Take 1,000 mcg by mouth daily       fexofenadine (ALLEGRA) 180 MG tablet Take 180 mg by mouth daily as needed      ketoconazole (NIZORAL) 2 % cream Apply topically daily 60 g 4    lisinopril-hydrochlorothiazide (PRINZIDE,ZESTORETIC) 20-12.5 MG per tablet Take 1 tablet by mouth daily 90 tablet 1    Multiple Vitamins-Minerals (MULTIVITAL PO) Take 1 tablet by mouth      Omega-3 Fatty Acids (fish oil) 1,000 mg Take 1,000 mg by mouth 2 (two) times a day      omeprazole (PriLOSEC) 20 mg delayed release capsule Take 20 mg by mouth as needed      sildenafil (VIAGRA) 50 MG tablet Take 1 tablet (50 mg total) by mouth daily as needed for erectile dysfunction 8 tablet 2    simvastatin (ZOCOR) 40 mg tablet Take 1 tablet (40 mg total) by mouth daily at bedtime 90 tablet 1    triamcinolone (KENALOG) 0.1 % lotion Apply topically 2 (two) times a day 60 mL 2    triamcinolone (KENALOG) 0.1 % ointment Apply topically 2 (two) times a day 80 g 0    apixaban (ELIQUIS) 5 mg Take 1 tablet (5 mg total) by mouth 2 (two) times a day (Patient not taking: Reported on 3/4/2025) 60 tablet 0    metoprolol succinate (TOPROL-XL) 25 mg 24 hr tablet Take 1 tablet (25 mg total) by mouth daily (Patient not taking: Reported on 3/4/2025) 30 tablet 0     No current facility-administered medications for this visit.       Social History:  Social History     Socioeconomic History    Marital status: /Civil Union     Spouse name: None    Number of children: None    Years of education: None    Highest  education level: None   Occupational History    Occupation: Presently not working   Tobacco Use    Smoking status: Former     Current packs/day: 0.00     Average packs/day: 1.5 packs/day for 16.9 years (25.4 ttl pk-yrs)     Types: Cigarettes     Start date: 1975     Quit date: 1992     Years since quittin.8    Smokeless tobacco: Never   Vaping Use    Vaping status: Never Used   Substance and Sexual Activity    Alcohol use: Yes     Alcohol/week: 1.0 standard drink of alcohol     Types: 1 Glasses of wine per week     Comment: rarely    Drug use: No    Sexual activity: Not Currently     Partners: Female     Birth control/protection: Male Sterilization   Other Topics Concern    None   Social History Narrative    Single-family home    Current work/study status: Full-time    Caffeine use: Coffee, 3 servings/day    Lives with spouse    Former smoker - Inactive 2018    Annual eye exam: Sees 1hr; wears glasses    Annual dental checkup: Sees q6months    PSA: Used to follow Urology    - As per [employer]     Social Drivers of Health     Financial Resource Strain: Not on file   Food Insecurity: No Food Insecurity (2025)    Nursing - Inadequate Food Risk Classification     Worried About Running Out of Food in the Last Year: Never true     Ran Out of Food in the Last Year: Never true     Ran Out of Food in the Last Year: Never true   Transportation Needs: No Transportation Needs (2025)    Nursing - Transportation Risk Classification     Lack of Transportation: Not on file     Lack of Transportation: No   Physical Activity: Not on file   Stress: Not on file   Social Connections: Not on file   Intimate Partner Violence: Unknown (2025)    Nursing IPS     Feels Physically and Emotionally Safe: Not on file     Physically Hurt by Someone: Not on file     Humiliated or Emotionally Abused by Someone: Not on file     Physically Hurt by Someone: No     Hurt or Threatened by Someone: No   Housing  Stability: Unknown (2025)    Nursing: Inadequate Housing Risk Classification     Has Housing: Not on file     Worried About Losing Housing: Not on file     Unable to Get Utilities: Not on file     Unable to Pay for Housing in the Last Year: No     Has Housin        Review of Systems   Constitutional:  Negative for appetite change, chills and fever.   Respiratory:  Negative for cough and shortness of breath.    Cardiovascular:  Negative for chest pain.   Gastrointestinal:  Negative for diarrhea, nausea and vomiting.   Musculoskeletal:  Negative for gait problem.   Skin:  Negative for wound.   Neurological:  Positive for numbness. Negative for weakness.         Objective:      Ht 6' (1.829 m)   Wt 126 kg (278 lb)   BMI 37.70 kg/m²          Physical Exam  Vitals reviewed.   Constitutional:       General: He is not in acute distress.     Appearance: He is obese. He is not toxic-appearing.   Cardiovascular:      Rate and Rhythm: Normal rate and regular rhythm.      Pulses: Normal pulses. no weak pulses.           Dorsalis pedis pulses are 2+ on the right side and 2+ on the left side.        Posterior tibial pulses are 2+ on the right side and 2+ on the left side.      Comments: No ischemia.  Pulmonary:      Effort: Pulmonary effort is normal. No respiratory distress.   Musculoskeletal:         General: No swelling, tenderness or signs of injury.      Right foot: No Charcot foot or foot drop.      Left foot: No Charcot foot or foot drop.   Feet:      Right foot:      Protective Sensation: 10 sites tested.  10 sites sensed.      Skin integrity: Callus and dry skin present. No ulcer, skin breakdown or erythema.      Left foot:      Protective Sensation: 10 sites tested.  10 sites sensed.      Skin integrity: Callus and dry skin present. No ulcer, skin breakdown or erythema.   Skin:     General: Skin is warm.      Capillary Refill: Capillary refill takes less than 2 seconds.      Coloration: Skin is not cyanotic  or mottled.      Findings: No abscess, ecchymosis, erythema, rash or wound.      Nails: There is no clubbing.      Comments: Thickening and discoloration of great toenails.  Mild HPK on right hallux IPJ medially and lateral left 5th met head.   Neurological:      General: No focal deficit present.      Mental Status: He is alert and oriented to person, place, and time.      Cranial Nerves: No cranial nerve deficit.      Sensory: No sensory deficit.      Motor: No weakness.      Coordination: Coordination normal.      Gait: Gait normal.   Psychiatric:         Mood and Affect: Mood normal.         Behavior: Behavior normal.         Thought Content: Thought content normal.         Judgment: Judgment normal.         Diabetic Foot Exam    Patient's shoes and socks removed.    Right Foot/Ankle   Right Foot Inspection  Skin Exam: skin intact, dry skin, callus and callus. No erythema, no maceration and no ulcer.     Toe Exam: No swelling, no tenderness, erythema and  no right toe deformity    Sensory   Vibration: diminished  Proprioception: intact  Monofilament testing: intact    Vascular  Capillary refills: < 3 seconds  The right DP pulse is 2+. The right PT pulse is 2+.     Left Foot/Ankle  Left Foot Inspection  Skin Exam: skin intact, dry skin and callus. No erythema, no maceration and no ulcer.     Toe Exam: No swelling, no tenderness, no erythema and no left toe deformity.     Sensory   Vibration: diminished  Proprioception: intact  Monofilament testing: intact    Vascular  Capillary refills: < 3 seconds  The left DP pulse is 2+. The left PT pulse is 2+.     Assign Risk Category  No deformity present  No loss of protective sensation  No weak pulses  Risk: 0

## 2025-03-19 ENCOUNTER — OFFICE VISIT (OUTPATIENT)
Dept: CARDIOLOGY CLINIC | Facility: CLINIC | Age: 70
End: 2025-03-19
Payer: MEDICARE

## 2025-03-19 VITALS
WEIGHT: 279.6 LBS | DIASTOLIC BLOOD PRESSURE: 62 MMHG | OXYGEN SATURATION: 97 % | HEART RATE: 72 BPM | SYSTOLIC BLOOD PRESSURE: 136 MMHG | BODY MASS INDEX: 37.92 KG/M2

## 2025-03-19 DIAGNOSIS — E78.2 MIXED HYPERLIPIDEMIA: ICD-10-CM

## 2025-03-19 DIAGNOSIS — G47.33 OBSTRUCTIVE SLEEP APNEA SYNDROME: ICD-10-CM

## 2025-03-19 DIAGNOSIS — I10 ESSENTIAL HYPERTENSION: ICD-10-CM

## 2025-03-19 DIAGNOSIS — I48.92 ATRIAL FLUTTER, UNSPECIFIED TYPE (HCC): Primary | ICD-10-CM

## 2025-03-19 DIAGNOSIS — R00.1 BRADYCARDIA: ICD-10-CM

## 2025-03-19 DIAGNOSIS — E66.01 CLASS 2 SEVERE OBESITY WITH SERIOUS COMORBIDITY AND BODY MASS INDEX (BMI) OF 38.0 TO 38.9 IN ADULT, UNSPECIFIED OBESITY TYPE (HCC): ICD-10-CM

## 2025-03-19 DIAGNOSIS — E66.812 CLASS 2 SEVERE OBESITY WITH SERIOUS COMORBIDITY AND BODY MASS INDEX (BMI) OF 38.0 TO 38.9 IN ADULT, UNSPECIFIED OBESITY TYPE (HCC): ICD-10-CM

## 2025-03-19 PROCEDURE — 99214 OFFICE O/P EST MOD 30 MIN: CPT | Performed by: INTERNAL MEDICINE

## 2025-03-19 NOTE — PROGRESS NOTES
[facility]    CHIEF COMPLAINT:   Chief Complaint   Patient presents with    Follow-up       HPI:  Jean-Pierre Scherer is a 69 y.o. male with a past medical history of asthma, morbid obesity, hypertension, hyperlipidemia, type 2 diabetes mellitus, severe ROSITA, atrial flutter.    Initially seen in April 2023 for complaints oc chest pain. Briefly, he was seen in the emergency department on 2/17/2023 with complaints of chest pain.  He was seen in his primary care provider's office earlier that day was referred to the emergency department for an abnormal ECG.  Reports difficulty getting up steps because he feels its more physically demanding.  He reports chest tightness and shortness of breath which occurs with exertion. He describes tightness as inability to take in a deep breath rather than a pressure or heaviness sensation.  He feels the symptoms are the same and that his shortness of breath is similar to when he had asthma as a child. Although, he does describe L chest discomfort. He is unable to elaborate further or quantify how long this has been occurring.  Previously he got his weight down to 280 pounds.  He has gained approximately 70 pounds over a couple years time.  He gained 10 pounds throughout this past winter. 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 No designated activity regimen. Social history: Former smoker.   Family history: Father had coronary stents mid to late 50s. Later had coronary bypass.     OV - 5/18/23: He was referred for nuclear stress test and echocardiogram during her last appointment and we reviewed the results today.  Does still complain of shortness of breath and fatigue.  He does snore at night and does not feel refreshed from sleep.  He does take naps when he is able to.  He has been out of blood pressure medication for approximately 1 week and needs to  his prescription.    Interval history: Last seen in the office in May 2023. He was  admitted to the hospital in January 2025 after his watch alerted him the his heart rate was in the 140s.  Found to bee in atrial flutter and placed on cardizem gtt. This was held due to bradycardia. Eliquis was started. He converted to normal sinus rhythm. He was discharged on metoprolol succinate 25 mg and Apixaban 5 mg BID. Outpatient TTE recommended. In follow up on 2/3 - he did not start BB or AC. He was referred to EP and a 2 week cardiac monitor was ordered. Feels well today without acute complaints. Denies visual changes, lightheadedness, syncope, chest pain, palpitations, shortness of breath, orthopnea, edema.    The following portions of the patient's history were reviewed and updated as appropriate: allergies, current medications, past family history, past medical history, past social history, past surgical history, and problem list.    SINCE LAST OV I REVIEWED WITH THE PATIENT THE INTERIM LABS, TEST RESULTS, CONSULTANT(S) NOTES AND PERFORMED AN INTERIM REVIEW OF HISTORY    Past Medical History:   Diagnosis Date    Annual physical exam 12/12/2024    Asthma     BMI 40.0-44.9, adult ([facility]) 01/11/2022    BMI 40.0-44.9, adult ([facility]) 05/09/2024    BMI 45.0-49.9, adult ([facility]) 06/28/2021    Bradycardia 12/12/2024    Bronchitis 09/29/2023    Bronchospasm     Cancer ([facility])     Prostate     Chest tightness 02/17/2023    Choking episode 02/17/2023    Colon polyp     COVID-19 08/12/2024    CPAP (continuous positive airway pressure) dependence     Elevated serum creatinine 05/09/2024    Essential hypertension 06/26/2021    GERD (gastroesophageal reflux disease)     Hearing impairment 10/11/2022    Hyperlipidemia     Hypertension     Hypocalcemia 01/11/2022    Insomnia     Leg cramps     Lump of skin 12/12/2024    Lump on finger, left 01/11/2022    Microalbuminuria 09/27/2021    Mixed hyperlipidemia 06/26/2021    Morbid obesity (HCC) 05/26/2022    Nocturia     Numbness of fingers of both hands     Obesity Always     Obstructive sleep apnea syndrome 2023    Onychomycosis of toenail 2022    Pain in both knees 2022    Paronychia of left middle finger 2021    Preoperative evaluation of a medical condition to rule out surgical contraindications (TAR required) 2024    Right foot pain 2023    Seasonal allergic rhinitis 2022    Skin lesion     Skin rash 2021    Sleep apnea     not tested yet    Snoring     Tubular adenoma of colon 2021    Type 2 diabetes mellitus without complication, without long-term current use of insulin (HCC) 2021    Wears glasses     Weight gain        Past Surgical History:   Procedure Laterality Date    COLONOSCOPY  2017    COLONOSCOPY      HIP SURGERY Bilateral     Hip replacement    AR COLONOSCOPY FLX DX W/COLLJ SPEC WHEN PFRMD N/A 2017    Procedure: COLONOSCOPY;  Surgeon: Dinesh Bradley MD;  Location: [facility];  Service: Gastroenterology    AR EXCISION GANGLION WRIST DORSAL/VOLAR PRIMARY Left 2024    Procedure: EXCISION GANGLION CYST LEFT MIDDLE FINGER;  Surgeon: Jorge Coles MD;  Location: [facility];  Service: Orthopedics    PROSTATECTOMY      UPPER GASTROINTESTINAL ENDOSCOPY         Social History     Socioeconomic History    Marital status: /Civil Union     Spouse name: Not on file    Number of children: Not on file    Years of education: Not on file    Highest education level: Not on file   Occupational History    Occupation: Presently not working   Tobacco Use    Smoking status: Former     Current packs/day: 0.00     Average packs/day: 1.5 packs/day for 16.9 years (25.4 ttl pk-yrs)     Types: Cigarettes     Start date: 1975     Quit date: 1992     Years since quittin.8    Smokeless tobacco: Never   Vaping Use    Vaping status: Never Used   Substance and Sexual Activity    Alcohol use: Yes     Alcohol/week: 1.0 standard drink of alcohol     Types: 1 Glasses of wine per week     Comment: rarely    Drug  use: No    Sexual activity: Not Currently     Partners: Female     Birth control/protection: Male Sterilization   Other Topics Concern    Not on file   Social History Narrative    Single-family home    Current work/study status: Full-time    Caffeine use: Coffee, 3 servings/day    Lives with spouse    Former smoker - Inactive 2018    Annual eye exam: Sees 1hr; wears glasses    Annual dental checkup: Sees q6months    PSA: Used to follow Urology    - As per [employer]     Social Drivers of Health     Financial Resource Strain: Not on file   Food Insecurity: No Food Insecurity (2025)    Nursing - Inadequate Food Risk Classification     Worried About Running Out of Food in the Last Year: Never true     Ran Out of Food in the Last Year: Never true     Ran Out of Food in the Last Year: Never true   Transportation Needs: No Transportation Needs (2025)    Nursing - Transportation Risk Classification     Lack of Transportation: Not on file     Lack of Transportation: No   Physical Activity: Not on file   Stress: Not on file   Social Connections: Not on file   Intimate Partner Violence: Unknown (2025)    Nursing IPS     Feels Physically and Emotionally Safe: Not on file     Physically Hurt by Someone: Not on file     Humiliated or Emotionally Abused by Someone: Not on file     Physically Hurt by Someone: No     Hurt or Threatened by Someone: No   Housing Stability: Unknown (2025)    Nursing: Inadequate Housing Risk Classification     Has Housing: Not on file     Worried About Losing Housing: Not on file     Unable to Get Utilities: Not on file     Unable to Pay for Housing in the Last Year: No     Has Housin       Family History   Problem Relation Age of Onset    Brain cancer Mother     Psoriasis Mother     Eczema Mother     Cancer Mother         Brain Tumor    Depression Father     Heart disease Father         Heart Artery blockages    Diabetes Father     Stroke Paternal Grandfather         Allergies   Allergen Reactions    Penicillins Rash     sensitivity       Current Outpatient Medications   Medication Sig Dispense Refill    albuterol (PROVENTIL HFA,VENTOLIN HFA) 90 mcg/act inhaler Inhale 2 puffs every 6 (six) hours as needed for wheezing      ammonium lactate (LAC-HYDRIN) 12 % cream Apply topically as needed for dry skin 385 g 3    Ascorbic Acid (VITAMIN C PO) Take 1,000 mg by mouth daily      betamethasone dipropionate (DIPROSONE) 0.05 % ointment Apply sparingly  1-2 times/day for up to 2 wks to R forearm rash. 45 g 0    betamethasone valerate (VALISONE) 0.1 % lotion Apply topically 2 (two) times a day To the scalp as needed. 60 mL 3    Cyanocobalamin (VITAMIN B-12 PO) Take 1,000 mcg by mouth daily       fexofenadine (ALLEGRA) 180 MG tablet Take 180 mg by mouth daily as needed      ketoconazole (NIZORAL) 2 % cream Apply topically daily 60 g 4    lisinopril-hydrochlorothiazide (PRINZIDE,ZESTORETIC) 20-12.5 MG per tablet Take 1 tablet by mouth daily 90 tablet 1    Multiple Vitamins-Minerals (MULTIVITAL PO) Take 1 tablet by mouth      Omega-3 Fatty Acids (fish oil) 1,000 mg Take 1,000 mg by mouth 2 (two) times a day      omeprazole (PriLOSEC) 20 mg delayed release capsule Take 20 mg by mouth as needed      sildenafil (VIAGRA) 50 MG tablet Take 1 tablet (50 mg total) by mouth daily as needed for erectile dysfunction 8 tablet 2    simvastatin (ZOCOR) 40 mg tablet Take 1 tablet (40 mg total) by mouth daily at bedtime 90 tablet 1    triamcinolone (KENALOG) 0.1 % ointment Apply topically 2 (two) times a day 80 g 0    apixaban (ELIQUIS) 5 mg Take 1 tablet (5 mg total) by mouth 2 (two) times a day (Patient not taking: Reported on 2/5/2025) 60 tablet 0    metoprolol succinate (TOPROL-XL) 25 mg 24 hr tablet Take 1 tablet (25 mg total) by mouth daily (Patient not taking: Reported on 2/5/2025) 30 tablet 0    triamcinolone (KENALOG) 0.1 % lotion Apply topically 2 (two) times a day (Patient not taking:  Reported on 3/19/2025) 60 mL 2     No current facility-administered medications for this visit.       /62 (BP Location: Left arm, Patient Position: Sitting, Cuff Size: Large)   Pulse 72   Wt 127 kg (279 lb 9.6 oz)   SpO2 97%   BMI 37.92 kg/m²     Review of Systems    Physical Exam  Vitals and nursing note reviewed.   Constitutional:       General: He is not in acute distress.     Appearance: Normal appearance. He is well-developed. He is obese. He is not toxic-appearing.   HENT:      Head: Normocephalic and atraumatic.   Eyes:      General: No scleral icterus.     Conjunctiva/sclera: Conjunctivae normal.   Cardiovascular:      Rate and Rhythm: Normal rate.      Pulses: Normal pulses.      Heart sounds: Normal heart sounds. No murmur heard.     No gallop.   Pulmonary:      Effort: Pulmonary effort is normal. No respiratory distress.      Breath sounds: Normal breath sounds. No wheezing or rales.   Abdominal:      General: There is no distension.      Palpations: Abdomen is soft.      Tenderness: There is no abdominal tenderness. There is no guarding.   Musculoskeletal:      Cervical back: Neck supple.      Right lower leg: No edema.      Left lower leg: No edema.   Skin:     General: Skin is warm and dry.   Neurological:      Mental Status: He is alert.   Psychiatric:         Mood and Affect: Mood normal.          Lab Results   Component Value Date    K 4.3 01/21/2025     01/21/2025    CO2 25 01/21/2025    BUN 26 (H) 01/21/2025    CREATININE 1.30 01/21/2025    CALCIUM 8.9 01/21/2025    ALT 12 01/21/2025    AST 17 01/21/2025       Lab Results   Component Value Date    HDL 42 08/11/2024    LDLCALC 83 08/11/2024    TRIG 154 (H) 08/11/2024       Lab Results   Component Value Date    WBC 7.46 01/21/2025    HGB 13.9 01/21/2025    HCT 40.9 01/21/2025     01/21/2025       Lab Results   Component Value Date     01/21/2025    HGBA1C 6.2 (H) 01/21/2025       Cardiac studies:   Pharm NM stress -  5/3/23: 1 day rest/stress protocol.  Pharmacological stress with regadenoson.  Nondiagnostic stress ECG due to vasodilator stress.  Stress/perfusion ratio 1.04.  Normal perfusion imaging without evidence of ischemia or infarct.    TTE - 4/26/23: Technically difficult study.  Normal left ventricular size with moderately increased wall thickness. Normal systolic function. Wall motion could not be accurately assessed. LVEF 55-60%. Grade 2 DD.  Normal right ventricular cavity size and systolic function.  No significant valvular abnormalities.    ASSESSMENT AND PLAN:  Trevon was seen today for follow-up.    Diagnoses and all orders for this visit:    Atrial flutter, unspecified type (HCC)  -Asymptomatic   -Reviewed CVA risk and recommendations for anticoagulation. He exhibits understanding but declines anticoagulation. Patient had previously been scheduled with EP for consultation - in May 2025.  Currently off beta blocker for history of sinus bradycardia. Zio monitor was ordered to assess for recurrence and for bradycardia - not yet completed.    Bradycardia: History of sinus bradycardia. Asymptomatic. Normal chronotropic competence.    #. Class 2 severe obesity with serious comorbidity and body mass index (BMI) of 38.0 to 38.9 in adult, unspecified obesity type (HCC)  #. Obstructive sleep apnea syndrome  Severe ROSITA on previous sleep study. He is following with Pulmonology. He has reported good compliance with CPAP therapy. We also reviewed how untreated sleep apnea can contribute to atrial arrhythmias including atrial fibrillation and atrial flutter. Weight loss recommended.    #. Mixed hyperlipidemia: Lipid panel from 8/11/24 shows total cholesterol 156, triglycerides 154, HDL 42, LDL 83. Currently on simvastatin 40 mg.

## 2025-03-25 DIAGNOSIS — I10 ESSENTIAL HYPERTENSION: ICD-10-CM

## 2025-03-25 DIAGNOSIS — E78.2 MIXED HYPERLIPIDEMIA: ICD-10-CM

## 2025-03-26 RX ORDER — SIMVASTATIN 40 MG
40 TABLET ORAL
Qty: 90 TABLET | Refills: 1 | Status: SHIPPED | OUTPATIENT
Start: 2025-03-26

## 2025-03-26 RX ORDER — LISINOPRIL AND HYDROCHLOROTHIAZIDE 12.5; 2 MG/1; MG/1
1 TABLET ORAL DAILY
Qty: 90 TABLET | Refills: 1 | Status: SHIPPED | OUTPATIENT
Start: 2025-03-26

## 2025-04-01 ENCOUNTER — OFFICE VISIT (OUTPATIENT)
Dept: INTERNAL MEDICINE CLINIC | Facility: CLINIC | Age: 70
End: 2025-04-01
Payer: MEDICARE

## 2025-04-01 VITALS
DIASTOLIC BLOOD PRESSURE: 78 MMHG | HEART RATE: 74 BPM | BODY MASS INDEX: 38.06 KG/M2 | OXYGEN SATURATION: 96 % | HEIGHT: 72 IN | RESPIRATION RATE: 16 BRPM | SYSTOLIC BLOOD PRESSURE: 134 MMHG | TEMPERATURE: 98.1 F | WEIGHT: 281 LBS

## 2025-04-01 DIAGNOSIS — E78.2 MIXED HYPERLIPIDEMIA: ICD-10-CM

## 2025-04-01 DIAGNOSIS — I48.92 ATRIAL FLUTTER, UNSPECIFIED TYPE (HCC): ICD-10-CM

## 2025-04-01 DIAGNOSIS — E11.9 TYPE 2 DIABETES MELLITUS WITHOUT COMPLICATION, WITHOUT LONG-TERM CURRENT USE OF INSULIN (HCC): ICD-10-CM

## 2025-04-01 DIAGNOSIS — J30.89 SEASONAL ALLERGIC RHINITIS DUE TO OTHER ALLERGIC TRIGGER: ICD-10-CM

## 2025-04-01 DIAGNOSIS — Z00.00 MEDICARE ANNUAL WELLNESS VISIT, INITIAL: Primary | ICD-10-CM

## 2025-04-01 DIAGNOSIS — J45.20 MILD INTERMITTENT ASTHMA WITHOUT COMPLICATION: ICD-10-CM

## 2025-04-01 DIAGNOSIS — I10 ESSENTIAL HYPERTENSION: ICD-10-CM

## 2025-04-01 DIAGNOSIS — G47.33 OBSTRUCTIVE SLEEP APNEA SYNDROME: ICD-10-CM

## 2025-04-01 DIAGNOSIS — L40.8 SEBOPSORIASIS: ICD-10-CM

## 2025-04-01 DIAGNOSIS — K21.9 GASTROESOPHAGEAL REFLUX DISEASE WITHOUT ESOPHAGITIS: ICD-10-CM

## 2025-04-01 DIAGNOSIS — R79.89 TSH ELEVATION: ICD-10-CM

## 2025-04-01 PROCEDURE — G0402 INITIAL PREVENTIVE EXAM: HCPCS | Performed by: INTERNAL MEDICINE

## 2025-04-01 RX ORDER — BETAMETHASONE VALERATE 0.1 %
1 LOTION (ML) TOPICAL AS NEEDED
COMMUNITY

## 2025-04-01 NOTE — ASSESSMENT & PLAN NOTE
Last cholesterol 156, triglyceride 154, HDL 42, LDL 83 so advised to continue present medications and low cholesterol low carbs diet.  Will follow lipid panel.  Orders:  •  Lipid panel; Future  •  TSH, 3rd generation with Free T4 reflex; Future

## 2025-04-01 NOTE — ASSESSMENT & PLAN NOTE
He was recently seen by his cardiologist.  Presently asymptomatic.  Clinically appears to be in normal sinus rhythm.  Orders:  •  Basic metabolic panel; Future

## 2025-04-01 NOTE — ASSESSMENT & PLAN NOTE
Well-controlled.  Occasionally takes albuterol inhaler.  Has been seen and followed by pulmonary.

## 2025-04-01 NOTE — PATIENT INSTRUCTIONS
PT/OT for bedbound patient     Patient was advised to continue present medications. discussed with the patient medications and laboratory data in detail.  Follow-up with me in 3 months or as advised.  If any blood test was ordered please do 1 week prior to next appointment unless advise to get earlier.  If you have any questions please call the office [phone]

## 2025-04-01 NOTE — ASSESSMENT & PLAN NOTE
Controlled.  Advised to continue present medications and low-salt diet.  Orders:  •  Basic metabolic panel; Future

## 2025-04-01 NOTE — ASSESSMENT & PLAN NOTE
Diet controlled.  Has been seen and followed by an endocrinologist.  He has been seeing eye doctor regularly as well as foot doctor.  Lab Results   Component Value Date    HGBA1C 6.2 (H) 01/21/2025       Orders:  •  Basic metabolic panel; Future

## 2025-04-01 NOTE — PROGRESS NOTES
Name: Jean-Pierre Scherer      : 1955      MRN: [id number]  Encounter Provider: Rach Kate MD  Encounter Date: 2025   Encounter department: [facility] INTERNAL MEDICINE    Assessment & Plan  Medicare annual wellness visit, initial  Discussion with the patient about getting updated COVID-19 vaccination but he does not want.  Also advised to get pneumonia vaccination and shingles vaccination.  He will consider taking pneumonia vaccination next visit.  .       Essential hypertension  Controlled.  Advised to continue present medications and low-salt diet.  Orders:  •  Basic metabolic panel; Future    Atrial flutter, unspecified type (HCC)  He was recently seen by his cardiologist.  Presently asymptomatic.  Clinically appears to be in normal sinus rhythm.  Orders:  •  Basic metabolic panel; Future    Mild intermittent asthma without complication  Well-controlled.  Occasionally takes albuterol inhaler.  Has been seen and followed by pulmonary.       Obstructive sleep apnea syndrome  He uses his CPAP regularly.  Has been seen and followed by pulmonary.       Gastroesophageal reflux disease without esophagitis  Occasionally takes omeprazole.  Has been watching diet.       Type 2 diabetes mellitus without complication, without long-term current use of insulin (HCC)  Diet controlled.  Has been seen and followed by an endocrinologist.  He has been seeing eye doctor regularly as well as foot doctor.  Lab Results   Component Value Date    HGBA1C 6.2 (H) 2025       Orders:  •  Basic metabolic panel; Future    Seasonal allergic rhinitis due to other allergic trigger  Takes fexofenadine for allergic rhinitis as needed.       TSH elevation  Last time he had elevated TSH 5.3 in emergency room.  Will follow TSH and check free T4  .  Orders:  •  TSH, 3rd generation with Free T4 reflex; Future    Mixed hyperlipidemia  Last cholesterol 156, triglyceride 154, HDL 42, LDL 83 so advised to  continue present medications and low cholesterol low carbs diet.  Will follow lipid panel.  Orders:  •  Lipid panel; Future  •  TSH, 3rd generation with Free T4 reflex; Future    BMI 38.0-38.9,adult  Patient  was advised to decrease portion size.  Advised to decrease carb, sugar, cholesterol intake.  Advised to exercise 3-5 times per week.  Advised to lose weight.       Sebopsoriasis  He has been seen and followed by dermatologist.          Preventive health issues were discussed with patient, and age appropriate screening tests were ordered as noted in patient's After Visit Summary. Personalized health advice and appropriate referrals for health education or preventive services given if needed, as noted in patient's After Visit Summary.    History of Present Illness     HPI   69-year-old white male patient presents for Medicare welcome visit and follow-up his medical problems.      Current Outpatient Medications:   •  albuterol (PROVENTIL HFA,VENTOLIN HFA) 90 mcg/act inhaler, Inhale 2 puffs every 6 (six) hours as needed for wheezing, Disp: , Rfl:   •  ammonium lactate (LAC-HYDRIN) 12 % cream, Apply topically as needed for dry skin, Disp: 385 g, Rfl: 3  •  Ascorbic Acid (VITAMIN C PO), Take 1,000 mg by mouth daily, Disp: , Rfl:   •  betamethasone valerate (VALISONE) 0.1 % lotion, Apply 1 Application topically if needed, Disp: , Rfl:   •  Cyanocobalamin (VITAMIN B-12 PO), Take 1,000 mcg by mouth daily , Disp: , Rfl:   •  fexofenadine (ALLEGRA) 180 MG tablet, Take 180 mg by mouth daily as needed, Disp: , Rfl:   •  lisinopril-hydrochlorothiazide (PRINZIDE,ZESTORETIC) 20-12.5 MG per tablet, TAKE 1 TABLET BY MOUTH DAILY, Disp: 90 tablet, Rfl: 1  •  Multiple Vitamins-Minerals (MULTIVITAL PO), Take 1 tablet by mouth, Disp: , Rfl:   •  Omega-3 Fatty Acids (fish oil) 1,000 mg, Take 1,000 mg by mouth 2 (two) times a day, Disp: , Rfl:   •  omeprazole (PriLOSEC) 20 mg delayed release capsule, Take 20 mg by mouth as needed,  Disp: , Rfl:   •  sildenafil (VIAGRA) 50 MG tablet, Take 1 tablet (50 mg total) by mouth daily as needed for erectile dysfunction, Disp: 8 tablet, Rfl: 2  •  simvastatin (ZOCOR) 40 mg tablet, TAKE 1 TABLET (40 MG TOTAL) BY MOUTH DAILY AT BEDTIME, Disp: 90 tablet, Rfl: 1  •  apixaban (ELIQUIS) 5 mg, Take 1 tablet (5 mg total) by mouth 2 (two) times a day (Patient not taking: Reported on 2/5/2025), Disp: 60 tablet, Rfl: 0  •  betamethasone dipropionate (DIPROSONE) 0.05 % ointment, Apply sparingly  1-2 times/day for up to 2 wks to R forearm rash. (Patient not taking: Reported on 4/1/2025), Disp: 45 g, Rfl: 0  •  ketoconazole (NIZORAL) 2 % cream, Apply topically daily (Patient not taking: Reported on 4/1/2025), Disp: 60 g, Rfl: 4     Past Medical History:   Diagnosis Date   • Annual physical exam 12/12/2024   • Asthma    • BMI 40.0-44.9, adult ([facility]) 01/11/2022   • BMI 40.0-44.9, adult ([facility]) 05/09/2024   • BMI 45.0-49.9, adult ([facility]) 06/28/2021   • Bradycardia 12/12/2024   • Bronchitis 09/29/2023   • Bronchospasm    • Cancer ([facility])     Prostate    • Chest tightness 02/17/2023   • Choking episode 02/17/2023   • Colon polyp    • COVID-19 08/12/2024   • CPAP (continuous positive airway pressure) dependence    • Elevated serum creatinine 05/09/2024   • Essential hypertension 06/26/2021   • GERD (gastroesophageal reflux disease)    • Hearing impairment 10/11/2022   • Hyperlipidemia    • Hypertension    • Hypocalcemia 01/11/2022   • Insomnia    • Leg cramps    • Lump of skin 12/12/2024   • Lump on finger, left 01/11/2022   • Medicare annual wellness visit, initial 04/01/2025   • Microalbuminuria 09/27/2021   • Mixed hyperlipidemia 06/26/2021   • Morbid obesity (HCC) 05/26/2022   • Nocturia    • Numbness of fingers of both hands    • Obesity Always   • Obstructive sleep apnea syndrome 08/28/2023   • Onychomycosis of toenail 05/26/2022   • Pain in both knees 05/26/2022   • Paronychia of left middle finger 09/27/2021   •  Preoperative evaluation of a medical condition to rule out surgical contraindications (TAR required) 05/09/2024   • Right foot pain 05/26/2023   • Seasonal allergic rhinitis 01/11/2022   • Skin lesion    • Skin rash 06/26/2021   • Sleep apnea     not tested yet   • Snoring    • Tubular adenoma of colon 06/28/2021   • Type 2 diabetes mellitus without complication, without long-term current use of insulin (HCC) 06/26/2021   • Wears glasses    • Weight gain         Patient Care Team:  Rach Kate MD as PCP - General (Internal Medicine)  MD Dinesh Leahy MD as Endoscopist    Review of Systems   Constitutional:  Negative for chills and fever.   HENT:  Negative for congestion, ear pain, hearing loss, nosebleeds, sinus pain, sore throat and trouble swallowing.    Eyes:  Negative for discharge, redness and visual disturbance.   Respiratory:  Negative for cough, chest tightness and shortness of breath.    Cardiovascular:  Negative for chest pain and palpitations.   Gastrointestinal:  Negative for abdominal pain, blood in stool, constipation, diarrhea, nausea and vomiting.   Genitourinary:  Negative for hematuria.   Musculoskeletal:  Negative for arthralgias, myalgias and neck pain.   Skin:  Negative for rash.   Neurological:  Negative for dizziness, speech difficulty, weakness and headaches.   Hematological:  Does not bruise/bleed easily.   Psychiatric/Behavioral:  Negative for agitation and behavioral problems.          Medical History Reviewed by provider this encounter:  Tobacco  Allergies  Meds  Problems  Med Hx  Surg Hx  Fam Hx       Annual Wellness Visit Questionnaire   Jean-Pierre is here for his Subsequent Wellness visit. Last Medicare Wellness visit information reviewed, patient interviewed and updates made to the record today.      Health Risk Assessment:   Patient rates overall health as very good. Patient feels that their physical health rating is same. Patient is very satisfied  with their life. Eyesight was rated as same. Hearing was rated as same. Patient feels that their emotional and mental health rating is same. Patients states they are never, rarely angry. Patient states they are never, rarely unusually tired/fatigued. Pain experienced in the last 7 days has been some. Patient's pain rating has been 4/10. Patient states that he has experienced no weight loss or gain in last 6 months.     Depression Screening:   PHQ-2 Score: 0      Fall Risk Screening:   In the past year, patient has experienced: no history of falling in past year      Home Safety:  Patient does not have trouble with stairs inside or outside of their home. Patient has working smoke alarms and has working carbon monoxide detector. Home safety hazards include: none.     Nutrition:   Current diet is Limited junk food, Low Cholesterol, Low Carb and No Added Salt.     Medications:   Patient is currently taking over-the-counter supplements. OTC medications include: see medication list. Patient is able to manage medications.     Activities of Daily Living (ADLs)/Instrumental Activities of Daily Living (IADLs):   Walk and transfer into and out of bed and chair?: Yes  Dress and groom yourself?: Yes    Bathe or shower yourself?: Yes    Feed yourself? Yes  Do your laundry/housekeeping?: Yes  Manage your money, pay your bills and track your expenses?: Yes  Make your own meals?: Yes    Do your own shopping?: Yes    Previous Hospitalizations:   Any hospitalizations or ED visits within the last 12 months?: Yes    How many hospitalizations have you had in the last year?: 1-2    Advance Care Planning:   Living will: No    Durable POA for healthcare: No    Advanced directive: No    Advanced directive counseling given: Yes    ACP document given: Yes    Patient declined ACP directive: No      Cognitive Screening:   Provider or family/friend/caregiver concerned regarding cognition?: No    PREVENTIVE SCREENINGS      Cardiovascular  Screening:    General: History Lipid Disorder and Screening Current      Diabetes Screening:     General: History Diabetes and Screening Current      Colorectal Cancer Screening:     General: Screening Current      Prostate Cancer Screening:    General: History Prostate Cancer and Screening Current      Abdominal Aortic Aneurysm (AAA) Screening:    Risk factors include: age between 65-74 yo and tobacco use        Lung Cancer Screening:     General: Screening Not Indicated    Screening, Brief Intervention, and Referral to Treatment (SBIRT)     Screening  Typical number of drinks in a day: 0  Typical number of drinks in a week: 0  Interpretation: Low risk drinking behavior.    Single Item Drug Screening:  How often have you used an illegal drug (including marijuana) or a prescription medication for non-medical reasons in the past year? never    Single Item Drug Screen Score: 0  Interpretation: Negative screen for possible drug use disorder    Brief Intervention  Alcohol & drug use screenings were reviewed. No concerns regarding substance use disorder identified.     Other Counseling Topics:   Car/seat belt/driving safety, skin self-exam, sunscreen and calcium and vitamin D intake and regular weightbearing exercise.     Social Drivers of Health     Food Insecurity: No Food Insecurity (4/1/2025)    Hunger Vital Sign    • Worried About Running Out of Food in the Last Year: Never true    • Ran Out of Food in the Last Year: Never true   Transportation Needs: No Transportation Needs (4/1/2025)    PRAPARE - Transportation    • Lack of Transportation (Medical): No    • Lack of Transportation (Non-Medical): No   Housing Stability: Low Risk  (4/1/2025)    Housing Stability Vital Sign    • Unable to Pay for Housing in the Last Year: No    • Number of Times Moved in the Last Year: 0    • Homeless in the Last Year: No   Utilities: Not At Risk (4/1/2025)    [facility] Utilities    • Threatened with loss of utilities: No     No results  found.    Objective   /78 (BP Location: Left arm, Patient Position: Sitting, Cuff Size: Large)   Pulse 74   Temp 98.1 °F (36.7 °C) (Temporal)   Resp 16   Ht 6' (1.829 m)   Wt 127 kg (281 lb)   SpO2 96%   BMI 38.11 kg/m²     Physical Exam  Vitals and nursing note reviewed.   Constitutional:       General: He is not in acute distress.     Appearance: He is well-developed. He is obese.   HENT:      Head: Normocephalic and atraumatic.      Right Ear: Tympanic membrane, ear canal and external ear normal. There is no impacted cerumen.      Left Ear: Tympanic membrane, ear canal and external ear normal.      Nose: Nose normal.      Mouth/Throat:      Mouth: Mucous membranes are moist.      Pharynx: Oropharynx is clear. No oropharyngeal exudate or posterior oropharyngeal erythema.   Eyes:      General: No scleral icterus.        Right eye: No discharge.         Left eye: No discharge.      Extraocular Movements: Extraocular movements intact.      Conjunctiva/sclera: Conjunctivae normal.   Cardiovascular:      Rate and Rhythm: Normal rate and regular rhythm.      Pulses: Normal pulses.      Heart sounds: No murmur heard.  Pulmonary:      Effort: Pulmonary effort is normal. No respiratory distress.      Breath sounds: Normal breath sounds. No wheezing, rhonchi or rales.   Abdominal:      General: Bowel sounds are normal.      Palpations: Abdomen is soft.      Tenderness: There is no abdominal tenderness.   Musculoskeletal:         General: No swelling. Normal range of motion.      Cervical back: Normal range of motion and neck supple.      Right lower leg: No edema.      Left lower leg: No edema.   Skin:     General: Skin is warm and dry.      Capillary Refill: Capillary refill takes less than 2 seconds.   Neurological:      General: No focal deficit present.      Mental Status: He is alert and oriented to person, place, and time.      Motor: No weakness.   Psychiatric:         Mood and Affect: Mood normal.          Behavior: Behavior normal.         Thought Content: Thought content normal.         Judgment: Judgment normal.

## 2025-04-01 NOTE — ASSESSMENT & PLAN NOTE
Discussion with the patient about getting updated COVID-19 vaccination but he does not want.  Also advised to get pneumonia vaccination and shingles vaccination.  He will consider taking pneumonia vaccination next visit.  .

## 2025-05-01 PROBLEM — Z00.00 MEDICARE ANNUAL WELLNESS VISIT, INITIAL: Status: RESOLVED | Noted: 2025-04-01 | Resolved: 2025-05-01

## 2025-05-08 ENCOUNTER — CONSULT (OUTPATIENT)
Dept: CARDIOLOGY CLINIC | Facility: CLINIC | Age: 70
End: 2025-05-08
Payer: MEDICARE

## 2025-05-08 VITALS
DIASTOLIC BLOOD PRESSURE: 66 MMHG | HEART RATE: 64 BPM | SYSTOLIC BLOOD PRESSURE: 130 MMHG | WEIGHT: 282.6 LBS | OXYGEN SATURATION: 97 % | BODY MASS INDEX: 38.33 KG/M2

## 2025-05-08 DIAGNOSIS — I48.92 ATRIAL FLUTTER, UNSPECIFIED TYPE (HCC): ICD-10-CM

## 2025-05-08 DIAGNOSIS — J45.20 MILD INTERMITTENT ASTHMA WITHOUT COMPLICATION: ICD-10-CM

## 2025-05-08 DIAGNOSIS — E78.2 MIXED HYPERLIPIDEMIA: ICD-10-CM

## 2025-05-08 DIAGNOSIS — R00.1 BRADYCARDIA: ICD-10-CM

## 2025-05-08 DIAGNOSIS — I10 ESSENTIAL HYPERTENSION: ICD-10-CM

## 2025-05-08 DIAGNOSIS — E66.812 CLASS 2 SEVERE OBESITY WITH SERIOUS COMORBIDITY AND BODY MASS INDEX (BMI) OF 38.0 TO 38.9 IN ADULT, UNSPECIFIED OBESITY TYPE (HCC): ICD-10-CM

## 2025-05-08 DIAGNOSIS — E11.9 TYPE 2 DIABETES MELLITUS WITHOUT COMPLICATION, WITHOUT LONG-TERM CURRENT USE OF INSULIN (HCC): ICD-10-CM

## 2025-05-08 DIAGNOSIS — D12.6 TUBULAR ADENOMA OF COLON: ICD-10-CM

## 2025-05-08 DIAGNOSIS — E66.01 CLASS 2 SEVERE OBESITY WITH SERIOUS COMORBIDITY AND BODY MASS INDEX (BMI) OF 38.0 TO 38.9 IN ADULT, UNSPECIFIED OBESITY TYPE (HCC): ICD-10-CM

## 2025-05-08 DIAGNOSIS — C61 PROSTATE CANCER (HCC): ICD-10-CM

## 2025-05-08 DIAGNOSIS — G47.33 OBSTRUCTIVE SLEEP APNEA SYNDROME: ICD-10-CM

## 2025-05-08 PROBLEM — G47.30 SLEEP APNEA: Status: RESOLVED | Noted: 2021-09-30 | Resolved: 2025-05-08

## 2025-05-08 PROCEDURE — 93000 ELECTROCARDIOGRAM COMPLETE: CPT | Performed by: INTERNAL MEDICINE

## 2025-05-08 PROCEDURE — 99204 OFFICE O/P NEW MOD 45 MIN: CPT | Performed by: INTERNAL MEDICINE

## 2025-05-08 NOTE — LETTER
May 8, 2025     Rach Kate MD  [address] [place] [address]    Patient: Jean-Pierre Scherer   YOB: 1955   Date of Visit: 5/8/2025       Dear MD Judd Phillips CRNP Jeff J. Barrows Jahangir A Khan, MD:    Thank you for referring Jean-Pierre Scherer to me for evaluation. Below are my notes for this consultation.    If you have questions, please do not hesitate to call me. I look forward to following your patient along with you.         Sincerely,        Will Wheat MD        CC: DEBORAH Hennessy MD Sudip Nanda, MD  5/8/2025  9:53 AM  Sign when Signing Visit   Consultation - Electrophysiology-Cardiology (EP)   Jean-Pierre Scherer 69 y.o. male MRN: [id number]  Unit/Bed#:  Encounter: [id number]      1. Atrial flutter, unspecified type (HCC)  Ambulatory referral to Cardiac Electrophysiology      2. Essential hypertension        3. Mild intermittent asthma without complication        4. Obstructive sleep apnea syndrome        5. Tubular adenoma of colon        6. Type 2 diabetes mellitus without complication, without long-term current use of insulin (HCC)        7. Prostate cancer (HCC)        8. Mixed hyperlipidemia        9. Class 2 severe obesity with serious comorbidity and body mass index (BMI) of 38.0 to 38.9 in adult, unspecified obesity type (HCC)        10. Bradycardia        11. BMI 38.0-38.9,adult              Consults  Physician Requesting Consult: Judd Glover CRNP   Reason for Consult / Principal Problem: AF      Summary of my recommendation for the patient  Patient has so far had 1 episode of atrial flutter with RVR, this was in January 2025    His risk factors include age 69, BMI 38, hypertension    He is voluntarily trying to lose weight and has lost 80 pounds in the last 12 months through diet and exercise    Patient would prefer to wait and see if he has a recurrence    Went over details of  pathophysiology of atrial flutter  Predominantly it is a fixed abnormal circuit  Ablation is class I indication for flutter  Flutter is associated with similar risk of stroke as fibrillation  Patient does have an Apple Watch and monitors his rhythm himself -he was the one who identified that he was an abnormal rhythm and went to ER    Patient was made aware of the major risk factors of flutter with RVR-risk of stroke and risk of heart failure    He prefers to wait and plan for an ablation if he has a recurrence    Advised to continue with weight loss, management of sleep apnea and using his CPAP    He is going to get in touch with me once he has a recurrence              Clinical conditions  Atrial flutter  Long-term anticoagulation  Hypertension  Hyperlipidemia  Type 2 diabetes mellitus  Morbid obesity  Obstructive sleep apnea, severe  Asthma  Former smoker            Assessment & Plan    Atrial flutter  Anticoagulation - not on eliquis     Patient has so far had 1 episode of atrial flutter with RVR, this was in January 2025    His risk factors include age 69, BMI 38, hypertension    He is voluntarily trying to lose weight and has lost 80 pounds in the last 12 months through diet and exercise    Patient would prefer to wait and see if he has a recurrence    Went over details of pathophysiology of atrial flutter  Predominantly it is a fixed abnormal circuit  Ablation is class I indication for flutter  Flutter is associated with similar risk of stroke as fibrillation  Patient does have an Apple Watch and monitors his rhythm himself -he was the one who identified that he was an abnormal rhythm and went to ER    Patient was made aware of the major risk factors of flutter with RVR-risk of stroke and risk of heart failure    He prefers to wait and plan for an ablation if he has a recurrence    Advised to continue with weight loss, management of sleep apnea and using his CPAP    He is going to get in touch with me once he  has a recurrence                        Hypertension  Blood pressure 130/66  Continue with lisinopril, hydrochlorothiazide    Hyperlipidemia  Patient is on simvastatin  History of mild      Type 2 diabetes mellitus  As per him has lost significant amount of weight  HbA1c is in normal range      Morbid obesity  Patient has lost 70-80 pounds of weight in the last year      Obstructive sleep apnea, severe  Uses CPAP      Asthma  Uses inhaler as needed      Former smoker  Stopped over 30 years ago        History of Present Illness  HPI: Jean-Pierre Scherer is a 69 y.o. year old male has been referred to me by Dr Garcia for evaluation and management of atrial flutter    The patient has significant medical illnesses which include  Atrial flutter  Long-term anticoagulation  Hypertension  Hyperlipidemia  Type 2 diabetes mellitus  Morbid obesity  Obstructive sleep apnea, severe  Asthma  Former smoker    Patient has so far had 1 episode of atrial flutter in January 2025  No associated anginal-like chest pain, orthopnea or PND  No new leg swelling  No presyncope or presyncope  Does have palpitations  Was associated with some exertional intolerance      Patient was morbidly obese  He has lost about 70 to 80 pounds in the last 1 year  His blood sugar and blood pressure have improved significantly    He does have severe sleep apnea  He uses a CPAP    He is not abusing tobacco alcohol or energy drinks at this time      Historical Information  Past Medical History:   Diagnosis Date   • Annual physical exam 12/12/2024   • Asthma    • BMI 40.0-44.9, adult ([facility]) 01/11/2022   • BMI 40.0-44.9, adult ([facility]) 05/09/2024   • BMI 45.0-49.9, adult ([facility]) 06/28/2021   • Bradycardia 12/12/2024   • Bronchitis 09/29/2023   • Bronchospasm    • Cancer ([facility])     Prostate    • Chest tightness 02/17/2023   • Choking episode 02/17/2023   • Colon polyp    • COVID-19 08/12/2024   • CPAP (continuous positive airway pressure) dependence    • Elevated serum  creatinine 05/09/2024   • Essential hypertension 06/26/2021   • GERD (gastroesophageal reflux disease)    • Hearing impairment 10/11/2022   • Hyperlipidemia    • Hypertension    • Hypocalcemia 01/11/2022   • Insomnia    • Leg cramps    • Lump of skin 12/12/2024   • Lump on finger, left 01/11/2022   • Medicare annual wellness visit, initial 04/01/2025   • Microalbuminuria 09/27/2021   • Mixed hyperlipidemia 06/26/2021   • Morbid obesity (HCC) 05/26/2022   • Nocturia    • Numbness of fingers of both hands    • Obesity Always   • Obstructive sleep apnea syndrome 08/28/2023   • Onychomycosis of toenail 05/26/2022   • Pain in both knees 05/26/2022   • Paronychia of left middle finger 09/27/2021   • Preoperative evaluation of a medical condition to rule out surgical contraindications (TAR required) 05/09/2024   • Right foot pain 05/26/2023   • Seasonal allergic rhinitis 01/11/2022   • Skin lesion    • Skin rash 06/26/2021   • Sleep apnea     not tested yet   • Snoring    • Tubular adenoma of colon 06/28/2021   • Type 2 diabetes mellitus without complication, without long-term current use of insulin ([facility]) 06/26/2021   • Wears glasses    • Weight gain      Past Surgical History:   Procedure Laterality Date   • COLONOSCOPY  05/09/2017   • COLONOSCOPY     • HIP SURGERY Bilateral     Hip replacement   • AZ COLONOSCOPY FLX DX W/COLLJ SPEC WHEN PFRMD N/A 05/09/2017    Procedure: COLONOSCOPY;  Surgeon: Dinesh Bradley MD;  Location: [facility];  Service: Gastroenterology   • AZ EXCISION GANGLION WRIST DORSAL/VOLAR PRIMARY Left 5/16/2024    Procedure: EXCISION GANGLION CYST LEFT MIDDLE FINGER;  Surgeon: Jorge Coles MD;  Location: [facility];  Service: Orthopedics   • PROSTATECTOMY     • UPPER GASTROINTESTINAL ENDOSCOPY       Social History     Substance and Sexual Activity   Alcohol Use Yes   • Alcohol/week: 1.0 standard drink of alcohol   • Types: 1 Glasses of wine per week    Comment: rarely     Social History     Substance  and Sexual Activity   Drug Use No     Social History     Tobacco Use   Smoking Status Former   • Current packs/day: 0.00   • Average packs/day: 1.5 packs/day for 16.9 years (25.4 ttl pk-yrs)   • Types: Cigarettes   • Start date: 1975   • Quit date: 1992   • Years since quittin.0   Smokeless Tobacco Never     Social History     Socioeconomic History   • Marital status: /Civil Union     Spouse name: Not on file   • Number of children: Not on file   • Years of education: Not on file   • Highest education level: Not on file   Occupational History   • Occupation: Presently not working   Tobacco Use   • Smoking status: Former     Current packs/day: 0.00     Average packs/day: 1.5 packs/day for 16.9 years (25.4 ttl pk-yrs)     Types: Cigarettes     Start date: 1975     Quit date: 1992     Years since quittin.0   • Smokeless tobacco: Never   Vaping Use   • Vaping status: Never Used   Substance and Sexual Activity   • Alcohol use: Yes     Alcohol/week: 1.0 standard drink of alcohol     Types: 1 Glasses of wine per week     Comment: rarely   • Drug use: No   • Sexual activity: Not Currently     Partners: Female     Birth control/protection: Male Sterilization   Other Topics Concern   • Not on file   Social History Narrative    Single-family home    Current work/study status: Full-time    Caffeine use: Coffee, 3 servings/day    Lives with spouse    Former smoker - Inactive 2018    Annual eye exam: Sees 1hr; wears glasses    Annual dental checkup: Sees q6months    PSA: Used to follow Urology    - As per [facility]     Social Drivers of Health     Financial Resource Strain: Not on file   Food Insecurity: No Food Insecurity (2025)    Hunger Vital Sign    • Worried About Running Out of Food in the Last Year: Never true    • Ran Out of Food in the Last Year: Never true   Transportation Needs: No Transportation Needs (2025)    PRAPARE - Transportation    • Lack of Transportation  (Medical): No    • Lack of Transportation (Non-Medical): No   Physical Activity: Not on file   Stress: Not on file   Social Connections: Not on file   Intimate Partner Violence: Unknown (1/22/2025)    Nursing IPS    • Feels Physically and Emotionally Safe: Not on file    • Physically Hurt by Someone: Not on file    • Humiliated or Emotionally Abused by Someone: Not on file    • Physically Hurt by Someone: No    • Hurt or Threatened by Someone: No   Housing Stability: Low Risk  (4/1/2025)    Housing Stability Vital Sign    • Unable to Pay for Housing in the Last Year: No    • Number of Times Moved in the Last Year: 0    • Homeless in the Last Year: No     .  Family History:  Family History   Problem Relation Age of Onset   • Brain cancer Mother    • Psoriasis Mother    • Eczema Mother    • Cancer Mother         Brain Tumor   • Depression Father    • Heart disease Father         Heart Artery blockages   • Diabetes Father    • Stroke Paternal Grandfather          Meds/Allergies   No current facility-administered medications for this visit.     Not in a hospital admission.    Allergies   Allergen Reactions   • Penicillins Rash     sensitivity           Objective  Vitals: Visit Vitals  Wt 128 kg (282 lb 9.6 oz)   BMI 38.33 kg/m²   Smoking Status Former   BSA 2.47 m²      Vitals:    05/08/25 0907   Weight: 128 kg (282 lb 9.6 oz)   [unfilled]    Invasive Devices       None                     ROS  Review of Systems   All other systems reviewed and are negative.  As described in My History of Present Illness        PHYSICAL EXAM  Physical Exam  Vitals reviewed.   Constitutional:       General: He is not in acute distress.     Appearance: Normal appearance. He is obese. He is not ill-appearing.   HENT:      Head: Normocephalic and atraumatic.      Right Ear: External ear normal.      Left Ear: External ear normal.      Nose: Nose normal.      Mouth/Throat:      Comments: Posterior pharynx is crowded  Eyes:      General: No  scleral icterus.     Extraocular Movements: Extraocular movements intact.      Conjunctiva/sclera: Conjunctivae normal.      Pupils: Pupils are equal, round, and reactive to light.   Neck:      Comments: Large thick neck  Cardiovascular:      Rate and Rhythm: Normal rate and regular rhythm.      Heart sounds: Normal heart sounds. No murmur heard.     No gallop.   Pulmonary:      Effort: No respiratory distress.      Breath sounds: Normal breath sounds. No wheezing.   Abdominal:      General: Bowel sounds are normal. There is no distension.      Palpations: Abdomen is soft.      Tenderness: There is no abdominal tenderness. There is no guarding.      Comments: Central obesity present   Musculoskeletal:         General: No swelling or deformity.      Cervical back: Neck supple. No rigidity.   Skin:     Coloration: Skin is not jaundiced.      Findings: No bruising or lesion.   Neurological:      Mental Status: He is alert and oriented to person, place, and time. Mental status is at baseline.      Motor: No weakness.   Psychiatric:         Mood and Affect: Mood normal.         Behavior: Behavior normal.         Thought Content: Thought content normal.         Judgment: Judgment normal.               LAB RESULTS:    CBC:  WBC   Date Value Ref Range Status   01/21/2025 7.46 4.31 - 10.16 Thousand/uL Final     Hemoglobin   Date Value Ref Range Status   01/21/2025 13.9 12.0 - 17.0 g/dL Final     Hematocrit   Date Value Ref Range Status   01/21/2025 40.9 36.5 - 49.3 % Final     MCV   Date Value Ref Range Status   01/21/2025 92 82 - 98 fL Final     Platelets   Date Value Ref Range Status   01/21/2025 237 149 - 390 Thousands/uL Final     RBC   Date Value Ref Range Status   01/21/2025 4.45 3.88 - 5.62 Million/uL Final     MCH   Date Value Ref Range Status   01/21/2025 31.2 26.8 - 34.3 pg Final     MCHC   Date Value Ref Range Status   01/21/2025 34.0 31.4 - 37.4 g/dL Final     RDW   Date Value Ref Range Status   01/21/2025 12.7  "11.6 - 15.1 % Final     MPV   Date Value Ref Range Status   01/21/2025 11.0 8.9 - 12.7 fL Final     nRBC   Date Value Ref Range Status   01/21/2025 0 /100 WBCs Final       CMP:  Potassium   Date Value Ref Range Status   01/21/2025 4.3 3.5 - 5.3 mmol/L Final     Chloride   Date Value Ref Range Status   01/21/2025 104 96 - 108 mmol/L Final     CO2   Date Value Ref Range Status   01/21/2025 25 21 - 32 mmol/L Final     BUN   Date Value Ref Range Status   01/21/2025 26 (H) 5 - 25 mg/dL Final     Creatinine   Date Value Ref Range Status   01/21/2025 1.30 0.60 - 1.30 mg/dL Final     Comment:     Standardized to IDMS reference method     Calcium   Date Value Ref Range Status   01/21/2025 8.9 8.4 - 10.2 mg/dL Final     AST   Date Value Ref Range Status   01/21/2025 17 13 - 39 U/L Final     ALT   Date Value Ref Range Status   01/21/2025 12 7 - 52 U/L Final     Comment:     Specimen collection should occur prior to Sulfasalazine administration due to the potential for falsely depressed results.      Alkaline Phosphatase   Date Value Ref Range Status   01/21/2025 43 34 - 104 U/L Final     eGFR   Date Value Ref Range Status   01/21/2025 56 ml/min/1.73sq m Final        Magnesium:   Magnesium   Date Value Ref Range Status   01/21/2025 1.9 1.9 - 2.7 mg/dL Final        A1C:  Hemoglobin A1C   Date Value Ref Range Status   01/21/2025 6.2 (H) Normal 4.0-5.6%; PreDiabetic 5.7-6.4%; Diabetic >=6.5%; Glycemic control for adults with diabetes <7.0% % Final        TSH:  TSH 3RD GENERATON   Date Value Ref Range Status   01/21/2025 5.333 (H) 0.450 - 4.500 uIU/mL Final     Comment:     Adult TSH (3rd generation) reference range follows the recommended guidelines of the American Thyroid Association, January, 2020.        PT/INR:  No results found for: \"PROTIME\", \"INR\"    Lipid Panel:  Cholesterol   Date Value Ref Range Status   08/11/2024 156 See Comment mg/dL Final     Comment:     Cholesterol:         Pediatric <18 Years        Desirable      "     <170 mg/dL      Borderline High    170-199 mg/dL      High               >=200 mg/dL        Adult >=18 Years            Desirable        <200 mg/dL      Borderline High  200-239 mg/dL      High             >239 mg/dL       Triglycerides   Date Value Ref Range Status   08/11/2024 154 (H) See Comment mg/dL Final     Comment:     Triglyceride:     0-9Y            <75mg/dL     10Y-17Y         <90 mg/dL       >=18Y     Normal          <150 mg/dL     Borderline High 150-199 mg/dL     High            200-499 mg/dL        Very High       >499 mg/dL    Specimen collection should occur prior to Metamizole administration due to the potential for falsely depressed results.     HDL, Direct   Date Value Ref Range Status   08/11/2024 42 >=40 mg/dL Final     Non-HDL-Chol (CHOL-HDL)   Date Value Ref Range Status   08/11/2024 114 mg/dl Final       Troponin:  Troponin I   Date Value Ref Range Status   11/27/2020 <0.02 <=0.04 ng/mL Final     Comment:     [employer] Chemistry analyzer 99% cutoff is > 0.04 ng/mL in network labs     o cTnI 99% cutoff is useful only when applied to patients in the clinical setting of myocardial ischemia   o cTnI 99% cutoff should be interpreted in the context of clinical history, ECG findings and possibly cardiac imaging to establish correct diagnosis.   o cTnI 99% cutoff may be suggestive but clearly not indicative of a coronary event without the clinical setting of myocardial ischemia.           Imaging:    EKG:    (Most recent date)  (Add EKG image)      JONATHAN:  No results found for this or any previous visit.      Echocardiogram:  Results for orders placed during the hospital encounter of 04/26/23    Echo complete w/ contrast if indicated    Interpretation Summary  •  Left Ventricle: Left ventricular cavity size is normal. Wall thickness is moderately increased. There is moderate concentric hypertrophy. The left ventricular ejection fraction is 55-60% based on limited views. Systolic function is normal.  Wall motion cannot be accurately assessed. Diastolic function is moderately abnormal, consistent with grade II (pseudonormal) relaxation.  Left atrial filling pressure is elevated.  •  Right Ventricle: Right ventricular cavity size is normal. Systolic function is normal.  •  Study quality was poor. This was a technically difficult study    No results found for this or any previous visit.      Stress Test:   Results for orders placed during the hospital encounter of 04/26/23    NM myocardial perfusion spect (rx stress and/or rest)    Interpretation Summary  •  Stress ECG: No ST deviation is noted. The ECG was equivocal for ischemia. The stress ECG is equivocal for ischemia after pharmacologic vasodilation.  •  Perfusion: There are no perfusion defects.  •  Stress Function: Left ventricular function post-stress is normal. Post-stress ejection fraction is 70 %.  •  Stress Combined Conclusion: Left ventricular perfusion is normal.    No results found for this or any previous visit.      Cardiac Catheterization:  No results found for this or any previous visit.      HOLTER MONITOR: 24 HOUR/48 HOUR MONITORS  No results found for this or any previous visit.      AMB Extended Holter Monitor: Zio XT/AT or BioTel  No results found for this or any previous visit.        DEVICE CHECK:     No results found for this or any previous visit.         Code Status: [unfilled]  Advance Directive and Living Will:      Power of :    POLST:      Counseling / Coordination of Care  Detailed discussion done with patient with regards to A-flutter management, role of ablation  He wants to wait till he has a recurrence before deciding      Will Wheat MD

## 2025-05-08 NOTE — PROGRESS NOTES
Consultation - Electrophysiology-Cardiology (EP)   Jean-Pierre Scherer 69 y.o. male MRN: [id number]  Unit/Bed#:  Encounter: [id number]      1. Atrial flutter, unspecified type (HCC)  Ambulatory referral to Cardiac Electrophysiology      2. Essential hypertension        3. Mild intermittent asthma without complication        4. Obstructive sleep apnea syndrome        5. Tubular adenoma of colon        6. Type 2 diabetes mellitus without complication, without long-term current use of insulin (HCC)        7. Prostate cancer (HCC)        8. Mixed hyperlipidemia        9. Class 2 severe obesity with serious comorbidity and body mass index (BMI) of 38.0 to 38.9 in adult, unspecified obesity type (HCC)        10. Bradycardia        11. BMI 38.0-38.9,adult              Consults  Physician Requesting Consult: Judd Glover CRNP   Reason for Consult / Principal Problem: AF      Summary of my recommendation for the patient  Patient has so far had 1 episode of atrial flutter with RVR, this was in January 2025    His risk factors include age 69, BMI 38, hypertension    He is voluntarily trying to lose weight and has lost 80 pounds in the last 12 months through diet and exercise    Patient would prefer to wait and see if he has a recurrence    Went over details of pathophysiology of atrial flutter  Predominantly it is a fixed abnormal circuit  Ablation is class I indication for flutter  Flutter is associated with similar risk of stroke as fibrillation  Patient does have an Apple Watch and monitors his rhythm himself -he was the one who identified that he was an abnormal rhythm and went to ER    Patient was made aware of the major risk factors of flutter with RVR-risk of stroke and risk of heart failure    He prefers to wait and plan for an ablation if he has a recurrence    Advised to continue with weight loss, management of sleep apnea and using his CPAP    He is going to get in touch with me once he has a  recurrence              Clinical conditions  Atrial flutter  Long-term anticoagulation  Hypertension  Hyperlipidemia  Type 2 diabetes mellitus  Morbid obesity  Obstructive sleep apnea, severe  Asthma  Former smoker            Assessment & Plan     Atrial flutter  Anticoagulation - not on eliquis     Patient has so far had 1 episode of atrial flutter with RVR, this was in January 2025    His risk factors include age 69, BMI 38, hypertension    He is voluntarily trying to lose weight and has lost 80 pounds in the last 12 months through diet and exercise    Patient would prefer to wait and see if he has a recurrence    Went over details of pathophysiology of atrial flutter  Predominantly it is a fixed abnormal circuit  Ablation is class I indication for flutter  Flutter is associated with similar risk of stroke as fibrillation  Patient does have an Apple Watch and monitors his rhythm himself -he was the one who identified that he was an abnormal rhythm and went to ER    Patient was made aware of the major risk factors of flutter with RVR-risk of stroke and risk of heart failure    He prefers to wait and plan for an ablation if he has a recurrence    Advised to continue with weight loss, management of sleep apnea and using his CPAP    He is going to get in touch with me once he has a recurrence                        Hypertension  Blood pressure 130/66  Continue with lisinopril, hydrochlorothiazide    Hyperlipidemia  Patient is on simvastatin  History of mild      Type 2 diabetes mellitus  As per him has lost significant amount of weight  HbA1c is in normal range      Morbid obesity  Patient has lost 70-80 pounds of weight in the last year      Obstructive sleep apnea, severe  Uses CPAP      Asthma  Uses inhaler as needed      Former smoker  Stopped over 30 years ago        History of Present Illness   HPI: Jean-Pierre Scherer is a 69 y.o. year old male has been referred to me by Dr Garcia for evaluation and management of  atrial flutter    The patient has significant medical illnesses which include  Atrial flutter  Long-term anticoagulation  Hypertension  Hyperlipidemia  Type 2 diabetes mellitus  Morbid obesity  Obstructive sleep apnea, severe  Asthma  Former smoker    Patient has so far had 1 episode of atrial flutter in January 2025  No associated anginal-like chest pain, orthopnea or PND  No new leg swelling  No presyncope or presyncope  Does have palpitations  Was associated with some exertional intolerance      Patient was morbidly obese  He has lost about 70 to 80 pounds in the last 1 year  His blood sugar and blood pressure have improved significantly    He does have severe sleep apnea  He uses a CPAP    He is not abusing tobacco alcohol or energy drinks at this time      Historical Information   Past Medical History:   Diagnosis Date    Annual physical exam 12/12/2024    Asthma     BMI 40.0-44.9, adult ([facility]) 01/11/2022    BMI 40.0-44.9, adult ([facility]) 05/09/2024    BMI 45.0-49.9, adult ([facility]) 06/28/2021    Bradycardia 12/12/2024    Bronchitis 09/29/2023    Bronchospasm     Cancer ([facility])     Prostate     Chest tightness 02/17/2023    Choking episode 02/17/2023    Colon polyp     COVID-19 08/12/2024    CPAP (continuous positive airway pressure) dependence     Elevated serum creatinine 05/09/2024    Essential hypertension 06/26/2021    GERD (gastroesophageal reflux disease)     Hearing impairment 10/11/2022    Hyperlipidemia     Hypertension     Hypocalcemia 01/11/2022    Insomnia     Leg cramps     Lump of skin 12/12/2024    Lump on finger, left 01/11/2022    Medicare annual wellness visit, initial 04/01/2025    Microalbuminuria 09/27/2021    Mixed hyperlipidemia 06/26/2021    Morbid obesity (HCC) 05/26/2022    Nocturia     Numbness of fingers of both hands     Obesity Always    Obstructive sleep apnea syndrome 08/28/2023    Onychomycosis of toenail 05/26/2022    Pain in both knees 05/26/2022    Paronychia of left middle finger  2021    Preoperative evaluation of a medical condition to rule out surgical contraindications (TAR required) 2024    Right foot pain 2023    Seasonal allergic rhinitis 2022    Skin lesion     Skin rash 2021    Sleep apnea     not tested yet    Snoring     Tubular adenoma of colon 2021    Type 2 diabetes mellitus without complication, without long-term current use of insulin (HCC) 2021    Wears glasses     Weight gain      Past Surgical History:   Procedure Laterality Date    COLONOSCOPY  2017    COLONOSCOPY      HIP SURGERY Bilateral     Hip replacement    LA COLONOSCOPY FLX DX W/COLLJ SPEC WHEN PFRMD N/A 2017    Procedure: COLONOSCOPY;  Surgeon: Dinesh Bradley MD;  Location: [facility];  Service: Gastroenterology    LA EXCISION GANGLION WRIST DORSAL/VOLAR PRIMARY Left 2024    Procedure: EXCISION GANGLION CYST LEFT MIDDLE FINGER;  Surgeon: Jorge Coles MD;  Location: [facility];  Service: Orthopedics    PROSTATECTOMY      UPPER GASTROINTESTINAL ENDOSCOPY       Social History     Substance and Sexual Activity   Alcohol Use Yes    Alcohol/week: 1.0 standard drink of alcohol    Types: 1 Glasses of wine per week    Comment: rarely     Social History     Substance and Sexual Activity   Drug Use No     Social History     Tobacco Use   Smoking Status Former    Current packs/day: 0.00    Average packs/day: 1.5 packs/day for 16.9 years (25.4 ttl pk-yrs)    Types: Cigarettes    Start date: 1975    Quit date: 1992    Years since quittin.0   Smokeless Tobacco Never     Social History     Socioeconomic History    Marital status: /Civil Union     Spouse name: Not on file    Number of children: Not on file    Years of education: Not on file    Highest education level: Not on file   Occupational History    Occupation: Presently not working   Tobacco Use    Smoking status: Former     Current packs/day: 0.00     Average packs/day: 1.5 packs/day for  16.9 years (25.4 ttl pk-yrs)     Types: Cigarettes     Start date: 1975     Quit date: 1992     Years since quittin.0    Smokeless tobacco: Never   Vaping Use    Vaping status: Never Used   Substance and Sexual Activity    Alcohol use: Yes     Alcohol/week: 1.0 standard drink of alcohol     Types: 1 Glasses of wine per week     Comment: rarely    Drug use: No    Sexual activity: Not Currently     Partners: Female     Birth control/protection: Male Sterilization   Other Topics Concern    Not on file   Social History Narrative    Single-family home    Current work/study status: Full-time    Caffeine use: Coffee, 3 servings/day    Lives with spouse    Former smoker - Inactive 2018    Annual eye exam: Sees 1hr; wears glasses    Annual dental checkup: Sees q6months    PSA: Used to follow Urology    - As per [employer]     Social Drivers of Health     Financial Resource Strain: Not on file   Food Insecurity: No Food Insecurity (2025)    Hunger Vital Sign     Worried About Running Out of Food in the Last Year: Never true     Ran Out of Food in the Last Year: Never true   Transportation Needs: No Transportation Needs (2025)    PRAPARE - Transportation     Lack of Transportation (Medical): No     Lack of Transportation (Non-Medical): No   Physical Activity: Not on file   Stress: Not on file   Social Connections: Not on file   Intimate Partner Violence: Unknown (2025)    Nursing IPS     Feels Physically and Emotionally Safe: Not on file     Physically Hurt by Someone: Not on file     Humiliated or Emotionally Abused by Someone: Not on file     Physically Hurt by Someone: No     Hurt or Threatened by Someone: No   Housing Stability: Low Risk  (2025)    Housing Stability Vital Sign     Unable to Pay for Housing in the Last Year: No     Number of Times Moved in the Last Year: 0     Homeless in the Last Year: No     .  Family History:  Family History   Problem Relation Age of Onset    Brain  cancer Mother     Psoriasis Mother     Eczema Mother     Cancer Mother         Brain Tumor    Depression Father     Heart disease Father         Heart Artery blockages    Diabetes Father     Stroke Paternal Grandfather          Meds/Allergies    No current facility-administered medications for this visit.     Not in a hospital admission.    Allergies   Allergen Reactions    Penicillins Rash     sensitivity           Objective   Vitals: Visit Vitals  Wt 128 kg (282 lb 9.6 oz)   BMI 38.33 kg/m²   Smoking Status Former   BSA 2.47 m²      Vitals:    05/08/25 0907   Weight: 128 kg (282 lb 9.6 oz)   [unfilled]    Invasive Devices       None                     ROS  Review of Systems   All other systems reviewed and are negative.  As described in My History of Present Illness        PHYSICAL EXAM  Physical Exam  Vitals reviewed.   Constitutional:       General: He is not in acute distress.     Appearance: Normal appearance. He is obese. He is not ill-appearing.   HENT:      Head: Normocephalic and atraumatic.      Right Ear: External ear normal.      Left Ear: External ear normal.      Nose: Nose normal.      Mouth/Throat:      Comments: Posterior pharynx is crowded  Eyes:      General: No scleral icterus.     Extraocular Movements: Extraocular movements intact.      Conjunctiva/sclera: Conjunctivae normal.      Pupils: Pupils are equal, round, and reactive to light.   Neck:      Comments: Large thick neck  Cardiovascular:      Rate and Rhythm: Normal rate and regular rhythm.      Heart sounds: Normal heart sounds. No murmur heard.     No gallop.   Pulmonary:      Effort: No respiratory distress.      Breath sounds: Normal breath sounds. No wheezing.   Abdominal:      General: Bowel sounds are normal. There is no distension.      Palpations: Abdomen is soft.      Tenderness: There is no abdominal tenderness. There is no guarding.      Comments: Central obesity present   Musculoskeletal:         General: No swelling or  deformity.      Cervical back: Neck supple. No rigidity.   Skin:     Coloration: Skin is not jaundiced.      Findings: No bruising or lesion.   Neurological:      Mental Status: He is alert and oriented to person, place, and time. Mental status is at baseline.      Motor: No weakness.   Psychiatric:         Mood and Affect: Mood normal.         Behavior: Behavior normal.         Thought Content: Thought content normal.         Judgment: Judgment normal.               LAB RESULTS:    CBC:  WBC   Date Value Ref Range Status   01/21/2025 7.46 4.31 - 10.16 Thousand/uL Final     Hemoglobin   Date Value Ref Range Status   01/21/2025 13.9 12.0 - 17.0 g/dL Final     Hematocrit   Date Value Ref Range Status   01/21/2025 40.9 36.5 - 49.3 % Final     MCV   Date Value Ref Range Status   01/21/2025 92 82 - 98 fL Final     Platelets   Date Value Ref Range Status   01/21/2025 237 149 - 390 Thousands/uL Final     RBC   Date Value Ref Range Status   01/21/2025 4.45 3.88 - 5.62 Million/uL Final     MCH   Date Value Ref Range Status   01/21/2025 31.2 26.8 - 34.3 pg Final     MCHC   Date Value Ref Range Status   01/21/2025 34.0 31.4 - 37.4 g/dL Final     RDW   Date Value Ref Range Status   01/21/2025 12.7 11.6 - 15.1 % Final     MPV   Date Value Ref Range Status   01/21/2025 11.0 8.9 - 12.7 fL Final     nRBC   Date Value Ref Range Status   01/21/2025 0 /100 WBCs Final       CMP:  Potassium   Date Value Ref Range Status   01/21/2025 4.3 3.5 - 5.3 mmol/L Final     Chloride   Date Value Ref Range Status   01/21/2025 104 96 - 108 mmol/L Final     CO2   Date Value Ref Range Status   01/21/2025 25 21 - 32 mmol/L Final     BUN   Date Value Ref Range Status   01/21/2025 26 (H) 5 - 25 mg/dL Final     Creatinine   Date Value Ref Range Status   01/21/2025 1.30 0.60 - 1.30 mg/dL Final     Comment:     Standardized to IDMS reference method     Calcium   Date Value Ref Range Status   01/21/2025 8.9 8.4 - 10.2 mg/dL Final     AST   Date Value Ref  "Range Status   01/21/2025 17 13 - 39 U/L Final     ALT   Date Value Ref Range Status   01/21/2025 12 7 - 52 U/L Final     Comment:     Specimen collection should occur prior to Sulfasalazine administration due to the potential for falsely depressed results.      Alkaline Phosphatase   Date Value Ref Range Status   01/21/2025 43 34 - 104 U/L Final     eGFR   Date Value Ref Range Status   01/21/2025 56 ml/min/1.73sq m Final        Magnesium:   Magnesium   Date Value Ref Range Status   01/21/2025 1.9 1.9 - 2.7 mg/dL Final        A1C:  Hemoglobin A1C   Date Value Ref Range Status   01/21/2025 6.2 (H) Normal 4.0-5.6%; PreDiabetic 5.7-6.4%; Diabetic >=6.5%; Glycemic control for adults with diabetes <7.0% % Final        TSH:  TSH 3RD GENERATON   Date Value Ref Range Status   01/21/2025 5.333 (H) 0.450 - 4.500 uIU/mL Final     Comment:     Adult TSH (3rd generation) reference range follows the recommended guidelines of the [employer], January, 2020.        PT/INR:  No results found for: \"PROTIME\", \"INR\"    Lipid Panel:  Cholesterol   Date Value Ref Range Status   08/11/2024 156 See Comment mg/dL Final     Comment:     Cholesterol:         Pediatric <18 Years        Desirable          <170 mg/dL      Borderline High    170-199 mg/dL      High               >=200 mg/dL        Adult >=18 Years            Desirable        <200 mg/dL      Borderline High  200-239 mg/dL      High             >239 mg/dL       Triglycerides   Date Value Ref Range Status   08/11/2024 154 (H) See Comment mg/dL Final     Comment:     Triglyceride:     0-9Y            <75mg/dL     10Y-17Y         <90 mg/dL       >=18Y     Normal          <150 mg/dL     Borderline High 150-199 mg/dL     High            200-499 mg/dL        Very High       >499 mg/dL    Specimen collection should occur prior to Metamizole administration due to the potential for falsely depressed results.     HDL, Direct   Date Value Ref Range Status   08/11/2024 42 >=40 " mg/dL Final     Non-HDL-Chol (CHOL-HDL)   Date Value Ref Range Status   08/11/2024 114 mg/dl Final       Troponin:  Troponin I   Date Value Ref Range Status   11/27/2020 <0.02 <=0.04 ng/mL Final     Comment:     [employer] Chemistry analyzer 99% cutoff is > 0.04 ng/mL in network labs     o cTnI 99% cutoff is useful only when applied to patients in the clinical setting of myocardial ischemia   o cTnI 99% cutoff should be interpreted in the context of clinical history, ECG findings and possibly cardiac imaging to establish correct diagnosis.   o cTnI 99% cutoff may be suggestive but clearly not indicative of a coronary event without the clinical setting of myocardial ischemia.           Imaging:    EKG:    (Most recent date)  (Add EKG image)      JONATHAN:  No results found for this or any previous visit.      Echocardiogram:  Results for orders placed during the hospital encounter of 04/26/23    Echo complete w/ contrast if indicated    Interpretation Summary    Left Ventricle: Left ventricular cavity size is normal. Wall thickness is moderately increased. There is moderate concentric hypertrophy. The left ventricular ejection fraction is 55-60% based on limited views. Systolic function is normal. Wall motion cannot be accurately assessed. Diastolic function is moderately abnormal, consistent with grade II (pseudonormal) relaxation.  Left atrial filling pressure is elevated.    Right Ventricle: Right ventricular cavity size is normal. Systolic function is normal.    Study quality was poor. This was a technically difficult study    No results found for this or any previous visit.      Stress Test:   Results for orders placed during the hospital encounter of 04/26/23    NM myocardial perfusion spect (rx stress and/or rest)    Interpretation Summary    Stress ECG: No ST deviation is noted. The ECG was equivocal for ischemia. The stress ECG is equivocal for ischemia after pharmacologic vasodilation.    Perfusion: There are no  perfusion defects.    Stress Function: Left ventricular function post-stress is normal. Post-stress ejection fraction is 70 %.    Stress Combined Conclusion: Left ventricular perfusion is normal.    No results found for this or any previous visit.      Cardiac Catheterization:  No results found for this or any previous visit.      HOLTER MONITOR: 24 HOUR/48 HOUR MONITORS  No results found for this or any previous visit.      AMB Extended Holter Monitor: Zio XT/AT or BioTel  No results found for this or any previous visit.        DEVICE CHECK:     No results found for this or any previous visit.         Code Status: [unfilled]  Advance Directive and Living Will:      Power of :    POLST:      Counseling / Coordination of Care  Detailed discussion done with patient with regards to A-flutter management, role of ablation  He wants to wait till he has a recurrence before deciding      Will Wheat MD

## 2025-07-07 ENCOUNTER — TELEPHONE (OUTPATIENT)
Age: 70
End: 2025-07-07

## 2025-07-07 NOTE — TELEPHONE ENCOUNTER
Caller: patient    Doctor: Dr. terry    Reason for call: Offered some open hand appts. No appt made at this time    Call back#: n/a

## 2025-07-23 DIAGNOSIS — E78.2 MIXED HYPERLIPIDEMIA: ICD-10-CM

## 2025-07-23 DIAGNOSIS — I10 ESSENTIAL HYPERTENSION: ICD-10-CM

## 2025-07-23 DIAGNOSIS — N52.31 ERECTILE DYSFUNCTION AFTER RADICAL PROSTATECTOMY: ICD-10-CM

## 2025-07-24 RX ORDER — SIMVASTATIN 40 MG
40 TABLET ORAL
Qty: 90 TABLET | Refills: 1 | Status: SHIPPED | OUTPATIENT
Start: 2025-07-24

## 2025-07-24 RX ORDER — SILDENAFIL 50 MG/1
50 TABLET, FILM COATED ORAL DAILY PRN
Qty: 8 TABLET | Refills: 5 | Status: SHIPPED | OUTPATIENT
Start: 2025-07-24

## 2025-07-24 RX ORDER — LISINOPRIL AND HYDROCHLOROTHIAZIDE 12.5; 2 MG/1; MG/1
1 TABLET ORAL DAILY
Qty: 90 TABLET | Refills: 1 | Status: SHIPPED | OUTPATIENT
Start: 2025-07-24

## 2025-08-02 ENCOUNTER — APPOINTMENT (OUTPATIENT)
Dept: LAB | Facility: CLINIC | Age: 70
End: 2025-08-02
Attending: INTERNAL MEDICINE
Payer: MEDICARE

## 2025-08-02 DIAGNOSIS — I48.92 ATRIAL FLUTTER, UNSPECIFIED TYPE (HCC): ICD-10-CM

## 2025-08-02 DIAGNOSIS — R79.89 TSH ELEVATION: ICD-10-CM

## 2025-08-02 DIAGNOSIS — E78.2 MIXED HYPERLIPIDEMIA: ICD-10-CM

## 2025-08-02 DIAGNOSIS — E11.9 TYPE 2 DIABETES MELLITUS WITHOUT COMPLICATION, WITHOUT LONG-TERM CURRENT USE OF INSULIN (HCC): ICD-10-CM

## 2025-08-02 DIAGNOSIS — I10 ESSENTIAL HYPERTENSION: ICD-10-CM

## 2025-08-02 LAB
ANION GAP SERPL CALCULATED.3IONS-SCNC: 7 MMOL/L (ref 4–13)
BUN SERPL-MCNC: 22 MG/DL (ref 5–25)
CALCIUM SERPL-MCNC: 8.8 MG/DL (ref 8.4–10.2)
CHLORIDE SERPL-SCNC: 102 MMOL/L (ref 96–108)
CHOLEST SERPL-MCNC: 172 MG/DL (ref ?–200)
CO2 SERPL-SCNC: 29 MMOL/L (ref 21–32)
CREAT SERPL-MCNC: 1.33 MG/DL (ref 0.6–1.3)
GFR SERPL CREATININE-BSD FRML MDRD: 54 ML/MIN/1.73SQ M
GLUCOSE P FAST SERPL-MCNC: 107 MG/DL (ref 65–99)
HDLC SERPL-MCNC: 50 MG/DL
LDLC SERPL CALC-MCNC: 98 MG/DL (ref 0–100)
NONHDLC SERPL-MCNC: 122 MG/DL
POTASSIUM SERPL-SCNC: 4.6 MMOL/L (ref 3.5–5.3)
SODIUM SERPL-SCNC: 138 MMOL/L (ref 135–147)
TRIGL SERPL-MCNC: 118 MG/DL (ref ?–150)
TSH SERPL DL<=0.05 MIU/L-ACNC: 3.38 UIU/ML (ref 0.45–4.5)

## 2025-08-02 PROCEDURE — 80048 BASIC METABOLIC PNL TOTAL CA: CPT

## 2025-08-02 PROCEDURE — 80061 LIPID PANEL: CPT

## 2025-08-02 PROCEDURE — 84443 ASSAY THYROID STIM HORMONE: CPT

## 2025-08-02 PROCEDURE — 36415 COLL VENOUS BLD VENIPUNCTURE: CPT

## 2025-08-04 ENCOUNTER — OFFICE VISIT (OUTPATIENT)
Dept: INTERNAL MEDICINE CLINIC | Facility: CLINIC | Age: 70
End: 2025-08-04
Payer: MEDICARE

## 2025-08-04 VITALS
RESPIRATION RATE: 18 BRPM | HEART RATE: 82 BPM | WEIGHT: 281 LBS | OXYGEN SATURATION: 98 % | BODY MASS INDEX: 38.06 KG/M2 | TEMPERATURE: 98.1 F | HEIGHT: 72 IN | SYSTOLIC BLOOD PRESSURE: 130 MMHG | DIASTOLIC BLOOD PRESSURE: 76 MMHG

## 2025-08-04 DIAGNOSIS — E78.2 MIXED HYPERLIPIDEMIA: ICD-10-CM

## 2025-08-04 DIAGNOSIS — M25.511 CHRONIC RIGHT SHOULDER PAIN: ICD-10-CM

## 2025-08-04 DIAGNOSIS — C61 PROSTATE CANCER (HCC): ICD-10-CM

## 2025-08-04 DIAGNOSIS — N18.31 STAGE 3A CHRONIC KIDNEY DISEASE (HCC): ICD-10-CM

## 2025-08-04 DIAGNOSIS — E11.9 TYPE 2 DIABETES MELLITUS WITHOUT COMPLICATION, WITHOUT LONG-TERM CURRENT USE OF INSULIN (HCC): ICD-10-CM

## 2025-08-04 DIAGNOSIS — R21 SKIN RASH: ICD-10-CM

## 2025-08-04 DIAGNOSIS — G89.29 CHRONIC RIGHT SHOULDER PAIN: ICD-10-CM

## 2025-08-04 DIAGNOSIS — N52.8 OTHER MALE ERECTILE DYSFUNCTION: ICD-10-CM

## 2025-08-04 DIAGNOSIS — G47.33 OBSTRUCTIVE SLEEP APNEA SYNDROME: ICD-10-CM

## 2025-08-04 DIAGNOSIS — I10 ESSENTIAL HYPERTENSION: ICD-10-CM

## 2025-08-04 DIAGNOSIS — R07.89 OTHER CHEST PAIN: Primary | ICD-10-CM

## 2025-08-04 PROCEDURE — 93000 ELECTROCARDIOGRAM COMPLETE: CPT | Performed by: INTERNAL MEDICINE

## 2025-08-04 PROCEDURE — 99214 OFFICE O/P EST MOD 30 MIN: CPT | Performed by: INTERNAL MEDICINE

## 2025-08-04 PROCEDURE — G2211 COMPLEX E/M VISIT ADD ON: HCPCS | Performed by: INTERNAL MEDICINE

## 2025-08-07 ENCOUNTER — HOSPITAL ENCOUNTER (OUTPATIENT)
Dept: RADIOLOGY | Facility: HOSPITAL | Age: 70
End: 2025-08-07
Attending: ORTHOPAEDIC SURGERY
Payer: MEDICARE

## 2025-08-07 ENCOUNTER — TELEPHONE (OUTPATIENT)
Dept: OBGYN CLINIC | Facility: CLINIC | Age: 70
End: 2025-08-07

## 2025-08-08 ENCOUNTER — HOSPITAL ENCOUNTER (OUTPATIENT)
Dept: ULTRASOUND IMAGING | Facility: HOSPITAL | Age: 70
Discharge: HOME | End: 2025-08-08
Payer: MEDICARE

## 2025-08-08 ENCOUNTER — OFFICE VISIT (OUTPATIENT)
Dept: PULMONOLOGY | Facility: CLINIC | Age: 70
End: 2025-08-08
Payer: MEDICARE

## 2025-08-08 VITALS
HEART RATE: 58 BPM | HEIGHT: 72 IN | BODY MASS INDEX: 38.06 KG/M2 | OXYGEN SATURATION: 97 % | DIASTOLIC BLOOD PRESSURE: 64 MMHG | WEIGHT: 281 LBS | SYSTOLIC BLOOD PRESSURE: 120 MMHG | TEMPERATURE: 97.8 F

## 2025-08-08 DIAGNOSIS — I10 ESSENTIAL HYPERTENSION: ICD-10-CM

## 2025-08-08 DIAGNOSIS — G47.33 OBSTRUCTIVE SLEEP APNEA SYNDROME: Primary | ICD-10-CM

## 2025-08-08 DIAGNOSIS — J45.20 MILD INTERMITTENT ASTHMA WITHOUT COMPLICATION: ICD-10-CM

## 2025-08-08 DIAGNOSIS — E66.01 CLASS 2 SEVERE OBESITY DUE TO EXCESS CALORIES WITH SERIOUS COMORBIDITY AND BODY MASS INDEX (BMI) OF 38.0 TO 38.9 IN ADULT (HCC): ICD-10-CM

## 2025-08-08 DIAGNOSIS — E66.812 CLASS 2 SEVERE OBESITY DUE TO EXCESS CALORIES WITH SERIOUS COMORBIDITY AND BODY MASS INDEX (BMI) OF 38.0 TO 38.9 IN ADULT (HCC): ICD-10-CM

## 2025-08-08 DIAGNOSIS — N18.31 STAGE 3A CHRONIC KIDNEY DISEASE (HCC): ICD-10-CM

## 2025-08-08 PROCEDURE — 76775 US EXAM ABDO BACK WALL LIM: CPT

## 2025-08-08 PROCEDURE — 99214 OFFICE O/P EST MOD 30 MIN: CPT | Performed by: INTERNAL MEDICINE

## 2025-08-08 PROCEDURE — G2211 COMPLEX E/M VISIT ADD ON: HCPCS | Performed by: INTERNAL MEDICINE

## 2025-08-19 DIAGNOSIS — I10 ESSENTIAL HYPERTENSION: Primary | ICD-10-CM

## 2025-08-19 DIAGNOSIS — N18.31 STAGE 3A CHRONIC KIDNEY DISEASE (HCC): ICD-10-CM

## (undated) DEVICE — GLOVE SRG BIOGEL ECLIPSE 7

## (undated) DEVICE — SPONGE SCRUB 4 PCT CHLORHEXIDINE

## (undated) DEVICE — GLOVE INDICATOR PI UNDERGLOVE SZ 7 BLUE

## (undated) DEVICE — CUFF TOURNIQUET 18 X 4 IN QUICK CONNECT DISP 1 BLADDER

## (undated) DEVICE — GLOVE SRG BIOGEL 6.5

## (undated) DEVICE — INTENDED FOR TISSUE SEPARATION, AND OTHER PROCEDURES THAT REQUIRE A SHARP SURGICAL BLADE TO PUNCTURE OR CUT.: Brand: BARD-PARKER ® CARBON RIB-BACK BLADES

## (undated) DEVICE — OCCLUSIVE GAUZE STRIP,3% BISMUTH TRIBROMOPHENATE IN PETROLATUM BLEND: Brand: XEROFORM

## (undated) DEVICE — SUT ETHILON 3-0 PS-1 18 IN 1663G

## (undated) DEVICE — KERLIX BANDAGE ROLL: Brand: KERLIX

## (undated) DEVICE — DISPOSABLE EQUIPMENT COVER: Brand: SMALL TOWEL DRAPE

## (undated) DEVICE — ACE WRAP 2 IN UNSTERILE

## (undated) DEVICE — GAUZE SPONGES,16 PLY: Brand: CURITY

## (undated) DEVICE — PADDING CAST 2IN COTTON NON STERILE

## (undated) DEVICE — STERILE BETHLEHEM PLASTIC HAND: Brand: CARDINAL HEALTH